# Patient Record
Sex: FEMALE | Race: WHITE | Employment: OTHER | URBAN - METROPOLITAN AREA
[De-identification: names, ages, dates, MRNs, and addresses within clinical notes are randomized per-mention and may not be internally consistent; named-entity substitution may affect disease eponyms.]

---

## 2017-07-18 ENCOUNTER — HOSPITAL ENCOUNTER (OUTPATIENT)
Dept: CT IMAGING | Age: 57
Discharge: HOME | End: 2017-07-18

## 2017-07-18 ENCOUNTER — HOSPITAL ENCOUNTER (OUTPATIENT)
Dept: OTHER | Age: 57
Discharge: HOME | End: 2017-07-18
Attending: INTERNAL MEDICINE | Admitting: INTERNAL MEDICINE

## 2017-07-18 VITALS — WEIGHT: 258 LBS | BODY MASS INDEX: 44.05 KG/M2 | HEIGHT: 64 IN

## 2017-07-18 DIAGNOSIS — C50.911: ICD-10-CM

## 2017-07-18 DIAGNOSIS — C79.51: ICD-10-CM

## 2017-07-18 RX ORDER — FLUDEOXYGLUCOSE F 18 200 MCI/ML
16.68 INJECTION, SOLUTION INTRAVENOUS
Status: COMPLETED | OUTPATIENT
Start: 2017-07-18 | End: 2017-07-18

## 2017-07-18 RX ADMIN — FLUDEOXYGLUCOSE F 18 16.68 MILLICURIE: 200 INJECTION, SOLUTION INTRAVENOUS at 08:57

## 2017-11-07 ENCOUNTER — HOSPITAL ENCOUNTER (OUTPATIENT)
Dept: OTHER | Age: 57
Discharge: HOME | End: 2017-11-07
Attending: INTERNAL MEDICINE | Admitting: INTERNAL MEDICINE

## 2017-11-07 VITALS — HEIGHT: 65 IN | BODY MASS INDEX: 43.82 KG/M2 | WEIGHT: 263 LBS

## 2017-11-07 DIAGNOSIS — C50.011 CANCER OF NIPPLE OF RIGHT BREAST (HCC): ICD-10-CM

## 2017-11-07 RX ORDER — FLUDEOXYGLUCOSE F 18 200 MCI/ML
15.47 INJECTION, SOLUTION INTRAVENOUS
Status: COMPLETED | OUTPATIENT
Start: 2017-11-07 | End: 2017-11-07

## 2017-11-07 RX ADMIN — FLUDEOXYGLUCOSE F 18 15.47 MILLICURIE: 200 INJECTION, SOLUTION INTRAVENOUS at 08:02

## 2018-03-13 ENCOUNTER — HOSPITAL ENCOUNTER (OUTPATIENT)
Dept: OTHER | Age: 58
Discharge: HOME | End: 2018-03-13
Attending: INTERNAL MEDICINE | Admitting: INTERNAL MEDICINE

## 2018-03-13 VITALS — WEIGHT: 264 LBS | HEIGHT: 64 IN | BODY MASS INDEX: 45.07 KG/M2

## 2018-03-13 DIAGNOSIS — C50.511 MALIGNANT NEOPLASM OF LOWER-OUTER QUADRANT OF RIGHT FEMALE BREAST, UNSPECIFIED ESTROGEN RECEPTOR STATUS (HCC): ICD-10-CM

## 2018-03-13 DIAGNOSIS — C50.011: ICD-10-CM

## 2018-03-13 RX ORDER — FLUDEOXYGLUCOSE F 18 200 MCI/ML
16 INJECTION, SOLUTION INTRAVENOUS
Status: COMPLETED | OUTPATIENT
Start: 2018-03-13 | End: 2018-03-13

## 2018-03-13 RX ADMIN — FLUDEOXYGLUCOSE F 18 16 MILLICURIE: 200 INJECTION, SOLUTION INTRAVENOUS at 09:59

## 2018-06-12 ENCOUNTER — HOSPITAL ENCOUNTER (OUTPATIENT)
Dept: OTHER | Age: 58
Discharge: HOME | End: 2018-06-12
Attending: INTERNAL MEDICINE | Admitting: INTERNAL MEDICINE

## 2018-06-12 DIAGNOSIS — C79.52 SECONDARY MALIGNANCY OF BONE MARROW (HCC): ICD-10-CM

## 2018-06-12 DIAGNOSIS — C79.51 BONE METASTASIS (HCC): ICD-10-CM

## 2018-06-12 RX ORDER — FLUDEOXYGLUCOSE F 18 200 MCI/ML
13.9 INJECTION, SOLUTION INTRAVENOUS
Status: COMPLETED | OUTPATIENT
Start: 2018-06-12 | End: 2018-06-12

## 2018-06-12 RX ADMIN — FLUDEOXYGLUCOSE F 18 13.9 MILLICURIE: 200 INJECTION, SOLUTION INTRAVENOUS at 09:00

## 2018-11-06 ENCOUNTER — HOSPITAL ENCOUNTER (OUTPATIENT)
Dept: PET IMAGING | Age: 58
Discharge: HOME | End: 2018-11-06
Payer: MEDICARE

## 2018-11-06 VITALS — HEIGHT: 63 IN | WEIGHT: 245 LBS | BODY MASS INDEX: 43.41 KG/M2

## 2018-11-06 DIAGNOSIS — C50.111 MALIGNANT NEOPLASM OF CENTRAL PORTION OF RIGHT FEMALE BREAST, UNSPECIFIED ESTROGEN RECEPTOR STATUS (HCC): ICD-10-CM

## 2018-11-06 PROCEDURE — 3430000000 HC RX DIAGNOSTIC RADIOPHARMACEUTICAL: Performed by: INTERNAL MEDICINE

## 2018-11-06 PROCEDURE — 78815 PET IMAGE W/CT SKULL-THIGH: CPT

## 2018-11-06 PROCEDURE — A9552 F18 FDG: HCPCS | Performed by: INTERNAL MEDICINE

## 2018-11-06 RX ORDER — FLUDEOXYGLUCOSE F 18 200 MCI/ML
13.74 INJECTION, SOLUTION INTRAVENOUS
Status: COMPLETED | OUTPATIENT
Start: 2018-11-06 | End: 2018-11-06

## 2018-11-06 RX ADMIN — FLUDEOXYGLUCOSE F 18 13.74 MILLICURIE: 200 INJECTION, SOLUTION INTRAVENOUS at 09:18

## 2019-04-09 ENCOUNTER — HOSPITAL ENCOUNTER (OUTPATIENT)
Age: 59
Discharge: HOME | End: 2019-04-09
Payer: MEDICARE

## 2019-04-09 ENCOUNTER — HOSPITAL ENCOUNTER (OUTPATIENT)
Dept: PET IMAGING | Age: 59
Discharge: HOME | End: 2019-04-09
Payer: MEDICARE

## 2019-04-09 DIAGNOSIS — C50.811 MALIGNANT NEOPLASM OF OVERLAPPING SITES OF RIGHT FEMALE BREAST, UNSPECIFIED ESTROGEN RECEPTOR STATUS (HCC): ICD-10-CM

## 2019-04-09 PROCEDURE — 78815 PET IMAGE W/CT SKULL-THIGH: CPT

## 2019-04-09 PROCEDURE — 3430000000 HC RX DIAGNOSTIC RADIOPHARMACEUTICAL: Performed by: INTERNAL MEDICINE

## 2019-04-09 PROCEDURE — A9552 F18 FDG: HCPCS | Performed by: INTERNAL MEDICINE

## 2019-04-09 RX ORDER — FLUDEOXYGLUCOSE F 18 200 MCI/ML
12.22 INJECTION, SOLUTION INTRAVENOUS
Status: COMPLETED | OUTPATIENT
Start: 2019-04-09 | End: 2019-04-09

## 2019-04-09 RX ADMIN — FLUDEOXYGLUCOSE F 18 12.22 MILLICURIE: 200 INJECTION, SOLUTION INTRAVENOUS at 08:22

## 2019-06-18 ENCOUNTER — APPOINTMENT (OUTPATIENT)
Dept: GENERAL RADIOLOGY | Age: 59
End: 2019-06-18
Payer: MEDICARE

## 2019-06-18 ENCOUNTER — HOSPITAL ENCOUNTER (EMERGENCY)
Age: 59
Discharge: HOME | End: 2019-06-19
Attending: EMERGENCY MEDICINE
Payer: MEDICARE

## 2019-06-18 ENCOUNTER — APPOINTMENT (OUTPATIENT)
Dept: CT IMAGING | Age: 59
End: 2019-06-18
Payer: MEDICARE

## 2019-06-18 VITALS
DIASTOLIC BLOOD PRESSURE: 77 MMHG | WEIGHT: 241 LBS | BODY MASS INDEX: 41.15 KG/M2 | HEIGHT: 64 IN | RESPIRATION RATE: 18 BRPM | SYSTOLIC BLOOD PRESSURE: 198 MMHG | OXYGEN SATURATION: 98 % | TEMPERATURE: 98.1 F | HEART RATE: 70 BPM

## 2019-06-18 DIAGNOSIS — R07.89 ATYPICAL CHEST PAIN: Primary | ICD-10-CM

## 2019-06-18 LAB
A/G RATIO: 1.1 (ref 1.1–2.2)
ALBUMIN SERPL-MCNC: 4.1 G/DL (ref 3.4–5)
ALP BLD-CCNC: 58 U/L (ref 40–129)
ALT SERPL-CCNC: 16 U/L (ref 10–40)
ANION GAP SERPL CALCULATED.3IONS-SCNC: 11 MMOL/L (ref 3–16)
APTT: 26.1 SEC (ref 26–36)
AST SERPL-CCNC: 21 U/L (ref 15–37)
BASOPHILS ABSOLUTE: 0 K/UL (ref 0–0.2)
BASOPHILS RELATIVE PERCENT: 2.2 %
BILIRUB SERPL-MCNC: 0.7 MG/DL (ref 0–1)
BUN BLDV-MCNC: 14 MG/DL (ref 7–20)
CALCIUM SERPL-MCNC: 9.6 MG/DL (ref 8.3–10.6)
CHLORIDE BLD-SCNC: 100 MMOL/L (ref 99–110)
CO2: 30 MMOL/L (ref 21–32)
CREAT SERPL-MCNC: 1.1 MG/DL (ref 0.6–1.2)
D DIMER: 310 NG/ML DDU (ref 0–229)
EOSINOPHILS ABSOLUTE: 0.2 K/UL (ref 0–0.6)
EOSINOPHILS RELATIVE PERCENT: 9.5 %
GFR AFRICAN AMERICAN: >60
GFR NON-AFRICAN AMERICAN: 51
GLOBULIN: 3.7 G/DL
GLUCOSE BLD-MCNC: 132 MG/DL (ref 70–99)
HCT VFR BLD CALC: 36.2 % (ref 36–48)
HEMOGLOBIN: 12.4 G/DL (ref 12–16)
INR BLD: 1.08 (ref 0.86–1.14)
LYMPHOCYTES ABSOLUTE: 0.3 K/UL (ref 1–5.1)
LYMPHOCYTES RELATIVE PERCENT: 11.9 %
MCH RBC QN AUTO: 33.8 PG (ref 26–34)
MCHC RBC AUTO-ENTMCNC: 34.3 G/DL (ref 31–36)
MCV RBC AUTO: 98.6 FL (ref 80–100)
MONOCYTES ABSOLUTE: 0.1 K/UL (ref 0–1.3)
MONOCYTES RELATIVE PERCENT: 6.3 %
NEUTROPHILS ABSOLUTE: 1.6 K/UL (ref 1.7–7.7)
NEUTROPHILS RELATIVE PERCENT: 70.1 %
PDW BLD-RTO: 15.6 % (ref 12.4–15.4)
PLATELET # BLD: 134 K/UL (ref 135–450)
PMV BLD AUTO: 8.2 FL (ref 5–10.5)
POTASSIUM SERPL-SCNC: 3.8 MMOL/L (ref 3.5–5.1)
PROTHROMBIN TIME: 12.3 SEC (ref 9.8–13)
RBC # BLD: 3.67 M/UL (ref 4–5.2)
SODIUM BLD-SCNC: 141 MMOL/L (ref 136–145)
TOTAL PROTEIN: 7.8 G/DL (ref 6.4–8.2)
TROPONIN: <0.01 NG/ML
TROPONIN: <0.01 NG/ML
WBC # BLD: 2.3 K/UL (ref 4–11)

## 2019-06-18 PROCEDURE — 99285 EMERGENCY DEPT VISIT HI MDM: CPT

## 2019-06-18 PROCEDURE — 96374 THER/PROPH/DIAG INJ IV PUSH: CPT

## 2019-06-18 PROCEDURE — 71260 CT THORAX DX C+: CPT

## 2019-06-18 PROCEDURE — 85379 FIBRIN DEGRADATION QUANT: CPT

## 2019-06-18 PROCEDURE — 80053 COMPREHEN METABOLIC PANEL: CPT

## 2019-06-18 PROCEDURE — 36415 COLL VENOUS BLD VENIPUNCTURE: CPT

## 2019-06-18 PROCEDURE — 6360000004 HC RX CONTRAST MEDICATION: Performed by: EMERGENCY MEDICINE

## 2019-06-18 PROCEDURE — 85025 COMPLETE CBC W/AUTO DIFF WBC: CPT

## 2019-06-18 PROCEDURE — 6370000000 HC RX 637 (ALT 250 FOR IP): Performed by: EMERGENCY MEDICINE

## 2019-06-18 PROCEDURE — 6360000002 HC RX W HCPCS: Performed by: EMERGENCY MEDICINE

## 2019-06-18 PROCEDURE — 85730 THROMBOPLASTIN TIME PARTIAL: CPT

## 2019-06-18 PROCEDURE — 85610 PROTHROMBIN TIME: CPT

## 2019-06-18 PROCEDURE — 93005 ELECTROCARDIOGRAM TRACING: CPT | Performed by: EMERGENCY MEDICINE

## 2019-06-18 PROCEDURE — 71045 X-RAY EXAM CHEST 1 VIEW: CPT

## 2019-06-18 PROCEDURE — 84484 ASSAY OF TROPONIN QUANT: CPT

## 2019-06-18 RX ORDER — DICYCLOMINE HYDROCHLORIDE 10 MG/1
10 CAPSULE ORAL ONCE
Status: COMPLETED | OUTPATIENT
Start: 2019-06-18 | End: 2019-06-18

## 2019-06-18 RX ORDER — MORPHINE SULFATE 4 MG/ML
4 INJECTION, SOLUTION INTRAMUSCULAR; INTRAVENOUS ONCE
Status: COMPLETED | OUTPATIENT
Start: 2019-06-18 | End: 2019-06-18

## 2019-06-18 RX ORDER — ASPIRIN 81 MG/1
81 TABLET, CHEWABLE ORAL ONCE
Status: DISCONTINUED | OUTPATIENT
Start: 2019-06-18 | End: 2019-06-19 | Stop reason: HOSPADM

## 2019-06-18 RX ORDER — PANTOPRAZOLE SODIUM 40 MG/1
40 TABLET, DELAYED RELEASE ORAL ONCE
Status: COMPLETED | OUTPATIENT
Start: 2019-06-18 | End: 2019-06-18

## 2019-06-18 RX ADMIN — DICYCLOMINE HYDROCHLORIDE 10 MG: 10 CAPSULE ORAL at 21:28

## 2019-06-18 RX ADMIN — IOPAMIDOL 75 ML: 755 INJECTION, SOLUTION INTRAVENOUS at 21:59

## 2019-06-18 RX ADMIN — LIDOCAINE HYDROCHLORIDE: 20 SOLUTION ORAL; TOPICAL at 21:30

## 2019-06-18 RX ADMIN — PANTOPRAZOLE SODIUM 40 MG: 40 TABLET, DELAYED RELEASE ORAL at 21:28

## 2019-06-18 RX ADMIN — MORPHINE SULFATE 4 MG: 4 INJECTION INTRAVENOUS at 23:34

## 2019-06-18 ASSESSMENT — PAIN DESCRIPTION - ORIENTATION: ORIENTATION: LEFT

## 2019-06-18 ASSESSMENT — ENCOUNTER SYMPTOMS
ABDOMINAL PAIN: 0
VOICE CHANGE: 0
VOMITING: 0
TROUBLE SWALLOWING: 0
SHORTNESS OF BREATH: 0
COLOR CHANGE: 0
WHEEZING: 0
FACIAL SWELLING: 0
STRIDOR: 0
NAUSEA: 0

## 2019-06-18 ASSESSMENT — PAIN SCALES - GENERAL
PAINLEVEL_OUTOF10: 5
PAINLEVEL_OUTOF10: 5
PAINLEVEL_OUTOF10: 10

## 2019-06-18 ASSESSMENT — PAIN DESCRIPTION - ONSET: ONSET: SUDDEN

## 2019-06-18 ASSESSMENT — PAIN DESCRIPTION - LOCATION: LOCATION: CHEST

## 2019-06-18 ASSESSMENT — PAIN DESCRIPTION - PROGRESSION: CLINICAL_PROGRESSION: NOT CHANGED

## 2019-06-18 ASSESSMENT — PAIN - FUNCTIONAL ASSESSMENT: PAIN_FUNCTIONAL_ASSESSMENT: PREVENTS OR INTERFERES WITH MANY ACTIVE NOT PASSIVE ACTIVITIES

## 2019-06-18 ASSESSMENT — PAIN DESCRIPTION - PAIN TYPE
TYPE: ACUTE PAIN
TYPE: ACUTE PAIN

## 2019-06-18 ASSESSMENT — PAIN DESCRIPTION - FREQUENCY: FREQUENCY: CONTINUOUS

## 2019-06-19 LAB
EKG ATRIAL RATE: 73 BPM
EKG DIAGNOSIS: NORMAL
EKG P AXIS: 72 DEGREES
EKG P-R INTERVAL: 126 MS
EKG Q-T INTERVAL: 406 MS
EKG QRS DURATION: 90 MS
EKG QTC CALCULATION (BAZETT): 447 MS
EKG R AXIS: 55 DEGREES
EKG T AXIS: 62 DEGREES
EKG VENTRICULAR RATE: 73 BPM

## 2019-06-19 PROCEDURE — 93010 ELECTROCARDIOGRAM REPORT: CPT | Performed by: INTERNAL MEDICINE

## 2019-06-19 RX ORDER — CYCLOBENZAPRINE HCL 10 MG
10 TABLET ORAL 2 TIMES DAILY PRN
Qty: 20 TABLET | Refills: 0 | Status: SHIPPED | OUTPATIENT
Start: 2019-06-19 | End: 2019-06-29

## 2019-06-19 NOTE — ED PROVIDER NOTES
[facility]  eMERGENCY dEPARTMENT eNCOUnter      Pt Name: Criselda Kiser  MRN: [id number]  [place] 1960  Date of evaluation: 6/18/2019  Provider: Gordo Mckinnon MD    CHIEF COMPLAINT       Chief Complaint   Patient presents with    Chest Pain     pt gives hx of a fib and states that has had chest since 0300 today and at 0800 pt gives chest pain a 10 and has had that type of pain all day. pt has hx breast cancer stage 4         HISTORY OF PRESENT ILLNESS   (Location/Symptom, Timing/Onset, Context/Setting, Quality, Duration, Modifying Factors, Severity)  Note limiting factors. The history is provided by the patient. Chest Pain   Pain location:  L chest  Pain quality: stabbing    Pain radiates to:  Does not radiate  Pain severity:  Severe  Onset quality:  Gradual  Duration:  18 hours  Timing:  Constant  Progression:  Worsening  Chronicity:  New  Context: at rest    Relieved by:  Nothing  Worsened by:  Deep breathing  Ineffective treatments:  None tried  Associated symptoms: palpitations    Associated symptoms: no abdominal pain, no dysphagia, no fever, no nausea, no shortness of breath, no vomiting and no weakness    Risk factors: diabetes mellitus, hypertension and obesity    Risk factors: no birth control, no coronary artery disease, no high cholesterol, no prior DVT/PE and no smoking        Nursing Notes were reviewed. REVIEW OFSYSTEMS    (2-9 systems for level 4, 10 or more for level 5)     Review of Systems   Constitutional: Negative for appetite change, fever and unexpected weight change. HENT: Negative for facial swelling, trouble swallowing and voice change. Eyes: Negative for visual disturbance. Respiratory: Negative for shortness of breath, wheezing and stridor. Cardiovascular: Positive for chest pain and palpitations. Gastrointestinal: Negative for abdominal pain, nausea and vomiting. Genitourinary: Negative for dysuria and vaginal bleeding. Musculoskeletal: Negative for neck pain and neck stiffness. Skin: Negative for color change and wound. Neurological: Negative for seizures, syncope and weakness. Psychiatric/Behavioral: Negative for self-injury and suicidal ideas. Except as noted above the remainder of the review of systems was reviewed and negative. PAST MEDICAL HISTORY     Past Medical History:   Diagnosis Date    AC (acromioclavicular) joint bone spurs     knees    Atrial fibrillation (HCC)     Cancer (HCC)     Cellulitis     Depression     anxiety    Diabetes mellitus ([facility]     Hypertension          SURGICAL HISTORY       Past Surgical History:   Procedure Laterality Date    CHOLECYSTECTOMY      HYSTERECTOMY      HYSTERECTOMY      KNEE SURGERY Right          CURRENT MEDICATIONS       Previous Medications    CALCIUM CARBONATE (OSCAL) 500 MG TABS TABLET    Take 500 mg by mouth daily    CARVEDILOL (COREG) 25 MG TABLET    Take 25 mg by mouth 2 times daily (with meals)    CITALOPRAM (CELEXA) 20 MG TABLET    Take 40 mg by mouth daily.       FERROUS SULFATE 325 (65 FE) MG EC TABLET    Take 325 mg by mouth 3 times daily (with meals)    FULVESTRANT (FASLODEX) 250 MG/5ML SOLN IM INJECTION    Inject 500 mg into the muscle once    FUROSEMIDE (LASIX) 20 MG TABLET    Take 40 mg by mouth daily     HYDRALAZINE (APRESOLINE) 10 MG TABLET    Take 25 mg by mouth 3 times daily     LANTUS SOLOSTAR 100 UNIT/ML INJECTION PEN    Inject 10 Units as directed nightly    METOCLOPRAMIDE (REGLAN) 10 MG TABLET    Take 1 tablet by mouth 3 times daily as needed (nausea and vomiting)    ONDANSETRON (ZOFRAN-ODT) 4 MG DISINTEGRATING TABLET    Take 4 mg by mouth every 8 hours as needed for Nausea or Vomiting    PALBOCICLIB (IBRANCE) 100 MG CAPSULE    Take 100 mg by mouth daily    RANITIDINE HCL (RANITIDINE 150 MAX STRENGTH PO)    Take 150 mg by mouth 2 times daily       ALLERGIES     Ampicillin and Carafate [sucralfate]    FAMILY HISTORY       Family History   Problem Relation Age of Onset    Heart Disease Maternal Grandmother     Kidney Disease Mother     Cancer Maternal Aunt           SOCIAL HISTORY       Social History     Socioeconomic History    Marital status: Single     Spouse name: None    Number of children: None    Years of education: None    Highest education level: None   Occupational History    None   Social Needs    Financial resource strain: None    Food insecurity:     Worry: None     Inability: None    Transportation needs:     Medical: None     Non-medical: None   Tobacco Use    Smoking status: Never Smoker    Smokeless tobacco: Never Used   Substance and Sexual Activity    Alcohol use: No    Drug use: No    Sexual activity: Never   Lifestyle    Physical activity:     Days per week: None     Minutes per session: None    Stress: None   Relationships    Social connections:     Talks on phone: None     Gets together: None     Attends Christianity service: None     Active member of club or organization: None     Attends meetings of clubs or organizations: None     Relationship status: None    Intimate partner violence:     Fear of current or ex partner: None     Emotionally abused: None     Physically abused: None     Forced sexual activity: None   Other Topics Concern    None   Social History Narrative    None         PHYSICAL EXAM    (up to 7 for level 4, 8 or more for level 5)     ED Triage Vitals [06/18/19 2014]   BP Temp Temp Source Pulse Resp SpO2 Height Weight   (!) 188/81 98.1 °F (36.7 °C) Oral 74 20 100 % 5' 4\" (1.626 m) 241 lb (109.3 kg)       Physical Exam   Constitutional: She is oriented to person, place, and time. She appears well-developed and well-nourished. HENT:   Head: Normocephalic and atraumatic. Right Ear: External ear normal.   Left Ear: External ear normal.   Eyes: Conjunctivae and EOM are normal.   Neck: Neck supple. No JVD present. No tracheal deviation present.    Cardiovascular: Normal rate and intact distal pulses. Pulmonary/Chest: Effort normal and breath sounds normal. No respiratory distress. She has no wheezes. She exhibits tenderness (chest pain reproducible to palpation). Abdominal: Soft. She exhibits no distension. There is no tenderness. There is no rebound and no guarding. Musculoskeletal: Normal range of motion. She exhibits no tenderness or deformity. No lower extremity swelling, tenderness, or palpable cord. Negative Ivan test bilaterally. Neurological: She is alert and oriented to person, place, and time. No cranial nerve deficit. Skin: Skin is warm and dry. She is not diaphoretic. Nursing note and vitals reviewed. DIAGNOSTIC RESULTS     EKG:All EKG's are interpreted by the Emergency Department Physician who either signs or Co-signs this chart in the absence of a cardiologist.    The Ekg interpreted by me shows  normal sinus rhythm with a rate of 73  Axis is   Normal  QTc is  447ms  Intervals and Durations are unremarkable. ST Segments: no acute change  No significant change from prior EKG dated 12/6/16      RADIOLOGY:     Interpretation per the Radiologist below, if available at the time of this note:    CT CHEST PULMONARY EMBOLISM W CONTRAST   Final Result   No evidence of pulmonary embolism or acute pulmonary abnormality. Fatty infiltration of the liver. XR CHEST PORTABLE   Final Result   Stable portable study. LABS:  Labs Reviewed   CBC WITH AUTO DIFFERENTIAL - Abnormal; Notable for the following components:       Result Value    WBC 2.3 (*)     RBC 3.67 (*)     RDW 15.6 (*)     Platelets 625 (*)     Neutrophils # 1.6 (*)     Lymphocytes # 0.3 (*)     All other components within normal limits    Narrative:     Performed at:  [address] [place] [address] Laboratory  [address] [place] [address].  [address]   Phone [phone]   COMPREHENSIVE METABOLIC PANEL - Abnormal; Notable for the following components:    Glucose 132 (*)     GFR Non- 51 (*)     All other components within normal limits    Narrative:     Performed at:  [facility]. [facility]  [address] [place] [address]. [place] [facility] [address]   Phone [phone]   D-DIMER, QUANTITATIVE - Abnormal; Notable for the following components:    D-Dimer, Quant 310 (*)     All other components within normal limits    Narrative:     Performed at:  [facility]. [facility]  [address] [place] [address]. [place]   Phone [phone]    Narrative:     Performed at:  [facility]. [facility]  [address] [place] [address]. [facility] [address] [facility]   Phone [phone]   APTT    Narrative:     Performed at:  [facility]. [facility]  [address] [place] [address]. [facility]   Phone [phone]   TROPONIN    Narrative:     Performed at:  [facility]. [facility]  [address] [place] [address]. [address]   Phone [phone]   TROPONIN    Narrative:     Performed at:  [facility]. [facility]  [address] [place] [address]. [address]   Phone [phone]       All otherlabs were within normal range or not returned as of this dictation. EMERGENCY DEPARTMENT COURSE and DIFFERENTIAL DIAGNOSIS/MDM:   Vitals:    Vitals:    06/18/19 2014 06/18/19 2327   BP: (!) 188/81 (!) 198/77   Pulse: 74 70   Resp: 20 18   Temp: 98.1 °F (36.7 °C)    TempSrc: Oral    SpO2: 100% 98%   Weight: 241 lb (109.3 kg)    Height: 5' 4\" (1.626 m)          MDM   HEART SCORE:    History: +0 for low suspicion  EKG: +0 for normal EKG   Age: +1 for age 44-72 years  Risk factors (includes HLD, HTN, DM, tobacco use, obesity, and +FHx): +2 for known CAD or 3+ risk factors  Initial troponin: +0 for negative troponin    Heart score: 3.   This falls under the following category: Score of 0-3, which indicates a very low risk for major adverse cardiac event and supports early discharge        The patient's symptoms are atypical in nature, completely reproducible to palpation, nonexertional, heart score 3, and delta troponins negative I do not suspect ACS at this time. D-dimer is elevated therefore CT chest is ordered but does not show any evidence of PE or acute emergent pathology. I talked with the patient she is comfortable being discharged home with close primary care follow-up at this time where they will discuss the possibility of stress test or further testing. In the meantime we will treat with a muscle relaxer as muscle pain is a possible cause of the patient's symptoms. Standard ER return precautions discussed in the meantime. Patient expresses understanding and agreement with this plan and is discharged home. I estimate there is low risk for pericardial tamponade, pneumothorax, pulmonary embolism, acute coronary syndrome or thoracic aortic dissection, thus I consider the discharge disposition reasonable. We have discussed the diagnosis and risks, and we agree with discharging home to follow-up with their primary doctor. We also discussed returning to the Emergency Department immediately if new or worsening symptoms occur. We have discussed the symptoms which are most concerning (e.g., bloody sputum, fever, worsening pain or shortenss of breath, vomiting) that necessitate immediate return. Procedures    FINAL IMPRESSION      1. Atypical chest pain          DISPOSITION/PLAN   DISPOSITION  Discharge      PATIENT REFERRED TO:  Riki Smallwood MD  [facility]. [address] [facility]  [phone]    In 1 week      [place].  [facility] Emergency Department  [address] [place] [address],[address]  [phone]    If symptoms worsen      DISCHARGE MEDICATIONS:  New Prescriptions    CYCLOBENZAPRINE (FLEXERIL) 10 MG TABLET    Take 1 tablet by mouth 2 times daily as needed for Muscle spasms          (Please note that portions of this note were completed with a voice recognition program.  Efforts were made to edit the dictations but occasionally words aremis-transcribed. )    Shiraz Chung MD (electronically signed)  Attending Emergency Physician           Shiraz Chung MD  06/19/19 0236

## 2019-07-05 ENCOUNTER — HOSPITAL ENCOUNTER (EMERGENCY)
Age: 59
Discharge: HOME | End: 2019-07-05
Attending: EMERGENCY MEDICINE
Payer: MEDICARE

## 2019-07-05 ENCOUNTER — APPOINTMENT (OUTPATIENT)
Dept: GENERAL RADIOLOGY | Age: 59
End: 2019-07-05
Payer: MEDICARE

## 2019-07-05 ENCOUNTER — APPOINTMENT (OUTPATIENT)
Dept: CT IMAGING | Age: 59
End: 2019-07-05
Payer: MEDICARE

## 2019-07-05 VITALS
BODY MASS INDEX: 41.15 KG/M2 | WEIGHT: 241 LBS | RESPIRATION RATE: 18 BRPM | OXYGEN SATURATION: 99 % | DIASTOLIC BLOOD PRESSURE: 81 MMHG | HEIGHT: 64 IN | TEMPERATURE: 98.4 F | SYSTOLIC BLOOD PRESSURE: 191 MMHG | HEART RATE: 88 BPM

## 2019-07-05 DIAGNOSIS — S00.81XA FACIAL ABRASION, INITIAL ENCOUNTER: ICD-10-CM

## 2019-07-05 DIAGNOSIS — S09.90XA INJURY OF HEAD, INITIAL ENCOUNTER: Primary | ICD-10-CM

## 2019-07-05 DIAGNOSIS — M25.551 ACUTE RIGHT HIP PAIN: ICD-10-CM
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
## 2019-07-05 PROCEDURE — 73501 X-RAY EXAM HIP UNI 1 VIEW: CPT

## 2019-07-05 PROCEDURE — 70450 CT HEAD/BRAIN W/O DYE: CPT

## 2019-07-05 PROCEDURE — 99284 EMERGENCY DEPT VISIT MOD MDM: CPT

## 2019-07-05 PROCEDURE — 72125 CT NECK SPINE W/O DYE: CPT

## 2019-07-05 PROCEDURE — 6370000000 HC RX 637 (ALT 250 FOR IP): Performed by: EMERGENCY MEDICINE

## 2019-07-05 RX ORDER — ACETAMINOPHEN 325 MG/1
650 TABLET ORAL ONCE
Status: COMPLETED | OUTPATIENT
Start: 2019-07-05 | End: 2019-07-05

## 2019-07-05 RX ORDER — MORPHINE SULFATE 15 MG/1
30 TABLET ORAL 2 TIMES DAILY
Status: ON HOLD | COMMUNITY
End: 2020-01-01 | Stop reason: HOSPADM

## 2019-07-05 RX ORDER — MORPHINE SULFATE 60 MG/1
60 TABLET, FILM COATED, EXTENDED RELEASE ORAL 2 TIMES DAILY
Status: ON HOLD | COMMUNITY
End: 2020-01-01 | Stop reason: HOSPADM

## 2019-07-05 RX ADMIN — ACETAMINOPHEN 650 MG: 325 TABLET ORAL at 22:09

## 2019-07-05 ASSESSMENT — PAIN SCALES - GENERAL
PAINLEVEL_OUTOF10: 8
PAINLEVEL_OUTOF10: 8

## 2019-07-05 ASSESSMENT — PAIN DESCRIPTION - ORIENTATION: ORIENTATION: RIGHT

## 2019-07-05 ASSESSMENT — PAIN DESCRIPTION - LOCATION: LOCATION: HIP;FACE

## 2019-07-05 ASSESSMENT — PAIN DESCRIPTION - PAIN TYPE: TYPE: ACUTE PAIN

## 2019-07-05 ASSESSMENT — PAIN DESCRIPTION - DESCRIPTORS: DESCRIPTORS: ACHING

## 2019-07-06 ASSESSMENT — ENCOUNTER SYMPTOMS
COUGH: 0
VOMITING: 0
NAUSEA: 0
DIARRHEA: 0
EYE DISCHARGE: 0
BACK PAIN: 0
ABDOMINAL PAIN: 0
SORE THROAT: 0

## 2019-07-06 NOTE — ED PROVIDER NOTES
bruise/bleed easily. Psychiatric/Behavioral: Negative for confusion. Positives and Pertinent negatives as per HPI. Except as noted abovein the ROS, all other systems were reviewed and negative. PAST MEDICAL HISTORY     Past Medical History:   Diagnosis Date    AC (acromioclavicular) joint bone spurs     knees    Atrial fibrillation (HCC)     Cancer ([facility] [address]     stage 4 breast    Cellulitis     Depression     anxiety    Diabetes mellitus ([facility]     Hypertension          SURGICAL HISTORY     Past Surgical History:   Procedure Laterality Date    CHOLECYSTECTOMY      HYSTERECTOMY      HYSTERECTOMY      KNEE SURGERY Right          CURRENTMEDICATIONS       Discharge Medication List as of 7/5/2019 10:37 PM      CONTINUE these medications which have NOT CHANGED    Details   morphine (MS CONTIN) 60 MG extended release tablet Take 60 mg by mouth 2 times daily. Historical Med      morphine (MSIR) 15 MG tablet Take 15 mg by mouth nightly. Historical Med      raNITIdine HCl (RANITIDINE 150 MAX STRENGTH PO) Take 150 mg by mouth 2 times dailyHistorical Med      metoclopramide (REGLAN) 10 MG tablet Take 1 tablet by mouth 3 times daily as needed (nausea and vomiting), Disp-12 tablet, R-0Print      LANTUS SOLOSTAR 100 UNIT/ML injection pen Inject 10 Units as directed nightly, R-5, TIM      hydrALAZINE (APRESOLINE) 10 MG tablet Take 25 mg by mouth 3 times daily       calcium carbonate (OSCAL) 500 MG TABS tablet Take 500 mg by mouth daily      ferrous sulfate 325 (65 FE) MG EC tablet Take 325 mg by mouth 3 times daily (with meals)      ondansetron (ZOFRAN-ODT) 4 MG disintegrating tablet Take 4 mg by mouth every 8 hours as needed for Nausea or Vomiting      palbociclib (IBRANCE) 100 MG capsule Take 100 mg by mouth daily      fulvestrant (FASLODEX) 250 MG/5ML SOLN IM injection Inject 500 mg into the muscle once      furosemide (LASIX) 20 MG tablet Take 40 mg by mouth daily       carvedilol (COREG) 25 MG tablet Take 25 fever) that necessitate immediate return. The patient understands the importance of follow up and reasons to return. FINAL IMPRESSION      1. Injury of head, initial encounter    2. Facial abrasion, initial encounter    3. Fall, initial encounter    4. Acute right hip pain          DISPOSITION/PLAN   DISPOSITION Decision To Discharge 07/05/2019 10:36:09 PM      PATIENT REFERRED TO:  [facility]) Pre-Services  [address] [place] [address].  [facility] Emergency Department  [address] [place] [address]  [phone]  Go to   If symptoms worsen      DISCHARGE MEDICATIONS:  Discharge Medication List as of 7/5/2019 10:37 PM          DISCONTINUED MEDICATIONS:  Discharge Medication List as of 7/5/2019 10:37 PM                 (Please note that portions of this note were completed with a voice recognition program.  Efforts were [facility] edit the dictations but occasionally words are mis-transcribed.)    Joshua Mckeon MD(electronically signed)      Joshua Mckeon MD  07/06/19 1756

## 2019-07-24 ENCOUNTER — HOSPITAL ENCOUNTER (EMERGENCY)
Age: 59
Discharge: HOME | DRG: 682 | End: 2019-07-24
Attending: EMERGENCY MEDICINE
Payer: MEDICARE

## 2019-07-24 ENCOUNTER — APPOINTMENT (OUTPATIENT)
Dept: CT IMAGING | Age: 59
DRG: 682 | End: 2019-07-24
Payer: MEDICARE

## 2019-07-24 VITALS
RESPIRATION RATE: 14 BRPM | BODY MASS INDEX: 40.97 KG/M2 | WEIGHT: 240 LBS | HEIGHT: 64 IN | DIASTOLIC BLOOD PRESSURE: 76 MMHG | TEMPERATURE: 98.6 F | HEART RATE: 62 BPM | OXYGEN SATURATION: 98 % | SYSTOLIC BLOOD PRESSURE: 186 MMHG

## 2019-07-24 DIAGNOSIS — R19.7 NAUSEA VOMITING AND DIARRHEA: ICD-10-CM

## 2019-07-24 DIAGNOSIS — Z17.0 MALIGNANT NEOPLASM OF BREAST IN FEMALE, ESTROGEN RECEPTOR POSITIVE, UNSPECIFIED LATERALITY, UNSPECIFIED SITE OF BREAST (HCC): ICD-10-CM

## 2019-07-24 DIAGNOSIS — C50.919 MALIGNANT NEOPLASM OF BREAST IN FEMALE, ESTROGEN RECEPTOR POSITIVE, UNSPECIFIED LATERALITY, UNSPECIFIED SITE OF BREAST (HCC): ICD-10-CM

## 2019-07-24 DIAGNOSIS — R19.7 DIARRHEA, UNSPECIFIED TYPE: Primary | ICD-10-CM

## 2019-07-24 DIAGNOSIS — R11.2 NAUSEA VOMITING AND DIARRHEA: ICD-10-CM

## 2019-07-24 LAB
A/G RATIO: 1 (ref 1.1–2.2)
ALBUMIN SERPL-MCNC: 4.4 G/DL (ref 3.4–5)
ALP BLD-CCNC: 71 U/L (ref 40–129)
ALT SERPL-CCNC: 15 U/L (ref 10–40)
ANION GAP SERPL CALCULATED.3IONS-SCNC: 12 MMOL/L (ref 3–16)
AST SERPL-CCNC: 21 U/L (ref 15–37)
BASOPHILS ABSOLUTE: 0.1 K/UL (ref 0–0.2)
BASOPHILS RELATIVE PERCENT: 1.1 %
BILIRUB SERPL-MCNC: 1 MG/DL (ref 0–1)
BUN BLDV-MCNC: 11 MG/DL (ref 7–20)
C DIFF TOXIN/ANTIGEN: NORMAL
CALCIUM SERPL-MCNC: 10.2 MG/DL (ref 8.3–10.6)
CHLORIDE BLD-SCNC: 100 MMOL/L (ref 99–110)
CO2: 30 MMOL/L (ref 21–32)
CREAT SERPL-MCNC: 1 MG/DL (ref 0.6–1.2)
EOSINOPHILS ABSOLUTE: 0.1 K/UL (ref 0–0.6)
EOSINOPHILS RELATIVE PERCENT: 1.4 %
GFR AFRICAN AMERICAN: >60
GFR NON-AFRICAN AMERICAN: 56
GLOBULIN: 4.5 G/DL
GLUCOSE BLD-MCNC: 192 MG/DL (ref 70–99)
HCT VFR BLD CALC: 38.1 % (ref 36–48)
HEMOGLOBIN: 13.3 G/DL (ref 12–16)
LIPASE: 58 U/L (ref 13–60)
LYMPHOCYTES ABSOLUTE: 0.3 K/UL (ref 1–5.1)
LYMPHOCYTES RELATIVE PERCENT: 7 %
MAGNESIUM: 1.7 MG/DL (ref 1.8–2.4)
MCH RBC QN AUTO: 35.1 PG (ref 26–34)
MCHC RBC AUTO-ENTMCNC: 35 G/DL (ref 31–36)
MCV RBC AUTO: 100.3 FL (ref 80–100)
MONOCYTES ABSOLUTE: 0.2 K/UL (ref 0–1.3)
MONOCYTES RELATIVE PERCENT: 3.6 %
NEUTROPHILS ABSOLUTE: 4.1 K/UL (ref 1.7–7.7)
NEUTROPHILS RELATIVE PERCENT: 86.9 %
PDW BLD-RTO: 17.2 % (ref 12.4–15.4)
PLATELET # BLD: 186 K/UL (ref 135–450)
PMV BLD AUTO: 8.2 FL (ref 5–10.5)
POTASSIUM SERPL-SCNC: 3 MMOL/L (ref 3.5–5.1)
RBC # BLD: 3.8 M/UL (ref 4–5.2)
SODIUM BLD-SCNC: 142 MMOL/L (ref 136–145)
TOTAL PROTEIN: 8.9 G/DL (ref 6.4–8.2)
WBC # BLD: 4.7 K/UL (ref 4–11)

## 2019-07-24 PROCEDURE — 74177 CT ABD & PELVIS W/CONTRAST: CPT

## 2019-07-24 PROCEDURE — 96361 HYDRATE IV INFUSION ADD-ON: CPT

## 2019-07-24 PROCEDURE — 85025 COMPLETE CBC W/AUTO DIFF WBC: CPT

## 2019-07-24 PROCEDURE — 2580000003 HC RX 258: Performed by: EMERGENCY MEDICINE

## 2019-07-24 PROCEDURE — 2500000003 HC RX 250 WO HCPCS: Performed by: EMERGENCY MEDICINE

## 2019-07-24 PROCEDURE — 83690 ASSAY OF LIPASE: CPT

## 2019-07-24 PROCEDURE — 6360000004 HC RX CONTRAST MEDICATION: Performed by: EMERGENCY MEDICINE

## 2019-07-24 PROCEDURE — 87449 NOS EACH ORGANISM AG IA: CPT

## 2019-07-24 PROCEDURE — 6360000002 HC RX W HCPCS: Performed by: EMERGENCY MEDICINE

## 2019-07-24 PROCEDURE — 96375 TX/PRO/DX INJ NEW DRUG ADDON: CPT

## 2019-07-24 PROCEDURE — 80053 COMPREHEN METABOLIC PANEL: CPT

## 2019-07-24 PROCEDURE — 99285 EMERGENCY DEPT VISIT HI MDM: CPT

## 2019-07-24 PROCEDURE — 96374 THER/PROPH/DIAG INJ IV PUSH: CPT

## 2019-07-24 PROCEDURE — 6370000000 HC RX 637 (ALT 250 FOR IP): Performed by: EMERGENCY MEDICINE

## 2019-07-24 PROCEDURE — 83735 ASSAY OF MAGNESIUM: CPT

## 2019-07-24 PROCEDURE — 36415 COLL VENOUS BLD VENIPUNCTURE: CPT

## 2019-07-24 PROCEDURE — 87324 CLOSTRIDIUM AG IA: CPT

## 2019-07-24 RX ORDER — DIPHENOXYLATE HYDROCHLORIDE AND ATROPINE SULFATE 2.5; .025 MG/1; MG/1
1 TABLET ORAL 4 TIMES DAILY PRN
Qty: 10 TABLET | Refills: 0 | Status: SHIPPED | OUTPATIENT
Start: 2019-07-24 | End: 2019-08-03

## 2019-07-24 RX ORDER — ONDANSETRON 2 MG/ML
4 INJECTION INTRAMUSCULAR; INTRAVENOUS ONCE
Status: COMPLETED | OUTPATIENT
Start: 2019-07-24 | End: 2019-07-24

## 2019-07-24 RX ORDER — LOPERAMIDE HYDROCHLORIDE 2 MG/1
2 CAPSULE ORAL ONCE
Status: COMPLETED | OUTPATIENT
Start: 2019-07-24 | End: 2019-07-24

## 2019-07-24 RX ORDER — DIPHENOXYLATE HYDROCHLORIDE AND ATROPINE SULFATE 2.5; .025 MG/1; MG/1
1 TABLET ORAL ONCE
Status: DISCONTINUED | OUTPATIENT
Start: 2019-07-24 | End: 2019-07-24

## 2019-07-24 RX ORDER — 0.9 % SODIUM CHLORIDE 0.9 %
1000 INTRAVENOUS SOLUTION INTRAVENOUS ONCE
Status: COMPLETED | OUTPATIENT
Start: 2019-07-24 | End: 2019-07-24

## 2019-07-24 RX ORDER — ONDANSETRON HYDROCHLORIDE 8 MG/1
8 TABLET, FILM COATED ORAL EVERY 8 HOURS PRN
Qty: 10 TABLET | Refills: 0 | Status: SHIPPED | OUTPATIENT
Start: 2019-07-24

## 2019-07-24 RX ORDER — MORPHINE SULFATE 4 MG/ML
4 INJECTION, SOLUTION INTRAMUSCULAR; INTRAVENOUS ONCE
Status: COMPLETED | OUTPATIENT
Start: 2019-07-24 | End: 2019-07-24

## 2019-07-24 RX ADMIN — ONDANSETRON 4 MG: 2 INJECTION INTRAMUSCULAR; INTRAVENOUS at 09:41

## 2019-07-24 RX ADMIN — FAMOTIDINE 20 MG: 10 INJECTION, SOLUTION INTRAVENOUS at 09:41

## 2019-07-24 RX ADMIN — MORPHINE SULFATE 4 MG: 4 INJECTION INTRAVENOUS at 09:41

## 2019-07-24 RX ADMIN — SODIUM CHLORIDE 1000 ML: 9 INJECTION, SOLUTION INTRAVENOUS at 09:41

## 2019-07-24 RX ADMIN — IOPAMIDOL 75 ML: 755 INJECTION, SOLUTION INTRAVENOUS at 10:19

## 2019-07-24 RX ADMIN — LOPERAMIDE HYDROCHLORIDE 2 MG: 2 CAPSULE ORAL at 09:41

## 2019-07-24 ASSESSMENT — ENCOUNTER SYMPTOMS
SHORTNESS OF BREATH: 0
ABDOMINAL DISTENTION: 0
BLOOD IN STOOL: 0
BACK PAIN: 0
CONSTIPATION: 0
ANAL BLEEDING: 0
NAUSEA: 1
ABDOMINAL PAIN: 0
PHOTOPHOBIA: 0
VOMITING: 1
DIARRHEA: 1
FACIAL SWELLING: 0

## 2019-07-24 ASSESSMENT — PAIN DESCRIPTION - ORIENTATION: ORIENTATION: MID

## 2019-07-24 ASSESSMENT — PAIN DESCRIPTION - LOCATION: LOCATION: ABDOMEN

## 2019-07-24 ASSESSMENT — PAIN DESCRIPTION - PAIN TYPE: TYPE: ACUTE PAIN

## 2019-07-24 ASSESSMENT — PAIN SCALES - GENERAL: PAINLEVEL_OUTOF10: 10

## 2019-07-24 ASSESSMENT — PAIN DESCRIPTION - DESCRIPTORS: DESCRIPTORS: CRAMPING

## 2019-07-24 NOTE — ED NOTES
[facility] for [facility] consulted for ENT on call, for an open nasal fracture.       Alma Mayer  07/24/19 1037

## 2019-07-24 NOTE — ED NOTES
Pt stated she needed to use the bathroom. Pt given hat for toilet and stool specimen cup and verbalized understanding. Pt ambulated to bathroom.      Dorene Veloz RN  07/24/19 0432

## 2019-07-24 NOTE — ED PROVIDER NOTES
Cancer ([facility]     stage 4 breast    Cellulitis     Depression     anxiety    Diabetes mellitus ([facility]     Hypertension        Past Surgical History:   Procedure Laterality Date    CHOLECYSTECTOMY      HYSTERECTOMY      HYSTERECTOMY      KNEE SURGERY Right        Family History   Problem Relation Age of Onset    Heart Disease Maternal Grandmother     Kidney Disease Mother     Cancer Maternal Aunt        Social History     Socioeconomic History    Marital status: Single     Spouse name: Not on file    Number of children: Not on file    Years of education: Not on file    Highest education level: Not on file   Occupational History    Not on file   Social Needs    Financial resource strain: Not on file    Food insecurity:     Worry: Not on file     Inability: Not on file    Transportation needs:     Medical: Not on file     Non-medical: Not on file   Tobacco Use    Smoking status: Never Smoker    Smokeless tobacco: Never Used   Substance and Sexual Activity    Alcohol use: No    Drug use: No    Sexual activity: Never   Lifestyle    Physical activity:     Days per week: Not on file     Minutes per session: Not on file    Stress: Not on file   Relationships    Social connections:     Talks on phone: Not on file     Gets together: Not on file     Attends Denominational service: Not on file     Active member of club or organization: Not on file     Attends meetings of clubs or organizations: Not on file     Relationship status: Not on file    Intimate partner violence:     Fear of current or ex partner: Not on file     Emotionally abused: Not on file     Physically abused: Not on file     Forced sexual activity: Not on file   Other Topics Concern    Not on file   Social History Narrative    Not on file       Patient Vitals for the past 24 hrs:   BP Temp Pulse Resp SpO2 Height Weight   07/24/19 0904 (!) 217/76 98.2 °F (36.8 °C) 66 16 98 % 5' 4\" (1.626 m) 240 lb (108.9 kg)         Medications   0.9 % None. HISTORY: ORDERING SYSTEM PROVIDED HISTORY: C-SPINE TRAUMA, NEXUS/CCR POSITIVE TECHNOLOGIST PROVIDED HISTORY: Reason for Exam: Fall (caught toe on step and lost balance and landed on face. Abrasion to nose, forehead. Pain to right hip.) Acuity: Acute Type of Exam: Initial FINDINGS: BONES/ALIGNMENT: No evidence of fracture or subluxation DEGENERATIVE CHANGES: Mild degenerative disc disease C3-C4 through C5-C6. Facet osteoarthritis C2-C3 on the left and C7-T1 bilaterally SOFT TISSUES: There is no prevertebral soft tissue swelling. No acute abnormality of the cervical spine. Ct Abdomen Pelvis W Iv Contrast Additional Contrast? Iv    Result Date: 7/24/2019  EXAMINATION: CT OF THE ABDOMEN AND PELVIS WITH CONTRAST 7/24/2019 10:10 am TECHNIQUE: CT of the abdomen and pelvis was performed with the administration of intravenous contrast. Multiplanar reformatted images are provided for review. Dose modulation, iterative reconstruction, and/or weight based adjustment of the mA/kV was utilized to reduce the radiation dose to as low as reasonably achievable. COMPARISON: CT abdomen and pelvis performed 06/04/2018. HISTORY: ORDERING SYSTEM PROVIDED HISTORY: DIARRHEA TECHNOLOGIST PROVIDED HISTORY: Patient has metastatic breast cancer and now presents with severe diarrhea and a little bit of some vomiting with diffuse abdominal discomfort Additional Contrast?->IV Reason for Exam: upper abd pain with nausea/vomiting Acuity: Acute Type of Exam: Initial Relevant Medical/Surgical History: surg: gb,hysterectomy FINDINGS: Lower Chest: The lung bases are without consolidation or effusion. The visualized cardiac structures are unremarkable. Organs: The liver is diffusely hypodense consistent with a degree of steatosis. The gallbladder has been removed. The pancreas, spleen, and adrenal glands are unremarkable. The kidneys are without obstructive uropathy. There are bilateral renal cysts. The ureters are not dilated.  The urinary bladder is unremarkable. GI/Bowel: The stomach is unremarkable. Loops of small bowel are normal in caliber without evidence for obstruction. The colon contains air and fecal residue. There are a few scattered uncomplicated diverticula. The appendix is normal.  There is no intraperitoneal free air or free fluid. Pelvis: The uterus has been removed. Phleboliths are seen in the pelvis. Peritoneum/Retroperitoneum: The psoas muscles are symmetric. The abdominal aorta is normal in caliber. The inferior vena cava is unremarkable. There is no retroperitoneal or mesenteric adenopathy. Bones/Soft Tissues: The extra-abdominal soft tissues are unremarkable. There is no acute osseous abnormality. There are 2 stable hypodense foci within the left iliac wing. There are stable primarily hypodense foci within the lumbar spine. No acute abdominal or pelvic abnormality. Diffuse hepatic steatosis. Scattered uncomplicated colonic diverticulosis. Stable hypodense foci within the lumbar spine and left iliac wing. Xr Hip 1 Vw W Pelvis Right    Result Date: 7/5/2019  EXAMINATION: ONE XRAY VIEW OF THE PELVIS AND TWO XRAY VIEWS RIGHT HIP 7/5/2019 9:42 pm COMPARISON: None. HISTORY: ORDERING SYSTEM PROVIDED HISTORY: fall onto face, now rt hip pain, known st 4 breast ca with hip met. TECHNOLOGIST PROVIDED HISTORY: Reason for exam:->fall onto face, now rt hip pain, known st 4 breast ca with hip met. Reason for Exam: Fall (caught toe on step and lost balance and landed on face. Abrasion to nose, forehead. Pain to right hip.) Acuity: Acute Type of Exam: Initial FINDINGS: No evidence of fracture or dislocation. No significant bony or joint abnormality     Negative       New Prescriptions    DIPHENOXYLATE-ATROPINE (LOMOTIL) 2.5-0.025 MG PER TABLET    Take 1 tablet by mouth 4 times daily as needed for Diarrhea for up to 10 doses.     ONDANSETRON (ZOFRAN) 8 MG TABLET    Take 1 tablet by mouth every 8 hours as needed for

## 2019-07-27 ENCOUNTER — APPOINTMENT (OUTPATIENT)
Dept: ULTRASOUND IMAGING | Age: 59
DRG: 682 | End: 2019-07-27
Payer: MEDICARE

## 2019-07-27 ENCOUNTER — HOSPITAL ENCOUNTER (INPATIENT)
Age: 59
LOS: 5 days | Discharge: HOME | DRG: 682 | End: 2019-08-01
Attending: EMERGENCY MEDICINE | Admitting: INTERNAL MEDICINE
Payer: MEDICARE

## 2019-07-27 DIAGNOSIS — A41.9 SEPTICEMIA (HCC): ICD-10-CM

## 2019-07-27 DIAGNOSIS — N17.9 ACUTE RENAL INJURY (HCC): Primary | ICD-10-CM

## 2019-07-27 LAB
A/G RATIO: 1 (ref 1.1–2.2)
ALBUMIN SERPL-MCNC: 3.4 G/DL (ref 3.4–5)
ALBUMIN SERPL-MCNC: 3.8 G/DL (ref 3.4–5)
ALBUMIN SERPL-MCNC: 3.8 G/DL (ref 3.4–5)
ALP BLD-CCNC: 63 U/L (ref 40–129)
ALT SERPL-CCNC: 20 U/L (ref 10–40)
ANION GAP SERPL CALCULATED.3IONS-SCNC: 14 MMOL/L (ref 3–16)
ANION GAP SERPL CALCULATED.3IONS-SCNC: 15 MMOL/L (ref 3–16)
ANION GAP SERPL CALCULATED.3IONS-SCNC: 16 MMOL/L (ref 3–16)
ANION GAP SERPL CALCULATED.3IONS-SCNC: 17 MMOL/L (ref 3–16)
AST SERPL-CCNC: 36 U/L (ref 15–37)
BACTERIA: ABNORMAL /HPF
BASE EXCESS VENOUS: -1.7 MMOL/L (ref -3–3)
BASOPHILS ABSOLUTE: 0.1 K/UL (ref 0–0.2)
BASOPHILS ABSOLUTE: 0.1 K/UL (ref 0–0.2)
BASOPHILS RELATIVE PERCENT: 1.2 %
BASOPHILS RELATIVE PERCENT: 1.3 %
BILIRUB SERPL-MCNC: 1 MG/DL (ref 0–1)
BILIRUBIN URINE: ABNORMAL
BLOOD, URINE: ABNORMAL
BUN BLDV-MCNC: 32 MG/DL (ref 7–20)
BUN BLDV-MCNC: 34 MG/DL (ref 7–20)
BUN BLDV-MCNC: 35 MG/DL (ref 7–20)
BUN BLDV-MCNC: 37 MG/DL (ref 7–20)
C DIFF TOXIN/ANTIGEN: NORMAL
CALCIUM SERPL-MCNC: 7.8 MG/DL (ref 8.3–10.6)
CALCIUM SERPL-MCNC: 8.4 MG/DL (ref 8.3–10.6)
CALCIUM SERPL-MCNC: 8.7 MG/DL (ref 8.3–10.6)
CALCIUM SERPL-MCNC: 8.9 MG/DL (ref 8.3–10.6)
CARBOXYHEMOGLOBIN: 3.3 % (ref 0–1.5)
CHLORIDE BLD-SCNC: 91 MMOL/L (ref 99–110)
CHLORIDE BLD-SCNC: 93 MMOL/L (ref 99–110)
CHLORIDE BLD-SCNC: 93 MMOL/L (ref 99–110)
CHLORIDE BLD-SCNC: 96 MMOL/L (ref 99–110)
CLARITY: ABNORMAL
CO2: 21 MMOL/L (ref 21–32)
CO2: 22 MMOL/L (ref 21–32)
CO2: 25 MMOL/L (ref 21–32)
CO2: 25 MMOL/L (ref 21–32)
COLOR: ABNORMAL
CREAT SERPL-MCNC: 6.6 MG/DL (ref 0.6–1.2)
CREAT SERPL-MCNC: 6.9 MG/DL (ref 0.6–1.2)
CREAT SERPL-MCNC: 7.2 MG/DL (ref 0.6–1.2)
CREAT SERPL-MCNC: 7.4 MG/DL (ref 0.6–1.2)
CREATININE URINE: 227.1 MG/DL (ref 28–259)
EOSINOPHILS ABSOLUTE: 0.3 K/UL (ref 0–0.6)
EOSINOPHILS ABSOLUTE: 0.4 K/UL (ref 0–0.6)
EOSINOPHILS RELATIVE PERCENT: 4.9 %
EOSINOPHILS RELATIVE PERCENT: 5.7 %
EPITHELIAL CELLS, UA: ABNORMAL /HPF
GFR AFRICAN AMERICAN: 7
GFR AFRICAN AMERICAN: 8
GFR NON-AFRICAN AMERICAN: 6
GLOBULIN: 3.7 G/DL
GLUCOSE BLD-MCNC: 112 MG/DL (ref 70–99)
GLUCOSE BLD-MCNC: 116 MG/DL (ref 70–99)
GLUCOSE BLD-MCNC: 116 MG/DL (ref 70–99)
GLUCOSE BLD-MCNC: 117 MG/DL (ref 70–99)
GLUCOSE BLD-MCNC: 118 MG/DL (ref 70–99)
GLUCOSE BLD-MCNC: 119 MG/DL (ref 70–99)
GLUCOSE BLD-MCNC: 127 MG/DL (ref 70–99)
GLUCOSE BLD-MCNC: 128 MG/DL (ref 70–99)
GLUCOSE URINE: 100 MG/DL
HCO3 VENOUS: 23.5 MMOL/L (ref 23–29)
HCT VFR BLD CALC: 30.3 % (ref 36–48)
HCT VFR BLD CALC: 32.6 % (ref 36–48)
HEMOGLOBIN: 10.6 G/DL (ref 12–16)
HEMOGLOBIN: 11.1 G/DL (ref 12–16)
KETONES, URINE: ABNORMAL MG/DL
LACTIC ACID: 2 MMOL/L (ref 0.4–2)
LEUKOCYTE ESTERASE, URINE: ABNORMAL
LYMPHOCYTES ABSOLUTE: 0.4 K/UL (ref 1–5.1)
LYMPHOCYTES ABSOLUTE: 0.5 K/UL (ref 1–5.1)
LYMPHOCYTES RELATIVE PERCENT: 6.6 %
LYMPHOCYTES RELATIVE PERCENT: 7.1 %
MAGNESIUM: 1.6 MG/DL (ref 1.8–2.4)
MAGNESIUM: 1.8 MG/DL (ref 1.8–2.4)
MCH RBC QN AUTO: 34.4 PG (ref 26–34)
MCH RBC QN AUTO: 35.2 PG (ref 26–34)
MCHC RBC AUTO-ENTMCNC: 34.1 G/DL (ref 31–36)
MCHC RBC AUTO-ENTMCNC: 34.9 G/DL (ref 31–36)
MCV RBC AUTO: 100.9 FL (ref 80–100)
MCV RBC AUTO: 100.9 FL (ref 80–100)
METHEMOGLOBIN VENOUS: 0.3 %
MICROSCOPIC EXAMINATION: YES
MONOCYTES ABSOLUTE: 0.3 K/UL (ref 0–1.3)
MONOCYTES ABSOLUTE: 0.4 K/UL (ref 0–1.3)
MONOCYTES RELATIVE PERCENT: 5.3 %
MONOCYTES RELATIVE PERCENT: 5.6 %
NEUTROPHILS ABSOLUTE: 4.6 K/UL (ref 1.7–7.7)
NEUTROPHILS ABSOLUTE: 6 K/UL (ref 1.7–7.7)
NEUTROPHILS RELATIVE PERCENT: 81.1 %
NEUTROPHILS RELATIVE PERCENT: 81.2 %
NITRITE, URINE: POSITIVE
O2 CONTENT, VEN: 13 VOL %
O2 SAT, VEN: 88 %
O2 THERAPY: ABNORMAL
PCO2, VEN: 41.9 MMHG (ref 40–50)
PDW BLD-RTO: 16.8 % (ref 12.4–15.4)
PDW BLD-RTO: 17.2 % (ref 12.4–15.4)
PERFORMED ON: ABNORMAL
PH UA: 5 (ref 5–8)
PH VENOUS: 7.37 (ref 7.35–7.45)
PHOSPHORUS: 3.8 MG/DL (ref 2.5–4.9)
PHOSPHORUS: 4.1 MG/DL (ref 2.5–4.9)
PHOSPHORUS: 4.4 MG/DL (ref 2.5–4.9)
PLATELET # BLD: 175 K/UL (ref 135–450)
PLATELET # BLD: 206 K/UL (ref 135–450)
PMV BLD AUTO: 8.2 FL (ref 5–10.5)
PMV BLD AUTO: 8.7 FL (ref 5–10.5)
PO2, VEN: 55.1 MMHG (ref 25–40)
POTASSIUM REFLEX MAGNESIUM: 3.1 MMOL/L (ref 3.5–5.1)
POTASSIUM REFLEX MAGNESIUM: 3.2 MMOL/L (ref 3.5–5.1)
POTASSIUM SERPL-SCNC: 3.2 MMOL/L (ref 3.5–5.1)
POTASSIUM SERPL-SCNC: 3.3 MMOL/L (ref 3.5–5.1)
POTASSIUM, UR: 49.8 MMOL/L
PROTEIN UA: 30 MG/DL
RBC # BLD: 3 M/UL (ref 4–5.2)
RBC # BLD: 3.23 M/UL (ref 4–5.2)
RBC UA: ABNORMAL /HPF (ref 0–2)
SODIUM BLD-SCNC: 130 MMOL/L (ref 136–145)
SODIUM BLD-SCNC: 131 MMOL/L (ref 136–145)
SODIUM BLD-SCNC: 133 MMOL/L (ref 136–145)
SODIUM BLD-SCNC: 134 MMOL/L (ref 136–145)
SODIUM URINE: <20 MMOL/L
SPECIFIC GRAVITY UA: >=1.03 (ref 1–1.03)
TCO2 CALC VENOUS: 25 MMOL/L
TOTAL PROTEIN: 7.5 G/DL (ref 6.4–8.2)
URINE TYPE: ABNORMAL
UROBILINOGEN, URINE: 0.2 E.U./DL
WBC # BLD: 5.7 K/UL (ref 4–11)
WBC # BLD: 7.4 K/UL (ref 4–11)
WBC UA: >100 /HPF (ref 0–5)

## 2019-07-27 PROCEDURE — 76770 US EXAM ABDO BACK WALL COMP: CPT

## 2019-07-27 PROCEDURE — 6360000002 HC RX W HCPCS: Performed by: INTERNAL MEDICINE

## 2019-07-27 PROCEDURE — 2500000003 HC RX 250 WO HCPCS: Performed by: INTERNAL MEDICINE

## 2019-07-27 PROCEDURE — 6360000002 HC RX W HCPCS: Performed by: EMERGENCY MEDICINE

## 2019-07-27 PROCEDURE — 83735 ASSAY OF MAGNESIUM: CPT

## 2019-07-27 PROCEDURE — 85025 COMPLETE CBC W/AUTO DIFF WBC: CPT

## 2019-07-27 PROCEDURE — 83036 HEMOGLOBIN GLYCOSYLATED A1C: CPT

## 2019-07-27 PROCEDURE — 82570 ASSAY OF URINE CREATININE: CPT

## 2019-07-27 PROCEDURE — 84300 ASSAY OF URINE SODIUM: CPT

## 2019-07-27 PROCEDURE — 84133 ASSAY OF URINE POTASSIUM: CPT

## 2019-07-27 PROCEDURE — 84100 ASSAY OF PHOSPHORUS: CPT

## 2019-07-27 PROCEDURE — 83605 ASSAY OF LACTIC ACID: CPT

## 2019-07-27 PROCEDURE — 1200000000 HC SEMI PRIVATE

## 2019-07-27 PROCEDURE — 87040 BLOOD CULTURE FOR BACTERIA: CPT

## 2019-07-27 PROCEDURE — 2580000003 HC RX 258: Performed by: INTERNAL MEDICINE

## 2019-07-27 PROCEDURE — 82803 BLOOD GASES ANY COMBINATION: CPT

## 2019-07-27 PROCEDURE — 96375 TX/PRO/DX INJ NEW DRUG ADDON: CPT

## 2019-07-27 PROCEDURE — 80053 COMPREHEN METABOLIC PANEL: CPT

## 2019-07-27 PROCEDURE — 2580000003 HC RX 258: Performed by: EMERGENCY MEDICINE

## 2019-07-27 PROCEDURE — 51702 INSERT TEMP BLADDER CATH: CPT

## 2019-07-27 PROCEDURE — 81001 URINALYSIS AUTO W/SCOPE: CPT

## 2019-07-27 PROCEDURE — 99285 EMERGENCY DEPT VISIT HI MDM: CPT

## 2019-07-27 PROCEDURE — 87449 NOS EACH ORGANISM AG IA: CPT

## 2019-07-27 PROCEDURE — 87205 SMEAR GRAM STAIN: CPT

## 2019-07-27 PROCEDURE — 36415 COLL VENOUS BLD VENIPUNCTURE: CPT

## 2019-07-27 PROCEDURE — 96374 THER/PROPH/DIAG INJ IV PUSH: CPT

## 2019-07-27 PROCEDURE — 87324 CLOSTRIDIUM AG IA: CPT

## 2019-07-27 PROCEDURE — 6370000000 HC RX 637 (ALT 250 FOR IP): Performed by: INTERNAL MEDICINE

## 2019-07-27 RX ORDER — POTASSIUM CHLORIDE 20 MEQ/1
20 TABLET, EXTENDED RELEASE ORAL ONCE
Status: COMPLETED | OUTPATIENT
Start: 2019-07-27 | End: 2019-07-27

## 2019-07-27 RX ORDER — NICOTINE POLACRILEX 4 MG
15 LOZENGE BUCCAL PRN
Status: DISCONTINUED | OUTPATIENT
Start: 2019-07-27 | End: 2019-08-01 | Stop reason: HOSPADM

## 2019-07-27 RX ORDER — ONDANSETRON 2 MG/ML
4 INJECTION INTRAMUSCULAR; INTRAVENOUS EVERY 6 HOURS PRN
Status: DISCONTINUED | OUTPATIENT
Start: 2019-07-27 | End: 2019-08-01 | Stop reason: HOSPADM

## 2019-07-27 RX ORDER — DEXTROSE MONOHYDRATE 25 G/50ML
12.5 INJECTION, SOLUTION INTRAVENOUS PRN
Status: DISCONTINUED | OUTPATIENT
Start: 2019-07-27 | End: 2019-08-01 | Stop reason: HOSPADM

## 2019-07-27 RX ORDER — 0.9 % SODIUM CHLORIDE 0.9 %
1500 INTRAVENOUS SOLUTION INTRAVENOUS ONCE
Status: COMPLETED | OUTPATIENT
Start: 2019-07-27 | End: 2019-07-27

## 2019-07-27 RX ORDER — HEPARIN SODIUM 5000 [USP'U]/ML
5000 INJECTION, SOLUTION INTRAVENOUS; SUBCUTANEOUS EVERY 8 HOURS SCHEDULED
Status: DISCONTINUED | OUTPATIENT
Start: 2019-07-27 | End: 2019-08-01 | Stop reason: HOSPADM

## 2019-07-27 RX ORDER — POTASSIUM CHLORIDE 7.45 MG/ML
20 INJECTION INTRAVENOUS ONCE
Status: COMPLETED | OUTPATIENT
Start: 2019-07-27 | End: 2019-07-27

## 2019-07-27 RX ORDER — SODIUM CHLORIDE 9 MG/ML
INJECTION, SOLUTION INTRAVENOUS CONTINUOUS
Status: DISCONTINUED | OUTPATIENT
Start: 2019-07-27 | End: 2019-07-27

## 2019-07-27 RX ORDER — 0.9 % SODIUM CHLORIDE 0.9 %
1000 INTRAVENOUS SOLUTION INTRAVENOUS ONCE
Status: COMPLETED | OUTPATIENT
Start: 2019-07-27 | End: 2019-07-27

## 2019-07-27 RX ORDER — SODIUM CHLORIDE 0.9 % (FLUSH) 0.9 %
10 SYRINGE (ML) INJECTION PRN
Status: DISCONTINUED | OUTPATIENT
Start: 2019-07-27 | End: 2019-08-01 | Stop reason: HOSPADM

## 2019-07-27 RX ORDER — POLYETHYLENE GLYCOL 3350 17 G/17G
17 POWDER, FOR SOLUTION ORAL DAILY PRN
Status: DISCONTINUED | OUTPATIENT
Start: 2019-07-27 | End: 2019-07-31

## 2019-07-27 RX ORDER — SODIUM CHLORIDE 9 MG/ML
INJECTION, SOLUTION INTRAVENOUS CONTINUOUS
Status: DISCONTINUED | OUTPATIENT
Start: 2019-07-27 | End: 2019-07-28

## 2019-07-27 RX ORDER — SODIUM CHLORIDE 0.9 % (FLUSH) 0.9 %
10 SYRINGE (ML) INJECTION EVERY 12 HOURS SCHEDULED
Status: DISCONTINUED | OUTPATIENT
Start: 2019-07-27 | End: 2019-08-01 | Stop reason: HOSPADM

## 2019-07-27 RX ORDER — POTASSIUM CHLORIDE 20 MEQ/1
20 TABLET, EXTENDED RELEASE ORAL 2 TIMES DAILY WITH MEALS
Status: COMPLETED | OUTPATIENT
Start: 2019-07-27 | End: 2019-07-28

## 2019-07-27 RX ORDER — DEXTROSE MONOHYDRATE 50 MG/ML
100 INJECTION, SOLUTION INTRAVENOUS PRN
Status: DISCONTINUED | OUTPATIENT
Start: 2019-07-27 | End: 2019-08-01 | Stop reason: HOSPADM

## 2019-07-27 RX ORDER — POTASSIUM CHLORIDE 20 MEQ/1
40 TABLET, EXTENDED RELEASE ORAL 2 TIMES DAILY WITH MEALS
Status: DISCONTINUED | OUTPATIENT
Start: 2019-07-27 | End: 2019-07-27

## 2019-07-27 RX ORDER — ONDANSETRON 2 MG/ML
4 INJECTION INTRAMUSCULAR; INTRAVENOUS ONCE
Status: COMPLETED | OUTPATIENT
Start: 2019-07-27 | End: 2019-07-27

## 2019-07-27 RX ADMIN — POTASSIUM CHLORIDE 20 MEQ: 20 TABLET, EXTENDED RELEASE ORAL at 11:09

## 2019-07-27 RX ADMIN — SODIUM CHLORIDE 1000 ML: 9 INJECTION, SOLUTION INTRAVENOUS at 11:10

## 2019-07-27 RX ADMIN — SODIUM CHLORIDE: 9 INJECTION, SOLUTION INTRAVENOUS at 19:38

## 2019-07-27 RX ADMIN — HEPARIN SODIUM 5000 UNITS: 5000 INJECTION INTRAVENOUS; SUBCUTANEOUS at 21:58

## 2019-07-27 RX ADMIN — POTASSIUM CHLORIDE 20 MEQ: 20 TABLET, EXTENDED RELEASE ORAL at 16:54

## 2019-07-27 RX ADMIN — Medication 10 ML: at 11:09

## 2019-07-27 RX ADMIN — ONDANSETRON 4 MG: 2 INJECTION INTRAMUSCULAR; INTRAVENOUS at 02:55

## 2019-07-27 RX ADMIN — SODIUM CHLORIDE 1500 ML: 9 INJECTION, SOLUTION INTRAVENOUS at 14:33

## 2019-07-27 RX ADMIN — HEPARIN SODIUM 5000 UNITS: 5000 INJECTION INTRAVENOUS; SUBCUTANEOUS at 13:13

## 2019-07-27 RX ADMIN — SODIUM CHLORIDE: 9 INJECTION, SOLUTION INTRAVENOUS at 14:34

## 2019-07-27 RX ADMIN — POTASSIUM CHLORIDE 10 MEQ: 10 INJECTION, SOLUTION INTRAVENOUS at 03:40

## 2019-07-27 RX ADMIN — POTASSIUM CHLORIDE 20 MEQ: 20 TABLET, EXTENDED RELEASE ORAL at 14:33

## 2019-07-27 RX ADMIN — MAGNESIUM SULFATE 2 G: 500 INJECTION, SOLUTION INTRAMUSCULAR; INTRAVENOUS at 04:28

## 2019-07-27 RX ADMIN — CEFEPIME HYDROCHLORIDE 1 G: 1 INJECTION, POWDER, FOR SOLUTION INTRAMUSCULAR; INTRAVENOUS at 03:40

## 2019-07-27 RX ADMIN — FAMOTIDINE 20 MG: 10 INJECTION, SOLUTION INTRAVENOUS at 11:08

## 2019-07-27 RX ADMIN — SODIUM CHLORIDE 1000 ML: 9 INJECTION, SOLUTION INTRAVENOUS at 02:55

## 2019-07-27 RX ADMIN — SODIUM CHLORIDE: 9 INJECTION, SOLUTION INTRAVENOUS at 11:09

## 2019-07-27 RX ADMIN — ONDANSETRON 4 MG: 2 INJECTION INTRAMUSCULAR; INTRAVENOUS at 13:13

## 2019-07-27 ASSESSMENT — PAIN SCALES - GENERAL: PAINLEVEL_OUTOF10: 7

## 2019-07-27 ASSESSMENT — PAIN DESCRIPTION - ORIENTATION: ORIENTATION: MID

## 2019-07-27 ASSESSMENT — PAIN DESCRIPTION - FREQUENCY: FREQUENCY: INTERMITTENT

## 2019-07-27 ASSESSMENT — PAIN DESCRIPTION - LOCATION: LOCATION: ABDOMEN

## 2019-07-27 ASSESSMENT — PAIN DESCRIPTION - PROGRESSION: CLINICAL_PROGRESSION: GRADUALLY WORSENING

## 2019-07-27 ASSESSMENT — PAIN DESCRIPTION - DESCRIPTORS: DESCRIPTORS: DISCOMFORT

## 2019-07-27 NOTE — CONSULTS
Emotionally abused: Not on file     Physically abused: Not on file     Forced sexual activity: Not on file   Other Topics Concern    Not on file   Social History Narrative    Not on file       Family History:   Family History   Problem Relation Age of Onset    Heart Disease Maternal Grandmother     Kidney Disease Mother     Cancer Maternal Aunt        Review of Systems:   Pertinent positives stated above in HPI. All other 10 systems were reviewed and were negative. Physical exam:   Constitutional:  VITALS:  BP (!) 99/56   Pulse 73   Temp 98 °F (36.7 °C) (Oral)   Resp 16   Ht 5' 4\" (1.626 m)   Wt 240 lb (108.9 kg)   SpO2 92%   Breastfeeding? No   BMI 41.20 kg/m²   Gen: alert, awake, nad  Skin: no rash, turgor wnl  Heent:  eomi, mmm  Neck: no bruits or jvd noted, thyroid normal  Cardiovascular:  S1, S2 without m/r/g  Respiratory: CTA B without w/r/r; respiratory effort normal  Abdomen:  +bs, soft, nt, nd, no hepatosplenomegaly  Ext: no lower extremity edema  Neuro/Psy: AAoriented times 3 ; moves all 4 ext  Musculoskeletal:  Rom, muscular strength intact; digits, nails normal    Data/  Recent Labs     07/27/19  0228 07/27/19  0641   WBC 7.4 5.7   HGB 11.1* 10.6*   HCT 32.6* 30.3*   .9* 100.9*    175     Recent Labs     07/27/19 0228 07/27/19  0641 07/27/19  1246   * 133* 130*   K 3.1* 3.2* 3.3*   CL 93* 93* 91*   CO2 25 25 22   GLUCOSE 118* 119* 116*   PHOS 4.4  --  4.1   MG 1.60* 1.80  --    BUN 32* 34* 35*   CREATININE 6.9* 7.4* 7.2*   LABGLOM 6* 6* 6*   GFRAA 7* 7* 7*     Results for Pamela Erazo (MRN [id number]) as of 7/27/2019 15:05   Ref.  Range 7/27/2019 03:00 7/27/2019 12:13   Color, UA Latest Ref Range: Straw/Yellow  DARK YELLOW (A)    Clarity, UA Latest Ref Range: Clear  CLOUDY (A)    Glucose, UA Latest Ref Range: Negative mg/dL 100 (A)    Bilirubin, Urine Latest Ref Range: Negative  SMALL (A)    Ketones, Urine Latest Ref Range: Negative mg/dL TRACE (A)    Specific [place], UA Latest Ref Range: 1.005 - 1.030  >=1.030    Blood, Urine Latest Ref Range: Negative  TRACE-INTACT (A)    pH, UA Latest Ref Range: 5.0 - 8.0  5.0    Protein, UA Latest Ref Range: Negative mg/dL 30 (A)    Urobilinogen, Urine Latest Ref Range: <2.0 E.U./dL 0.2    Nitrite, Urine Latest Ref Range: Negative  POSITIVE (A)    Leukocyte Esterase, Urine Latest Ref Range: Negative  SMALL (A)    Urine Type Unknown Not Specified    WBC, UA Latest Ref Range: 0 - 5 /HPF >100 (A)    RBC, UA Latest Ref Range: 0 - 2 /HPF 5-10 (A)    Epi Cells Latest Units: /HPF 0-2    Bacteria, UA Latest Units: /HPF 4+ (A)    Microscopic Examination Unknown YES    Creatinine, Ur Latest Ref Range: 28.0 - 259.0 mg/dL  227.1   Potassium, Ur Latest Ref Range: Not Established mmol/L  49.8   Sodium, Ur Latest Ref Range: Not Established mmol/L  <20     Ct scan a/p w iv contrast 7/24/19  Impression   No acute abdominal or pelvic abnormality.       Diffuse hepatic steatosis.       Scattered uncomplicated colonic diverticulosis.       Stable hypodense foci within the lumbar spine and left iliac wing. Assessment  -MIGUELANGEL in the setting of nausea n vomiting , UTI ,consistent with pre-renal etiology. Currently oliguric. Also received iv contrast on 7/24  -stage IV breast cancer on ibrance, was neutropenic on 7/15/19  -hypokalemia  -hyponatremia from [facility]  -s/p 2.5 l NS bolus at different times this afternoon  -cont nS 150 ml/h  -follow uc, abx per primary team  -Serial renal panel  -daily wts and strict i/o  -renal dose medications   -avoid nephrotoxins          Thank you for the consultation. Please do not hesitate to call with questions.     Madyson Ruiz  [facility]  Office: [phone]  Fax:    [phone]

## 2019-07-27 NOTE — PROGRESS NOTES
4 Eyes Skin Assessment     The patient is being assess for   Admission    I agree that 2 RN's have performed a thorough Head to Toe Skin Assessment on the patient. ALL assessment sites listed below have been assessed. Areas assessed by both nurses:   [x]   Head, Face, and Ears   [x]   Shoulders, Back, and Chest, Abdomen  [x]   Arms, Elbows, and Hands   [x]   Coccyx, Sacrum, and Ischium  [x]   Legs, Feet, and Heels        Scattered bruising and abrasions. Small healing abrasions on L knee. Blanchable redness on coccyx. Preventive mepilex applied. Two small open \"pimples\" (per patient) on abdomen. Mepilexes applied to both. **SHARE this note so that the co-signing nurse is able to place an eSignature**    Co-signer eSignature: Electronically signed by Yesica Raines RN on 7/27/19 at 12:38 PM    Does the Patient have Skin Breakdown?   Yes LDA WOUND CARE was Initiated documentation include the Christina-wound, Wound Assessment, Measurements, Dressing Treatment, Drainage, and Color\",          Rigoberto Prevention initiated:  Yes   Wound Care Orders initiated:  Yes      [id number]  nurse consulted for Pressure Injury (Stage 3,4, Unstageable, DTI, NWPT, Complex wounds)and New or Established Ostomies:  NA      Primary Nurse eSignature: Electronically signed by Hong Gomez RN on 7/27/19 at 12:37 PM

## 2019-07-27 NOTE — ED PROVIDER NOTES
58 f  N.v.d 2 days  Seen Wed for same  Chronic MSo4   3 days without  Thurs. Same s/s  appt Mon onc     Emergency Department Attending Note    Ike Campos MD    Date of ED VIsit: 7/27/2019    CHIEF COMPLAINT  Nausea (pt is a breast CA pt who has had increased loose stools and nausea since seen here on Wednesday.)      [address]lower is a 59 y.o. female  With Vital signs of /88   Pulse 77   Temp 99.2 °F (37.3 °C) (Oral)   Resp 18   Ht 5' 4\" (1.626 m)   Wt 240 lb (108.9 kg)   SpO2 96%   BMI 41.20 kg/m²  who presents to the ED with a complaint of nausea and weakness. Patient seen and evaluated in room 6. Patient is in the room with her sister and between the 2 of them unable to get a story that the patient has stage IV metastatic breast cancer and was apparently without morphine for a couple to 3 days and that promoted diarrhea which is to be expected but now recently today she comes into the emergency department because she feels weak and she is nauseous she states she has not vomited. The sister confirms that information. The weakness is prompted them to bring her around in a wheelchair versus walking. But now the patient does not seem to be complaining about the diarrhea as much as the nausea. Patient denied any chest pain or shortness of breath. No fevers or chills. She does not complain of any abdominal pain but states it rubs her stomach when she states that she feels nauseated with an upset stomach. .  No other complaints, modifying factors or associated symptoms.     Patients Past medical history reviewed and listed below  Past Medical History:   Diagnosis Date    AC (acromioclavicular) joint bone spurs     knees    Atrial fibrillation (HCC)     Cancer (HCC)     stage 4 breast    Cellulitis     Depression     anxiety    Diabetes mellitus ([facility] [address]     Hypertension      Past Surgical History:   Procedure Laterality Date    CHOLECYSTECTOMY      HYSTERECTOMY

## 2019-07-28 LAB
ANION GAP SERPL CALCULATED.3IONS-SCNC: 11 MMOL/L (ref 3–16)
BASOPHILS ABSOLUTE: 0 K/UL (ref 0–0.2)
BASOPHILS RELATIVE PERCENT: 1.1 %
BUN BLDV-MCNC: 30 MG/DL (ref 7–20)
CALCIUM SERPL-MCNC: 8 MG/DL (ref 8.3–10.6)
CHLORIDE BLD-SCNC: 103 MMOL/L (ref 99–110)
CO2: 21 MMOL/L (ref 21–32)
CREAT SERPL-MCNC: 3.9 MG/DL (ref 0.6–1.2)
EOSINOPHIL,URINE: NORMAL
EOSINOPHILS ABSOLUTE: 0.2 K/UL (ref 0–0.6)
EOSINOPHILS RELATIVE PERCENT: 4.6 %
ESTIMATED AVERAGE GLUCOSE: 116.9 MG/DL
GFR AFRICAN AMERICAN: 14
GFR NON-AFRICAN AMERICAN: 12
GLUCOSE BLD-MCNC: 117 MG/DL (ref 70–99)
GLUCOSE BLD-MCNC: 122 MG/DL (ref 70–99)
GLUCOSE BLD-MCNC: 128 MG/DL (ref 70–99)
GLUCOSE BLD-MCNC: 143 MG/DL (ref 70–99)
GLUCOSE BLD-MCNC: 165 MG/DL (ref 70–99)
HBA1C MFR BLD: 5.7 %
HCT VFR BLD CALC: 27.5 % (ref 36–48)
HEMOGLOBIN: 9.4 G/DL (ref 12–16)
LACTIC ACID: 0.6 MMOL/L (ref 0.4–2)
LYMPHOCYTES ABSOLUTE: 0.2 K/UL (ref 1–5.1)
LYMPHOCYTES RELATIVE PERCENT: 5.5 %
MCH RBC QN AUTO: 34.3 PG (ref 26–34)
MCHC RBC AUTO-ENTMCNC: 34.3 G/DL (ref 31–36)
MCV RBC AUTO: 100.1 FL (ref 80–100)
MONOCYTES ABSOLUTE: 0.2 K/UL (ref 0–1.3)
MONOCYTES RELATIVE PERCENT: 7 %
NEUTROPHILS ABSOLUTE: 2.7 K/UL (ref 1.7–7.7)
NEUTROPHILS RELATIVE PERCENT: 81.8 %
PDW BLD-RTO: 17.1 % (ref 12.4–15.4)
PERFORMED ON: ABNORMAL
PLATELET # BLD: 144 K/UL (ref 135–450)
PMV BLD AUTO: 8.5 FL (ref 5–10.5)
POTASSIUM REFLEX MAGNESIUM: 3.8 MMOL/L (ref 3.5–5.1)
RBC # BLD: 2.74 M/UL (ref 4–5.2)
SODIUM BLD-SCNC: 135 MMOL/L (ref 136–145)
WBC # BLD: 3.3 K/UL (ref 4–11)

## 2019-07-28 PROCEDURE — 97116 GAIT TRAINING THERAPY: CPT

## 2019-07-28 PROCEDURE — 2580000003 HC RX 258: Performed by: INTERNAL MEDICINE

## 2019-07-28 PROCEDURE — 6360000002 HC RX W HCPCS: Performed by: INTERNAL MEDICINE

## 2019-07-28 PROCEDURE — 6370000000 HC RX 637 (ALT 250 FOR IP): Performed by: INTERNAL MEDICINE

## 2019-07-28 PROCEDURE — 97535 SELF CARE MNGMENT TRAINING: CPT

## 2019-07-28 PROCEDURE — 85025 COMPLETE CBC W/AUTO DIFF WBC: CPT

## 2019-07-28 PROCEDURE — 97530 THERAPEUTIC ACTIVITIES: CPT

## 2019-07-28 PROCEDURE — 83605 ASSAY OF LACTIC ACID: CPT

## 2019-07-28 PROCEDURE — 2500000003 HC RX 250 WO HCPCS: Performed by: INTERNAL MEDICINE

## 2019-07-28 PROCEDURE — 97161 PT EVAL LOW COMPLEX 20 MIN: CPT

## 2019-07-28 PROCEDURE — 1200000000 HC SEMI PRIVATE

## 2019-07-28 PROCEDURE — 80048 BASIC METABOLIC PNL TOTAL CA: CPT

## 2019-07-28 PROCEDURE — 87086 URINE CULTURE/COLONY COUNT: CPT

## 2019-07-28 PROCEDURE — 97166 OT EVAL MOD COMPLEX 45 MIN: CPT

## 2019-07-28 RX ORDER — SODIUM CHLORIDE, SODIUM LACTATE, POTASSIUM CHLORIDE, CALCIUM CHLORIDE 600; 310; 30; 20 MG/100ML; MG/100ML; MG/100ML; MG/100ML
INJECTION, SOLUTION INTRAVENOUS CONTINUOUS
Status: DISCONTINUED | OUTPATIENT
Start: 2019-07-28 | End: 2019-07-29

## 2019-07-28 RX ORDER — DIMETHICONE, OXYBENZONE, AND PADIMATE O 2; 2.5; 6.6 G/100G; G/100G; G/100G
STICK TOPICAL
Status: DISPENSED
Start: 2019-07-28 | End: 2019-07-29

## 2019-07-28 RX ORDER — ACETAMINOPHEN 325 MG/1
650 TABLET ORAL EVERY 4 HOURS PRN
Status: DISCONTINUED | OUTPATIENT
Start: 2019-07-28 | End: 2019-08-01 | Stop reason: HOSPADM

## 2019-07-28 RX ADMIN — HEPARIN SODIUM 5000 UNITS: 5000 INJECTION INTRAVENOUS; SUBCUTANEOUS at 06:00

## 2019-07-28 RX ADMIN — SODIUM CHLORIDE: 9 INJECTION, SOLUTION INTRAVENOUS at 03:57

## 2019-07-28 RX ADMIN — SODIUM CHLORIDE, POTASSIUM CHLORIDE, SODIUM LACTATE AND CALCIUM CHLORIDE: 600; 310; 30; 20 INJECTION, SOLUTION INTRAVENOUS at 20:46

## 2019-07-28 RX ADMIN — POTASSIUM CHLORIDE 20 MEQ: 20 TABLET, EXTENDED RELEASE ORAL at 08:02

## 2019-07-28 RX ADMIN — HEPARIN SODIUM 5000 UNITS: 5000 INJECTION INTRAVENOUS; SUBCUTANEOUS at 20:52

## 2019-07-28 RX ADMIN — HEPARIN SODIUM 5000 UNITS: 5000 INJECTION INTRAVENOUS; SUBCUTANEOUS at 13:58

## 2019-07-28 RX ADMIN — ACETAMINOPHEN 650 MG: 325 TABLET ORAL at 17:31

## 2019-07-28 RX ADMIN — Medication 10 ML: at 08:03

## 2019-07-28 RX ADMIN — SODIUM CHLORIDE, POTASSIUM CHLORIDE, SODIUM LACTATE AND CALCIUM CHLORIDE: 600; 310; 30; 20 INJECTION, SOLUTION INTRAVENOUS at 14:37

## 2019-07-28 RX ADMIN — FAMOTIDINE 20 MG: 10 INJECTION, SOLUTION INTRAVENOUS at 08:03

## 2019-07-28 RX ADMIN — INSULIN LISPRO 1 UNITS: 100 INJECTION, SOLUTION INTRAVENOUS; SUBCUTANEOUS at 20:52

## 2019-07-28 ASSESSMENT — PAIN SCALES - GENERAL
PAINLEVEL_OUTOF10: 5
PAINLEVEL_OUTOF10: 0
PAINLEVEL_OUTOF10: 8

## 2019-07-28 ASSESSMENT — PAIN DESCRIPTION - PROGRESSION
CLINICAL_PROGRESSION: RAPIDLY WORSENING
CLINICAL_PROGRESSION: GRADUALLY IMPROVING

## 2019-07-28 ASSESSMENT — PAIN DESCRIPTION - FREQUENCY: FREQUENCY: INTERMITTENT

## 2019-07-28 ASSESSMENT — PAIN DESCRIPTION - ONSET: ONSET: GRADUAL

## 2019-07-28 ASSESSMENT — PAIN DESCRIPTION - LOCATION: LOCATION: HEAD

## 2019-07-28 ASSESSMENT — PAIN DESCRIPTION - DESCRIPTORS: DESCRIPTORS: HEADACHE

## 2019-07-28 ASSESSMENT — PAIN DESCRIPTION - PAIN TYPE: TYPE: ACUTE PAIN

## 2019-07-28 NOTE — PLAN OF CARE
Problem: Falls - Risk of:  Goal: Will remain free from falls  Description  Will remain free from falls  7/28/2019 1411 by Angle Queen RN  Outcome: Ongoing  7/28/2019 0021 by Karen Licea RN  Outcome: Ongoing  Goal: Absence of physical injury  Description  Absence of physical injury  7/28/2019 1411 by Angle Queen RN  Outcome: Ongoing  7/28/2019 0021 by Karen Licea RN  Outcome: Ongoing     Problem: Risk for Impaired Skin Integrity  Goal: Tissue integrity - skin and mucous membranes  Description  Structural intactness and normal physiological function of skin and  mucous membranes. 7/28/2019 1411 by Angle Queen RN  Outcome: Ongoing  7/28/2019 0021 by Karen Licea RN  Outcome: Ongoing     Problem: Nausea/Vomiting:  Goal: Absence of nausea/vomiting  Description  Absence of nausea/vomiting  7/28/2019 0021 by Karen Licea RN  Outcome: Ongoing  Goal: Able to drink  Description  Able to drink  7/28/2019 0021 by Karen Licea RN  Outcome: Ongoing  Goal: Able to eat  Description  Able to eat  7/28/2019 0021 by Karen Licea RN  Outcome: Ongoing  Goal: Ability to achieve adequate nutritional intake will improve  Description  Ability to achieve adequate nutritional intake will improve  7/28/2019 0021 by Karen Licea RN  Outcome: Ongoing     Problem: Activity:  Goal: Risk for activity intolerance will decrease  Description  Risk for activity intolerance will decrease  7/28/2019 0021 by Karen Licea RN  Outcome: Ongoing     Problem:  Bowel/Gastric:  Goal: Bowel function will improve  Description  Bowel function will improve  7/28/2019 0021 by Karen Licea RN  Outcome: Ongoing  Goal: Diagnostic test results will improve  Description  Diagnostic test results will improve  7/28/2019 0021 by Karen Licea RN  Outcome: Ongoing  Goal: Occurrences of nausea will decrease  Description  Occurrences of nausea will decrease  7/28/2019 0021
healthcare provider of pain before it becomes unmanageable or unbearable will improve  Outcome: Ongoing  Goal: Pain level will decrease  Description  Pain level will decrease  Outcome: Ongoing     Problem: Mobility - Impaired:  Goal: Mobility will improve  Description  Mobility will improve  7/28/2019 0021 by Karen Licea RN  Outcome: Ongoing  7/27/2019 1329 by Jason Calvert RN  Outcome: Ongoing

## 2019-07-28 NOTE — PROGRESS NOTES
Inpatient Physical Therapy Evaluation and Treatment    Unit: [address] [facility]  Date:  2019  Patient Name:    Magui Harden  Admitting diagnosis:  Acute renal injury [facility]) [N17.9]  Acute [facility] ([facility] [N17.9]  Admit Date:  2019  Precautions/Restrictions/WB Status/ Lines/ Wounds/ Oxygen: fall risk, IV, bed/chair alarm, van catheter  and telesitter    Treatment Time:  10:20-11:00  Treatment Number:  1   Timed Code Treatment Minutes: 30 minutes  Total Treatment Minutes:  40  minutes    Patient Goals for Therapy: \" to get moving \"          Discharge Recommendations: Home with  assist and home therapy  DME needs for discharge: RW       Therapy recommendation for EMS Transport: NA    Therapy recommendations for staff:   Assist of 1 with use of rolling walker (RW) and gait belt for all transfers and ambulation within halls    Home Health S4 Level Recommendation:  Level 1 Standard  AM-PAC Mobility Score    AM-PAC Inpatient Mobility Raw Score : 20     PMH:stage IV breast cancer on Ibrance, chronic pain on morphine who is admitted for [facility]  Preadmission Environment    Pt. Lives with family (father, sister and son )  Home environment:    one story home  Steps to enter first floor:   with railing  steps to enter               Steps to second floor: N/A  Bathroom:       Walk-in Shower, Shower Chair  and Raised toilet seat with grab bars  Equipment owned:      [employer], manual W/C, lift chair, hospital bed and reacher      Preadmission Status / PLOF:  History of falls             Yes  Pt. Able to drive          Yes  Pt Fully independent with ADL's         Yes  Pt. Required assistance from family for:  Cooking, Cleaning and Laundry   Pt.  Fully independent for transfers and gait and walked with: [employer] and assist from family     Pain   Yes  Location:abdomen  Ratin /10  Pain Medicine Status: No request made    Cognition    A&O Person, Place and Situation, Date stated as    Able to follow 2 step commands    Subjective  Patient lying supine in bed with no family present  Pt agreeable to this PT eval & tx. Upper Extremity ROM/Strength  Please see OT evaluation. Lower Extremity ROM / Strength    AROM WFL: Yes  ROM limitations:     Strength Assessment (measured on a 0-5 scale):  R LE   Quad   4-   Ant Tib  4   Hamstring 4-   Iliopsoas 4-  L LE  Quad   4-   Ant Tib  4   Hamstring 4-   Iliopsoas 4-    Lower Extremity Sensation    NT    Lower Extremity Proprioception:   NT    Coordination and Tone  NT    Balance  Static Sitting:  Good    Tolerance:   Dynamic Sitting:  Good -  1 lob laterally donning pants  Static Standing: Good -    Tolerance:   Dynamic Standing: Good -  with RW    Bed Mobility   Supine to Sit:   SBA  Sit to Supine:  Not Tested  Rolling:   Not Tested  Scooting at EOB: Independent  Scooting to [facility]:  Not Tested    Transfer Training     Sit to stand:   CGA  Stand to sit:   CGA VC for safety, to keep RW close  Bed to Chair:  CGA with use of rolling walker (RW) and gait belt    Gait gait completed as indicated below  Distance:  75' ft  Deviations (firm surface/linoleum): narrow PRIETO and forward flexed posture, fast bin   Assistive Device Used:  rolling walker (RW) and gait belt  Level of Assist: CGA  Comment:     Stair Training deferred, pt unsafe/not appropriate to complete stairs at this time  Activity Tolerance   Pt completed therapy session with No adverse symptoms  SpO2:   HR:  BP:     Positioning Needs   Pt sitting up in chair, call light and needs in reach and alarm set    Exercises Initiated    N/A    Other  None. Patient/Family Education   Pt educated on role of inpatient PT, POC, importance of continued activity, DC recommendations, safety awareness and calling for assist with mobility. Assessment  Pt seen for Physical Therapy evaluation in acute care setting.   Pt demonstrated decreased Activity tolerance, Balance, Safety and Strength and decreased independence with

## 2019-07-28 NOTE — PROGRESS NOTES
Pt a/o x4. Pt medicated for pain 8/10 in head as severe headache. Medicated with PRN tylenol 2 tabs PO. See MAR and pain flow sheet. Mercedes care performed. Abdominal folds cleaned with wipes and interdry applied to pink/moist folds. Call light within reach. Bed alarm is on. Tele-sitter is on. Will monitor.       07/28/19 1731   Vital Signs   Patient Currently in Pain Yes   Pain Assessment   Pain Assessment 0-10   Pain Level 8   Pain Type Acute pain   Pain Location Head   Pain Descriptors Headache   Pain Frequency Intermittent   Clinical Progression Rapidly worsening   Patient's Stated Pain Goal 3   Pain Onset Gradual

## 2019-07-28 NOTE — PROGRESS NOTES
digits, nails normal    Data/  Recent Labs     07/27/19  0228 07/27/19  0641 07/28/19  1001   WBC 7.4 5.7 3.3*   HGB 11.1* 10.6* 9.4*   HCT 32.6* 30.3* 27.5*   .9* 100.9* 100.1*    175 144     Recent Labs     07/27/19  0228 07/27/19  0641 07/27/19  1246 07/27/19  1622 07/28/19  1001   * 133* 130* 131* 135*   K 3.1* 3.2* 3.3* 3.2* 3.8   CL 93* 93* 91* 96* 103   CO2 25 25 22 21 21   GLUCOSE 118* 119* 116* 116* 143*   PHOS 4.4  --  4.1 3.8  --    MG 1.60* 1.80  --   --   --    BUN 32* 34* 35* 37* 30*   CREATININE 6.9* 7.4* 7.2* 6.6* 3.9*   LABGLOM 6* 6* 6* 6* 12*   GFRAA 7* 7* 7* 8* 14*     Results for Odella Sicard (MRN [id number]) as of 7/27/2019 15:05   Ref.  Range 7/27/2019 03:00 7/27/2019 12:13   Color, UA Latest Ref Range: Straw/Yellow  DARK YELLOW (A)    Clarity, UA Latest Ref Range: Clear  CLOUDY (A)    Glucose, UA Latest Ref Range: Negative mg/dL 100 (A)    Bilirubin, Urine Latest Ref Range: Negative  SMALL (A)    Ketones, Urine Latest Ref Range: Negative mg/dL TRACE (A)    Specific [place], UA Latest Ref Range: 1.005 - 1.030  >=1.030    Blood, Urine Latest Ref Range: Negative  TRACE-INTACT (A)    pH, UA Latest Ref Range: 5.0 - 8.0  5.0    Protein, UA Latest Ref Range: Negative mg/dL 30 (A)    Urobilinogen, Urine Latest Ref Range: <2.0 E.U./dL 0.2    Nitrite, Urine Latest Ref Range: Negative  POSITIVE (A)    Leukocyte Esterase, Urine Latest Ref Range: Negative  SMALL (A)    Urine Type Unknown Not Specified    WBC, UA Latest Ref Range: 0 - 5 /HPF >100 (A)    RBC, UA Latest Ref Range: 0 - 2 /HPF 5-10 (A)    Epi Cells Latest Units: /HPF 0-2    Bacteria, UA Latest Units: /HPF 4+ (A)    Microscopic Examination Unknown YES    Creatinine, Ur Latest Ref Range: 28.0 - 259.0 mg/dL  227.1   Potassium, Ur Latest Ref Range: Not Established mmol/L  49.8   Sodium, Ur Latest Ref Range: Not Established mmol/L  <20     Ct scan a/p w iv contrast 7/24/19  Impression   No acute abdominal or pelvic

## 2019-07-29 LAB
ANION GAP SERPL CALCULATED.3IONS-SCNC: 8 MMOL/L (ref 3–16)
BASOPHILS ABSOLUTE: 0 K/UL (ref 0–0.2)
BASOPHILS RELATIVE PERCENT: 1.7 %
BUN BLDV-MCNC: 21 MG/DL (ref 7–20)
CALCIUM SERPL-MCNC: 8.5 MG/DL (ref 8.3–10.6)
CHLORIDE BLD-SCNC: 106 MMOL/L (ref 99–110)
CO2: 26 MMOL/L (ref 21–32)
CREAT SERPL-MCNC: 1.9 MG/DL (ref 0.6–1.2)
EOSINOPHILS ABSOLUTE: 0.1 K/UL (ref 0–0.6)
EOSINOPHILS RELATIVE PERCENT: 6.1 %
GFR AFRICAN AMERICAN: 33
GFR NON-AFRICAN AMERICAN: 27
GLUCOSE BLD-MCNC: 103 MG/DL (ref 70–99)
GLUCOSE BLD-MCNC: 132 MG/DL (ref 70–99)
GLUCOSE BLD-MCNC: 132 MG/DL (ref 70–99)
GLUCOSE BLD-MCNC: 151 MG/DL (ref 70–99)
GLUCOSE BLD-MCNC: 160 MG/DL (ref 70–99)
HCT VFR BLD CALC: 26.3 % (ref 36–48)
HEMOGLOBIN: 9.3 G/DL (ref 12–16)
LYMPHOCYTES ABSOLUTE: 0.3 K/UL (ref 1–5.1)
LYMPHOCYTES RELATIVE PERCENT: 13.6 %
MAGNESIUM: 1.9 MG/DL (ref 1.8–2.4)
MCH RBC QN AUTO: 34.8 PG (ref 26–34)
MCHC RBC AUTO-ENTMCNC: 35.1 G/DL (ref 31–36)
MCV RBC AUTO: 99.2 FL (ref 80–100)
MONOCYTES ABSOLUTE: 0.3 K/UL (ref 0–1.3)
MONOCYTES RELATIVE PERCENT: 10.8 %
NEUTROPHILS ABSOLUTE: 1.6 K/UL (ref 1.7–7.7)
NEUTROPHILS RELATIVE PERCENT: 67.8 %
PDW BLD-RTO: 17.3 % (ref 12.4–15.4)
PERFORMED ON: ABNORMAL
PLATELET # BLD: 152 K/UL (ref 135–450)
PMV BLD AUTO: 8.3 FL (ref 5–10.5)
POTASSIUM REFLEX MAGNESIUM: 3.4 MMOL/L (ref 3.5–5.1)
RBC # BLD: 2.65 M/UL (ref 4–5.2)
SODIUM BLD-SCNC: 140 MMOL/L (ref 136–145)
URINE CULTURE, ROUTINE: NORMAL
WBC # BLD: 2.4 K/UL (ref 4–11)

## 2019-07-29 PROCEDURE — 99232 SBSQ HOSP IP/OBS MODERATE 35: CPT | Performed by: INTERNAL MEDICINE

## 2019-07-29 PROCEDURE — 6370000000 HC RX 637 (ALT 250 FOR IP): Performed by: INTERNAL MEDICINE

## 2019-07-29 PROCEDURE — 85025 COMPLETE CBC W/AUTO DIFF WBC: CPT

## 2019-07-29 PROCEDURE — 80048 BASIC METABOLIC PNL TOTAL CA: CPT

## 2019-07-29 PROCEDURE — 6360000002 HC RX W HCPCS: Performed by: INTERNAL MEDICINE

## 2019-07-29 PROCEDURE — 1200000000 HC SEMI PRIVATE

## 2019-07-29 PROCEDURE — 2580000003 HC RX 258: Performed by: INTERNAL MEDICINE

## 2019-07-29 PROCEDURE — 36415 COLL VENOUS BLD VENIPUNCTURE: CPT

## 2019-07-29 PROCEDURE — 97535 SELF CARE MNGMENT TRAINING: CPT

## 2019-07-29 PROCEDURE — 83735 ASSAY OF MAGNESIUM: CPT

## 2019-07-29 RX ORDER — CARVEDILOL 25 MG/1
25 TABLET ORAL 2 TIMES DAILY WITH MEALS
Status: DISCONTINUED | OUTPATIENT
Start: 2019-07-29 | End: 2019-08-01 | Stop reason: HOSPADM

## 2019-07-29 RX ORDER — HYDRALAZINE HYDROCHLORIDE 25 MG/1
25 TABLET, FILM COATED ORAL EVERY 8 HOURS SCHEDULED
Status: DISCONTINUED | OUTPATIENT
Start: 2019-07-29 | End: 2019-07-30

## 2019-07-29 RX ORDER — HYDRALAZINE HYDROCHLORIDE 20 MG/ML
10 INJECTION INTRAMUSCULAR; INTRAVENOUS EVERY 6 HOURS PRN
Status: DISCONTINUED | OUTPATIENT
Start: 2019-07-29 | End: 2019-08-01 | Stop reason: HOSPADM

## 2019-07-29 RX ADMIN — CARVEDILOL 25 MG: 25 TABLET, FILM COATED ORAL at 17:12

## 2019-07-29 RX ADMIN — HEPARIN SODIUM 5000 UNITS: 5000 INJECTION INTRAVENOUS; SUBCUTANEOUS at 15:17

## 2019-07-29 RX ADMIN — ONDANSETRON 4 MG: 2 INJECTION INTRAMUSCULAR; INTRAVENOUS at 21:07

## 2019-07-29 RX ADMIN — SODIUM CHLORIDE, POTASSIUM CHLORIDE, SODIUM LACTATE AND CALCIUM CHLORIDE: 600; 310; 30; 20 INJECTION, SOLUTION INTRAVENOUS at 10:51

## 2019-07-29 RX ADMIN — HEPARIN SODIUM 5000 UNITS: 5000 INJECTION INTRAVENOUS; SUBCUTANEOUS at 21:08

## 2019-07-29 RX ADMIN — HYDRALAZINE HYDROCHLORIDE 10 MG: 20 INJECTION INTRAMUSCULAR; INTRAVENOUS at 18:06

## 2019-07-29 RX ADMIN — NITROGLYCERIN 0.5 INCH: 20 OINTMENT TOPICAL at 18:06

## 2019-07-29 RX ADMIN — INSULIN LISPRO 1 UNITS: 100 INJECTION, SOLUTION INTRAVENOUS; SUBCUTANEOUS at 12:10

## 2019-07-29 RX ADMIN — HEPARIN SODIUM 5000 UNITS: 5000 INJECTION INTRAVENOUS; SUBCUTANEOUS at 05:17

## 2019-07-29 RX ADMIN — HYDRALAZINE HYDROCHLORIDE 25 MG: 25 TABLET, FILM COATED ORAL at 12:10

## 2019-07-29 RX ADMIN — ONDANSETRON 4 MG: 2 INJECTION INTRAMUSCULAR; INTRAVENOUS at 15:17

## 2019-07-29 NOTE — PROGRESS NOTES
[facility]       Progress Note           Subjective/   59y.o. year old female who we are seeing in consultation for MIGUELANGEL. HPI:  Renal function better with IVF's, urine output of 2.9 liters over last 24 hours. Intake improving. ROS:  No sob, abdominal pain. Objective/   GEN:  Chronically ill, BP (!) 215/86   Pulse 81   Temp 98.5 °F (36.9 °C) (Oral)   Resp 16   Ht 5' 4\" (1.626 m)   Wt 246 lb 4.8 oz (111.7 kg)   SpO2 97%   Breastfeeding? No   BMI 42.28 kg/m²   HEENT: non-icteric, no JVD  CV: S1, S2 without m/r/g; no edema  RESP: CTA B without w/r/r; breathing wnl  ABD: +bs, soft, nt, no hsm  SKIN: warm, no rashes    Data/  Recent Labs     07/27/19  0641 07/28/19  1001 07/29/19  0452   WBC 5.7 3.3* 2.4*   HGB 10.6* 9.4* 9.3*   HCT 30.3* 27.5* 26.3*   .9* 100.1* 99.2    144 152     Recent Labs     07/27/19  0228 07/27/19  0641  07/27/19  1246 07/27/19  1622 07/28/19  1001 07/29/19  0451   * 133*  --  130* 131* 135* 140   K 3.1* 3.2*   < > 3.3* 3.2* 3.8 3.4*   CL 93* 93*  --  91* 96* 103 106   CO2 25 25  --  22 21 21 26   GLUCOSE 118* 119*  --  116* 116* 143* 132*   PHOS 4.4  --   --  4.1 3.8  --   --    MG 1.60* 1.80  --   --   --   --  1.90   BUN 32* 34*  --  35* 37* 30* 21*   CREATININE 6.9* 7.4*  --  7.2* 6.6* 3.9* 1.9*   LABGLOM 6* 6*  --  6* 6* 12* 27*   GFRAA 7* 7*  --  7* 8* 14* 33*    < > = values in this interval not displayed.        Assessment/     - Acute kidney injury - Prenal, improving with IVF's  - Stage IV breast cancer on ibrance    Plan/     - Continue LR, stop when bag finished today  - Trend labs

## 2019-07-29 NOTE — PROGRESS NOTES
Patient with increased nausea and vomiting. Non-pharmalogical measures ineffective. Perfect serve to MD regarding this and pt's increased BP.

## 2019-07-30 LAB
ANION GAP SERPL CALCULATED.3IONS-SCNC: 14 MMOL/L (ref 3–16)
BUN BLDV-MCNC: 12 MG/DL (ref 7–20)
CALCIUM SERPL-MCNC: 9.2 MG/DL (ref 8.3–10.6)
CHLORIDE BLD-SCNC: 95 MMOL/L (ref 99–110)
CO2: 28 MMOL/L (ref 21–32)
CREAT SERPL-MCNC: 1.1 MG/DL (ref 0.6–1.2)
GFR AFRICAN AMERICAN: >60
GFR NON-AFRICAN AMERICAN: 51
GLUCOSE BLD-MCNC: 118 MG/DL (ref 70–99)
GLUCOSE BLD-MCNC: 135 MG/DL (ref 70–99)
GLUCOSE BLD-MCNC: 147 MG/DL (ref 70–99)
GLUCOSE BLD-MCNC: 163 MG/DL (ref 70–99)
GLUCOSE BLD-MCNC: 173 MG/DL (ref 70–99)
MAGNESIUM: 1.4 MG/DL (ref 1.8–2.4)
PERFORMED ON: ABNORMAL
POTASSIUM REFLEX MAGNESIUM: 3.1 MMOL/L (ref 3.5–5.1)
POTASSIUM SERPL-SCNC: 2.9 MMOL/L (ref 3.5–5.1)
SODIUM BLD-SCNC: 137 MMOL/L (ref 136–145)
WHITE BLOOD CELLS (WBC), STOOL: NORMAL

## 2019-07-30 PROCEDURE — 83735 ASSAY OF MAGNESIUM: CPT

## 2019-07-30 PROCEDURE — 6370000000 HC RX 637 (ALT 250 FOR IP): Performed by: INTERNAL MEDICINE

## 2019-07-30 PROCEDURE — 2580000003 HC RX 258

## 2019-07-30 PROCEDURE — 1200000000 HC SEMI PRIVATE

## 2019-07-30 PROCEDURE — 80048 BASIC METABOLIC PNL TOTAL CA: CPT

## 2019-07-30 PROCEDURE — 84132 ASSAY OF SERUM POTASSIUM: CPT

## 2019-07-30 PROCEDURE — 6360000002 HC RX W HCPCS: Performed by: INTERNAL MEDICINE

## 2019-07-30 PROCEDURE — 36415 COLL VENOUS BLD VENIPUNCTURE: CPT

## 2019-07-30 PROCEDURE — 87046 STOOL CULTR AEROBIC BACT EA: CPT

## 2019-07-30 PROCEDURE — 2580000003 HC RX 258: Performed by: INTERNAL MEDICINE

## 2019-07-30 PROCEDURE — 99232 SBSQ HOSP IP/OBS MODERATE 35: CPT | Performed by: INTERNAL MEDICINE

## 2019-07-30 PROCEDURE — 83630 LACTOFERRIN FECAL (QUAL): CPT

## 2019-07-30 PROCEDURE — 87505 NFCT AGENT DETECTION GI: CPT

## 2019-07-30 RX ORDER — LOPERAMIDE HYDROCHLORIDE 2 MG/1
2 CAPSULE ORAL 4 TIMES DAILY PRN
Status: DISCONTINUED | OUTPATIENT
Start: 2019-07-30 | End: 2019-07-30

## 2019-07-30 RX ORDER — PROCHLORPERAZINE EDISYLATE 5 MG/ML
10 INJECTION INTRAMUSCULAR; INTRAVENOUS EVERY 6 HOURS PRN
Status: DISCONTINUED | OUTPATIENT
Start: 2019-07-30 | End: 2019-08-01 | Stop reason: HOSPADM

## 2019-07-30 RX ORDER — DIPHENOXYLATE HYDROCHLORIDE AND ATROPINE SULFATE 2.5; .025 MG/1; MG/1
1 TABLET ORAL 4 TIMES DAILY PRN
Status: DISCONTINUED | OUTPATIENT
Start: 2019-07-30 | End: 2019-08-01 | Stop reason: HOSPADM

## 2019-07-30 RX ORDER — CITALOPRAM 20 MG/1
10 TABLET ORAL DAILY
Status: DISCONTINUED | OUTPATIENT
Start: 2019-07-30 | End: 2019-08-01 | Stop reason: HOSPADM

## 2019-07-30 RX ORDER — SODIUM CHLORIDE 9 MG/ML
INJECTION, SOLUTION INTRAVENOUS
Status: COMPLETED
Start: 2019-07-30 | End: 2019-07-30

## 2019-07-30 RX ORDER — HYDRALAZINE HYDROCHLORIDE 50 MG/1
50 TABLET, FILM COATED ORAL EVERY 8 HOURS SCHEDULED
Status: DISCONTINUED | OUTPATIENT
Start: 2019-07-30 | End: 2019-08-01 | Stop reason: HOSPADM

## 2019-07-30 RX ORDER — POTASSIUM CHLORIDE 7.45 MG/ML
10 INJECTION INTRAVENOUS PRN
Status: DISCONTINUED | OUTPATIENT
Start: 2019-07-30 | End: 2019-08-01 | Stop reason: HOSPADM

## 2019-07-30 RX ORDER — METOCLOPRAMIDE HYDROCHLORIDE 5 MG/ML
10 INJECTION INTRAMUSCULAR; INTRAVENOUS EVERY 6 HOURS PRN
Status: DISCONTINUED | OUTPATIENT
Start: 2019-07-30 | End: 2019-08-01 | Stop reason: HOSPADM

## 2019-07-30 RX ORDER — MAGNESIUM SULFATE IN WATER 40 MG/ML
2 INJECTION, SOLUTION INTRAVENOUS ONCE
Status: COMPLETED | OUTPATIENT
Start: 2019-07-30 | End: 2019-07-30

## 2019-07-30 RX ORDER — AMLODIPINE BESYLATE 5 MG/1
5 TABLET ORAL DAILY
Status: DISCONTINUED | OUTPATIENT
Start: 2019-07-30 | End: 2019-08-01 | Stop reason: HOSPADM

## 2019-07-30 RX ADMIN — PROCHLORPERAZINE EDISYLATE 10 MG: 5 INJECTION INTRAMUSCULAR; INTRAVENOUS at 03:01

## 2019-07-30 RX ADMIN — Medication 10 ML: at 08:32

## 2019-07-30 RX ADMIN — POTASSIUM CHLORIDE 10 MEQ: 10 INJECTION, SOLUTION INTRAVENOUS at 13:58

## 2019-07-30 RX ADMIN — MAGNESIUM SULFATE HEPTAHYDRATE 2 G: 40 INJECTION, SOLUTION INTRAVENOUS at 10:13

## 2019-07-30 RX ADMIN — HEPARIN SODIUM 5000 UNITS: 5000 INJECTION INTRAVENOUS; SUBCUTANEOUS at 14:05

## 2019-07-30 RX ADMIN — INSULIN LISPRO 1 UNITS: 100 INJECTION, SOLUTION INTRAVENOUS; SUBCUTANEOUS at 17:26

## 2019-07-30 RX ADMIN — CITALOPRAM HYDROBROMIDE 10 MG: 20 TABLET ORAL at 10:10

## 2019-07-30 RX ADMIN — METOCLOPRAMIDE 10 MG: 5 INJECTION, SOLUTION INTRAMUSCULAR; INTRAVENOUS at 22:01

## 2019-07-30 RX ADMIN — POTASSIUM CHLORIDE 10 MEQ: 10 INJECTION, SOLUTION INTRAVENOUS at 08:25

## 2019-07-30 RX ADMIN — POTASSIUM CHLORIDE 10 MEQ: 10 INJECTION, SOLUTION INTRAVENOUS at 12:16

## 2019-07-30 RX ADMIN — DIPHENOXYLATE HYDROCHLORIDE AND ATROPINE SULFATE 1 TABLET: 2.5; .025 TABLET ORAL at 22:01

## 2019-07-30 RX ADMIN — DIPHENOXYLATE HYDROCHLORIDE AND ATROPINE SULFATE 1 TABLET: 2.5; .025 TABLET ORAL at 12:21

## 2019-07-30 RX ADMIN — NITROGLYCERIN 0.5 INCH: 20 OINTMENT TOPICAL at 00:46

## 2019-07-30 RX ADMIN — HEPARIN SODIUM 5000 UNITS: 5000 INJECTION INTRAVENOUS; SUBCUTANEOUS at 22:01

## 2019-07-30 RX ADMIN — HYDRALAZINE HYDROCHLORIDE 50 MG: 50 TABLET, FILM COATED ORAL at 14:03

## 2019-07-30 RX ADMIN — SODIUM CHLORIDE 1000 ML: 9 INJECTION, SOLUTION INTRAVENOUS at 12:23

## 2019-07-30 RX ADMIN — CARVEDILOL 25 MG: 25 TABLET, FILM COATED ORAL at 17:26

## 2019-07-30 RX ADMIN — AMLODIPINE BESYLATE 5 MG: 5 TABLET ORAL at 10:10

## 2019-07-30 RX ADMIN — POTASSIUM CHLORIDE 10 MEQ: 10 INJECTION, SOLUTION INTRAVENOUS at 15:32

## 2019-07-30 RX ADMIN — ONDANSETRON 4 MG: 2 INJECTION INTRAMUSCULAR; INTRAVENOUS at 23:32

## 2019-07-30 RX ADMIN — CARVEDILOL 25 MG: 25 TABLET, FILM COATED ORAL at 10:10

## 2019-07-30 RX ADMIN — ONDANSETRON 4 MG: 2 INJECTION INTRAMUSCULAR; INTRAVENOUS at 12:21

## 2019-07-30 RX ADMIN — POTASSIUM CHLORIDE 10 MEQ: 10 INJECTION, SOLUTION INTRAVENOUS at 17:26

## 2019-07-30 RX ADMIN — METOCLOPRAMIDE 10 MG: 5 INJECTION, SOLUTION INTRAMUSCULAR; INTRAVENOUS at 08:23

## 2019-07-30 RX ADMIN — Medication 10 ML: at 00:46

## 2019-07-30 RX ADMIN — ONDANSETRON 4 MG: 2 INJECTION INTRAMUSCULAR; INTRAVENOUS at 04:35

## 2019-07-30 RX ADMIN — HYDRALAZINE HYDROCHLORIDE 10 MG: 20 INJECTION INTRAMUSCULAR; INTRAVENOUS at 00:46

## 2019-07-30 RX ADMIN — ACETAMINOPHEN 650 MG: 325 TABLET ORAL at 12:21

## 2019-07-30 RX ADMIN — HYDRALAZINE HYDROCHLORIDE 50 MG: 50 TABLET, FILM COATED ORAL at 22:01

## 2019-07-30 RX ADMIN — NITROGLYCERIN 0.5 INCH: 20 OINTMENT TOPICAL at 06:20

## 2019-07-30 RX ADMIN — SODIUM CHLORIDE 250 ML: 9 INJECTION, SOLUTION INTRAVENOUS at 08:32

## 2019-07-30 ASSESSMENT — PAIN SCALES - GENERAL: PAINLEVEL_OUTOF10: 8

## 2019-07-30 NOTE — PROGRESS NOTES
4 Eyes Skin Assessment     The patient is being assess for   a rigoberto score of 17    I agree that 2 RN's have performed a thorough Head to Toe Skin Assessment on the patient. ALL assessment sites listed below have been assessed. Areas assessed by both nurses:   [x]   Head, Face, and Ears   [x]   Shoulders, Back, and Chest, Abdomen  [x]   Arms, Elbows, and Hands   [x]   Coccyx, Sacrum, and Ischium  [x]   Legs, Feet, and Heels        Redness under abd folds and breasts, dry closed sore on abd    **SHARE this note so that the co-signing nurse is able to place an eSignature**    Co-signer eSignature: Electronically signed by Sue aJy RN on 7/30/19 at 6:30 PM    Does the Patient have Skin Breakdown?   No          Rigoberto Prevention initiated:  Yes   Wound Care Orders initiated:  No      WOC nurse consulted for Pressure Injury (Stage 3,4, Unstageable, DTI, NWPT, Complex wounds)and New or Established Ostomies:  No      Primary Nurse eSignature: Electronically signed by Ry Lira RN on 7/30/19 at 4:00 PM

## 2019-07-30 NOTE — PROGRESS NOTES
Hospitalist Progress Note      PCP: Carol Preciado MD    Date of Admission: 7/27/2019    Subjective:   High BP with nausea noted  Still with diarrhea per pt    Had good UOP last 24 hrs -2200 ml  Creatinine is down      Off chemo pills x 2 weeks    Medications:  Reviewed    Infusion Medications    dextrose       Scheduled Medications    carvedilol  25 mg Oral BID WC    hydrALAZINE  25 mg Oral 3 times per day    nitroglycerin  0.5 inch Topical 4 times per day    sodium chloride flush  10 mL Intravenous 2 times per day    heparin (porcine)  5,000 Units Subcutaneous 3 times per day    insulin lispro  0-6 Units Subcutaneous TID WC    insulin lispro  0-3 Units Subcutaneous Nightly     PRN Meds: prochlorperazine, potassium chloride, hydrALAZINE, acetaminophen, sodium chloride flush, ondansetron, polyethylene glycol, glucose, dextrose, glucagon (rDNA), dextrose      Intake/Output Summary (Last 24 hours) at 7/30/2019 0754  Last data filed at 7/30/2019 0226  Gross per 24 hour   Intake 1458 ml   Output 2200 ml   Net -742 ml       Physical Exam Performed:    BP (!) 180/97   Pulse 80   Temp 97.3 °F (36.3 °C) (Oral)   Resp 16   Ht 5' 4\" (1.626 m)   Wt 246 lb 4.8 oz (111.7 kg)   SpO2 94%   Breastfeeding? No   BMI 42.28 kg/m²         General:  Obese female, Awake, alert and oriented. Appears to be not in any distress  Mucous Membranes:  Pink , anicteric  Neck: No JVD, no carotid bruit, no thyromegaly  Chest:  Clear to auscultation bilaterally, no added sounds  Cardiovascular:  RRR S1S2 heard, no murmurs or gallops  Abdomen:  Soft, undistended, non tender, no organomegaly, BS present  Extremities: No edema or cyanosis.  Distal pulses well felt  Neurological : grossly normal        Labs:   Recent Labs     07/28/19  1001 07/29/19  0452   WBC 3.3* 2.4*   HGB 9.4* 9.3*   HCT 27.5* 26.3*    152     Recent Labs     07/27/19  1246 07/27/19  1622  07/28/19  1001 07/29/19  0451 07/30/19  0530 07/30/19  1470 * 131*  --  135* 140 137  --    K 3.3* 3.2*   < > 3.8 3.4* 3.1* 2.9*   CL 91* 96*  --  103 106 95*  --    CO2 22 21  --  21 26 28  --    BUN 35* 37*  --  30* 21* 12  --    CREATININE 7.2* 6.6*  --  3.9* 1.9* 1.1  --    CALCIUM 8.4 7.8*  --  8.0* 8.5 9.2  --    PHOS 4.1 3.8  --   --   --   --   --     < > = values in this interval not displayed. No results for input(s): AST, ALT, BILIDIR, BILITOT, ALKPHOS in the last 72 hours. No results for input(s): INR in the last 72 hours. No results for input(s): Lynnell Minna in the last 72 hours. Urinalysis:      Lab Results   Component Value Date    NITRU POSITIVE 07/27/2019    WBCUA >100 07/27/2019    BACTERIA 4+ 07/27/2019    RBCUA 5-10 07/27/2019    BLOODU TRACE-INTACT 07/27/2019    SPECGRAV >=1.030 07/27/2019    GLUCOSEU 100 07/27/2019       Radiology:  US RETROPERITONEAL COMPLETE   Final Result   Unremarkable sonographic appearance of the kidneys. Fatty infiltration of the liver. Assessment/Plan:    Active Hospital Problems    Diagnosis    Chemotherapy-induced neutropenia ([facility] [address] [D70.1, T45.1X5A]    Acute renal injury ([facility] [N17.9]    Primary cancer of right breast with metastasis to other site [facility]) [C50.911]         Acute renal failure - creat 7.4 on admit- likely with volume depletion with diarrhea  Her oral chemo pills held. started on IVF, avoided nephrotoxic meds. renal USG normal with no obstruction   Nephrology consulted, creat improved today to 1.1 with IVF alone  Can remove van     Suspected UTI - UA abnormal, received cefepime initially, but cefepime d/rupa as cultures neg      Acute metabolic encephalopathy - suspect 2/2 uremia, resolved with improved BUN- resume celexa      Hypokalemia/hypomagnesemia - replete today with IV and oral supplements    Hyponatremia - monitor on IVF NS, improved from 130-->135    Diarrhea - c diff neg, can add lomotil prn    HTN - accelerated, high BP off meds - resumed coreg.  And

## 2019-07-30 NOTE — CONSULTS
12  --    CREATININE 3.9* 1.9* 1.1  --      LIVER PROFILE: No results for input(s): AST, ALT, LIPASE, PROT, BILIDIR, BILITOT, ALKPHOS in the last 72 hours. Invalid input(s): AMYLASE,  ALB  PT/INR: No results for input(s): INR in the last 72 hours. Invalid input(s): PT    Assessment:   58 yo female with metastatic breast cancer presents with neutropenia, diarrhea, and alice    Plan:   Await stool studies. Can consider flex sigmoidoscopy with biopsies but would wait for neutropenia to improve.

## 2019-07-30 NOTE — FLOWSHEET NOTE
07/30/19 0226   Vital Signs   Temp 97.3 °F (36.3 °C)   Temp Source Oral   Pulse 80   Heart Rate Source Monitor   Resp 16   BP (!) 184/90   BP Location Left lower arm   BP Upper/Lower Lower   Patient Position Semi fowlers   Level of Consciousness 0   MEWS Score 1   Oxygen Therapy   SpO2 94 %   O2 Device None (Room air)   Pt resting in bed, no acute distress.  Brenda Perez

## 2019-07-30 NOTE — PROGRESS NOTES
Bedside report and care transferred to Jayla Beltre, [facility]. Pt denies further needs.

## 2019-07-31 LAB
ANION GAP SERPL CALCULATED.3IONS-SCNC: 10 MMOL/L (ref 3–16)
BASOPHILS ABSOLUTE: 0 K/UL (ref 0–0.2)
BASOPHILS RELATIVE PERCENT: 0.7 %
BUN BLDV-MCNC: 12 MG/DL (ref 7–20)
CALCIUM SERPL-MCNC: 9 MG/DL (ref 8.3–10.6)
CHLORIDE BLD-SCNC: 100 MMOL/L (ref 99–110)
CO2: 29 MMOL/L (ref 21–32)
CREAT SERPL-MCNC: 1 MG/DL (ref 0.6–1.2)
EOSINOPHILS ABSOLUTE: 0 K/UL (ref 0–0.6)
EOSINOPHILS RELATIVE PERCENT: 0.4 %
GFR AFRICAN AMERICAN: >60
GFR NON-AFRICAN AMERICAN: 56
GI BACTERIAL PATHOGENS BY PCR: NORMAL
GLUCOSE BLD-MCNC: 110 MG/DL (ref 70–99)
GLUCOSE BLD-MCNC: 118 MG/DL (ref 70–99)
GLUCOSE BLD-MCNC: 137 MG/DL (ref 70–99)
GLUCOSE BLD-MCNC: 139 MG/DL (ref 70–99)
GLUCOSE BLD-MCNC: 146 MG/DL (ref 70–99)
HCT VFR BLD CALC: 35.3 % (ref 36–48)
HEMOGLOBIN: 12.2 G/DL (ref 12–16)
LYMPHOCYTES ABSOLUTE: 0.4 K/UL (ref 1–5.1)
LYMPHOCYTES RELATIVE PERCENT: 8.1 %
MAGNESIUM: 1.8 MG/DL (ref 1.8–2.4)
MCH RBC QN AUTO: 34.2 PG (ref 26–34)
MCHC RBC AUTO-ENTMCNC: 34.7 G/DL (ref 31–36)
MCV RBC AUTO: 98.5 FL (ref 80–100)
MONOCYTES ABSOLUTE: 0.4 K/UL (ref 0–1.3)
MONOCYTES RELATIVE PERCENT: 8.3 %
NEUTROPHILS ABSOLUTE: 4.2 K/UL (ref 1.7–7.7)
NEUTROPHILS RELATIVE PERCENT: 82.5 %
PDW BLD-RTO: 17.2 % (ref 12.4–15.4)
PERFORMED ON: ABNORMAL
PLATELET # BLD: 264 K/UL (ref 135–450)
PMV BLD AUTO: 8.2 FL (ref 5–10.5)
POTASSIUM REFLEX MAGNESIUM: 3.1 MMOL/L (ref 3.5–5.1)
RBC # BLD: 3.58 M/UL (ref 4–5.2)
SODIUM BLD-SCNC: 139 MMOL/L (ref 136–145)
WBC # BLD: 5.1 K/UL (ref 4–11)

## 2019-07-31 PROCEDURE — 6360000002 HC RX W HCPCS: Performed by: INTERNAL MEDICINE

## 2019-07-31 PROCEDURE — 7100000011 HC PHASE II RECOVERY - ADDTL 15 MIN: Performed by: INTERNAL MEDICINE

## 2019-07-31 PROCEDURE — 6370000000 HC RX 637 (ALT 250 FOR IP): Performed by: INTERNAL MEDICINE

## 2019-07-31 PROCEDURE — 2709999900 HC NON-CHARGEABLE SUPPLY: Performed by: INTERNAL MEDICINE

## 2019-07-31 PROCEDURE — 80048 BASIC METABOLIC PNL TOTAL CA: CPT

## 2019-07-31 PROCEDURE — 85025 COMPLETE CBC W/AUTO DIFF WBC: CPT

## 2019-07-31 PROCEDURE — 99152 MOD SED SAME PHYS/QHP 5/>YRS: CPT | Performed by: INTERNAL MEDICINE

## 2019-07-31 PROCEDURE — 2580000003 HC RX 258: Performed by: INTERNAL MEDICINE

## 2019-07-31 PROCEDURE — 88305 TISSUE EXAM BY PATHOLOGIST: CPT

## 2019-07-31 PROCEDURE — 7100000010 HC PHASE II RECOVERY - FIRST 15 MIN: Performed by: INTERNAL MEDICINE

## 2019-07-31 PROCEDURE — 83735 ASSAY OF MAGNESIUM: CPT

## 2019-07-31 PROCEDURE — 99232 SBSQ HOSP IP/OBS MODERATE 35: CPT | Performed by: INTERNAL MEDICINE

## 2019-07-31 PROCEDURE — 36415 COLL VENOUS BLD VENIPUNCTURE: CPT

## 2019-07-31 PROCEDURE — 1200000000 HC SEMI PRIVATE

## 2019-07-31 PROCEDURE — 0DBN8ZX EXCISION OF SIGMOID COLON, VIA NATURAL OR ARTIFICIAL OPENING ENDOSCOPIC, DIAGNOSTIC: ICD-10-PCS | Performed by: INTERNAL MEDICINE

## 2019-07-31 PROCEDURE — 3609008300 HC SIGMOIDOSCOPY W/BIOPSY SINGLE/MULTIPLE: Performed by: INTERNAL MEDICINE

## 2019-07-31 RX ORDER — CLONIDINE HYDROCHLORIDE 0.1 MG/1
0.1 TABLET ORAL 2 TIMES DAILY
Status: DISCONTINUED | OUTPATIENT
Start: 2019-07-31 | End: 2019-08-01 | Stop reason: HOSPADM

## 2019-07-31 RX ORDER — SODIUM PHOSPHATE, DIBASIC AND SODIUM PHOSPHATE, MONOBASIC 7; 19 G/133ML; G/133ML
1 ENEMA RECTAL ONCE
Status: COMPLETED | OUTPATIENT
Start: 2019-07-31 | End: 2019-07-31

## 2019-07-31 RX ORDER — FENTANYL CITRATE 50 UG/ML
INJECTION, SOLUTION INTRAMUSCULAR; INTRAVENOUS PRN
Status: DISCONTINUED | OUTPATIENT
Start: 2019-07-31 | End: 2019-07-31 | Stop reason: ALTCHOICE

## 2019-07-31 RX ORDER — MIDAZOLAM HYDROCHLORIDE 5 MG/ML
INJECTION INTRAMUSCULAR; INTRAVENOUS PRN
Status: DISCONTINUED | OUTPATIENT
Start: 2019-07-31 | End: 2019-07-31 | Stop reason: ALTCHOICE

## 2019-07-31 RX ADMIN — POTASSIUM CHLORIDE 10 MEQ: 10 INJECTION, SOLUTION INTRAVENOUS at 13:20

## 2019-07-31 RX ADMIN — DIPHENOXYLATE HYDROCHLORIDE AND ATROPINE SULFATE 1 TABLET: 2.5; .025 TABLET ORAL at 21:05

## 2019-07-31 RX ADMIN — AMLODIPINE BESYLATE 5 MG: 5 TABLET ORAL at 08:13

## 2019-07-31 RX ADMIN — METOCLOPRAMIDE 10 MG: 5 INJECTION, SOLUTION INTRAMUSCULAR; INTRAVENOUS at 15:36

## 2019-07-31 RX ADMIN — HEPARIN SODIUM 5000 UNITS: 5000 INJECTION INTRAVENOUS; SUBCUTANEOUS at 15:15

## 2019-07-31 RX ADMIN — CLONIDINE HYDROCHLORIDE 0.1 MG: 0.1 TABLET ORAL at 12:31

## 2019-07-31 RX ADMIN — HEPARIN SODIUM 5000 UNITS: 5000 INJECTION INTRAVENOUS; SUBCUTANEOUS at 21:04

## 2019-07-31 RX ADMIN — METOCLOPRAMIDE 10 MG: 5 INJECTION, SOLUTION INTRAMUSCULAR; INTRAVENOUS at 21:05

## 2019-07-31 RX ADMIN — DIPHENOXYLATE HYDROCHLORIDE AND ATROPINE SULFATE 1 TABLET: 2.5; .025 TABLET ORAL at 04:02

## 2019-07-31 RX ADMIN — POTASSIUM CHLORIDE 10 MEQ: 10 INJECTION, SOLUTION INTRAVENOUS at 15:10

## 2019-07-31 RX ADMIN — CARVEDILOL 25 MG: 25 TABLET, FILM COATED ORAL at 08:13

## 2019-07-31 RX ADMIN — CITALOPRAM HYDROBROMIDE 10 MG: 20 TABLET ORAL at 08:13

## 2019-07-31 RX ADMIN — HYDRALAZINE HYDROCHLORIDE 50 MG: 50 TABLET, FILM COATED ORAL at 06:16

## 2019-07-31 RX ADMIN — Medication 10 ML: at 21:05

## 2019-07-31 RX ADMIN — CARVEDILOL 25 MG: 25 TABLET, FILM COATED ORAL at 17:21

## 2019-07-31 RX ADMIN — PROCHLORPERAZINE EDISYLATE 10 MG: 5 INJECTION INTRAMUSCULAR; INTRAVENOUS at 02:54

## 2019-07-31 RX ADMIN — POTASSIUM CHLORIDE 10 MEQ: 10 INJECTION, SOLUTION INTRAVENOUS at 12:12

## 2019-07-31 RX ADMIN — POTASSIUM CHLORIDE 10 MEQ: 10 INJECTION, SOLUTION INTRAVENOUS at 08:05

## 2019-07-31 RX ADMIN — CLONIDINE HYDROCHLORIDE 0.1 MG: 0.1 TABLET ORAL at 21:05

## 2019-07-31 RX ADMIN — SODIUM PHOSPHATE 1 ENEMA: 7; 19 ENEMA RECTAL at 11:44

## 2019-07-31 RX ADMIN — HYDRALAZINE HYDROCHLORIDE 50 MG: 50 TABLET, FILM COATED ORAL at 15:15

## 2019-07-31 RX ADMIN — HYDRALAZINE HYDROCHLORIDE 50 MG: 50 TABLET, FILM COATED ORAL at 21:05

## 2019-07-31 ASSESSMENT — PAIN - FUNCTIONAL ASSESSMENT: PAIN_FUNCTIONAL_ASSESSMENT: 0-10

## 2019-07-31 ASSESSMENT — PAIN DESCRIPTION - PAIN TYPE: TYPE: ACUTE PAIN

## 2019-07-31 ASSESSMENT — PAIN DESCRIPTION - LOCATION: LOCATION: ABDOMEN

## 2019-07-31 ASSESSMENT — PAIN DESCRIPTION - DESCRIPTORS
DESCRIPTORS: CRAMPING
DESCRIPTORS: CRAMPING

## 2019-07-31 ASSESSMENT — PAIN SCALES - GENERAL: PAINLEVEL_OUTOF10: 8

## 2019-07-31 NOTE — PROGRESS NOTES
[facility]       Progress Note           Subjective/   59y.o. year old female who we are seeing in consultation for MIGUELANGEL. HPI:  Renal function better with IVF's, now off. Intake better. BP trend remains high. ROS:  No sob, abdominal pain. Still having diarrhea. Objective/   GEN:  Chronically ill, BP (!) 170/83   Pulse 83   Temp 98.5 °F (36.9 °C) (Oral)   Resp 20   Ht 5' 4\" (1.626 m)   Wt 246 lb 4.8 oz (111.7 kg)   SpO2 96%   Breastfeeding?  No   BMI 42.28 kg/m²   HEENT: non-icteric, no JVD  CV: S1, S2 without m/r/g; no edema  RESP: CTA B without w/r/r; breathing wnl  ABD: +bs, soft, nt, no hsm  SKIN: warm, no rashes    Data/  Recent Labs     07/29/19  0452 07/31/19  0558   WBC 2.4* 5.1   HGB 9.3* 12.2   HCT 26.3* 35.3*   MCV 99.2 98.5    264     Recent Labs     07/29/19  0451 07/30/19  0530 07/30/19  0712 07/31/19  0558    137  --  139   K 3.4* 3.1* 2.9* 3.1*    95*  --  100   CO2 26 28  --  29   GLUCOSE 132* 163*  --  139*   MG 1.90 1.40*  --  1.80   BUN 21* 12  --  12   CREATININE 1.9* 1.1  --  1.0   LABGLOM 27* 51*  --  56*   GFRAA 33* >60  --  >60       Assessment/     - Acute kidney injury - Prenal, resolved with IVF's, non-oliguric  - Stage IV breast cancer on ibrance  - Hypertension  - Hypokalemia, hypomagnesemia    Plan/     - Monitoring off of IVF's, monitor with oral intake  - K replacement per protocol  - Prn IV hydralazine, titrated hydralazine, & added amlodipine  - Add scheduled clonidine 0.1 mg po bid  - Trend labs

## 2019-07-31 NOTE — FLOWSHEET NOTE
07/31/19 0224   Vital Signs   Temp 97.9 °F (36.6 °C)   Temp Source Oral   Pulse 83   Heart Rate Source Monitor   Resp 16   BP (!) 195/95   BP Location Left lower arm   BP Upper/Lower Lower   Patient Position Semi fowlers   Level of Consciousness 0   MEWS Score 1   Oxygen Therapy   SpO2 94 %   O2 Device None (Room air)   Pt resting in bed, no acute distress.  Mecca Kohli

## 2019-07-31 NOTE — PROGRESS NOTES
IV right FA, hurting & slight swollen, leaking at site. IV discontinued, attempted x 1 without success to restart. Clinical  notified & with try with US machine.

## 2019-07-31 NOTE — H&P
(MS CONTIN) 60 MG extended release tablet Take 60 mg by mouth 2 times daily. Yes Historical Provider, MD   morphine (MSIR) 15 MG tablet Take 15 mg by mouth nightly. Yes Historical Provider, MD   raNITIdine HCl (RANITIDINE 150 MAX STRENGTH PO) Take 150 mg by mouth 2 times daily   Yes Historical Provider, MD   metoclopramide (REGLAN) 10 MG tablet Take 1 tablet by mouth 3 times daily as needed (nausea and vomiting) 6/6/18  Yes Tang Guzman PA-C   LANTUS SOLOSTAR 100 UNIT/ML injection pen Inject 10 Units as directed nightly 9/23/16  Yes Historical Provider, MD   hydrALAZINE (APRESOLINE) 10 MG tablet Take 25 mg by mouth 3 times daily    Yes Historical Provider, MD   calcium carbonate (OSCAL) 500 MG TABS tablet Take 500 mg by mouth daily   Yes Historical Provider, MD   ferrous sulfate 325 (65 FE) MG EC tablet Take 325 mg by mouth 3 times daily (with meals)   Yes Historical Provider, MD   ondansetron (ZOFRAN-ODT) 4 MG disintegrating tablet Take 4 mg by mouth every 8 hours as needed for Nausea or Vomiting   Yes Historical Provider, MD   palbociclib (IBRANCE) 100 MG capsule Take 100 mg by mouth daily   Yes Historical Provider, MD   fulvestrant (FASLODEX) 250 MG/5ML SOLN IM injection Inject 500 mg into the muscle once   Yes Historical Provider, MD   furosemide (LASIX) 20 MG tablet Take 40 mg by mouth daily    Yes Historical Provider, MD   carvedilol (COREG) 25 MG tablet Take 25 mg by mouth 2 times daily (with meals)   Yes Historical Provider, MD   citalopram (CELEXA) 20 MG tablet Take 40 mg by mouth daily.      Yes Historical Provider, MD         Current Facility-Administered Medications:     cloNIDine (CATAPRES) tablet 0.1 mg, 0.1 mg, Oral, BID, Kam Almanza MD, 0.1 mg at 07/31/19 1231    prochlorperazine (COMPAZINE) injection 10 mg, 10 mg, Intravenous, Q6H PRN, Ethan Jordan MD, 10 mg at 07/31/19 0254    potassium chloride 10 mEq/100 mL IVPB (Peripheral Line), 10 mEq, Intravenous, PRN, Gwen Lubin MD, Last Rate: 100 mL/hr at 07/31/19 1320, 10 mEq at 07/31/19 1320    amLODIPine (NORVASC) tablet 5 mg, 5 mg, Oral, Daily, Leonel Anglin MD, 5 mg at 07/31/19 0813    citalopram (CELEXA) tablet 10 mg, 10 mg, Oral, Daily, Leonel Anglin MD, 10 mg at 07/31/19 0813    metoclopramide (REGLAN) injection 10 mg, 10 mg, Intravenous, Q6H PRN, Leonel Anglin MD, 10 mg at 07/30/19 2201    hydrALAZINE (APRESOLINE) tablet 50 mg, 50 mg, Oral, 3 times per day, Leonel Anglin MD, 50 mg at 07/31/19 0616    diphenoxylate-atropine (LOMOTIL) 2.5-0.025 MG per tablet 1 tablet, 1 tablet, Oral, 4x Daily PRN, Leonel Anglin MD, 1 tablet at 07/31/19 0402    carvedilol (COREG) tablet 25 mg, 25 mg, Oral, BID WC, Leonel Anglin MD, 25 mg at 07/31/19 0813    hydrALAZINE (APRESOLINE) injection 10 mg, 10 mg, Intravenous, Q6H PRN, Leonel Anglin MD, 10 mg at 07/30/19 0046    acetaminophen (TYLENOL) tablet 650 mg, 650 mg, Oral, Q4H PRN, Hailey Hastings MD, 650 mg at 07/30/19 1221    sodium chloride flush 0.9 % injection 10 mL, 10 mL, Intravenous, 2 times per day, Hailey Hastings MD, 10 mL at 07/30/19 0832    sodium chloride flush 0.9 % injection 10 mL, 10 mL, Intravenous, PRN, Hailey Hastings MD    ondansetron (ZOFRAN) injection 4 mg, 4 mg, Intravenous, Q6H PRN, Hailey Hastings MD, 4 mg at 07/30/19 2332    heparin (porcine) injection 5,000 Units, 5,000 Units, Subcutaneous, 3 times per day, Hailey Hastings MD, Stopped at 07/31/19 0616    insulin lispro (HUMALOG) injection vial 0-6 Units, 0-6 Units, Subcutaneous, TID WC, Hailey Hastings MD, 1 Units at 07/30/19 1726    insulin lispro (HUMALOG) injection vial 0-3 Units, 0-3 Units, Subcutaneous, Nightly, Hailey Hastings MD, 1 Units at 07/28/19 2052    glucose (GLUTOSE) 40 % oral gel 15 g, 15 g, Oral, PRN, Hailey Hastings MD    dextrose 50 % IV solution, 12.5 g, Intravenous, PRN, Hailey Hastings MD    glucagon

## 2019-08-01 VITALS
TEMPERATURE: 99.5 F | DIASTOLIC BLOOD PRESSURE: 82 MMHG | HEIGHT: 64 IN | SYSTOLIC BLOOD PRESSURE: 162 MMHG | HEART RATE: 80 BPM | BODY MASS INDEX: 42.05 KG/M2 | OXYGEN SATURATION: 97 % | WEIGHT: 246.3 LBS | RESPIRATION RATE: 16 BRPM

## 2019-08-01 LAB
ALBUMIN SERPL-MCNC: 3.4 G/DL (ref 3.4–5)
ANION GAP SERPL CALCULATED.3IONS-SCNC: 11 MMOL/L (ref 3–16)
BUN BLDV-MCNC: 14 MG/DL (ref 7–20)
CALCIUM SERPL-MCNC: 8.3 MG/DL (ref 8.3–10.6)
CHLORIDE BLD-SCNC: 98 MMOL/L (ref 99–110)
CO2: 27 MMOL/L (ref 21–32)
CREAT SERPL-MCNC: 1 MG/DL (ref 0.6–1.2)
GFR AFRICAN AMERICAN: >60
GFR NON-AFRICAN AMERICAN: 56
GLUCOSE BLD-MCNC: 128 MG/DL (ref 70–99)
GLUCOSE BLD-MCNC: 130 MG/DL (ref 70–99)
GLUCOSE BLD-MCNC: 145 MG/DL (ref 70–99)
MAGNESIUM: 1.8 MG/DL (ref 1.8–2.4)
PERFORMED ON: ABNORMAL
PERFORMED ON: ABNORMAL
PHOSPHORUS: 2.3 MG/DL (ref 2.5–4.9)
POTASSIUM REFLEX MAGNESIUM: 3.1 MMOL/L (ref 3.5–5.1)
POTASSIUM SERPL-SCNC: 3.1 MMOL/L (ref 3.5–5.1)
SODIUM BLD-SCNC: 136 MMOL/L (ref 136–145)

## 2019-08-01 PROCEDURE — 99238 HOSP IP/OBS DSCHRG MGMT 30/<: CPT | Performed by: INTERNAL MEDICINE

## 2019-08-01 PROCEDURE — 83735 ASSAY OF MAGNESIUM: CPT

## 2019-08-01 PROCEDURE — 6360000002 HC RX W HCPCS: Performed by: INTERNAL MEDICINE

## 2019-08-01 PROCEDURE — 2500000003 HC RX 250 WO HCPCS: Performed by: INTERNAL MEDICINE

## 2019-08-01 PROCEDURE — 80069 RENAL FUNCTION PANEL: CPT

## 2019-08-01 PROCEDURE — 2580000003 HC RX 258: Performed by: INTERNAL MEDICINE

## 2019-08-01 PROCEDURE — 6370000000 HC RX 637 (ALT 250 FOR IP): Performed by: INTERNAL MEDICINE

## 2019-08-01 PROCEDURE — 36415 COLL VENOUS BLD VENIPUNCTURE: CPT

## 2019-08-01 RX ORDER — POTASSIUM CHLORIDE 20 MEQ/1
40 TABLET, EXTENDED RELEASE ORAL ONCE
Status: COMPLETED | OUTPATIENT
Start: 2019-08-01 | End: 2019-08-01

## 2019-08-01 RX ORDER — AMLODIPINE BESYLATE 5 MG/1
5 TABLET ORAL DAILY
Qty: 30 TABLET | Refills: 0 | Status: ON HOLD | OUTPATIENT
Start: 2019-08-01 | End: 2020-01-01 | Stop reason: HOSPADM

## 2019-08-01 RX ORDER — HYDRALAZINE HYDROCHLORIDE 50 MG/1
50 TABLET, FILM COATED ORAL 3 TIMES DAILY
Qty: 90 TABLET | Refills: 0 | Status: SHIPPED | OUTPATIENT
Start: 2019-08-01

## 2019-08-01 RX ORDER — FUROSEMIDE 20 MG/1
20 TABLET ORAL PRN
Qty: 60 TABLET | Refills: 3
Start: 2019-08-01

## 2019-08-01 RX ADMIN — ONDANSETRON 4 MG: 2 INJECTION INTRAMUSCULAR; INTRAVENOUS at 09:31

## 2019-08-01 RX ADMIN — POTASSIUM PHOSPHATE, MONOBASIC AND POTASSIUM PHOSPHATE, DIBASIC 10 MMOL: 224; 236 INJECTION, SOLUTION INTRAVENOUS at 10:18

## 2019-08-01 RX ADMIN — CLONIDINE HYDROCHLORIDE 0.1 MG: 0.1 TABLET ORAL at 09:30

## 2019-08-01 RX ADMIN — CITALOPRAM HYDROBROMIDE 10 MG: 20 TABLET ORAL at 09:30

## 2019-08-01 RX ADMIN — CARVEDILOL 25 MG: 25 TABLET, FILM COATED ORAL at 09:30

## 2019-08-01 RX ADMIN — POTASSIUM CHLORIDE 40 MEQ: 20 TABLET, EXTENDED RELEASE ORAL at 13:27

## 2019-08-01 RX ADMIN — Medication 10 ML: at 09:31

## 2019-08-01 RX ADMIN — HEPARIN SODIUM 5000 UNITS: 5000 INJECTION INTRAVENOUS; SUBCUTANEOUS at 05:16

## 2019-08-01 RX ADMIN — HYDRALAZINE HYDROCHLORIDE 50 MG: 50 TABLET, FILM COATED ORAL at 05:15

## 2019-08-01 RX ADMIN — AMLODIPINE BESYLATE 5 MG: 5 TABLET ORAL at 09:30

## 2019-08-01 RX ADMIN — HYDRALAZINE HYDROCHLORIDE 50 MG: 50 TABLET, FILM COATED ORAL at 15:24

## 2019-08-01 NOTE — PROGRESS NOTES
27   BUN 12  --  12 14   CREATININE 1.1  --  1.0 1.0   CALCIUM 9.2  --  9.0 8.3   PHOS  --   --   --  2.3*    < > = values in this interval not displayed. No results for input(s): AST, ALT, BILIDIR, BILITOT, ALKPHOS in the last 72 hours. No results for input(s): INR in the last 72 hours. No results for input(s): Cecillia Drop in the last 72 hours. Urinalysis:      Lab Results   Component Value Date    NITRU POSITIVE 07/27/2019    WBCUA >100 07/27/2019    BACTERIA 4+ 07/27/2019    RBCUA 5-10 07/27/2019    BLOODU TRACE-INTACT 07/27/2019    SPECGRAV >=1.030 07/27/2019    GLUCOSEU 100 07/27/2019       Radiology:  US RETROPERITONEAL COMPLETE   Final Result   Unremarkable sonographic appearance of the kidneys. Fatty infiltration of the liver. Assessment/Plan:    Active Hospital Problems    Diagnosis    Chemotherapy-induced neutropenia ([facility] [address] [D70.1, T45.1X5A]    Acute renal injury ([facility] [address] [N17.9]    Primary cancer of right breast with metastasis to other site [facility]) [C50.911]         Acute renal failure - creat 7.4 on admit- likely with volume depletion with diarrhea  Her oral chemo pills held. started on IVF, avoided nephrotoxic meds. renal USG normal with no obstruction   Nephrology consulted, creat improved today to 1.1 with IVF alone         Acute metabolic encephalopathy - suspect 2/2 uremia, resolved with improved BUN- resumed celexa      Hypokalemia/hypomagnesemia - repleted  with IV and oral supplements    Hyponatremia - monitor on IVF NS, improved from 130-->135    Diarrhea - c diff neg, can add lomotil prn- due to persistent nausea and diarrhea, chemo related adverse effects considered- GI consulted and had neg flex sigmoidoscopy - bx done  to rule out microscopic colitis pending  Diarrhea slowly improved with lomotil. Electrolytes stable  Dc home . HTN - accelerated, high BP off meds - resumed coreg. And  hydralazine as at home.  Added norvasc and improved      Dm II -

## 2019-08-01 NOTE — DISCHARGE SUMMARY
Biopsies obtained  Recommendations:  1. Continue antidiarrheal agents  2. Await pathology results    Consults    nephro  GI     Physical Exam at Discharge:    BP (!) 149/83   Pulse 80   Temp 99.5 °F (37.5 °C) (Oral)   Resp 16   Ht 5' 4\" (1.626 m)   Wt 246 lb 4.8 oz (111.7 kg)   SpO2 97%   Breastfeeding? No   BMI 42.28 kg/m²     General:  Obese female, Awake, alert and oriented. Appears to be not in any distress  Mucous Membranes:  Pink , anicteric  Neck: No JVD, no carotid bruit, no thyromegaly  Chest:  Clear to auscultation bilaterally, no added sounds  Cardiovascular:  RRR S1S2 heard, no murmurs or gallops  Abdomen:  Soft, undistended, non tender, no organomegaly, BS present  Extremities: No edema or cyanosis. Distal pulses well felt  Neurological : grossly normal    CBC:   Recent Labs     07/31/19  0558   WBC 5.1   HGB 12.2   HCT 35.3*   MCV 98.5        BMP:   Recent Labs     07/30/19  0530  07/31/19  0558 08/01/19  0530     --  139 136   K 3.1*   < > 3.1* 3.1*  3.1*   CL 95*  --  100 98*   CO2 28  --  29 27   PHOS  --   --   --  2.3*   BUN 12  --  12 14   CREATININE 1.1  --  1.0 1.0    < > = values in this interval not displayed. CULTURES  Blood: NGTD  Gi bacterial pathogens by PCR: neg  C diff ag: neg  Urine: NG    RADIOLOGY  US RETROPERITONEAL COMPLETE   Final Result   Unremarkable sonographic appearance of the kidneys. Fatty infiltration of the liver. Discharge Medications     Medication List      START taking these medications    amLODIPine 5 MG tablet  Commonly known as:  NORVASC  Take 1 tablet by mouth daily  Notes to patient:  Amlodipine (Norvasc ®)  Use: lower blood pressure. Side effects: dizziness, headache, and    constipation.         CHANGE how you take these medications    furosemide 20 MG tablet  Commonly known as:  LASIX  Take 1 tablet by mouth as needed (edema)  What changed:    · how much to take  · when to take this  · reasons to take this

## 2019-08-02 LAB
BLOOD CULTURE, ROUTINE: NORMAL
CULTURE, BLOOD 2: NORMAL

## 2019-08-05 ENCOUNTER — HOSPITAL ENCOUNTER (OUTPATIENT)
Age: 59
Discharge: HOME | End: 2019-08-05
Payer: MEDICARE

## 2019-08-05 DIAGNOSIS — N17.9 ACUTE RENAL INJURY (HCC): ICD-10-CM

## 2019-08-05 LAB
ANION GAP SERPL CALCULATED.3IONS-SCNC: 14 MMOL/L (ref 3–16)
BUN BLDV-MCNC: 16 MG/DL (ref 7–20)
CALCIUM SERPL-MCNC: 9.1 MG/DL (ref 8.3–10.6)
CHLORIDE BLD-SCNC: 100 MMOL/L (ref 99–110)
CO2: 24 MMOL/L (ref 21–32)
CREAT SERPL-MCNC: 1 MG/DL (ref 0.6–1.2)
GFR AFRICAN AMERICAN: >60
GFR NON-AFRICAN AMERICAN: 56
GLUCOSE BLD-MCNC: 223 MG/DL (ref 70–99)
POTASSIUM SERPL-SCNC: 3.2 MMOL/L (ref 3.5–5.1)
SODIUM BLD-SCNC: 138 MMOL/L (ref 136–145)

## 2019-08-05 PROCEDURE — 80048 BASIC METABOLIC PNL TOTAL CA: CPT

## 2019-08-05 PROCEDURE — 36415 COLL VENOUS BLD VENIPUNCTURE: CPT

## 2019-11-01 ENCOUNTER — HOSPITAL ENCOUNTER (OUTPATIENT)
Dept: PET IMAGING | Age: 59
Discharge: HOME | End: 2019-11-01
Payer: MEDICARE

## 2019-11-01 VITALS — BODY MASS INDEX: 40.29 KG/M2 | HEIGHT: 64 IN | WEIGHT: 236 LBS

## 2019-11-01 DIAGNOSIS — C50.811 MALIGNANT NEOPLASM OF OVERLAPPING SITES OF RIGHT FEMALE BREAST, UNSPECIFIED ESTROGEN RECEPTOR STATUS (HCC): ICD-10-CM

## 2019-11-01 PROCEDURE — A9552 F18 FDG: HCPCS | Performed by: INTERNAL MEDICINE

## 2019-11-01 PROCEDURE — 3430000000 HC RX DIAGNOSTIC RADIOPHARMACEUTICAL: Performed by: INTERNAL MEDICINE

## 2019-11-01 PROCEDURE — 78815 PET IMAGE W/CT SKULL-THIGH: CPT

## 2019-11-01 RX ORDER — FLUDEOXYGLUCOSE F 18 200 MCI/ML
14.36 INJECTION, SOLUTION INTRAVENOUS
Status: COMPLETED | OUTPATIENT
Start: 2019-11-01 | End: 2019-11-01

## 2019-11-01 RX ADMIN — FLUDEOXYGLUCOSE F 18 14.36 MILLICURIE: 200 INJECTION, SOLUTION INTRAVENOUS at 09:57

## 2020-01-01 ENCOUNTER — APPOINTMENT (OUTPATIENT)
Dept: GENERAL RADIOLOGY | Age: 60
DRG: 208 | End: 2020-01-01
Payer: MEDICARE

## 2020-01-01 ENCOUNTER — APPOINTMENT (OUTPATIENT)
Dept: CT IMAGING | Age: 60
DRG: 853 | End: 2020-01-01
Payer: MEDICARE

## 2020-01-01 ENCOUNTER — APPOINTMENT (OUTPATIENT)
Dept: GENERAL RADIOLOGY | Age: 60
DRG: 853 | End: 2020-01-01
Payer: MEDICARE

## 2020-01-01 ENCOUNTER — HOSPITAL ENCOUNTER (INPATIENT)
Age: 60
LOS: 11 days | Discharge: NURSING, REHAB OR LONG-TERM CARE | DRG: 208 | End: 2020-11-18
Attending: EMERGENCY MEDICINE | Admitting: INTERNAL MEDICINE
Payer: MEDICARE

## 2020-01-01 ENCOUNTER — APPOINTMENT (OUTPATIENT)
Dept: CT IMAGING | Age: 60
DRG: 208 | End: 2020-01-01
Payer: MEDICARE

## 2020-01-01 ENCOUNTER — HOSPITAL ENCOUNTER (INPATIENT)
Age: 60
LOS: 13 days | DRG: 853 | End: 2020-12-23
Attending: EMERGENCY MEDICINE | Admitting: INTERNAL MEDICINE
Payer: MEDICARE

## 2020-01-01 ENCOUNTER — HOSPITAL ENCOUNTER (OUTPATIENT)
Dept: PET IMAGING | Age: 60
Discharge: HOME | End: 2020-11-06
Payer: MEDICARE

## 2020-01-01 ENCOUNTER — APPOINTMENT (OUTPATIENT)
Dept: ULTRASOUND IMAGING | Age: 60
DRG: 208 | End: 2020-01-01
Payer: MEDICARE

## 2020-01-01 ENCOUNTER — TELEPHONE (OUTPATIENT)
Dept: PULMONOLOGY | Age: 60
End: 2020-01-01

## 2020-01-01 ENCOUNTER — HOSPITAL ENCOUNTER (OUTPATIENT)
Dept: PET IMAGING | Age: 60
Discharge: HOME | End: 2020-07-10
Payer: MEDICARE

## 2020-01-01 VITALS — BODY MASS INDEX: 40.12 KG/M2 | WEIGHT: 235 LBS | HEIGHT: 64 IN

## 2020-01-01 VITALS
BODY MASS INDEX: 37.11 KG/M2 | SYSTOLIC BLOOD PRESSURE: 166 MMHG | RESPIRATION RATE: 16 BRPM | HEART RATE: 98 BPM | OXYGEN SATURATION: 95 % | TEMPERATURE: 97.9 F | WEIGHT: 217.4 LBS | HEIGHT: 64 IN | DIASTOLIC BLOOD PRESSURE: 65 MMHG

## 2020-01-01 VITALS — BODY MASS INDEX: 40.12 KG/M2 | HEIGHT: 64 IN | WEIGHT: 235 LBS

## 2020-01-01 VITALS
WEIGHT: 244 LBS | SYSTOLIC BLOOD PRESSURE: 120 MMHG | RESPIRATION RATE: 38 BRPM | DIASTOLIC BLOOD PRESSURE: 70 MMHG | TEMPERATURE: 102.7 F | OXYGEN SATURATION: 91 % | BODY MASS INDEX: 41.66 KG/M2 | HEIGHT: 64 IN | HEART RATE: 121 BPM

## 2020-01-01 DIAGNOSIS — J12.82 PNEUMONIA DUE TO COVID-19 VIRUS: ICD-10-CM

## 2020-01-01 DIAGNOSIS — J96.01 ACUTE RESPIRATORY FAILURE WITH HYPOXIA (HCC): ICD-10-CM

## 2020-01-01 DIAGNOSIS — U07.1 PNEUMONIA DUE TO COVID-19 VIRUS: ICD-10-CM

## 2020-01-01 DIAGNOSIS — D64.9 ANEMIA REQUIRING TRANSFUSIONS: ICD-10-CM

## 2020-01-01 DIAGNOSIS — I46.9 CARDIAC ARREST (HCC): Primary | ICD-10-CM

## 2020-01-01 LAB
A/G RATIO: 0.6 (ref 1.1–2.2)
A/G RATIO: 0.9 (ref 1.1–2.2)
A/G RATIO: 1 (ref 1.1–2.2)
ABO/RH: NORMAL
ALBUMIN SERPL-MCNC: 1.7 G/DL (ref 3.4–5)
ALBUMIN SERPL-MCNC: 1.8 G/DL (ref 3.4–5)
ALBUMIN SERPL-MCNC: 1.8 G/DL (ref 3.4–5)
ALBUMIN SERPL-MCNC: 1.9 G/DL (ref 3.4–5)
ALBUMIN SERPL-MCNC: 2.1 G/DL (ref 3.4–5)
ALBUMIN SERPL-MCNC: 2.2 G/DL (ref 3.4–5)
ALBUMIN SERPL-MCNC: 2.2 G/DL (ref 3.4–5)
ALBUMIN SERPL-MCNC: 2.3 G/DL (ref 3.4–5)
ALBUMIN SERPL-MCNC: 2.4 G/DL (ref 3.4–5)
ALBUMIN SERPL-MCNC: 2.4 G/DL (ref 3.4–5)
ALBUMIN SERPL-MCNC: 2.5 G/DL (ref 3.4–5)
ALBUMIN SERPL-MCNC: 2.9 G/DL (ref 3.4–5)
ALBUMIN SERPL-MCNC: 3.1 G/DL (ref 3.4–5)
ALBUMIN SERPL-MCNC: 3.1 G/DL (ref 3.4–5)
ALBUMIN SERPL-MCNC: 3.5 G/DL (ref 3.4–5)
ALBUMIN SERPL-MCNC: 3.7 G/DL (ref 3.4–5)
ALBUMIN SERPL-MCNC: 3.7 G/DL (ref 3.4–5)
ALP BLD-CCNC: 105 U/L (ref 40–129)
ALP BLD-CCNC: 110 U/L (ref 40–129)
ALP BLD-CCNC: 115 U/L (ref 40–129)
ALP BLD-CCNC: 133 U/L (ref 40–129)
ALP BLD-CCNC: 55 U/L (ref 40–129)
ALP BLD-CCNC: 62 U/L (ref 40–129)
ALP BLD-CCNC: 97 U/L (ref 40–129)
ALP BLD-CCNC: 99 U/L (ref 40–129)
ALT SERPL-CCNC: 106 U/L (ref 10–40)
ALT SERPL-CCNC: 112 U/L (ref 10–40)
ALT SERPL-CCNC: 19 U/L (ref 10–40)
ALT SERPL-CCNC: 6 U/L (ref 10–40)
ALT SERPL-CCNC: 7 U/L (ref 10–40)
ALT SERPL-CCNC: 91 U/L (ref 10–40)
ALT SERPL-CCNC: 93 U/L (ref 10–40)
ALT SERPL-CCNC: 96 U/L (ref 10–40)
AMORPHOUS: ABNORMAL /HPF
AMYLASE: 50 U/L (ref 25–115)
ANION GAP SERPL CALCULATED.3IONS-SCNC: 10 MMOL/L (ref 3–16)
ANION GAP SERPL CALCULATED.3IONS-SCNC: 11 MMOL/L (ref 3–16)
ANION GAP SERPL CALCULATED.3IONS-SCNC: 12 MMOL/L (ref 3–16)
ANION GAP SERPL CALCULATED.3IONS-SCNC: 13 MMOL/L (ref 3–16)
ANION GAP SERPL CALCULATED.3IONS-SCNC: 14 MMOL/L (ref 3–16)
ANION GAP SERPL CALCULATED.3IONS-SCNC: 15 MMOL/L (ref 3–16)
ANION GAP SERPL CALCULATED.3IONS-SCNC: 15 MMOL/L (ref 3–16)
ANION GAP SERPL CALCULATED.3IONS-SCNC: 17 MMOL/L (ref 3–16)
ANION GAP SERPL CALCULATED.3IONS-SCNC: 8 MMOL/L (ref 3–16)
ANION GAP SERPL CALCULATED.3IONS-SCNC: 9 MMOL/L (ref 3–16)
ANION GAP SERPL CALCULATED.3IONS-SCNC: 9 MMOL/L (ref 3–16)
ANISOCYTOSIS: ABNORMAL
ANISOCYTOSIS: ABNORMAL
ANTIBODY SCREEN: NORMAL
APPEARANCE FLUID: NORMAL
APTT: 29.1 SEC (ref 24.2–36.2)
APTT: 32.8 SEC (ref 24.2–36.2)
AST SERPL-CCNC: 11 U/L (ref 15–37)
AST SERPL-CCNC: 12 U/L (ref 15–37)
AST SERPL-CCNC: 123 U/L (ref 15–37)
AST SERPL-CCNC: 125 U/L (ref 15–37)
AST SERPL-CCNC: 133 U/L (ref 15–37)
AST SERPL-CCNC: 133 U/L (ref 15–37)
AST SERPL-CCNC: 165 U/L (ref 15–37)
AST SERPL-CCNC: 22 U/L (ref 15–37)
BACTERIA: ABNORMAL /HPF
BACTERIA: ABNORMAL /HPF
BANDED NEUTROPHILS RELATIVE PERCENT: 16 % (ref 0–7)
BANDED NEUTROPHILS RELATIVE PERCENT: 8 % (ref 0–7)
BASE EXCESS ARTERIAL: -1.9 MMOL/L (ref -3–3)
BASE EXCESS ARTERIAL: -2.2 MMOL/L (ref -3–3)
BASE EXCESS ARTERIAL: -2.2 MMOL/L (ref -3–3)
BASE EXCESS ARTERIAL: -2.7 MMOL/L (ref -3–3)
BASE EXCESS ARTERIAL: -2.8 MMOL/L (ref -3–3)
BASE EXCESS ARTERIAL: -2.9 MMOL/L (ref -3–3)
BASE EXCESS ARTERIAL: -3 (ref -3–3)
BASE EXCESS ARTERIAL: -3 MMOL/L (ref -3–3)
BASE EXCESS ARTERIAL: -3.1 MMOL/L (ref -3–3)
BASE EXCESS ARTERIAL: -3.2 MMOL/L (ref -3–3)
BASE EXCESS ARTERIAL: -3.3 MMOL/L (ref -3–3)
BASE EXCESS ARTERIAL: -4.1 MMOL/L (ref -3–3)
BASE EXCESS ARTERIAL: -4.2 MMOL/L (ref -3–3)
BASE EXCESS ARTERIAL: -4.6 MMOL/L (ref -3–3)
BASE EXCESS ARTERIAL: -5 MMOL/L (ref -3–3)
BASE EXCESS ARTERIAL: -5 MMOL/L (ref -3–3)
BASE EXCESS ARTERIAL: -5.1 MMOL/L (ref -3–3)
BASE EXCESS ARTERIAL: -5.2 MMOL/L (ref -3–3)
BASE EXCESS ARTERIAL: -5.3 MMOL/L (ref -3–3)
BASE EXCESS ARTERIAL: -5.7 MMOL/L (ref -3–3)
BASE EXCESS ARTERIAL: -5.8 MMOL/L (ref -3–3)
BASE EXCESS ARTERIAL: -6.5 MMOL/L (ref -3–3)
BASE EXCESS ARTERIAL: -7.3 MMOL/L (ref -3–3)
BASE EXCESS ARTERIAL: -7.3 MMOL/L (ref -3–3)
BASE EXCESS ARTERIAL: -7.4 MMOL/L (ref -3–3)
BASE EXCESS ARTERIAL: -8.2 MMOL/L (ref -3–3)
BASE EXCESS ARTERIAL: 10.2 MMOL/L (ref -3–3)
BASE EXCESS ARTERIAL: 16.2 MMOL/L (ref -3–3)
BASE EXCESS ARTERIAL: 6.1 MMOL/L (ref -3–3)
BASE EXCESS ARTERIAL: 7.1 MMOL/L (ref -3–3)
BASE EXCESS ARTERIAL: 8.4 MMOL/L (ref -3–3)
BASE EXCESS VENOUS: -6 MMOL/L (ref -3–3)
BASE EXCESS VENOUS: 2.7 MMOL/L (ref -3–3)
BASE EXCESS VENOUS: 6 (ref -3–3)
BASOPHILS ABSOLUTE: 0 K/UL (ref 0–0.2)
BASOPHILS ABSOLUTE: 0.1 K/UL (ref 0–0.2)
BASOPHILS ABSOLUTE: 0.2 K/UL (ref 0–0.2)
BASOPHILS RELATIVE PERCENT: 0 %
BASOPHILS RELATIVE PERCENT: 0 %
BASOPHILS RELATIVE PERCENT: 0.1 %
BASOPHILS RELATIVE PERCENT: 0.2 %
BASOPHILS RELATIVE PERCENT: 0.3 %
BASOPHILS RELATIVE PERCENT: 0.4 %
BASOPHILS RELATIVE PERCENT: 0.5 %
BASOPHILS RELATIVE PERCENT: 0.6 %
BASOPHILS RELATIVE PERCENT: 0.6 %
BASOPHILS RELATIVE PERCENT: 0.7 %
BASOPHILS RELATIVE PERCENT: 0.7 %
BASOPHILS RELATIVE PERCENT: 0.8 %
BASOPHILS RELATIVE PERCENT: 0.9 %
BASOPHILS RELATIVE PERCENT: 1 %
BASOPHILS RELATIVE PERCENT: 1.1 %
BASOPHILS RELATIVE PERCENT: 1.1 %
BILIRUB SERPL-MCNC: 0.8 MG/DL (ref 0–1)
BILIRUB SERPL-MCNC: 0.9 MG/DL (ref 0–1)
BILIRUB SERPL-MCNC: 1.1 MG/DL (ref 0–1)
BILIRUB SERPL-MCNC: 1.2 MG/DL (ref 0–1)
BILIRUB SERPL-MCNC: 1.2 MG/DL (ref 0–1)
BILIRUB SERPL-MCNC: 1.3 MG/DL (ref 0–1)
BILIRUB SERPL-MCNC: 2 MG/DL (ref 0–1)
BILIRUB SERPL-MCNC: 3.5 MG/DL (ref 0–1)
BILIRUBIN DIRECT: 0.6 MG/DL (ref 0–0.3)
BILIRUBIN DIRECT: 0.7 MG/DL (ref 0–0.3)
BILIRUBIN DIRECT: 1 MG/DL (ref 0–0.3)
BILIRUBIN DIRECT: 1.7 MG/DL (ref 0–0.3)
BILIRUBIN DIRECT: 3.2 MG/DL (ref 0–0.3)
BILIRUBIN URINE: ABNORMAL
BILIRUBIN URINE: NEGATIVE
BILIRUBIN, INDIRECT: 0.1 MG/DL (ref 0–1)
BILIRUBIN, INDIRECT: 0.2 MG/DL (ref 0–1)
BILIRUBIN, INDIRECT: 0.2 MG/DL (ref 0–1)
BILIRUBIN, INDIRECT: 0.3 MG/DL (ref 0–1)
BILIRUBIN, INDIRECT: 0.3 MG/DL (ref 0–1)
BLOOD BANK DISPENSE STATUS: NORMAL
BLOOD BANK DISPENSE STATUS: NORMAL
BLOOD BANK PRODUCT CODE: NORMAL
BLOOD BANK PRODUCT CODE: NORMAL
BLOOD CULTURE, ROUTINE: ABNORMAL
BLOOD CULTURE, ROUTINE: NORMAL
BLOOD CULTURE, ROUTINE: NORMAL
BLOOD, URINE: ABNORMAL
BLOOD, URINE: NEGATIVE
BODY FLUID CULTURE, STERILE: NORMAL
BPU ID: NORMAL
BPU ID: NORMAL
BUN BLDV-MCNC: 14 MG/DL (ref 7–20)
BUN BLDV-MCNC: 16 MG/DL (ref 7–20)
BUN BLDV-MCNC: 18 MG/DL (ref 7–20)
BUN BLDV-MCNC: 21 MG/DL (ref 7–20)
BUN BLDV-MCNC: 21 MG/DL (ref 7–20)
BUN BLDV-MCNC: 28 MG/DL (ref 7–20)
BUN BLDV-MCNC: 29 MG/DL (ref 7–20)
BUN BLDV-MCNC: 29 MG/DL (ref 7–20)
BUN BLDV-MCNC: 32 MG/DL (ref 7–20)
BUN BLDV-MCNC: 32 MG/DL (ref 7–20)
BUN BLDV-MCNC: 36 MG/DL (ref 7–20)
BUN BLDV-MCNC: 38 MG/DL (ref 7–20)
BUN BLDV-MCNC: 42 MG/DL (ref 7–20)
BUN BLDV-MCNC: 44 MG/DL (ref 7–20)
BUN BLDV-MCNC: 45 MG/DL (ref 7–20)
BUN BLDV-MCNC: 46 MG/DL (ref 7–20)
BUN BLDV-MCNC: 47 MG/DL (ref 7–20)
BUN BLDV-MCNC: 47 MG/DL (ref 7–20)
BUN BLDV-MCNC: 48 MG/DL (ref 7–20)
BUN BLDV-MCNC: 48 MG/DL (ref 7–20)
BUN BLDV-MCNC: 49 MG/DL (ref 7–20)
BUN BLDV-MCNC: 51 MG/DL (ref 7–20)
BUN BLDV-MCNC: 52 MG/DL (ref 7–20)
BUN BLDV-MCNC: 52 MG/DL (ref 7–20)
BUN BLDV-MCNC: 53 MG/DL (ref 7–20)
BUN BLDV-MCNC: 54 MG/DL (ref 7–20)
BUN BLDV-MCNC: 54 MG/DL (ref 7–20)
BUN BLDV-MCNC: 55 MG/DL (ref 7–20)
BUN BLDV-MCNC: 55 MG/DL (ref 7–20)
BUN BLDV-MCNC: 57 MG/DL (ref 7–20)
BUN BLDV-MCNC: 60 MG/DL (ref 7–20)
BUN BLDV-MCNC: 60 MG/DL (ref 7–20)
BUN BLDV-MCNC: 67 MG/DL (ref 7–20)
BUN BLDV-MCNC: 81 MG/DL (ref 7–20)
BUN BLDV-MCNC: 88 MG/DL (ref 7–20)
CALCIUM IONIZED: 1.06 MMOL/L (ref 1.12–1.32)
CALCIUM IONIZED: 1.07 MMOL/L (ref 1.12–1.32)
CALCIUM IONIZED: 1.11 MMOL/L (ref 1.12–1.32)
CALCIUM IONIZED: 1.13 MMOL/L (ref 1.12–1.32)
CALCIUM IONIZED: 1.16 MMOL/L (ref 1.12–1.32)
CALCIUM SERPL-MCNC: 7.9 MG/DL (ref 8.3–10.6)
CALCIUM SERPL-MCNC: 7.9 MG/DL (ref 8.3–10.6)
CALCIUM SERPL-MCNC: 8 MG/DL (ref 8.3–10.6)
CALCIUM SERPL-MCNC: 8.1 MG/DL (ref 8.3–10.6)
CALCIUM SERPL-MCNC: 8.2 MG/DL (ref 8.3–10.6)
CALCIUM SERPL-MCNC: 8.3 MG/DL (ref 8.3–10.6)
CALCIUM SERPL-MCNC: 8.4 MG/DL (ref 8.3–10.6)
CALCIUM SERPL-MCNC: 8.5 MG/DL (ref 8.3–10.6)
CALCIUM SERPL-MCNC: 8.5 MG/DL (ref 8.3–10.6)
CALCIUM SERPL-MCNC: 8.6 MG/DL (ref 8.3–10.6)
CALCIUM SERPL-MCNC: 8.7 MG/DL (ref 8.3–10.6)
CALCIUM SERPL-MCNC: 8.7 MG/DL (ref 8.3–10.6)
CALCIUM SERPL-MCNC: 8.8 MG/DL (ref 8.3–10.6)
CALCIUM SERPL-MCNC: 8.8 MG/DL (ref 8.3–10.6)
CALCIUM SERPL-MCNC: 8.9 MG/DL (ref 8.3–10.6)
CALCIUM SERPL-MCNC: 8.9 MG/DL (ref 8.3–10.6)
CALCIUM SERPL-MCNC: 9 MG/DL (ref 8.3–10.6)
CALCIUM SERPL-MCNC: 9.1 MG/DL (ref 8.3–10.6)
CALCIUM SERPL-MCNC: 9.3 MG/DL (ref 8.3–10.6)
CALCIUM SERPL-MCNC: 9.3 MG/DL (ref 8.3–10.6)
CARBOXYHEMOGLOBIN ARTERIAL: 0 % (ref 0–1.5)
CARBOXYHEMOGLOBIN ARTERIAL: 0 % (ref 0–1.5)
CARBOXYHEMOGLOBIN ARTERIAL: 0.1 % (ref 0–1.5)
CARBOXYHEMOGLOBIN ARTERIAL: 0.2 % (ref 0–1.5)
CARBOXYHEMOGLOBIN ARTERIAL: 0.3 % (ref 0–1.5)
CARBOXYHEMOGLOBIN ARTERIAL: 0.4 % (ref 0–1.5)
CARBOXYHEMOGLOBIN ARTERIAL: 0.5 % (ref 0–1.5)
CARBOXYHEMOGLOBIN ARTERIAL: 0.7 % (ref 0–1.5)
CARBOXYHEMOGLOBIN ARTERIAL: 1.1 % (ref 0–1.5)
CARBOXYHEMOGLOBIN: 2.9 % (ref 0–1.5)
CARBOXYHEMOGLOBIN: 3.8 % (ref 0–1.5)
CELL COUNT FLUID TYPE: NORMAL
CHLORIDE BLD-SCNC: 100 MMOL/L (ref 99–110)
CHLORIDE BLD-SCNC: 101 MMOL/L (ref 99–110)
CHLORIDE BLD-SCNC: 103 MMOL/L (ref 99–110)
CHLORIDE BLD-SCNC: 104 MMOL/L (ref 99–110)
CHLORIDE BLD-SCNC: 105 MMOL/L (ref 99–110)
CHLORIDE BLD-SCNC: 106 MMOL/L (ref 99–110)
CHLORIDE BLD-SCNC: 106 MMOL/L (ref 99–110)
CHLORIDE BLD-SCNC: 108 MMOL/L (ref 99–110)
CHLORIDE BLD-SCNC: 109 MMOL/L (ref 99–110)
CHLORIDE BLD-SCNC: 86 MMOL/L (ref 99–110)
CHLORIDE BLD-SCNC: 86 MMOL/L (ref 99–110)
CHLORIDE BLD-SCNC: 88 MMOL/L (ref 99–110)
CHLORIDE BLD-SCNC: 89 MMOL/L (ref 99–110)
CHLORIDE BLD-SCNC: 90 MMOL/L (ref 99–110)
CHLORIDE BLD-SCNC: 91 MMOL/L (ref 99–110)
CHLORIDE BLD-SCNC: 92 MMOL/L (ref 99–110)
CHLORIDE BLD-SCNC: 92 MMOL/L (ref 99–110)
CHLORIDE BLD-SCNC: 93 MMOL/L (ref 99–110)
CHLORIDE BLD-SCNC: 93 MMOL/L (ref 99–110)
CHLORIDE BLD-SCNC: 95 MMOL/L (ref 99–110)
CHLORIDE BLD-SCNC: 97 MMOL/L (ref 99–110)
CHLORIDE BLD-SCNC: 98 MMOL/L (ref 99–110)
CHLORIDE BLD-SCNC: 99 MMOL/L (ref 99–110)
CHLORIDE URINE RANDOM: 87 MMOL/L
CLARITY: ABNORMAL
CLARITY: CLEAR
CLOT EVALUATION: NORMAL
CO2: 20 MMOL/L (ref 21–32)
CO2: 21 MMOL/L (ref 21–32)
CO2: 22 MMOL/L (ref 21–32)
CO2: 23 MMOL/L (ref 21–32)
CO2: 24 MMOL/L (ref 21–32)
CO2: 25 MMOL/L (ref 21–32)
CO2: 27 MMOL/L (ref 21–32)
CO2: 27 MMOL/L (ref 21–32)
CO2: 28 MMOL/L (ref 21–32)
CO2: 29 MMOL/L (ref 21–32)
CO2: 30 MMOL/L (ref 21–32)
CO2: 31 MMOL/L (ref 21–32)
CO2: 34 MMOL/L (ref 21–32)
CO2: 34 MMOL/L (ref 21–32)
CO2: 35 MMOL/L (ref 21–32)
CO2: 38 MMOL/L (ref 21–32)
CO2: 38 MMOL/L (ref 21–32)
COARSE CASTS, UA: ABNORMAL /LPF (ref 0–2)
COLOR FLUID: YELLOW
COLOR: ABNORMAL
COLOR: YELLOW
CREAT SERPL-MCNC: 0.9 MG/DL (ref 0.6–1.2)
CREAT SERPL-MCNC: 1 MG/DL (ref 0.6–1.2)
CREAT SERPL-MCNC: 1.1 MG/DL (ref 0.6–1.2)
CREAT SERPL-MCNC: 1.2 MG/DL (ref 0.6–1.2)
CREAT SERPL-MCNC: 1.3 MG/DL (ref 0.6–1.2)
CREAT SERPL-MCNC: 1.4 MG/DL (ref 0.6–1.2)
CREAT SERPL-MCNC: 1.5 MG/DL (ref 0.6–1.2)
CREAT SERPL-MCNC: 1.5 MG/DL (ref 0.6–1.2)
CREAT SERPL-MCNC: 1.6 MG/DL (ref 0.6–1.2)
CREAT SERPL-MCNC: 1.7 MG/DL (ref 0.6–1.2)
CREAT SERPL-MCNC: 1.7 MG/DL (ref 0.6–1.2)
CREAT SERPL-MCNC: 1.8 MG/DL (ref 0.6–1.2)
CREAT SERPL-MCNC: 2 MG/DL (ref 0.6–1.2)
CREAT SERPL-MCNC: 2.2 MG/DL (ref 0.6–1.2)
CREAT SERPL-MCNC: 2.3 MG/DL (ref 0.6–1.2)
CREAT SERPL-MCNC: 2.3 MG/DL (ref 0.6–1.2)
CREAT SERPL-MCNC: 2.4 MG/DL (ref 0.6–1.2)
CREAT SERPL-MCNC: 2.5 MG/DL (ref 0.6–1.2)
CREAT SERPL-MCNC: 2.6 MG/DL (ref 0.6–1.2)
CREAT SERPL-MCNC: 2.6 MG/DL (ref 0.6–1.2)
CREAT SERPL-MCNC: 2.7 MG/DL (ref 0.6–1.2)
CULTURE, BLOOD 2: ABNORMAL
CULTURE, BLOOD 2: ABNORMAL
CULTURE, BLOOD 2: NORMAL
CULTURE, BLOOD 2: NORMAL
CULTURE, RESPIRATORY: ABNORMAL
CULTURE, RESPIRATORY: NORMAL
DESCRIPTION BLOOD BANK: NORMAL
DESCRIPTION BLOOD BANK: NORMAL
EKG ATRIAL RATE: 107 BPM
EKG ATRIAL RATE: 129 BPM
EKG ATRIAL RATE: 129 BPM
EKG ATRIAL RATE: 133 BPM
EKG ATRIAL RATE: 142 BPM
EKG ATRIAL RATE: 197 BPM
EKG ATRIAL RATE: 258 BPM
EKG ATRIAL RATE: 64 BPM
EKG ATRIAL RATE: 65 BPM
EKG ATRIAL RATE: 83 BPM
EKG ATRIAL RATE: 84 BPM
EKG ATRIAL RATE: 85 BPM
EKG ATRIAL RATE: 88 BPM
EKG DIAGNOSIS: NORMAL
EKG P AXIS: 47 DEGREES
EKG P AXIS: 48 DEGREES
EKG P AXIS: 50 DEGREES
EKG P AXIS: 55 DEGREES
EKG P AXIS: 71 DEGREES
EKG P-R INTERVAL: 138 MS
EKG P-R INTERVAL: 142 MS
EKG P-R INTERVAL: 154 MS
EKG P-R INTERVAL: 158 MS
EKG P-R INTERVAL: 180 MS
EKG P-R INTERVAL: 198 MS
EKG Q-T INTERVAL: 316 MS
EKG Q-T INTERVAL: 318 MS
EKG Q-T INTERVAL: 328 MS
EKG Q-T INTERVAL: 338 MS
EKG Q-T INTERVAL: 342 MS
EKG Q-T INTERVAL: 358 MS
EKG Q-T INTERVAL: 364 MS
EKG Q-T INTERVAL: 364 MS
EKG Q-T INTERVAL: 368 MS
EKG Q-T INTERVAL: 370 MS
EKG Q-T INTERVAL: 372 MS
EKG Q-T INTERVAL: 482 MS
EKG Q-T INTERVAL: 504 MS
EKG QRS DURATION: 82 MS
EKG QRS DURATION: 86 MS
EKG QRS DURATION: 92 MS
EKG QRS DURATION: 92 MS
EKG QRS DURATION: 94 MS
EKG QRS DURATION: 96 MS
EKG QRS DURATION: 98 MS
EKG QRS DURATION: 98 MS
EKG QTC CALCULATION (BAZETT): 390 MS
EKG QTC CALCULATION (BAZETT): 427 MS
EKG QTC CALCULATION (BAZETT): 433 MS
EKG QTC CALCULATION (BAZETT): 442 MS
EKG QTC CALCULATION (BAZETT): 461 MS
EKG QTC CALCULATION (BAZETT): 467 MS
EKG QTC CALCULATION (BAZETT): 489 MS
EKG QTC CALCULATION (BAZETT): 501 MS
EKG QTC CALCULATION (BAZETT): 519 MS
EKG QTC CALCULATION (BAZETT): 520 MS
EKG QTC CALCULATION (BAZETT): 534 MS
EKG QTC CALCULATION (BAZETT): 555 MS
EKG QTC CALCULATION (BAZETT): 556 MS
EKG R AXIS: 17 DEGREES
EKG R AXIS: 30 DEGREES
EKG R AXIS: 36 DEGREES
EKG R AXIS: 38 DEGREES
EKG R AXIS: 39 DEGREES
EKG R AXIS: 40 DEGREES
EKG R AXIS: 41 DEGREES
EKG R AXIS: 42 DEGREES
EKG R AXIS: 43 DEGREES
EKG R AXIS: 43 DEGREES
EKG R AXIS: 53 DEGREES
EKG R AXIS: 61 DEGREES
EKG R AXIS: 65 DEGREES
EKG T AXIS: 114 DEGREES
EKG T AXIS: 155 DEGREES
EKG T AXIS: 171 DEGREES
EKG T AXIS: 178 DEGREES
EKG T AXIS: 179 DEGREES
EKG T AXIS: 181 DEGREES
EKG T AXIS: 194 DEGREES
EKG T AXIS: 213 DEGREES
EKG T AXIS: 215 DEGREES
EKG T AXIS: 220 DEGREES
EKG T AXIS: 224 DEGREES
EKG T AXIS: 70 DEGREES
EKG T AXIS: 83 DEGREES
EKG VENTRICULAR RATE: 115 BPM
EKG VENTRICULAR RATE: 127 BPM
EKG VENTRICULAR RATE: 128 BPM
EKG VENTRICULAR RATE: 136 BPM
EKG VENTRICULAR RATE: 139 BPM
EKG VENTRICULAR RATE: 140 BPM
EKG VENTRICULAR RATE: 142 BPM
EKG VENTRICULAR RATE: 64 BPM
EKG VENTRICULAR RATE: 65 BPM
EKG VENTRICULAR RATE: 83 BPM
EKG VENTRICULAR RATE: 85 BPM
EKG VENTRICULAR RATE: 85 BPM
EKG VENTRICULAR RATE: 88 BPM
EOSINOPHIL FLUID: 2 %
EOSINOPHILS ABSOLUTE: 0 K/UL (ref 0–0.6)
EOSINOPHILS ABSOLUTE: 0.1 K/UL (ref 0–0.6)
EOSINOPHILS ABSOLUTE: 0.2 K/UL (ref 0–0.6)
EOSINOPHILS RELATIVE PERCENT: 0 %
EOSINOPHILS RELATIVE PERCENT: 0.1 %
EOSINOPHILS RELATIVE PERCENT: 0.2 %
EOSINOPHILS RELATIVE PERCENT: 0.2 %
EOSINOPHILS RELATIVE PERCENT: 0.3 %
EOSINOPHILS RELATIVE PERCENT: 0.7 %
EOSINOPHILS RELATIVE PERCENT: 0.8 %
EOSINOPHILS RELATIVE PERCENT: 0.9 %
EOSINOPHILS RELATIVE PERCENT: 1 %
EOSINOPHILS RELATIVE PERCENT: 1.7 %
EOSINOPHILS RELATIVE PERCENT: 2.3 %
EOSINOPHILS RELATIVE PERCENT: 2.3 %
EOSINOPHILS RELATIVE PERCENT: 4.3 %
EPITHELIAL CELLS, UA: ABNORMAL /HPF (ref 0–5)
EPITHELIAL CELLS, UA: ABNORMAL /HPF (ref 0–5)
ESTIMATED AVERAGE GLUCOSE: 88.2 MG/DL
FIBRINOGEN: 470 MG/DL (ref 200–397)
FLUID PATH CONSULT: YES
FLUID TYPE: NORMAL
GFR AFRICAN AMERICAN: 22
GFR AFRICAN AMERICAN: 23
GFR AFRICAN AMERICAN: 23
GFR AFRICAN AMERICAN: 24
GFR AFRICAN AMERICAN: 25
GFR AFRICAN AMERICAN: 26
GFR AFRICAN AMERICAN: 26
GFR AFRICAN AMERICAN: 27
GFR AFRICAN AMERICAN: 31
GFR AFRICAN AMERICAN: 34
GFR AFRICAN AMERICAN: 37
GFR AFRICAN AMERICAN: 37
GFR AFRICAN AMERICAN: 40
GFR AFRICAN AMERICAN: 43
GFR AFRICAN AMERICAN: 43
GFR AFRICAN AMERICAN: 46
GFR AFRICAN AMERICAN: 50
GFR AFRICAN AMERICAN: 55
GFR AFRICAN AMERICAN: >60
GFR NON-AFRICAN AMERICAN: 18
GFR NON-AFRICAN AMERICAN: 19
GFR NON-AFRICAN AMERICAN: 19
GFR NON-AFRICAN AMERICAN: 20
GFR NON-AFRICAN AMERICAN: 21
GFR NON-AFRICAN AMERICAN: 21
GFR NON-AFRICAN AMERICAN: 23
GFR NON-AFRICAN AMERICAN: 25
GFR NON-AFRICAN AMERICAN: 28
GFR NON-AFRICAN AMERICAN: 30
GFR NON-AFRICAN AMERICAN: 30
GFR NON-AFRICAN AMERICAN: 33
GFR NON-AFRICAN AMERICAN: 35
GFR NON-AFRICAN AMERICAN: 35
GFR NON-AFRICAN AMERICAN: 38
GFR NON-AFRICAN AMERICAN: 41
GFR NON-AFRICAN AMERICAN: 46
GFR NON-AFRICAN AMERICAN: 50
GFR NON-AFRICAN AMERICAN: 56
GFR NON-AFRICAN AMERICAN: >60
GLOBULIN: 3.8 G/DL
GLOBULIN: 4 G/DL
GLOBULIN: 4.1 G/DL
GLUCOSE BLD-MCNC: 100 MG/DL (ref 70–99)
GLUCOSE BLD-MCNC: 102 MG/DL (ref 70–99)
GLUCOSE BLD-MCNC: 102 MG/DL (ref 70–99)
GLUCOSE BLD-MCNC: 103 MG/DL (ref 70–99)
GLUCOSE BLD-MCNC: 105 MG/DL (ref 70–99)
GLUCOSE BLD-MCNC: 106 MG/DL (ref 70–99)
GLUCOSE BLD-MCNC: 106 MG/DL (ref 70–99)
GLUCOSE BLD-MCNC: 107 MG/DL (ref 70–99)
GLUCOSE BLD-MCNC: 108 MG/DL (ref 70–99)
GLUCOSE BLD-MCNC: 110 MG/DL (ref 70–99)
GLUCOSE BLD-MCNC: 111 MG/DL (ref 70–99)
GLUCOSE BLD-MCNC: 112 MG/DL (ref 70–99)
GLUCOSE BLD-MCNC: 113 MG/DL (ref 70–99)
GLUCOSE BLD-MCNC: 114 MG/DL (ref 70–99)
GLUCOSE BLD-MCNC: 115 MG/DL (ref 70–99)
GLUCOSE BLD-MCNC: 116 MG/DL (ref 70–99)
GLUCOSE BLD-MCNC: 117 MG/DL (ref 70–99)
GLUCOSE BLD-MCNC: 118 MG/DL (ref 70–99)
GLUCOSE BLD-MCNC: 118 MG/DL (ref 70–99)
GLUCOSE BLD-MCNC: 119 MG/DL (ref 70–99)
GLUCOSE BLD-MCNC: 120 MG/DL (ref 70–99)
GLUCOSE BLD-MCNC: 120 MG/DL (ref 70–99)
GLUCOSE BLD-MCNC: 121 MG/DL (ref 70–99)
GLUCOSE BLD-MCNC: 122 MG/DL (ref 70–99)
GLUCOSE BLD-MCNC: 123 MG/DL (ref 70–99)
GLUCOSE BLD-MCNC: 124 MG/DL (ref 70–99)
GLUCOSE BLD-MCNC: 125 MG/DL (ref 70–99)
GLUCOSE BLD-MCNC: 125 MG/DL (ref 70–99)
GLUCOSE BLD-MCNC: 126 MG/DL (ref 70–99)
GLUCOSE BLD-MCNC: 126 MG/DL (ref 70–99)
GLUCOSE BLD-MCNC: 127 MG/DL (ref 70–99)
GLUCOSE BLD-MCNC: 128 MG/DL (ref 70–99)
GLUCOSE BLD-MCNC: 128 MG/DL (ref 70–99)
GLUCOSE BLD-MCNC: 129 MG/DL (ref 70–99)
GLUCOSE BLD-MCNC: 129 MG/DL (ref 70–99)
GLUCOSE BLD-MCNC: 130 MG/DL (ref 70–99)
GLUCOSE BLD-MCNC: 131 MG/DL (ref 70–99)
GLUCOSE BLD-MCNC: 132 MG/DL (ref 70–99)
GLUCOSE BLD-MCNC: 133 MG/DL (ref 70–99)
GLUCOSE BLD-MCNC: 133 MG/DL (ref 70–99)
GLUCOSE BLD-MCNC: 134 MG/DL (ref 70–99)
GLUCOSE BLD-MCNC: 135 MG/DL (ref 70–99)
GLUCOSE BLD-MCNC: 137 MG/DL (ref 70–99)
GLUCOSE BLD-MCNC: 138 MG/DL (ref 70–99)
GLUCOSE BLD-MCNC: 139 MG/DL (ref 70–99)
GLUCOSE BLD-MCNC: 139 MG/DL (ref 70–99)
GLUCOSE BLD-MCNC: 140 MG/DL (ref 70–99)
GLUCOSE BLD-MCNC: 142 MG/DL (ref 70–99)
GLUCOSE BLD-MCNC: 143 MG/DL (ref 70–99)
GLUCOSE BLD-MCNC: 144 MG/DL (ref 70–99)
GLUCOSE BLD-MCNC: 145 MG/DL (ref 70–99)
GLUCOSE BLD-MCNC: 146 MG/DL (ref 70–99)
GLUCOSE BLD-MCNC: 146 MG/DL (ref 70–99)
GLUCOSE BLD-MCNC: 147 MG/DL (ref 70–99)
GLUCOSE BLD-MCNC: 148 MG/DL (ref 70–99)
GLUCOSE BLD-MCNC: 149 MG/DL (ref 70–99)
GLUCOSE BLD-MCNC: 150 MG/DL (ref 70–99)
GLUCOSE BLD-MCNC: 151 MG/DL (ref 70–99)
GLUCOSE BLD-MCNC: 151 MG/DL (ref 70–99)
GLUCOSE BLD-MCNC: 153 MG/DL (ref 70–99)
GLUCOSE BLD-MCNC: 153 MG/DL (ref 70–99)
GLUCOSE BLD-MCNC: 154 MG/DL (ref 70–99)
GLUCOSE BLD-MCNC: 154 MG/DL (ref 70–99)
GLUCOSE BLD-MCNC: 155 MG/DL (ref 70–99)
GLUCOSE BLD-MCNC: 156 MG/DL (ref 70–99)
GLUCOSE BLD-MCNC: 157 MG/DL (ref 70–99)
GLUCOSE BLD-MCNC: 157 MG/DL (ref 70–99)
GLUCOSE BLD-MCNC: 158 MG/DL (ref 70–99)
GLUCOSE BLD-MCNC: 159 MG/DL (ref 70–99)
GLUCOSE BLD-MCNC: 159 MG/DL (ref 70–99)
GLUCOSE BLD-MCNC: 160 MG/DL (ref 70–99)
GLUCOSE BLD-MCNC: 163 MG/DL (ref 70–99)
GLUCOSE BLD-MCNC: 163 MG/DL (ref 70–99)
GLUCOSE BLD-MCNC: 164 MG/DL (ref 70–99)
GLUCOSE BLD-MCNC: 165 MG/DL (ref 70–99)
GLUCOSE BLD-MCNC: 167 MG/DL (ref 70–99)
GLUCOSE BLD-MCNC: 168 MG/DL (ref 70–99)
GLUCOSE BLD-MCNC: 170 MG/DL (ref 70–99)
GLUCOSE BLD-MCNC: 172 MG/DL (ref 70–99)
GLUCOSE BLD-MCNC: 173 MG/DL (ref 70–99)
GLUCOSE BLD-MCNC: 179 MG/DL (ref 70–99)
GLUCOSE BLD-MCNC: 183 MG/DL (ref 70–99)
GLUCOSE BLD-MCNC: 185 MG/DL (ref 70–99)
GLUCOSE BLD-MCNC: 186 MG/DL (ref 70–99)
GLUCOSE BLD-MCNC: 187 MG/DL (ref 70–99)
GLUCOSE BLD-MCNC: 187 MG/DL (ref 70–99)
GLUCOSE BLD-MCNC: 189 MG/DL (ref 70–99)
GLUCOSE BLD-MCNC: 193 MG/DL (ref 70–99)
GLUCOSE BLD-MCNC: 198 MG/DL (ref 70–99)
GLUCOSE BLD-MCNC: 210 MG/DL (ref 70–99)
GLUCOSE BLD-MCNC: 217 MG/DL (ref 70–99)
GLUCOSE BLD-MCNC: 219 MG/DL (ref 70–99)
GLUCOSE BLD-MCNC: 225 MG/DL (ref 70–99)
GLUCOSE BLD-MCNC: 230 MG/DL (ref 70–99)
GLUCOSE BLD-MCNC: 236 MG/DL (ref 70–99)
GLUCOSE BLD-MCNC: 237 MG/DL (ref 70–99)
GLUCOSE BLD-MCNC: 242 MG/DL (ref 70–99)
GLUCOSE BLD-MCNC: 76 MG/DL (ref 70–99)
GLUCOSE BLD-MCNC: 84 MG/DL (ref 70–99)
GLUCOSE BLD-MCNC: 90 MG/DL (ref 70–99)
GLUCOSE BLD-MCNC: 90 MG/DL (ref 70–99)
GLUCOSE BLD-MCNC: 91 MG/DL (ref 70–99)
GLUCOSE BLD-MCNC: 91 MG/DL (ref 70–99)
GLUCOSE BLD-MCNC: 92 MG/DL (ref 70–99)
GLUCOSE BLD-MCNC: 93 MG/DL (ref 70–99)
GLUCOSE BLD-MCNC: 94 MG/DL (ref 70–99)
GLUCOSE BLD-MCNC: 95 MG/DL (ref 70–99)
GLUCOSE BLD-MCNC: 96 MG/DL (ref 70–99)
GLUCOSE BLD-MCNC: 98 MG/DL (ref 70–99)
GLUCOSE BLD-MCNC: 99 MG/DL (ref 70–99)
GLUCOSE URINE: NEGATIVE MG/DL
GLUCOSE URINE: NEGATIVE MG/DL
GRAM STAIN RESULT: ABNORMAL
GRAM STAIN RESULT: ABNORMAL
GRAM STAIN RESULT: NORMAL
GRAM STAIN RESULT: NORMAL
HBA1C MFR BLD: 4.7 %
HCO3 ARTERIAL: 18 MMOL/L (ref 21–29)
HCO3 ARTERIAL: 19.2 MMOL/L (ref 21–29)
HCO3 ARTERIAL: 19.2 MMOL/L (ref 21–29)
HCO3 ARTERIAL: 19.8 MMOL/L (ref 21–29)
HCO3 ARTERIAL: 19.9 MMOL/L (ref 21–29)
HCO3 ARTERIAL: 20.2 MMOL/L (ref 21–29)
HCO3 ARTERIAL: 20.2 MMOL/L (ref 21–29)
HCO3 ARTERIAL: 20.5 MMOL/L (ref 21–29)
HCO3 ARTERIAL: 20.8 MMOL/L (ref 21–29)
HCO3 ARTERIAL: 20.9 MMOL/L (ref 21–29)
HCO3 ARTERIAL: 21.1 MMOL/L (ref 21–29)
HCO3 ARTERIAL: 21.1 MMOL/L (ref 21–29)
HCO3 ARTERIAL: 21.3 MMOL/L (ref 21–29)
HCO3 ARTERIAL: 21.7 MMOL/L (ref 21–29)
HCO3 ARTERIAL: 21.9 MMOL/L (ref 21–29)
HCO3 ARTERIAL: 22 MMOL/L (ref 21–29)
HCO3 ARTERIAL: 22.2 MMOL/L (ref 21–29)
HCO3 ARTERIAL: 22.3 MMOL/L (ref 21–29)
HCO3 ARTERIAL: 22.4 MMOL/L (ref 21–29)
HCO3 ARTERIAL: 22.9 MMOL/L (ref 21–29)
HCO3 ARTERIAL: 23 MMOL/L (ref 21–29)
HCO3 ARTERIAL: 23.3 MMOL/L (ref 21–29)
HCO3 ARTERIAL: 23.4 MMOL/L (ref 21–29)
HCO3 ARTERIAL: 23.4 MMOL/L (ref 21–29)
HCO3 ARTERIAL: 23.8 MMOL/L (ref 21–29)
HCO3 ARTERIAL: 24.1 MMOL/L (ref 21–29)
HCO3 ARTERIAL: 31.1 MMOL/L (ref 21–29)
HCO3 ARTERIAL: 32 MMOL/L (ref 21–29)
HCO3 ARTERIAL: 32.7 MMOL/L (ref 21–29)
HCO3 ARTERIAL: 34.1 MMOL/L (ref 21–29)
HCO3 ARTERIAL: 40.2 MMOL/L (ref 21–29)
HCO3 VENOUS: 20.2 MMOL/L (ref 23–29)
HCO3 VENOUS: 28.4 MMOL/L (ref 23–29)
HCO3 VENOUS: 31.1 MMOL/L (ref 23–29)
HCT VFR BLD CALC: 19.8 % (ref 36–48)
HCT VFR BLD CALC: 22.9 % (ref 36–48)
HCT VFR BLD CALC: 23.6 % (ref 36–48)
HCT VFR BLD CALC: 23.8 % (ref 36–48)
HCT VFR BLD CALC: 24.5 % (ref 36–48)
HCT VFR BLD CALC: 24.7 % (ref 36–48)
HCT VFR BLD CALC: 24.7 % (ref 36–48)
HCT VFR BLD CALC: 24.8 % (ref 36–48)
HCT VFR BLD CALC: 25 % (ref 36–48)
HCT VFR BLD CALC: 25.3 % (ref 36–48)
HCT VFR BLD CALC: 25.4 % (ref 36–48)
HCT VFR BLD CALC: 25.6 % (ref 36–48)
HCT VFR BLD CALC: 25.7 % (ref 36–48)
HCT VFR BLD CALC: 25.9 % (ref 36–48)
HCT VFR BLD CALC: 25.9 % (ref 36–48)
HCT VFR BLD CALC: 26.2 % (ref 36–48)
HCT VFR BLD CALC: 26.3 % (ref 36–48)
HCT VFR BLD CALC: 27.5 % (ref 36–48)
HCT VFR BLD CALC: 27.6 % (ref 36–48)
HCT VFR BLD CALC: 28.1 % (ref 36–48)
HCT VFR BLD CALC: 28.8 % (ref 36–48)
HCT VFR BLD CALC: 29.4 % (ref 36–48)
HCT VFR BLD CALC: 29.9 % (ref 36–48)
HCT VFR BLD CALC: 30.1 % (ref 36–48)
HCT VFR BLD CALC: 30.1 % (ref 36–48)
HCT VFR BLD CALC: 31.2 % (ref 36–48)
HEMATOLOGY PATH CONSULT: NORMAL
HEMATOLOGY PATH CONSULT: YES
HEMOGLOBIN, ART, EXTENDED: 10 G/DL (ref 12–16)
HEMOGLOBIN, ART, EXTENDED: 10.2 G/DL (ref 12–16)
HEMOGLOBIN, ART, EXTENDED: 10.3 G/DL (ref 12–16)
HEMOGLOBIN, ART, EXTENDED: 10.3 G/DL (ref 12–16)
HEMOGLOBIN, ART, EXTENDED: 10.5 G/DL (ref 12–16)
HEMOGLOBIN, ART, EXTENDED: 10.5 G/DL (ref 12–16)
HEMOGLOBIN, ART, EXTENDED: 10.6 G/DL (ref 12–16)
HEMOGLOBIN, ART, EXTENDED: 10.8 G/DL (ref 12–16)
HEMOGLOBIN, ART, EXTENDED: 11.8 G/DL (ref 12–16)
HEMOGLOBIN, ART, EXTENDED: 11.8 G/DL (ref 12–16)
HEMOGLOBIN, ART, EXTENDED: 12.9 G/DL (ref 12–16)
HEMOGLOBIN, ART, EXTENDED: 7.4 G/DL (ref 12–16)
HEMOGLOBIN, ART, EXTENDED: 8.1 G/DL (ref 12–16)
HEMOGLOBIN, ART, EXTENDED: 8.2 G/DL (ref 12–16)
HEMOGLOBIN, ART, EXTENDED: 8.3 G/DL (ref 12–16)
HEMOGLOBIN, ART, EXTENDED: 8.4 G/DL (ref 12–16)
HEMOGLOBIN, ART, EXTENDED: 8.8 G/DL (ref 12–16)
HEMOGLOBIN, ART, EXTENDED: 8.9 G/DL (ref 12–16)
HEMOGLOBIN, ART, EXTENDED: 9 G/DL (ref 12–16)
HEMOGLOBIN, ART, EXTENDED: 9 G/DL (ref 12–16)
HEMOGLOBIN, ART, EXTENDED: 9.1 G/DL (ref 12–16)
HEMOGLOBIN, ART, EXTENDED: 9.2 G/DL (ref 12–16)
HEMOGLOBIN, ART, EXTENDED: 9.3 G/DL (ref 12–16)
HEMOGLOBIN, ART, EXTENDED: 9.3 G/DL (ref 12–16)
HEMOGLOBIN, ART, EXTENDED: 9.5 G/DL (ref 12–16)
HEMOGLOBIN, ART, EXTENDED: 9.8 G/DL (ref 12–16)
HEMOGLOBIN: 10 G/DL (ref 12–16)
HEMOGLOBIN: 10.1 G/DL (ref 12–16)
HEMOGLOBIN: 6.2 G/DL (ref 12–16)
HEMOGLOBIN: 7.4 G/DL (ref 12–16)
HEMOGLOBIN: 7.4 G/DL (ref 12–16)
HEMOGLOBIN: 7.5 G/DL (ref 12–16)
HEMOGLOBIN: 7.8 G/DL (ref 12–16)
HEMOGLOBIN: 7.9 G/DL (ref 12–16)
HEMOGLOBIN: 8 G/DL (ref 12–16)
HEMOGLOBIN: 8 G/DL (ref 12–16)
HEMOGLOBIN: 8.1 G/DL (ref 12–16)
HEMOGLOBIN: 8.2 G/DL (ref 12–16)
HEMOGLOBIN: 8.3 G/DL (ref 12–16)
HEMOGLOBIN: 8.3 G/DL (ref 12–16)
HEMOGLOBIN: 8.4 G/DL (ref 12–16)
HEMOGLOBIN: 8.7 G/DL (ref 12–16)
HEMOGLOBIN: 8.9 G/DL (ref 12–16)
HEMOGLOBIN: 9 G/DL (ref 12–16)
HEMOGLOBIN: 9 G/DL (ref 12–16)
HEMOGLOBIN: 9.1 G/DL (ref 12–16)
HEMOGLOBIN: 9.5 G/DL (ref 12–16)
HEMOGLOBIN: 9.6 G/DL (ref 12–16)
HEMOGLOBIN: 9.8 G/DL (ref 12–16)
HYALINE CASTS: ABNORMAL /LPF (ref 0–2)
HYPERSEGMENTED NEUTROPHILS: PRESENT
INR BLD: 1.28 (ref 0.86–1.14)
INR BLD: 1.31 (ref 0.86–1.14)
INR BLD: 1.32 (ref 0.86–1.14)
INR BLD: 1.46 (ref 0.86–1.14)
INR BLD: 1.66 (ref 0.86–1.14)
INR BLD: 2.1 (ref 0.86–1.14)
INR BLD: 2.38 (ref 0.86–1.14)
INR BLD: 3.68 (ref 0.86–1.14)
KETONES, URINE: ABNORMAL MG/DL
KETONES, URINE: NEGATIVE MG/DL
LACTIC ACID, SEPSIS: 1.1 MMOL/L (ref 0.4–1.9)
LACTIC ACID, SEPSIS: 1.5 MMOL/L (ref 0.4–1.9)
LACTIC ACID: 1.5 MMOL/L (ref 0.4–2)
LACTIC ACID: 1.7 MMOL/L (ref 0.4–2)
LACTIC ACID: 1.9 MMOL/L (ref 0.4–2)
LACTIC ACID: 2.3 MMOL/L (ref 0.4–2)
LACTIC ACID: 2.6 MMOL/L (ref 0.4–2)
LD, FLUID: 630 U/L
LEUKOCYTE ESTERASE, URINE: ABNORMAL
LEUKOCYTE ESTERASE, URINE: NEGATIVE
LIPASE: 9 U/L (ref 13–60)
LYMPHOCYTES ABSOLUTE: 0 K/UL (ref 1–5.1)
LYMPHOCYTES ABSOLUTE: 0.1 K/UL (ref 1–5.1)
LYMPHOCYTES ABSOLUTE: 0.2 K/UL (ref 1–5.1)
LYMPHOCYTES ABSOLUTE: 0.3 K/UL (ref 1–5.1)
LYMPHOCYTES RELATIVE PERCENT: 0 %
LYMPHOCYTES RELATIVE PERCENT: 0.4 %
LYMPHOCYTES RELATIVE PERCENT: 0.6 %
LYMPHOCYTES RELATIVE PERCENT: 0.6 %
LYMPHOCYTES RELATIVE PERCENT: 0.7 %
LYMPHOCYTES RELATIVE PERCENT: 0.8 %
LYMPHOCYTES RELATIVE PERCENT: 0.8 %
LYMPHOCYTES RELATIVE PERCENT: 1 %
LYMPHOCYTES RELATIVE PERCENT: 1 %
LYMPHOCYTES RELATIVE PERCENT: 1.1 %
LYMPHOCYTES RELATIVE PERCENT: 1.4 %
LYMPHOCYTES RELATIVE PERCENT: 1.4 %
LYMPHOCYTES RELATIVE PERCENT: 1.5 %
LYMPHOCYTES RELATIVE PERCENT: 1.6 %
LYMPHOCYTES RELATIVE PERCENT: 1.8 %
LYMPHOCYTES RELATIVE PERCENT: 1.9 %
LYMPHOCYTES RELATIVE PERCENT: 1.9 %
LYMPHOCYTES RELATIVE PERCENT: 2.4 %
LYMPHOCYTES RELATIVE PERCENT: 2.6 %
LYMPHOCYTES RELATIVE PERCENT: 2.9 %
LYMPHOCYTES RELATIVE PERCENT: 3.2 %
LYMPHOCYTES RELATIVE PERCENT: 4.2 %
LYMPHOCYTES, BODY FLUID: 29 %
MACROPHAGE FLUID: 21 %
MAGNESIUM: 1.3 MG/DL (ref 1.8–2.4)
MAGNESIUM: 1.6 MG/DL (ref 1.8–2.4)
MAGNESIUM: 1.8 MG/DL (ref 1.8–2.4)
MAGNESIUM: 1.9 MG/DL (ref 1.8–2.4)
MAGNESIUM: 2 MG/DL (ref 1.8–2.4)
MAGNESIUM: 2.1 MG/DL (ref 1.8–2.4)
MAGNESIUM: 2.1 MG/DL (ref 1.8–2.4)
MAGNESIUM: 2.2 MG/DL (ref 1.8–2.4)
MAGNESIUM: 2.2 MG/DL (ref 1.8–2.4)
MAGNESIUM: 2.3 MG/DL (ref 1.8–2.4)
MAGNESIUM: 2.3 MG/DL (ref 1.8–2.4)
MAGNESIUM: 2.4 MG/DL (ref 1.8–2.4)
MAGNESIUM: 2.5 MG/DL (ref 1.8–2.4)
MAGNESIUM: 2.5 MG/DL (ref 1.8–2.4)
MCH RBC QN AUTO: 27.7 PG (ref 26–34)
MCH RBC QN AUTO: 27.8 PG (ref 26–34)
MCH RBC QN AUTO: 27.9 PG (ref 26–34)
MCH RBC QN AUTO: 28 PG (ref 26–34)
MCH RBC QN AUTO: 28 PG (ref 26–34)
MCH RBC QN AUTO: 28.2 PG (ref 26–34)
MCH RBC QN AUTO: 28.3 PG (ref 26–34)
MCH RBC QN AUTO: 28.4 PG (ref 26–34)
MCH RBC QN AUTO: 28.5 PG (ref 26–34)
MCH RBC QN AUTO: 28.5 PG (ref 26–34)
MCH RBC QN AUTO: 28.7 PG (ref 26–34)
MCH RBC QN AUTO: 28.9 PG (ref 26–34)
MCH RBC QN AUTO: 29.3 PG (ref 26–34)
MCH RBC QN AUTO: 29.3 PG (ref 26–34)
MCH RBC QN AUTO: 29.5 PG (ref 26–34)
MCH RBC QN AUTO: 29.6 PG (ref 26–34)
MCH RBC QN AUTO: 29.9 PG (ref 26–34)
MCH RBC QN AUTO: 30 PG (ref 26–34)
MCH RBC QN AUTO: 30.1 PG (ref 26–34)
MCH RBC QN AUTO: 30.3 PG (ref 26–34)
MCH RBC QN AUTO: 30.4 PG (ref 26–34)
MCH RBC QN AUTO: 30.4 PG (ref 26–34)
MCH RBC QN AUTO: 30.6 PG (ref 26–34)
MCHC RBC AUTO-ENTMCNC: 30.3 G/DL (ref 31–36)
MCHC RBC AUTO-ENTMCNC: 30.4 G/DL (ref 31–36)
MCHC RBC AUTO-ENTMCNC: 30.5 G/DL (ref 31–36)
MCHC RBC AUTO-ENTMCNC: 30.8 G/DL (ref 31–36)
MCHC RBC AUTO-ENTMCNC: 31.1 G/DL (ref 31–36)
MCHC RBC AUTO-ENTMCNC: 31.3 G/DL (ref 31–36)
MCHC RBC AUTO-ENTMCNC: 31.5 G/DL (ref 31–36)
MCHC RBC AUTO-ENTMCNC: 31.5 G/DL (ref 31–36)
MCHC RBC AUTO-ENTMCNC: 31.7 G/DL (ref 31–36)
MCHC RBC AUTO-ENTMCNC: 31.8 G/DL (ref 31–36)
MCHC RBC AUTO-ENTMCNC: 31.9 G/DL (ref 31–36)
MCHC RBC AUTO-ENTMCNC: 31.9 G/DL (ref 31–36)
MCHC RBC AUTO-ENTMCNC: 32 G/DL (ref 31–36)
MCHC RBC AUTO-ENTMCNC: 32.1 G/DL (ref 31–36)
MCHC RBC AUTO-ENTMCNC: 32.2 G/DL (ref 31–36)
MCHC RBC AUTO-ENTMCNC: 32.2 G/DL (ref 31–36)
MCHC RBC AUTO-ENTMCNC: 32.4 G/DL (ref 31–36)
MCHC RBC AUTO-ENTMCNC: 32.5 G/DL (ref 31–36)
MCHC RBC AUTO-ENTMCNC: 32.5 G/DL (ref 31–36)
MCHC RBC AUTO-ENTMCNC: 32.6 G/DL (ref 31–36)
MCHC RBC AUTO-ENTMCNC: 32.6 G/DL (ref 31–36)
MCHC RBC AUTO-ENTMCNC: 32.7 G/DL (ref 31–36)
MCHC RBC AUTO-ENTMCNC: 33 G/DL (ref 31–36)
MCHC RBC AUTO-ENTMCNC: 33 G/DL (ref 31–36)
MCHC RBC AUTO-ENTMCNC: 33.1 G/DL (ref 31–36)
MCHC RBC AUTO-ENTMCNC: 33.1 G/DL (ref 31–36)
MCHC RBC AUTO-ENTMCNC: 33.2 G/DL (ref 31–36)
MCHC RBC AUTO-ENTMCNC: 33.3 G/DL (ref 31–36)
MCV RBC AUTO: 85.2 FL (ref 80–100)
MCV RBC AUTO: 85.7 FL (ref 80–100)
MCV RBC AUTO: 86.4 FL (ref 80–100)
MCV RBC AUTO: 86.8 FL (ref 80–100)
MCV RBC AUTO: 86.9 FL (ref 80–100)
MCV RBC AUTO: 87.2 FL (ref 80–100)
MCV RBC AUTO: 87.6 FL (ref 80–100)
MCV RBC AUTO: 88.4 FL (ref 80–100)
MCV RBC AUTO: 88.4 FL (ref 80–100)
MCV RBC AUTO: 88.5 FL (ref 80–100)
MCV RBC AUTO: 88.9 FL (ref 80–100)
MCV RBC AUTO: 89.5 FL (ref 80–100)
MCV RBC AUTO: 89.7 FL (ref 80–100)
MCV RBC AUTO: 89.9 FL (ref 80–100)
MCV RBC AUTO: 90.1 FL (ref 80–100)
MCV RBC AUTO: 90.9 FL (ref 80–100)
MCV RBC AUTO: 90.9 FL (ref 80–100)
MCV RBC AUTO: 91.2 FL (ref 80–100)
MCV RBC AUTO: 91.3 FL (ref 80–100)
MCV RBC AUTO: 91.7 FL (ref 80–100)
MCV RBC AUTO: 91.9 FL (ref 80–100)
MCV RBC AUTO: 91.9 FL (ref 80–100)
MCV RBC AUTO: 92.1 FL (ref 80–100)
MCV RBC AUTO: 92.2 FL (ref 80–100)
MCV RBC AUTO: 92.4 FL (ref 80–100)
MCV RBC AUTO: 93.2 FL (ref 80–100)
MCV RBC AUTO: 93.8 FL (ref 80–100)
MCV RBC AUTO: 94.2 FL (ref 80–100)
MESOTHELIAL FLUID: 1 %
METHEMOGLOBIN ARTERIAL: 0 %
METHEMOGLOBIN ARTERIAL: 0.1 %
METHEMOGLOBIN ARTERIAL: 0.2 %
METHEMOGLOBIN ARTERIAL: 0.3 %
METHEMOGLOBIN VENOUS: 0.3 %
METHEMOGLOBIN VENOUS: 0.3 %
MICROSCOPIC EXAMINATION: YES
MICROSCOPIC EXAMINATION: YES
MONOCYTES ABSOLUTE: 0 K/UL (ref 0–1.3)
MONOCYTES ABSOLUTE: 0 K/UL (ref 0–1.3)
MONOCYTES ABSOLUTE: 0.1 K/UL (ref 0–1.3)
MONOCYTES ABSOLUTE: 0.2 K/UL (ref 0–1.3)
MONOCYTES ABSOLUTE: 0.3 K/UL (ref 0–1.3)
MONOCYTES ABSOLUTE: 0.4 K/UL (ref 0–1.3)
MONOCYTES ABSOLUTE: 0.5 K/UL (ref 0–1.3)
MONOCYTES ABSOLUTE: 0.5 K/UL (ref 0–1.3)
MONOCYTES ABSOLUTE: 0.6 K/UL (ref 0–1.3)
MONOCYTES RELATIVE PERCENT: 0.2 %
MONOCYTES RELATIVE PERCENT: 0.2 %
MONOCYTES RELATIVE PERCENT: 1 %
MONOCYTES RELATIVE PERCENT: 1.2 %
MONOCYTES RELATIVE PERCENT: 1.3 %
MONOCYTES RELATIVE PERCENT: 1.6 %
MONOCYTES RELATIVE PERCENT: 1.8 %
MONOCYTES RELATIVE PERCENT: 1.8 %
MONOCYTES RELATIVE PERCENT: 1.9 %
MONOCYTES RELATIVE PERCENT: 2 %
MONOCYTES RELATIVE PERCENT: 2.2 %
MONOCYTES RELATIVE PERCENT: 2.7 %
MONOCYTES RELATIVE PERCENT: 2.7 %
MONOCYTES RELATIVE PERCENT: 3.6 %
MONOCYTES RELATIVE PERCENT: 5 %
MONOCYTES RELATIVE PERCENT: 5.1 %
MONOCYTES RELATIVE PERCENT: 5.3 %
MONOCYTES RELATIVE PERCENT: 6.4 %
MONOCYTES RELATIVE PERCENT: 6.5 %
MONOCYTES RELATIVE PERCENT: 6.9 %
MONOCYTES RELATIVE PERCENT: 7.7 %
MONOCYTES RELATIVE PERCENT: 7.8 %
MONOCYTES RELATIVE PERCENT: 8.6 %
MONONUCLEAR UNIDENTIFIED CELLS FLUID: 23 %
MUCUS: ABNORMAL /LPF
NEUTROPHIL, FLUID: 24 %
NEUTROPHILS ABSOLUTE: 10.6 K/UL (ref 1.7–7.7)
NEUTROPHILS ABSOLUTE: 10.9 K/UL (ref 1.7–7.7)
NEUTROPHILS ABSOLUTE: 12.6 K/UL (ref 1.7–7.7)
NEUTROPHILS ABSOLUTE: 12.7 K/UL (ref 1.7–7.7)
NEUTROPHILS ABSOLUTE: 13.5 K/UL (ref 1.7–7.7)
NEUTROPHILS ABSOLUTE: 13.9 K/UL (ref 1.7–7.7)
NEUTROPHILS ABSOLUTE: 14.9 K/UL (ref 1.7–7.7)
NEUTROPHILS ABSOLUTE: 16.3 K/UL (ref 1.7–7.7)
NEUTROPHILS ABSOLUTE: 17.7 K/UL (ref 1.7–7.7)
NEUTROPHILS ABSOLUTE: 3.5 K/UL (ref 1.7–7.7)
NEUTROPHILS ABSOLUTE: 3.9 K/UL (ref 1.7–7.7)
NEUTROPHILS ABSOLUTE: 31 K/UL (ref 1.7–7.7)
NEUTROPHILS ABSOLUTE: 4.3 K/UL (ref 1.7–7.7)
NEUTROPHILS ABSOLUTE: 4.3 K/UL (ref 1.7–7.7)
NEUTROPHILS ABSOLUTE: 47.4 K/UL (ref 1.7–7.7)
NEUTROPHILS ABSOLUTE: 5 K/UL (ref 1.7–7.7)
NEUTROPHILS ABSOLUTE: 5.2 K/UL (ref 1.7–7.7)
NEUTROPHILS ABSOLUTE: 6 K/UL (ref 1.7–7.7)
NEUTROPHILS ABSOLUTE: 6.5 K/UL (ref 1.7–7.7)
NEUTROPHILS ABSOLUTE: 6.5 K/UL (ref 1.7–7.7)
NEUTROPHILS ABSOLUTE: 6.6 K/UL (ref 1.7–7.7)
NEUTROPHILS ABSOLUTE: 6.8 K/UL (ref 1.7–7.7)
NEUTROPHILS ABSOLUTE: 7.2 K/UL (ref 1.7–7.7)
NEUTROPHILS ABSOLUTE: 7.6 K/UL (ref 1.7–7.7)
NEUTROPHILS ABSOLUTE: 7.8 K/UL (ref 1.7–7.7)
NEUTROPHILS ABSOLUTE: 7.9 K/UL (ref 1.7–7.7)
NEUTROPHILS ABSOLUTE: 8.4 K/UL (ref 1.7–7.7)
NEUTROPHILS RELATIVE PERCENT: 82.7 %
NEUTROPHILS RELATIVE PERCENT: 83 %
NEUTROPHILS RELATIVE PERCENT: 87.3 %
NEUTROPHILS RELATIVE PERCENT: 87.8 %
NEUTROPHILS RELATIVE PERCENT: 88.1 %
NEUTROPHILS RELATIVE PERCENT: 89.3 %
NEUTROPHILS RELATIVE PERCENT: 89.7 %
NEUTROPHILS RELATIVE PERCENT: 90 %
NEUTROPHILS RELATIVE PERCENT: 90.6 %
NEUTROPHILS RELATIVE PERCENT: 91.9 %
NEUTROPHILS RELATIVE PERCENT: 92.5 %
NEUTROPHILS RELATIVE PERCENT: 93.7 %
NEUTROPHILS RELATIVE PERCENT: 94.6 %
NEUTROPHILS RELATIVE PERCENT: 94.8 %
NEUTROPHILS RELATIVE PERCENT: 96.2 %
NEUTROPHILS RELATIVE PERCENT: 96.3 %
NEUTROPHILS RELATIVE PERCENT: 96.3 %
NEUTROPHILS RELATIVE PERCENT: 96.6 %
NEUTROPHILS RELATIVE PERCENT: 97 %
NEUTROPHILS RELATIVE PERCENT: 97 %
NEUTROPHILS RELATIVE PERCENT: 97.3 %
NEUTROPHILS RELATIVE PERCENT: 97.4 %
NEUTROPHILS RELATIVE PERCENT: 97.7 %
NEUTROPHILS RELATIVE PERCENT: 98 %
NEUTROPHILS RELATIVE PERCENT: 98.1 %
NEUTROPHILS RELATIVE PERCENT: 98.4 %
NEUTROPHILS RELATIVE PERCENT: 99.3 %
NITRITE, URINE: NEGATIVE
NITRITE, URINE: NEGATIVE
NUCLEATED CELLS FLUID: 437 /CUMM
NUMBER OF CELLS COUNTED FLUID: 100
O2 CONTENT ARTERIAL: 11 ML/DL
O2 CONTENT ARTERIAL: 12 ML/DL
O2 CONTENT ARTERIAL: 13 ML/DL
O2 CONTENT ARTERIAL: 14 ML/DL
O2 CONTENT ARTERIAL: 15 ML/DL
O2 CONTENT ARTERIAL: 17 ML/DL
O2 CONTENT ARTERIAL: 9 ML/DL
O2 CONTENT, VEN: 12 VOL %
O2 CONTENT, VEN: 12 VOL %
O2 SAT, ARTERIAL: 53 % (ref 93–100)
O2 SAT, ARTERIAL: 76.1 %
O2 SAT, ARTERIAL: 82 %
O2 SAT, ARTERIAL: 86.8 %
O2 SAT, ARTERIAL: 87.1 %
O2 SAT, ARTERIAL: 87.1 %
O2 SAT, ARTERIAL: 87.8 %
O2 SAT, ARTERIAL: 88.5 %
O2 SAT, ARTERIAL: 89.8 %
O2 SAT, ARTERIAL: 90.4 %
O2 SAT, ARTERIAL: 90.6 %
O2 SAT, ARTERIAL: 92 %
O2 SAT, ARTERIAL: 92.4 %
O2 SAT, ARTERIAL: 94.2 %
O2 SAT, ARTERIAL: 94.2 %
O2 SAT, ARTERIAL: 95 %
O2 SAT, ARTERIAL: 95.8 %
O2 SAT, ARTERIAL: 96 %
O2 SAT, ARTERIAL: 96.1 %
O2 SAT, ARTERIAL: 96.3 %
O2 SAT, ARTERIAL: 96.3 %
O2 SAT, ARTERIAL: 96.5 %
O2 SAT, ARTERIAL: 96.6 %
O2 SAT, ARTERIAL: 96.7 %
O2 SAT, ARTERIAL: 96.9 %
O2 SAT, ARTERIAL: 97.1 %
O2 SAT, ARTERIAL: 97.3 %
O2 SAT, ARTERIAL: 97.7 %
O2 SAT, ARTERIAL: 98 %
O2 SAT, ARTERIAL: 98.4 %
O2 SAT, ARTERIAL: 98.9 %
O2 SAT, VEN: 71 %
O2 SAT, VEN: 85 %
O2 SAT, VEN: 98 %
O2 THERAPY: ABNORMAL
ORGANISM: ABNORMAL
OSMOLALITY URINE: 304 MOSM/KG (ref 390–1070)
PATH CONSULT FLUID: NORMAL
PCO2 ARTERIAL: 27.9 MMHG (ref 35–45)
PCO2 ARTERIAL: 29.7 MMHG (ref 35–45)
PCO2 ARTERIAL: 32.3 MMHG (ref 35–45)
PCO2 ARTERIAL: 33.4 MMHG (ref 35–45)
PCO2 ARTERIAL: 33.7 MMHG (ref 35–45)
PCO2 ARTERIAL: 34.3 MMHG (ref 35–45)
PCO2 ARTERIAL: 34.5 MMHG (ref 35–45)
PCO2 ARTERIAL: 35.3 MMHG (ref 35–45)
PCO2 ARTERIAL: 35.6 MMHG (ref 35–45)
PCO2 ARTERIAL: 35.7 MMHG (ref 35–45)
PCO2 ARTERIAL: 36.1 MMHG (ref 35–45)
PCO2 ARTERIAL: 36.6 MMHG (ref 35–45)
PCO2 ARTERIAL: 37.6 MMHG (ref 35–45)
PCO2 ARTERIAL: 39 MMHG (ref 35–45)
PCO2 ARTERIAL: 40.5 MMHG (ref 35–45)
PCO2 ARTERIAL: 40.7 MMHG (ref 35–45)
PCO2 ARTERIAL: 43.3 MMHG (ref 35–45)
PCO2 ARTERIAL: 43.5 MMHG (ref 35–45)
PCO2 ARTERIAL: 45.6 MMHG (ref 35–45)
PCO2 ARTERIAL: 48 MMHG (ref 35–45)
PCO2 ARTERIAL: 51.8 MMHG (ref 35–45)
PCO2 ARTERIAL: 52.7 MMHG (ref 35–45)
PCO2 ARTERIAL: 53.4 MMHG (ref 35–45)
PCO2 ARTERIAL: 54.8 MMHG (ref 35–45)
PCO2 ARTERIAL: 56.2 MM HG (ref 35–45)
PCO2 ARTERIAL: 56.4 MMHG (ref 35–45)
PCO2 ARTERIAL: 57.5 MMHG (ref 35–45)
PCO2 ARTERIAL: 58.8 MMHG (ref 35–45)
PCO2 ARTERIAL: 64.1 MMHG (ref 35–45)
PCO2 ARTERIAL: 73.6 MMHG (ref 35–45)
PCO2 ARTERIAL: 81.9 MMHG (ref 35–45)
PCO2, VEN: 43.3 MMHG (ref 40–50)
PCO2, VEN: 49.1 MMHG (ref 40–50)
PCO2, VEN: 49.8 MM HG (ref 40–50)
PDW BLD-RTO: 18.9 % (ref 12.4–15.4)
PDW BLD-RTO: 19 % (ref 12.4–15.4)
PDW BLD-RTO: 19 % (ref 12.4–15.4)
PDW BLD-RTO: 19.2 % (ref 12.4–15.4)
PDW BLD-RTO: 19.2 % (ref 12.4–15.4)
PDW BLD-RTO: 19.3 % (ref 12.4–15.4)
PDW BLD-RTO: 19.5 % (ref 12.4–15.4)
PDW BLD-RTO: 19.6 % (ref 12.4–15.4)
PDW BLD-RTO: 19.6 % (ref 12.4–15.4)
PDW BLD-RTO: 19.7 % (ref 12.4–15.4)
PDW BLD-RTO: 19.7 % (ref 12.4–15.4)
PDW BLD-RTO: 19.9 % (ref 12.4–15.4)
PDW BLD-RTO: 20 % (ref 12.4–15.4)
PDW BLD-RTO: 20.1 % (ref 12.4–15.4)
PDW BLD-RTO: 20.3 % (ref 12.4–15.4)
PDW BLD-RTO: 20.5 % (ref 12.4–15.4)
PDW BLD-RTO: 20.6 % (ref 12.4–15.4)
PDW BLD-RTO: 20.7 % (ref 12.4–15.4)
PDW BLD-RTO: 20.9 % (ref 12.4–15.4)
PDW BLD-RTO: 20.9 % (ref 12.4–15.4)
PDW BLD-RTO: 21 % (ref 12.4–15.4)
PDW BLD-RTO: 21.3 % (ref 12.4–15.4)
PDW BLD-RTO: 21.4 % (ref 12.4–15.4)
PDW BLD-RTO: 21.7 % (ref 12.4–15.4)
PDW BLD-RTO: 21.9 % (ref 12.4–15.4)
PDW BLD-RTO: 21.9 % (ref 12.4–15.4)
PDW BLD-RTO: 22 % (ref 12.4–15.4)
PDW BLD-RTO: 22.2 % (ref 12.4–15.4)
PERFORMED ON: ABNORMAL
PERFORMED ON: NORMAL
PH ARTERIAL: 7.08 (ref 7.35–7.45)
PH ARTERIAL: 7.12 (ref 7.35–7.45)
PH ARTERIAL: 7.15 (ref 7.35–7.45)
PH ARTERIAL: 7.19 (ref 7.35–7.45)
PH ARTERIAL: 7.21 (ref 7.35–7.45)
PH ARTERIAL: 7.24 (ref 7.35–7.45)
PH ARTERIAL: 7.33 (ref 7.35–7.45)
PH ARTERIAL: 7.35 (ref 7.35–7.45)
PH ARTERIAL: 7.36 (ref 7.35–7.45)
PH ARTERIAL: 7.36 (ref 7.35–7.45)
PH ARTERIAL: 7.39 (ref 7.35–7.45)
PH ARTERIAL: 7.4 (ref 7.35–7.45)
PH ARTERIAL: 7.41 (ref 7.35–7.45)
PH ARTERIAL: 7.42 (ref 7.35–7.45)
PH ARTERIAL: 7.42 (ref 7.35–7.45)
PH ARTERIAL: 7.43 (ref 7.35–7.45)
PH ARTERIAL: 7.43 (ref 7.35–7.45)
PH ARTERIAL: 7.45 (ref 7.35–7.45)
PH ARTERIAL: 7.51 (ref 7.35–7.45)
PH ARTERIAL: 7.51 (ref 7.35–7.45)
PH ARTERIAL: 7.56 (ref 7.35–7.45)
PH UA: 5 (ref 5–8)
PH UA: 5.5 (ref 5–8)
PH VENOUS: 7.19 (ref 7.35–7.45)
PH VENOUS: 7.27 (ref 7.35–7.45)
PH VENOUS: 7.29 (ref 7.35–7.45)
PH VENOUS: 7.31 (ref 7.35–7.45)
PH VENOUS: 7.33 (ref 7.35–7.45)
PH VENOUS: 7.36 (ref 7.35–7.45)
PH VENOUS: 7.38 (ref 7.35–7.45)
PH VENOUS: 7.4 (ref 7.35–7.45)
PH, BODY FLUID: 7.5
PHOSPHORUS: 3 MG/DL (ref 2.5–4.9)
PHOSPHORUS: 3.2 MG/DL (ref 2.5–4.9)
PHOSPHORUS: 3.5 MG/DL (ref 2.5–4.9)
PHOSPHORUS: 3.7 MG/DL (ref 2.5–4.9)
PHOSPHORUS: 3.9 MG/DL (ref 2.5–4.9)
PHOSPHORUS: 4.2 MG/DL (ref 2.5–4.9)
PHOSPHORUS: 4.3 MG/DL (ref 2.5–4.9)
PHOSPHORUS: 4.5 MG/DL (ref 2.5–4.9)
PHOSPHORUS: 4.7 MG/DL (ref 2.5–4.9)
PHOSPHORUS: 4.8 MG/DL (ref 2.5–4.9)
PHOSPHORUS: 4.8 MG/DL (ref 2.5–4.9)
PHOSPHORUS: 5 MG/DL (ref 2.5–4.9)
PHOSPHORUS: 5.2 MG/DL (ref 2.5–4.9)
PHOSPHORUS: 5.4 MG/DL (ref 2.5–4.9)
PHOSPHORUS: 5.7 MG/DL (ref 2.5–4.9)
PHOSPHORUS: 5.7 MG/DL (ref 2.5–4.9)
PHOSPHORUS: 6.1 MG/DL (ref 2.5–4.9)
PHOSPHORUS: 6.3 MG/DL (ref 2.5–4.9)
PHOSPHORUS: 6.7 MG/DL (ref 2.5–4.9)
PHOSPHORUS: 6.8 MG/DL (ref 2.5–4.9)
PHOSPHORUS: 6.9 MG/DL (ref 2.5–4.9)
PHOSPHORUS: 7 MG/DL (ref 2.5–4.9)
PHOSPHORUS: 7 MG/DL (ref 2.5–4.9)
PHOSPHORUS: 7.2 MG/DL (ref 2.5–4.9)
PHOSPHORUS: 7.2 MG/DL (ref 2.5–4.9)
PHOSPHORUS: 7.5 MG/DL (ref 2.5–4.9)
PHOSPHORUS: 7.6 MG/DL (ref 2.5–4.9)
PHOSPHORUS: 7.6 MG/DL (ref 2.5–4.9)
PHOSPHORUS: 7.7 MG/DL (ref 2.5–4.9)
PHOSPHORUS: 7.9 MG/DL (ref 2.5–4.9)
PLATELET # BLD: 105 K/UL (ref 135–450)
PLATELET # BLD: 106 K/UL (ref 135–450)
PLATELET # BLD: 107 K/UL (ref 135–450)
PLATELET # BLD: 111 K/UL (ref 135–450)
PLATELET # BLD: 112 K/UL (ref 135–450)
PLATELET # BLD: 113 K/UL (ref 135–450)
PLATELET # BLD: 114 K/UL (ref 135–450)
PLATELET # BLD: 116 K/UL (ref 135–450)
PLATELET # BLD: 118 K/UL (ref 135–450)
PLATELET # BLD: 126 K/UL (ref 135–450)
PLATELET # BLD: 129 K/UL (ref 135–450)
PLATELET # BLD: 133 K/UL (ref 135–450)
PLATELET # BLD: 144 K/UL (ref 135–450)
PLATELET # BLD: 147 K/UL (ref 135–450)
PLATELET # BLD: 148 K/UL (ref 135–450)
PLATELET # BLD: 159 K/UL (ref 135–450)
PLATELET # BLD: 162 K/UL (ref 135–450)
PLATELET # BLD: 184 K/UL (ref 135–450)
PLATELET # BLD: 190 K/UL (ref 135–450)
PLATELET # BLD: 193 K/UL (ref 135–450)
PLATELET # BLD: 200 K/UL (ref 135–450)
PLATELET # BLD: 234 K/UL (ref 135–450)
PLATELET # BLD: 235 K/UL (ref 135–450)
PLATELET # BLD: 237 K/UL (ref 135–450)
PLATELET # BLD: 253 K/UL (ref 135–450)
PLATELET # BLD: 81 K/UL (ref 135–450)
PLATELET # BLD: 85 K/UL (ref 135–450)
PLATELET # BLD: 87 K/UL (ref 135–450)
PLATELET # BLD: 87 K/UL (ref 135–450)
PLATELET # BLD: 92 K/UL (ref 135–450)
PLATELET # BLD: 94 K/UL (ref 135–450)
PLATELET # BLD: 99 K/UL (ref 135–450)
PLATELET SLIDE REVIEW: ADEQUATE
PMV BLD AUTO: 10 FL (ref 5–10.5)
PMV BLD AUTO: 10.2 FL (ref 5–10.5)
PMV BLD AUTO: 10.4 FL (ref 5–10.5)
PMV BLD AUTO: 7.5 FL (ref 5–10.5)
PMV BLD AUTO: 7.7 FL (ref 5–10.5)
PMV BLD AUTO: 7.8 FL (ref 5–10.5)
PMV BLD AUTO: 7.9 FL (ref 5–10.5)
PMV BLD AUTO: 7.9 FL (ref 5–10.5)
PMV BLD AUTO: 8 FL (ref 5–10.5)
PMV BLD AUTO: 8.1 FL (ref 5–10.5)
PMV BLD AUTO: 8.2 FL (ref 5–10.5)
PMV BLD AUTO: 8.2 FL (ref 5–10.5)
PMV BLD AUTO: 8.3 FL (ref 5–10.5)
PMV BLD AUTO: 8.5 FL (ref 5–10.5)
PMV BLD AUTO: 8.6 FL (ref 5–10.5)
PMV BLD AUTO: 8.6 FL (ref 5–10.5)
PMV BLD AUTO: 8.7 FL (ref 5–10.5)
PMV BLD AUTO: 8.9 FL (ref 5–10.5)
PMV BLD AUTO: 8.9 FL (ref 5–10.5)
PMV BLD AUTO: 9.1 FL (ref 5–10.5)
PMV BLD AUTO: 9.3 FL (ref 5–10.5)
PMV BLD AUTO: 9.5 FL (ref 5–10.5)
PMV BLD AUTO: 9.5 FL (ref 5–10.5)
PMV BLD AUTO: 9.7 FL (ref 5–10.5)
PMV BLD AUTO: 9.7 FL (ref 5–10.5)
PO2 ARTERIAL: 107.1 MMHG (ref 75–108)
PO2 ARTERIAL: 121 MMHG (ref 75–108)
PO2 ARTERIAL: 142 MMHG (ref 75–108)
PO2 ARTERIAL: 33.2 MM HG (ref 75–108)
PO2 ARTERIAL: 47.4 MMHG (ref 75–108)
PO2 ARTERIAL: 52.5 MMHG (ref 75–108)
PO2 ARTERIAL: 55.1 MMHG (ref 75–108)
PO2 ARTERIAL: 56.2 MMHG (ref 75–108)
PO2 ARTERIAL: 60.8 MMHG (ref 75–108)
PO2 ARTERIAL: 64.1 MMHG (ref 75–108)
PO2 ARTERIAL: 64.8 MMHG (ref 75–108)
PO2 ARTERIAL: 67.3 MMHG (ref 75–108)
PO2 ARTERIAL: 69.1 MMHG (ref 75–108)
PO2 ARTERIAL: 71.6 MMHG (ref 75–108)
PO2 ARTERIAL: 72.8 MMHG (ref 75–108)
PO2 ARTERIAL: 73.2 MMHG (ref 75–108)
PO2 ARTERIAL: 74.9 MMHG (ref 75–108)
PO2 ARTERIAL: 76.1 MMHG (ref 75–108)
PO2 ARTERIAL: 77.1 MMHG (ref 75–108)
PO2 ARTERIAL: 78.3 MMHG (ref 75–108)
PO2 ARTERIAL: 79.8 MMHG (ref 75–108)
PO2 ARTERIAL: 81.4 MMHG (ref 75–108)
PO2 ARTERIAL: 82.2 MMHG (ref 75–108)
PO2 ARTERIAL: 82.5 MMHG (ref 75–108)
PO2 ARTERIAL: 84.8 MMHG (ref 75–108)
PO2 ARTERIAL: 85.4 MMHG (ref 75–108)
PO2 ARTERIAL: 86.4 MMHG (ref 75–108)
PO2 ARTERIAL: 88.4 MMHG (ref 75–108)
PO2 ARTERIAL: 95.1 MMHG (ref 75–108)
PO2 ARTERIAL: 96.3 MMHG (ref 75–108)
PO2 ARTERIAL: 98.9 MMHG (ref 75–108)
PO2, VEN: 115.7 MMHG (ref 25–40)
PO2, VEN: 38 MM HG
PO2, VEN: 50.9 MMHG (ref 25–40)
POC FIO2: 90
POC SAMPLE TYPE: ABNORMAL
POC SAMPLE TYPE: ABNORMAL
POTASSIUM REFLEX MAGNESIUM: 2.9 MMOL/L (ref 3.5–5.1)
POTASSIUM REFLEX MAGNESIUM: 3 MMOL/L (ref 3.5–5.1)
POTASSIUM REFLEX MAGNESIUM: 3.1 MMOL/L (ref 3.5–5.1)
POTASSIUM REFLEX MAGNESIUM: 3.1 MMOL/L (ref 3.5–5.1)
POTASSIUM REFLEX MAGNESIUM: 3.2 MMOL/L (ref 3.5–5.1)
POTASSIUM REFLEX MAGNESIUM: 3.3 MMOL/L (ref 3.5–5.1)
POTASSIUM REFLEX MAGNESIUM: 3.3 MMOL/L (ref 3.5–5.1)
POTASSIUM REFLEX MAGNESIUM: 3.7 MMOL/L (ref 3.5–5.1)
POTASSIUM REFLEX MAGNESIUM: 4 MMOL/L (ref 3.5–5.1)
POTASSIUM REFLEX MAGNESIUM: 4.1 MMOL/L (ref 3.5–5.1)
POTASSIUM REFLEX MAGNESIUM: 4.7 MMOL/L (ref 3.5–5.1)
POTASSIUM SERPL-SCNC: 2.7 MMOL/L (ref 3.5–5.1)
POTASSIUM SERPL-SCNC: 2.9 MMOL/L (ref 3.5–5.1)
POTASSIUM SERPL-SCNC: 3.6 MMOL/L (ref 3.5–5.1)
POTASSIUM SERPL-SCNC: 3.7 MMOL/L (ref 3.5–5.1)
POTASSIUM SERPL-SCNC: 3.8 MMOL/L (ref 3.5–5.1)
POTASSIUM SERPL-SCNC: 3.9 MMOL/L (ref 3.5–5.1)
POTASSIUM SERPL-SCNC: 3.9 MMOL/L (ref 3.5–5.1)
POTASSIUM SERPL-SCNC: 4 MMOL/L (ref 3.5–5.1)
POTASSIUM SERPL-SCNC: 4.1 MMOL/L (ref 3.5–5.1)
POTASSIUM SERPL-SCNC: 4.1 MMOL/L (ref 3.5–5.1)
POTASSIUM SERPL-SCNC: 4.2 MMOL/L (ref 3.5–5.1)
POTASSIUM SERPL-SCNC: 4.5 MMOL/L (ref 3.5–5.1)
POTASSIUM SERPL-SCNC: 4.6 MMOL/L (ref 3.5–5.1)
POTASSIUM SERPL-SCNC: 4.7 MMOL/L (ref 3.5–5.1)
POTASSIUM SERPL-SCNC: 4.8 MMOL/L (ref 3.5–5.1)
POTASSIUM, UR: 22.9 MMOL/L
PRO-BNP: ABNORMAL PG/ML (ref 0–124)
PROCALCITONIN: 99.54 NG/ML (ref 0–0.15)
PROTEIN FLUID: 4.3 G/DL
PROTEIN UA: ABNORMAL MG/DL
PROTEIN UA: NEGATIVE MG/DL
PROTHROMBIN TIME: 14.9 SEC (ref 10–13.2)
PROTHROMBIN TIME: 15.2 SEC (ref 10–13.2)
PROTHROMBIN TIME: 15.3 SEC (ref 10–13.2)
PROTHROMBIN TIME: 17 SEC (ref 10–13.2)
PROTHROMBIN TIME: 19.3 SEC (ref 10–13.2)
PROTHROMBIN TIME: 24.6 SEC (ref 10–13.2)
PROTHROMBIN TIME: 27.8 SEC (ref 10–13.2)
PROTHROMBIN TIME: 43.3 SEC (ref 10–13.2)
RBC # BLD: 2.24 M/UL (ref 4–5.2)
RBC # BLD: 2.59 M/UL (ref 4–5.2)
RBC # BLD: 2.59 M/UL (ref 4–5.2)
RBC # BLD: 2.62 M/UL (ref 4–5.2)
RBC # BLD: 2.63 M/UL (ref 4–5.2)
RBC # BLD: 2.75 M/UL (ref 4–5.2)
RBC # BLD: 2.76 M/UL (ref 4–5.2)
RBC # BLD: 2.76 M/UL (ref 4–5.2)
RBC # BLD: 2.79 M/UL (ref 4–5.2)
RBC # BLD: 2.81 M/UL (ref 4–5.2)
RBC # BLD: 2.81 M/UL (ref 4–5.2)
RBC # BLD: 2.85 M/UL (ref 4–5.2)
RBC # BLD: 2.86 M/UL (ref 4–5.2)
RBC # BLD: 2.86 M/UL (ref 4–5.2)
RBC # BLD: 2.88 M/UL (ref 4–5.2)
RBC # BLD: 2.88 M/UL (ref 4–5.2)
RBC # BLD: 2.89 M/UL (ref 4–5.2)
RBC # BLD: 2.9 M/UL (ref 4–5.2)
RBC # BLD: 2.93 M/UL (ref 4–5.2)
RBC # BLD: 2.93 M/UL (ref 4–5.2)
RBC # BLD: 2.98 M/UL (ref 4–5.2)
RBC # BLD: 3 M/UL (ref 4–5.2)
RBC # BLD: 3.02 M/UL (ref 4–5.2)
RBC # BLD: 3.06 M/UL (ref 4–5.2)
RBC # BLD: 3.12 M/UL (ref 4–5.2)
RBC # BLD: 3.19 M/UL (ref 4–5.2)
RBC # BLD: 3.24 M/UL (ref 4–5.2)
RBC # BLD: 3.27 M/UL (ref 4–5.2)
RBC # BLD: 3.28 M/UL (ref 4–5.2)
RBC # BLD: 3.31 M/UL (ref 4–5.2)
RBC # BLD: 3.45 M/UL (ref 4–5.2)
RBC # BLD: 3.46 M/UL (ref 4–5.2)
RBC FLUID: 2400 /CUMM
RBC UA: ABNORMAL /HPF (ref 0–4)
RBC UA: ABNORMAL /HPF (ref 0–4)
REPORT: NORMAL
SARS-COV-2, NAAT: DETECTED
SARS-COV-2, NAAT: NOT DETECTED
SARS-COV-2, NAAT: NOT DETECTED
SARS-COV-2, PCR: NOT DETECTED
SLIDE REVIEW: ABNORMAL
SODIUM BLD-SCNC: 120 MMOL/L (ref 136–145)
SODIUM BLD-SCNC: 120 MMOL/L (ref 136–145)
SODIUM BLD-SCNC: 121 MMOL/L (ref 136–145)
SODIUM BLD-SCNC: 121 MMOL/L (ref 136–145)
SODIUM BLD-SCNC: 123 MMOL/L (ref 136–145)
SODIUM BLD-SCNC: 123 MMOL/L (ref 136–145)
SODIUM BLD-SCNC: 124 MMOL/L (ref 136–145)
SODIUM BLD-SCNC: 125 MMOL/L (ref 136–145)
SODIUM BLD-SCNC: 126 MMOL/L (ref 136–145)
SODIUM BLD-SCNC: 130 MMOL/L (ref 136–145)
SODIUM BLD-SCNC: 131 MMOL/L (ref 136–145)
SODIUM BLD-SCNC: 132 MMOL/L (ref 136–145)
SODIUM BLD-SCNC: 133 MMOL/L (ref 136–145)
SODIUM BLD-SCNC: 134 MMOL/L (ref 136–145)
SODIUM BLD-SCNC: 135 MMOL/L (ref 136–145)
SODIUM BLD-SCNC: 136 MMOL/L (ref 136–145)
SODIUM BLD-SCNC: 136 MMOL/L (ref 136–145)
SODIUM BLD-SCNC: 137 MMOL/L (ref 136–145)
SODIUM BLD-SCNC: 138 MMOL/L (ref 136–145)
SODIUM BLD-SCNC: 138 MMOL/L (ref 136–145)
SODIUM BLD-SCNC: 139 MMOL/L (ref 136–145)
SODIUM BLD-SCNC: 140 MMOL/L (ref 136–145)
SODIUM BLD-SCNC: 141 MMOL/L (ref 136–145)
SODIUM BLD-SCNC: 142 MMOL/L (ref 136–145)
SODIUM URINE: 85 MMOL/L
SPECIFIC GRAVITY UA: 1.02 (ref 1–1.03)
SPECIFIC GRAVITY UA: >=1.03 (ref 1–1.03)
TCO2 ARTERIAL: 18.8 MMOL/L
TCO2 ARTERIAL: 20.2 MMOL/L
TCO2 ARTERIAL: 20.3 MMOL/L
TCO2 ARTERIAL: 21 MMOL/L
TCO2 ARTERIAL: 21.1 MMOL/L
TCO2 ARTERIAL: 21.4 MMOL/L
TCO2 ARTERIAL: 21.5 MMOL/L
TCO2 ARTERIAL: 21.9 MMOL/L
TCO2 ARTERIAL: 22 MMOL/L
TCO2 ARTERIAL: 22.1 MMOL/L
TCO2 ARTERIAL: 22.2 MMOL/L
TCO2 ARTERIAL: 22.2 MMOL/L
TCO2 ARTERIAL: 22.4 MMOL/L
TCO2 ARTERIAL: 22.7 MMOL/L
TCO2 ARTERIAL: 23.1 MMOL/L
TCO2 ARTERIAL: 23.5 MMOL/L
TCO2 ARTERIAL: 23.5 MMOL/L
TCO2 ARTERIAL: 23.8 MMOL/L
TCO2 ARTERIAL: 24 MMOL/L
TCO2 ARTERIAL: 24.7 MMOL/L
TCO2 ARTERIAL: 25.1 MMOL/L
TCO2 ARTERIAL: 25.6 MMOL/L
TCO2 ARTERIAL: 26.3 MMOL/L
TCO2 ARTERIAL: 32.6 MMOL/L
TCO2 ARTERIAL: 33.3 MMOL/L
TCO2 ARTERIAL: 34.3 MMOL/L
TCO2 ARTERIAL: 35.4 MMOL/L
TCO2 ARTERIAL: 41.6 MMOL/L
TCO2 CALC VENOUS: 22 MMOL/L
TCO2 CALC VENOUS: 30 MMOL/L
TOTAL PROTEIN: 4.8 G/DL (ref 6.4–8.2)
TOTAL PROTEIN: 5.2 G/DL (ref 6.4–8.2)
TOTAL PROTEIN: 5.6 G/DL (ref 6.4–8.2)
TOTAL PROTEIN: 5.9 G/DL (ref 6.4–8.2)
TOTAL PROTEIN: 5.9 G/DL (ref 6.4–8.2)
TOTAL PROTEIN: 6.5 G/DL (ref 6.4–8.2)
TOTAL PROTEIN: 7.5 G/DL (ref 6.4–8.2)
TOTAL PROTEIN: 7.5 G/DL (ref 6.4–8.2)
TOXIC GRANULATION: PRESENT
TRIGL SERPL-MCNC: 117 MG/DL (ref 0–150)
TRIGL SERPL-MCNC: 190 MG/DL (ref 0–150)
TRIGL SERPL-MCNC: 207 MG/DL (ref 0–150)
TRIGL SERPL-MCNC: 218 MG/DL (ref 0–150)
TRIGL SERPL-MCNC: 97 MG/DL (ref 0–150)
TROPONIN: 0.01 NG/ML
TROPONIN: <0.01 NG/ML
URINE CULTURE, ROUTINE: ABNORMAL
URINE REFLEX TO CULTURE: ABNORMAL
URINE TYPE: ABNORMAL
URINE TYPE: ABNORMAL
UROBILINOGEN, URINE: 0.2 E.U./DL
UROBILINOGEN, URINE: 0.2 E.U./DL
VANCOMYCIN RANDOM: 13.1 UG/ML
VANCOMYCIN RANDOM: 13.9 UG/ML
VANCOMYCIN RANDOM: 14.4 UG/ML
VANCOMYCIN RANDOM: 15.2 UG/ML
VANCOMYCIN RANDOM: 17 UG/ML
VANCOMYCIN RANDOM: 18.2 UG/ML
VANCOMYCIN RANDOM: 19.4 UG/ML
VANCOMYCIN RANDOM: 19.7 UG/ML
VANCOMYCIN TROUGH: 19.8 UG/ML (ref 10–20)
VANCOMYCIN TROUGH: 9.8 UG/ML (ref 10–20)
WBC # BLD: 10.8 K/UL (ref 4–11)
WBC # BLD: 11.1 K/UL (ref 4–11)
WBC # BLD: 12.8 K/UL (ref 4–11)
WBC # BLD: 12.8 K/UL (ref 4–11)
WBC # BLD: 12.9 K/UL (ref 4–11)
WBC # BLD: 13.3 K/UL (ref 4–11)
WBC # BLD: 13.9 K/UL (ref 4–11)
WBC # BLD: 14.4 K/UL (ref 4–11)
WBC # BLD: 15.2 K/UL (ref 4–11)
WBC # BLD: 16.6 K/UL (ref 4–11)
WBC # BLD: 18.2 K/UL (ref 4–11)
WBC # BLD: 18.4 K/UL (ref 4–11)
WBC # BLD: 3.6 K/UL (ref 4–11)
WBC # BLD: 31.6 K/UL (ref 4–11)
WBC # BLD: 4.1 K/UL (ref 4–11)
WBC # BLD: 4.8 K/UL (ref 4–11)
WBC # BLD: 47.9 K/UL (ref 4–11)
WBC # BLD: 5.2 K/UL (ref 4–11)
WBC # BLD: 5.5 K/UL (ref 4–11)
WBC # BLD: 5.6 K/UL (ref 4–11)
WBC # BLD: 6 K/UL (ref 4–11)
WBC # BLD: 6.8 K/UL (ref 4–11)
WBC # BLD: 6.8 K/UL (ref 4–11)
WBC # BLD: 7.1 K/UL (ref 4–11)
WBC # BLD: 7.3 K/UL (ref 4–11)
WBC # BLD: 7.3 K/UL (ref 4–11)
WBC # BLD: 7.4 K/UL (ref 4–11)
WBC # BLD: 8 K/UL (ref 4–11)
WBC # BLD: 8 K/UL (ref 4–11)
WBC # BLD: 8.2 K/UL (ref 4–11)
WBC # BLD: 8.6 K/UL (ref 4–11)
WBC # BLD: 8.7 K/UL (ref 4–11)
WBC UA: ABNORMAL /HPF (ref 0–5)
WBC UA: ABNORMAL /HPF (ref 0–5)

## 2020-01-01 PROCEDURE — 84100 ASSAY OF PHOSPHORUS: CPT

## 2020-01-01 PROCEDURE — 99232 SBSQ HOSP IP/OBS MODERATE 35: CPT | Performed by: INTERNAL MEDICINE

## 2020-01-01 PROCEDURE — 2700000000 HC OXYGEN THERAPY PER DAY

## 2020-01-01 PROCEDURE — 99291 CRITICAL CARE FIRST HOUR: CPT | Performed by: INTERNAL MEDICINE

## 2020-01-01 PROCEDURE — 80053 COMPREHEN METABOLIC PANEL: CPT

## 2020-01-01 PROCEDURE — U0002 COVID-19 LAB TEST NON-CDC: HCPCS

## 2020-01-01 PROCEDURE — 84132 ASSAY OF SERUM POTASSIUM: CPT

## 2020-01-01 PROCEDURE — 6370000000 HC RX 637 (ALT 250 FOR IP): Performed by: INTERNAL MEDICINE

## 2020-01-01 PROCEDURE — 93005 ELECTROCARDIOGRAM TRACING: CPT | Performed by: EMERGENCY MEDICINE

## 2020-01-01 PROCEDURE — 93005 ELECTROCARDIOGRAM TRACING: CPT | Performed by: HOSPITALIST

## 2020-01-01 PROCEDURE — 2500000003 HC RX 250 WO HCPCS: Performed by: HOSPITALIST

## 2020-01-01 PROCEDURE — 6360000002 HC RX W HCPCS: Performed by: INTERNAL MEDICINE

## 2020-01-01 PROCEDURE — 6360000002 HC RX W HCPCS

## 2020-01-01 PROCEDURE — 0W9B3ZZ DRAINAGE OF LEFT PLEURAL CAVITY, PERCUTANEOUS APPROACH: ICD-10-PCS | Performed by: RADIOLOGY

## 2020-01-01 PROCEDURE — 82803 BLOOD GASES ANY COMBINATION: CPT

## 2020-01-01 PROCEDURE — 80048 BASIC METABOLIC PNL TOTAL CA: CPT

## 2020-01-01 PROCEDURE — 36600 WITHDRAWAL OF ARTERIAL BLOOD: CPT

## 2020-01-01 PROCEDURE — 94761 N-INVAS EAR/PLS OXIMETRY MLT: CPT

## 2020-01-01 PROCEDURE — 2580000003 HC RX 258: Performed by: INTERNAL MEDICINE

## 2020-01-01 PROCEDURE — 71045 X-RAY EXAM CHEST 1 VIEW: CPT

## 2020-01-01 PROCEDURE — 2580000003 HC RX 258: Performed by: EMERGENCY MEDICINE

## 2020-01-01 PROCEDURE — 82150 ASSAY OF AMYLASE: CPT

## 2020-01-01 PROCEDURE — 2500000003 HC RX 250 WO HCPCS: Performed by: INTERNAL MEDICINE

## 2020-01-01 PROCEDURE — 92526 ORAL FUNCTION THERAPY: CPT

## 2020-01-01 PROCEDURE — 2580000003 HC RX 258: Performed by: PHYSICIAN ASSISTANT

## 2020-01-01 PROCEDURE — C9113 INJ PANTOPRAZOLE SODIUM, VIA: HCPCS | Performed by: INTERNAL MEDICINE

## 2020-01-01 PROCEDURE — 83880 ASSAY OF NATRIURETIC PEPTIDE: CPT

## 2020-01-01 PROCEDURE — 84478 ASSAY OF TRIGLYCERIDES: CPT

## 2020-01-01 PROCEDURE — 99222 1ST HOSP IP/OBS MODERATE 55: CPT | Performed by: INTERNAL MEDICINE

## 2020-01-01 PROCEDURE — 93010 ELECTROCARDIOGRAM REPORT: CPT | Performed by: INTERNAL MEDICINE

## 2020-01-01 PROCEDURE — 94640 AIRWAY INHALATION TREATMENT: CPT

## 2020-01-01 PROCEDURE — 87205 SMEAR GRAM STAIN: CPT

## 2020-01-01 PROCEDURE — 85025 COMPLETE CBC W/AUTO DIFF WBC: CPT

## 2020-01-01 PROCEDURE — 81001 URINALYSIS AUTO W/SCOPE: CPT

## 2020-01-01 PROCEDURE — 94003 VENT MGMT INPAT SUBQ DAY: CPT

## 2020-01-01 PROCEDURE — 80069 RENAL FUNCTION PANEL: CPT

## 2020-01-01 PROCEDURE — 2580000003 HC RX 258: Performed by: HOSPITALIST

## 2020-01-01 PROCEDURE — 94750 HC PULMONARY COMPLIANCE STUDY: CPT

## 2020-01-01 PROCEDURE — 97535 SELF CARE MNGMENT TRAINING: CPT

## 2020-01-01 PROCEDURE — 84484 ASSAY OF TROPONIN QUANT: CPT

## 2020-01-01 PROCEDURE — 6360000004 HC RX CONTRAST MEDICATION: Performed by: EMERGENCY MEDICINE

## 2020-01-01 PROCEDURE — 92611 MOTION FLUOROSCOPY/SWALLOW: CPT

## 2020-01-01 PROCEDURE — 5A1945Z RESPIRATORY VENTILATION, 24-96 CONSECUTIVE HOURS: ICD-10-PCS | Performed by: INTERNAL MEDICINE

## 2020-01-01 PROCEDURE — 2000000000 HC ICU R&B

## 2020-01-01 PROCEDURE — 3430000000 HC RX DIAGNOSTIC RADIOPHARMACEUTICAL: Performed by: INTERNAL MEDICINE

## 2020-01-01 PROCEDURE — 5A1955Z RESPIRATORY VENTILATION, GREATER THAN 96 CONSECUTIVE HOURS: ICD-10-PCS | Performed by: EMERGENCY MEDICINE

## 2020-01-01 PROCEDURE — 6360000002 HC RX W HCPCS: Performed by: PHYSICIAN ASSISTANT

## 2020-01-01 PROCEDURE — 36415 COLL VENOUS BLD VENIPUNCTURE: CPT

## 2020-01-01 PROCEDURE — 99233 SBSQ HOSP IP/OBS HIGH 50: CPT | Performed by: INTERNAL MEDICINE

## 2020-01-01 PROCEDURE — 89220 SPUTUM SPECIMEN COLLECTION: CPT

## 2020-01-01 PROCEDURE — 87077 CULTURE AEROBIC IDENTIFY: CPT

## 2020-01-01 PROCEDURE — 83615 LACTATE (LD) (LDH) ENZYME: CPT

## 2020-01-01 PROCEDURE — 88305 TISSUE EXAM BY PATHOLOGIST: CPT

## 2020-01-01 PROCEDURE — 6360000002 HC RX W HCPCS: Performed by: EMERGENCY MEDICINE

## 2020-01-01 PROCEDURE — 6370000000 HC RX 637 (ALT 250 FOR IP): Performed by: PHYSICIAN ASSISTANT

## 2020-01-01 PROCEDURE — 84133 ASSAY OF URINE POTASSIUM: CPT

## 2020-01-01 PROCEDURE — 86900 BLOOD TYPING SEROLOGIC ABO: CPT

## 2020-01-01 PROCEDURE — 93005 ELECTROCARDIOGRAM TRACING: CPT | Performed by: INTERNAL MEDICINE

## 2020-01-01 PROCEDURE — 83735 ASSAY OF MAGNESIUM: CPT

## 2020-01-01 PROCEDURE — 80202 ASSAY OF VANCOMYCIN: CPT

## 2020-01-01 PROCEDURE — 87150 DNA/RNA AMPLIFIED PROBE: CPT

## 2020-01-01 PROCEDURE — 37799 UNLISTED PX VASCULAR SURGERY: CPT

## 2020-01-01 PROCEDURE — 83605 ASSAY OF LACTIC ACID: CPT

## 2020-01-01 PROCEDURE — 92950 HEART/LUNG RESUSCITATION CPR: CPT

## 2020-01-01 PROCEDURE — P9016 RBC LEUKOCYTES REDUCED: HCPCS

## 2020-01-01 PROCEDURE — 87015 SPECIMEN INFECT AGNT CONCNTJ: CPT

## 2020-01-01 PROCEDURE — 2060000000 HC ICU INTERMEDIATE R&B

## 2020-01-01 PROCEDURE — 92610 EVALUATE SWALLOWING FUNCTION: CPT

## 2020-01-01 PROCEDURE — 85027 COMPLETE CBC AUTOMATED: CPT

## 2020-01-01 PROCEDURE — 85610 PROTHROMBIN TIME: CPT

## 2020-01-01 PROCEDURE — U0003 INFECTIOUS AGENT DETECTION BY NUCLEIC ACID (DNA OR RNA); SEVERE ACUTE RESPIRATORY SYNDROME CORONAVIRUS 2 (SARS-COV-2) (CORONAVIRUS DISEASE [COVID-19]), AMPLIFIED PROBE TECHNIQUE, MAKING USE OF HIGH THROUGHPUT TECHNOLOGIES AS DESCRIBED BY CMS-2020-01-R: HCPCS

## 2020-01-01 PROCEDURE — 11042 DBRDMT SUBQ TIS 1ST 20SQCM/<: CPT | Performed by: SURGERY

## 2020-01-01 PROCEDURE — 85384 FIBRINOGEN ACTIVITY: CPT

## 2020-01-01 PROCEDURE — 99221 1ST HOSP IP/OBS SF/LOW 40: CPT | Performed by: SURGERY

## 2020-01-01 PROCEDURE — 02HV33Z INSERTION OF INFUSION DEVICE INTO SUPERIOR VENA CAVA, PERCUTANEOUS APPROACH: ICD-10-PCS | Performed by: EMERGENCY MEDICINE

## 2020-01-01 PROCEDURE — 6370000000 HC RX 637 (ALT 250 FOR IP): Performed by: SURGERY

## 2020-01-01 PROCEDURE — 82330 ASSAY OF CALCIUM: CPT

## 2020-01-01 PROCEDURE — 36592 COLLECT BLOOD FROM PICC: CPT

## 2020-01-01 PROCEDURE — 87040 BLOOD CULTURE FOR BACTERIA: CPT

## 2020-01-01 PROCEDURE — 88342 IMHCHEM/IMCYTCHM 1ST ANTB: CPT

## 2020-01-01 PROCEDURE — 87086 URINE CULTURE/COLONY COUNT: CPT

## 2020-01-01 PROCEDURE — A9552 F18 FDG: HCPCS | Performed by: INTERNAL MEDICINE

## 2020-01-01 PROCEDURE — 6370000000 HC RX 637 (ALT 250 FOR IP): Performed by: HOSPITALIST

## 2020-01-01 PROCEDURE — 0BH17EZ INSERTION OF ENDOTRACHEAL AIRWAY INTO TRACHEA, VIA NATURAL OR ARTIFICIAL OPENING: ICD-10-PCS | Performed by: INTERNAL MEDICINE

## 2020-01-01 PROCEDURE — 85730 THROMBOPLASTIN TIME PARTIAL: CPT

## 2020-01-01 PROCEDURE — 2500000003 HC RX 250 WO HCPCS: Performed by: PHYSICIAN ASSISTANT

## 2020-01-01 PROCEDURE — 86850 RBC ANTIBODY SCREEN: CPT

## 2020-01-01 PROCEDURE — P9047 ALBUMIN (HUMAN), 25%, 50ML: HCPCS | Performed by: INTERNAL MEDICINE

## 2020-01-01 PROCEDURE — 87186 SC STD MICRODIL/AGAR DIL: CPT

## 2020-01-01 PROCEDURE — G0008 ADMIN INFLUENZA VIRUS VAC: HCPCS | Performed by: INTERNAL MEDICINE

## 2020-01-01 PROCEDURE — 80076 HEPATIC FUNCTION PANEL: CPT

## 2020-01-01 PROCEDURE — 2580000003 HC RX 258

## 2020-01-01 PROCEDURE — 97116 GAIT TRAINING THERAPY: CPT

## 2020-01-01 PROCEDURE — 36556 INSERT NON-TUNNEL CV CATH: CPT

## 2020-01-01 PROCEDURE — 32555 ASPIRATE PLEURA W/ IMAGING: CPT

## 2020-01-01 PROCEDURE — 51702 INSERT TEMP BLADDER CATH: CPT

## 2020-01-01 PROCEDURE — 99238 HOSP IP/OBS DSCHRG MGMT 30/<: CPT | Performed by: INTERNAL MEDICINE

## 2020-01-01 PROCEDURE — 83935 ASSAY OF URINE OSMOLALITY: CPT

## 2020-01-01 PROCEDURE — 97163 PT EVAL HIGH COMPLEX 45 MIN: CPT

## 2020-01-01 PROCEDURE — 90686 IIV4 VACC NO PRSV 0.5 ML IM: CPT | Performed by: INTERNAL MEDICINE

## 2020-01-01 PROCEDURE — 78815 PET IMAGE W/CT SKULL-THIGH: CPT

## 2020-01-01 PROCEDURE — 87206 SMEAR FLUORESCENT/ACID STAI: CPT

## 2020-01-01 PROCEDURE — 70450 CT HEAD/BRAIN W/O DYE: CPT

## 2020-01-01 PROCEDURE — 87070 CULTURE OTHR SPECIMN AEROBIC: CPT

## 2020-01-01 PROCEDURE — 89051 BODY FLUID CELL COUNT: CPT

## 2020-01-01 PROCEDURE — 74230 X-RAY XM SWLNG FUNCJ C+: CPT

## 2020-01-01 PROCEDURE — 99223 1ST HOSP IP/OBS HIGH 75: CPT | Performed by: INTERNAL MEDICINE

## 2020-01-01 PROCEDURE — 83690 ASSAY OF LIPASE: CPT

## 2020-01-01 PROCEDURE — 2500000003 HC RX 250 WO HCPCS: Performed by: EMERGENCY MEDICINE

## 2020-01-01 PROCEDURE — 99231 SBSQ HOSP IP/OBS SF/LOW 25: CPT | Performed by: SURGERY

## 2020-01-01 PROCEDURE — 2500000003 HC RX 250 WO HCPCS

## 2020-01-01 PROCEDURE — P9040 RBC LEUKOREDUCED IRRADIATED: HCPCS

## 2020-01-01 PROCEDURE — 99285 EMERGENCY DEPT VISIT HI MDM: CPT

## 2020-01-01 PROCEDURE — 6370000000 HC RX 637 (ALT 250 FOR IP)

## 2020-01-01 PROCEDURE — 88112 CYTOPATH CELL ENHANCE TECH: CPT

## 2020-01-01 PROCEDURE — 96365 THER/PROPH/DIAG IV INF INIT: CPT

## 2020-01-01 PROCEDURE — 1200000000 HC SEMI PRIVATE

## 2020-01-01 PROCEDURE — 84145 PROCALCITONIN (PCT): CPT

## 2020-01-01 PROCEDURE — 96374 THER/PROPH/DIAG INJ IV PUSH: CPT

## 2020-01-01 PROCEDURE — 6370000000 HC RX 637 (ALT 250 FOR IP): Performed by: RADIOLOGY

## 2020-01-01 PROCEDURE — 31500 INSERT EMERGENCY AIRWAY: CPT

## 2020-01-01 PROCEDURE — 86403 PARTICLE AGGLUT ANTBDY SCRN: CPT

## 2020-01-01 PROCEDURE — 97112 NEUROMUSCULAR REEDUCATION: CPT

## 2020-01-01 PROCEDURE — 0JB70ZZ EXCISION OF BACK SUBCUTANEOUS TISSUE AND FASCIA, OPEN APPROACH: ICD-10-PCS | Performed by: SURGERY

## 2020-01-01 PROCEDURE — 83986 ASSAY PH BODY FLUID NOS: CPT

## 2020-01-01 PROCEDURE — 71260 CT THORAX DX C+: CPT

## 2020-01-01 PROCEDURE — 94002 VENT MGMT INPAT INIT DAY: CPT

## 2020-01-01 PROCEDURE — 97606 NEG PRS WND THER DME>50 SQCM: CPT

## 2020-01-01 PROCEDURE — 97530 THERAPEUTIC ACTIVITIES: CPT

## 2020-01-01 PROCEDURE — 36430 TRANSFUSION BLD/BLD COMPNT: CPT

## 2020-01-01 PROCEDURE — 6360000002 HC RX W HCPCS: Performed by: HOSPITALIST

## 2020-01-01 PROCEDURE — 96376 TX/PRO/DX INJ SAME DRUG ADON: CPT

## 2020-01-01 PROCEDURE — 31500 INSERT EMERGENCY AIRWAY: CPT | Performed by: INTERNAL MEDICINE

## 2020-01-01 PROCEDURE — 86920 COMPATIBILITY TEST SPIN: CPT

## 2020-01-01 PROCEDURE — 83036 HEMOGLOBIN GLYCOSYLATED A1C: CPT

## 2020-01-01 PROCEDURE — 84157 ASSAY OF PROTEIN OTHER: CPT

## 2020-01-01 PROCEDURE — 93005 ELECTROCARDIOGRAM TRACING: CPT

## 2020-01-01 PROCEDURE — 99239 HOSP IP/OBS DSCHRG MGMT >30: CPT | Performed by: INTERNAL MEDICINE

## 2020-01-01 PROCEDURE — 88341 IMHCHEM/IMCYTCHM EA ADD ANTB: CPT

## 2020-01-01 PROCEDURE — 86901 BLOOD TYPING SEROLOGIC RH(D): CPT

## 2020-01-01 PROCEDURE — 82436 ASSAY OF URINE CHLORIDE: CPT

## 2020-01-01 PROCEDURE — 97167 OT EVAL HIGH COMPLEX 60 MIN: CPT

## 2020-01-01 PROCEDURE — 87116 MYCOBACTERIA CULTURE: CPT

## 2020-01-01 PROCEDURE — 84300 ASSAY OF URINE SODIUM: CPT

## 2020-01-01 RX ORDER — FENTANYL CITRATE 50 UG/ML
100 INJECTION, SOLUTION INTRAMUSCULAR; INTRAVENOUS ONCE
Status: COMPLETED | OUTPATIENT
Start: 2020-01-01 | End: 2020-01-01

## 2020-01-01 RX ORDER — MIDAZOLAM HYDROCHLORIDE 1 MG/ML
2 INJECTION INTRAMUSCULAR; INTRAVENOUS ONCE
Status: DISCONTINUED | OUTPATIENT
Start: 2020-01-01 | End: 2020-01-01

## 2020-01-01 RX ORDER — SODIUM CHLORIDE 9 MG/ML
INJECTION, SOLUTION INTRAVENOUS
Status: DISCONTINUED
Start: 2020-01-01 | End: 2020-01-01

## 2020-01-01 RX ORDER — CHLORHEXIDINE GLUCONATE 0.12 MG/ML
15 RINSE ORAL 2 TIMES DAILY
Status: DISCONTINUED | OUTPATIENT
Start: 2020-01-01 | End: 2020-01-01

## 2020-01-01 RX ORDER — ALBUTEROL SULFATE 90 UG/1
2 AEROSOL, METERED RESPIRATORY (INHALATION) 4 TIMES DAILY
Status: DISCONTINUED | OUTPATIENT
Start: 2020-01-01 | End: 2020-01-01

## 2020-01-01 RX ORDER — DILTIAZEM HYDROCHLORIDE 180 MG/1
180 CAPSULE, COATED, EXTENDED RELEASE ORAL 2 TIMES DAILY
Qty: 30 CAPSULE | Refills: 3
Start: 2020-01-01

## 2020-01-01 RX ORDER — DEXAMETHASONE SODIUM PHOSPHATE 4 MG/ML
4 INJECTION, SOLUTION INTRA-ARTICULAR; INTRALESIONAL; INTRAMUSCULAR; INTRAVENOUS; SOFT TISSUE DAILY
Status: DISCONTINUED | OUTPATIENT
Start: 2020-01-01 | End: 2020-01-01 | Stop reason: HOSPADM

## 2020-01-01 RX ORDER — FUROSEMIDE 10 MG/ML
40 INJECTION INTRAMUSCULAR; INTRAVENOUS 2 TIMES DAILY
Status: DISCONTINUED | OUTPATIENT
Start: 2020-01-01 | End: 2020-01-01

## 2020-01-01 RX ORDER — POTASSIUM CHLORIDE 7.45 MG/ML
10 INJECTION INTRAVENOUS
Status: COMPLETED | OUTPATIENT
Start: 2020-01-01 | End: 2020-01-01

## 2020-01-01 RX ORDER — PANTOPRAZOLE SODIUM 40 MG/10ML
40 INJECTION, POWDER, LYOPHILIZED, FOR SOLUTION INTRAVENOUS DAILY
Status: DISCONTINUED | OUTPATIENT
Start: 2020-01-01 | End: 2020-01-01 | Stop reason: HOSPADM

## 2020-01-01 RX ORDER — SODIUM CHLORIDE 0.9 % (FLUSH) 0.9 %
10 SYRINGE (ML) INJECTION PRN
Status: DISCONTINUED | OUTPATIENT
Start: 2020-01-01 | End: 2020-01-01 | Stop reason: HOSPADM

## 2020-01-01 RX ORDER — POTASSIUM CHLORIDE 29.8 MG/ML
20 INJECTION INTRAVENOUS PRN
Status: DISCONTINUED | OUTPATIENT
Start: 2020-01-01 | End: 2020-01-01

## 2020-01-01 RX ORDER — POTASSIUM CHLORIDE 750 MG/1
20 TABLET, EXTENDED RELEASE ORAL ONCE
Status: COMPLETED | OUTPATIENT
Start: 2020-01-01 | End: 2020-01-01

## 2020-01-01 RX ORDER — PROPOFOL 10 MG/ML
10 INJECTION, EMULSION INTRAVENOUS
Status: DISCONTINUED | OUTPATIENT
Start: 2020-01-01 | End: 2020-01-01

## 2020-01-01 RX ORDER — DILTIAZEM HYDROCHLORIDE 120 MG/1
120 CAPSULE, COATED, EXTENDED RELEASE ORAL ONCE
Status: COMPLETED | OUTPATIENT
Start: 2020-01-01 | End: 2020-01-01

## 2020-01-01 RX ORDER — ONDANSETRON 2 MG/ML
4 INJECTION INTRAMUSCULAR; INTRAVENOUS EVERY 6 HOURS PRN
Status: DISCONTINUED | OUTPATIENT
Start: 2020-01-01 | End: 2020-01-01 | Stop reason: HOSPADM

## 2020-01-01 RX ORDER — METOLAZONE 2.5 MG/1
5 TABLET ORAL DAILY
Status: DISCONTINUED | OUTPATIENT
Start: 2020-01-01 | End: 2020-01-01

## 2020-01-01 RX ORDER — CARVEDILOL 25 MG/1
25 TABLET ORAL 2 TIMES DAILY WITH MEALS
Status: DISCONTINUED | OUTPATIENT
Start: 2020-01-01 | End: 2020-01-01 | Stop reason: HOSPADM

## 2020-01-01 RX ORDER — SODIUM CHLORIDE 9 MG/ML
INJECTION, SOLUTION INTRAVENOUS CONTINUOUS
Status: DISCONTINUED | OUTPATIENT
Start: 2020-01-01 | End: 2020-01-01

## 2020-01-01 RX ORDER — MINERAL OIL AND WHITE PETROLATUM 150; 830 MG/G; MG/G
OINTMENT OPHTHALMIC EVERY 4 HOURS
Status: DISCONTINUED | OUTPATIENT
Start: 2020-01-01 | End: 2020-01-01 | Stop reason: HOSPADM

## 2020-01-01 RX ORDER — MAGNESIUM SULFATE IN WATER 40 MG/ML
2 INJECTION, SOLUTION INTRAVENOUS ONCE
Status: COMPLETED | OUTPATIENT
Start: 2020-01-01 | End: 2020-01-01

## 2020-01-01 RX ORDER — PANTOPRAZOLE SODIUM 40 MG/1
40 TABLET, DELAYED RELEASE ORAL
Status: DISCONTINUED | OUTPATIENT
Start: 2020-01-01 | End: 2020-01-01 | Stop reason: SDUPTHER

## 2020-01-01 RX ORDER — MIDAZOLAM HYDROCHLORIDE 1 MG/ML
1 INJECTION INTRAMUSCULAR; INTRAVENOUS ONCE
Status: DISCONTINUED | OUTPATIENT
Start: 2020-01-01 | End: 2020-01-01

## 2020-01-01 RX ORDER — DEXTROSE MONOHYDRATE 25 G/50ML
12.5 INJECTION, SOLUTION INTRAVENOUS PRN
Status: DISCONTINUED | OUTPATIENT
Start: 2020-01-01 | End: 2020-01-01 | Stop reason: HOSPADM

## 2020-01-01 RX ORDER — POTASSIUM CHLORIDE 750 MG/1
40 TABLET, EXTENDED RELEASE ORAL ONCE
Status: COMPLETED | OUTPATIENT
Start: 2020-01-01 | End: 2020-01-01

## 2020-01-01 RX ORDER — IPRATROPIUM BROMIDE AND ALBUTEROL SULFATE 2.5; .5 MG/3ML; MG/3ML
1 SOLUTION RESPIRATORY (INHALATION) 3 TIMES DAILY
Status: DISCONTINUED | OUTPATIENT
Start: 2020-01-01 | End: 2020-01-01

## 2020-01-01 RX ORDER — DOPAMINE HYDROCHLORIDE 160 MG/100ML
INJECTION, SOLUTION INTRAVENOUS
Status: COMPLETED
Start: 2020-01-01 | End: 2020-01-01

## 2020-01-01 RX ORDER — METOPROLOL TARTRATE 5 MG/5ML
2.5 INJECTION INTRAVENOUS ONCE
Status: COMPLETED | OUTPATIENT
Start: 2020-01-01 | End: 2020-01-01

## 2020-01-01 RX ORDER — MAGNESIUM SULFATE IN WATER 40 MG/ML
4 INJECTION, SOLUTION INTRAVENOUS ONCE
Status: COMPLETED | OUTPATIENT
Start: 2020-01-01 | End: 2020-01-01

## 2020-01-01 RX ORDER — SODIUM CHLORIDE 9 MG/ML
INJECTION, SOLUTION INTRAVENOUS
Status: DISPENSED
Start: 2020-01-01 | End: 2020-01-01

## 2020-01-01 RX ORDER — PROMETHAZINE HYDROCHLORIDE 25 MG/1
12.5 TABLET ORAL EVERY 6 HOURS PRN
Status: DISCONTINUED | OUTPATIENT
Start: 2020-01-01 | End: 2020-01-01 | Stop reason: HOSPADM

## 2020-01-01 RX ORDER — POTASSIUM CHLORIDE 7.45 MG/ML
10 INJECTION INTRAVENOUS PRN
Status: DISCONTINUED | OUTPATIENT
Start: 2020-01-01 | End: 2020-01-01

## 2020-01-01 RX ORDER — RANITIDINE 150 MG/1
150 TABLET ORAL 2 TIMES DAILY
Status: DISCONTINUED | OUTPATIENT
Start: 2020-01-01 | End: 2020-01-01

## 2020-01-01 RX ORDER — CHLORHEXIDINE GLUCONATE 0.12 MG/ML
15 RINSE ORAL 2 TIMES DAILY
Status: DISCONTINUED | OUTPATIENT
Start: 2020-01-01 | End: 2020-01-01 | Stop reason: HOSPADM

## 2020-01-01 RX ORDER — SODIUM CHLORIDE FOR INHALATION 3 %
VIAL, NEBULIZER (ML) INHALATION
Status: DISPENSED
Start: 2020-01-01 | End: 2020-01-01

## 2020-01-01 RX ORDER — PROPOFOL 10 MG/ML
10 INJECTION, EMULSION INTRAVENOUS
Status: DISCONTINUED | OUTPATIENT
Start: 2020-01-01 | End: 2020-01-01 | Stop reason: HOSPADM

## 2020-01-01 RX ORDER — POTASSIUM CHLORIDE 20 MEQ/1
40 TABLET, EXTENDED RELEASE ORAL
Status: DISCONTINUED | OUTPATIENT
Start: 2020-01-01 | End: 2020-01-01 | Stop reason: SDUPTHER

## 2020-01-01 RX ORDER — DIGOXIN 0.25 MG/ML
250 INJECTION INTRAMUSCULAR; INTRAVENOUS EVERY 4 HOURS
Status: DISCONTINUED | OUTPATIENT
Start: 2020-01-01 | End: 2020-01-01

## 2020-01-01 RX ORDER — ALBUMIN (HUMAN) 12.5 G/50ML
25 SOLUTION INTRAVENOUS EVERY 8 HOURS
Status: COMPLETED | OUTPATIENT
Start: 2020-01-01 | End: 2020-01-01

## 2020-01-01 RX ORDER — CASTOR OIL AND BALSAM, PERU 788; 87 MG/G; MG/G
OINTMENT TOPICAL 2 TIMES DAILY
Status: DISCONTINUED | OUTPATIENT
Start: 2020-01-01 | End: 2020-01-01 | Stop reason: HOSPADM

## 2020-01-01 RX ORDER — SODIUM CHLORIDE 0.9 % (FLUSH) 0.9 %
SYRINGE (ML) INJECTION
Status: COMPLETED
Start: 2020-01-01 | End: 2020-01-01

## 2020-01-01 RX ORDER — MAGNESIUM SULFATE 1 G/100ML
1 INJECTION INTRAVENOUS ONCE
Status: COMPLETED | OUTPATIENT
Start: 2020-01-01 | End: 2020-01-01

## 2020-01-01 RX ORDER — ALBUTEROL SULFATE 90 UG/1
4 AEROSOL, METERED RESPIRATORY (INHALATION) EVERY 4 HOURS
Status: DISCONTINUED | OUTPATIENT
Start: 2020-01-01 | End: 2020-01-01

## 2020-01-01 RX ORDER — ALBUTEROL SULFATE 90 UG/1
2 AEROSOL, METERED RESPIRATORY (INHALATION) EVERY 4 HOURS PRN
Status: DISCONTINUED | OUTPATIENT
Start: 2020-01-01 | End: 2020-01-01

## 2020-01-01 RX ORDER — FUROSEMIDE 10 MG/ML
INJECTION INTRAMUSCULAR; INTRAVENOUS
Status: DISPENSED
Start: 2020-01-01 | End: 2020-01-01

## 2020-01-01 RX ORDER — SODIUM CHLORIDE, SODIUM LACTATE, POTASSIUM CHLORIDE, CALCIUM CHLORIDE 600; 310; 30; 20 MG/100ML; MG/100ML; MG/100ML; MG/100ML
INJECTION, SOLUTION INTRAVENOUS
Status: DISCONTINUED
Start: 2020-01-01 | End: 2020-01-01 | Stop reason: WASHOUT

## 2020-01-01 RX ORDER — FENTANYL CITRATE 50 UG/ML
50 INJECTION, SOLUTION INTRAMUSCULAR; INTRAVENOUS
Status: DISCONTINUED | OUTPATIENT
Start: 2020-01-01 | End: 2020-01-01 | Stop reason: HOSPADM

## 2020-01-01 RX ORDER — CITALOPRAM 20 MG/1
40 TABLET ORAL DAILY
Status: DISCONTINUED | OUTPATIENT
Start: 2020-01-01 | End: 2020-01-01 | Stop reason: HOSPADM

## 2020-01-01 RX ORDER — MORPHINE SULFATE 30 MG/1
60 TABLET, FILM COATED, EXTENDED RELEASE ORAL 2 TIMES DAILY
Status: DISCONTINUED | OUTPATIENT
Start: 2020-01-01 | End: 2020-01-01

## 2020-01-01 RX ORDER — ACETAMINOPHEN 650 MG/1
650 SUPPOSITORY RECTAL EVERY 6 HOURS PRN
Status: DISCONTINUED | OUTPATIENT
Start: 2020-01-01 | End: 2020-01-01 | Stop reason: HOSPADM

## 2020-01-01 RX ORDER — DILTIAZEM HYDROCHLORIDE 60 MG/1
60 TABLET, FILM COATED ORAL EVERY 6 HOURS SCHEDULED
Status: DISCONTINUED | OUTPATIENT
Start: 2020-01-01 | End: 2020-01-01 | Stop reason: HOSPADM

## 2020-01-01 RX ORDER — EPINEPHRINE 0.1 MG/ML
SYRINGE (ML) INJECTION DAILY PRN
Status: COMPLETED | OUTPATIENT
Start: 2020-01-01 | End: 2020-01-01

## 2020-01-01 RX ORDER — DILTIAZEM HYDROCHLORIDE 60 MG/1
30 TABLET, FILM COATED ORAL EVERY 6 HOURS SCHEDULED
Status: DISCONTINUED | OUTPATIENT
Start: 2020-01-01 | End: 2020-01-01

## 2020-01-01 RX ORDER — LEVALBUTEROL 1.25 MG/.5ML
1.26 SOLUTION, CONCENTRATE RESPIRATORY (INHALATION) ONCE
Status: COMPLETED | OUTPATIENT
Start: 2020-01-01 | End: 2020-01-01

## 2020-01-01 RX ORDER — DILTIAZEM HYDROCHLORIDE 5 MG/ML
10 INJECTION INTRAVENOUS ONCE
Status: COMPLETED | OUTPATIENT
Start: 2020-01-01 | End: 2020-01-01

## 2020-01-01 RX ORDER — ALBUTEROL SULFATE 90 UG/1
4 AEROSOL, METERED RESPIRATORY (INHALATION) EVERY 4 HOURS PRN
Status: DISCONTINUED | OUTPATIENT
Start: 2020-01-01 | End: 2020-01-01

## 2020-01-01 RX ORDER — SODIUM CHLORIDE 9 MG/ML
INJECTION, SOLUTION INTRAVENOUS
Status: COMPLETED
Start: 2020-01-01 | End: 2020-01-01

## 2020-01-01 RX ORDER — ACETAMINOPHEN 325 MG/1
650 TABLET ORAL EVERY 4 HOURS PRN
Status: DISCONTINUED | OUTPATIENT
Start: 2020-01-01 | End: 2020-01-01 | Stop reason: HOSPADM

## 2020-01-01 RX ORDER — MORPHINE SULFATE 2 MG/ML
2 INJECTION, SOLUTION INTRAMUSCULAR; INTRAVENOUS EVERY 4 HOURS PRN
Status: DISCONTINUED | OUTPATIENT
Start: 2020-01-01 | End: 2020-01-01

## 2020-01-01 RX ORDER — SODIUM CHLORIDE, SODIUM LACTATE, POTASSIUM CHLORIDE, CALCIUM CHLORIDE 600; 310; 30; 20 MG/100ML; MG/100ML; MG/100ML; MG/100ML
INJECTION, SOLUTION INTRAVENOUS CONTINUOUS
Status: DISPENSED | OUTPATIENT
Start: 2020-01-01 | End: 2020-01-01

## 2020-01-01 RX ORDER — DEXTROSE MONOHYDRATE 50 MG/ML
100 INJECTION, SOLUTION INTRAVENOUS PRN
Status: DISCONTINUED | OUTPATIENT
Start: 2020-01-01 | End: 2020-01-01 | Stop reason: HOSPADM

## 2020-01-01 RX ORDER — DILTIAZEM HYDROCHLORIDE 180 MG/1
180 CAPSULE, COATED, EXTENDED RELEASE ORAL DAILY
Status: DISCONTINUED | OUTPATIENT
Start: 2020-01-01 | End: 2020-01-01

## 2020-01-01 RX ORDER — 0.9 % SODIUM CHLORIDE 0.9 %
500 INTRAVENOUS SOLUTION INTRAVENOUS ONCE
Status: COMPLETED | OUTPATIENT
Start: 2020-01-01 | End: 2020-01-01

## 2020-01-01 RX ORDER — CASTOR OIL AND BALSAM, PERU 788; 87 MG/G; MG/G
OINTMENT TOPICAL 2 TIMES DAILY
Qty: 30 G | Refills: 0
Start: 2020-01-01

## 2020-01-01 RX ORDER — DILTIAZEM HYDROCHLORIDE 180 MG/1
180 CAPSULE, COATED, EXTENDED RELEASE ORAL 2 TIMES DAILY
Status: DISCONTINUED | OUTPATIENT
Start: 2020-01-01 | End: 2020-01-01 | Stop reason: HOSPADM

## 2020-01-01 RX ORDER — NICOTINE POLACRILEX 4 MG
15 LOZENGE BUCCAL PRN
Status: DISCONTINUED | OUTPATIENT
Start: 2020-01-01 | End: 2020-01-01 | Stop reason: HOSPADM

## 2020-01-01 RX ORDER — FLUDEOXYGLUCOSE F 18 200 MCI/ML
13.11 INJECTION, SOLUTION INTRAVENOUS
Status: COMPLETED | OUTPATIENT
Start: 2020-01-01 | End: 2020-01-01

## 2020-01-01 RX ORDER — FUROSEMIDE 10 MG/ML
20 INJECTION INTRAMUSCULAR; INTRAVENOUS ONCE
Status: DISCONTINUED | OUTPATIENT
Start: 2020-01-01 | End: 2020-01-01

## 2020-01-01 RX ORDER — FUROSEMIDE 10 MG/ML
100 INJECTION INTRAMUSCULAR; INTRAVENOUS ONCE
Status: DISCONTINUED | OUTPATIENT
Start: 2020-01-01 | End: 2020-01-01 | Stop reason: CLARIF

## 2020-01-01 RX ORDER — SODIUM CHLORIDE 0.9 % (FLUSH) 0.9 %
10 SYRINGE (ML) INJECTION EVERY 12 HOURS SCHEDULED
Status: DISCONTINUED | OUTPATIENT
Start: 2020-01-01 | End: 2020-01-01 | Stop reason: HOSPADM

## 2020-01-01 RX ORDER — ACETAMINOPHEN 325 MG/1
650 TABLET ORAL EVERY 6 HOURS PRN
Status: DISCONTINUED | OUTPATIENT
Start: 2020-01-01 | End: 2020-01-01 | Stop reason: HOSPADM

## 2020-01-01 RX ORDER — CALCIUM CHLORIDE 100 MG/ML
INJECTION INTRAVENOUS; INTRAVENTRICULAR DAILY PRN
Status: COMPLETED | OUTPATIENT
Start: 2020-01-01 | End: 2020-01-01

## 2020-01-01 RX ORDER — LORAZEPAM 2 MG/ML
INJECTION INTRAMUSCULAR
Status: DISPENSED
Start: 2020-01-01 | End: 2020-01-01

## 2020-01-01 RX ORDER — 0.9 % SODIUM CHLORIDE 0.9 %
30 INTRAVENOUS SOLUTION INTRAVENOUS ONCE
Status: COMPLETED | OUTPATIENT
Start: 2020-01-01 | End: 2020-01-01

## 2020-01-01 RX ORDER — CHLOROTHIAZIDE SODIUM 500 MG/1
500 INJECTION INTRAVENOUS 2 TIMES DAILY
Status: DISCONTINUED | OUTPATIENT
Start: 2020-01-01 | End: 2020-01-01 | Stop reason: SDUPTHER

## 2020-01-01 RX ORDER — MINERAL OIL AND WHITE PETROLATUM 150; 830 MG/G; MG/G
OINTMENT OPHTHALMIC EVERY 4 HOURS
Status: DISCONTINUED | OUTPATIENT
Start: 2020-01-01 | End: 2020-01-01

## 2020-01-01 RX ORDER — SODIUM HYPOCHLORITE 1.25 MG/ML
480 SOLUTION TOPICAL CONTINUOUS PRN
Status: DISCONTINUED | OUTPATIENT
Start: 2020-01-01 | End: 2020-01-01 | Stop reason: SDUPTHER

## 2020-01-01 RX ORDER — PROPOFOL 10 MG/ML
10 INJECTION, EMULSION INTRAVENOUS CONTINUOUS
Status: DISCONTINUED | OUTPATIENT
Start: 2020-01-01 | End: 2020-01-01

## 2020-01-01 RX ORDER — ALBUTEROL SULFATE 90 UG/1
2 AEROSOL, METERED RESPIRATORY (INHALATION) EVERY 6 HOURS PRN
Status: DISCONTINUED | OUTPATIENT
Start: 2020-01-01 | End: 2020-01-01

## 2020-01-01 RX ORDER — 0.9 % SODIUM CHLORIDE 0.9 %
20 INTRAVENOUS SOLUTION INTRAVENOUS ONCE
Status: DISCONTINUED | OUTPATIENT
Start: 2020-01-01 | End: 2020-01-01 | Stop reason: ALTCHOICE

## 2020-01-01 RX ORDER — SODIUM CHLORIDE 9 MG/ML
INJECTION, SOLUTION INTRAVENOUS CONTINUOUS
Status: DISCONTINUED | OUTPATIENT
Start: 2020-01-01 | End: 2020-01-01 | Stop reason: HOSPADM

## 2020-01-01 RX ORDER — 0.9 % SODIUM CHLORIDE 0.9 %
1000 INTRAVENOUS SOLUTION INTRAVENOUS ONCE
Status: COMPLETED | OUTPATIENT
Start: 2020-01-01 | End: 2020-01-01

## 2020-01-01 RX ORDER — MIDAZOLAM HYDROCHLORIDE 1 MG/ML
2 INJECTION INTRAMUSCULAR; INTRAVENOUS
Status: DISCONTINUED | OUTPATIENT
Start: 2020-01-01 | End: 2020-01-01 | Stop reason: HOSPADM

## 2020-01-01 RX ORDER — POTASSIUM CHLORIDE 20 MEQ/1
40 TABLET, EXTENDED RELEASE ORAL ONCE
Status: COMPLETED | OUTPATIENT
Start: 2020-01-01 | End: 2020-01-01

## 2020-01-01 RX ORDER — LEVALBUTEROL 1.25 MG/.5ML
SOLUTION, CONCENTRATE RESPIRATORY (INHALATION)
Status: COMPLETED
Start: 2020-01-01 | End: 2020-01-01

## 2020-01-01 RX ORDER — METHYLPREDNISOLONE SODIUM SUCCINATE 40 MG/ML
40 INJECTION, POWDER, LYOPHILIZED, FOR SOLUTION INTRAMUSCULAR; INTRAVENOUS ONCE
Status: COMPLETED | OUTPATIENT
Start: 2020-01-01 | End: 2020-01-01

## 2020-01-01 RX ORDER — POTASSIUM CHLORIDE 7.45 MG/ML
20 INJECTION INTRAVENOUS ONCE
Status: DISCONTINUED | OUTPATIENT
Start: 2020-01-01 | End: 2020-01-01

## 2020-01-01 RX ORDER — IPRATROPIUM BROMIDE AND ALBUTEROL SULFATE 2.5; .5 MG/3ML; MG/3ML
1 SOLUTION RESPIRATORY (INHALATION) EVERY 4 HOURS PRN
Status: DISCONTINUED | OUTPATIENT
Start: 2020-01-01 | End: 2020-01-01 | Stop reason: HOSPADM

## 2020-01-01 RX ORDER — IPRATROPIUM BROMIDE AND ALBUTEROL SULFATE 2.5; .5 MG/3ML; MG/3ML
1 SOLUTION RESPIRATORY (INHALATION) EVERY 4 HOURS
Status: DISCONTINUED | OUTPATIENT
Start: 2020-01-01 | End: 2020-01-01

## 2020-01-01 RX ORDER — PANTOPRAZOLE SODIUM 40 MG/10ML
40 INJECTION, POWDER, LYOPHILIZED, FOR SOLUTION INTRAVENOUS 2 TIMES DAILY
Status: DISCONTINUED | OUTPATIENT
Start: 2020-01-01 | End: 2020-01-01

## 2020-01-01 RX ORDER — LORAZEPAM 2 MG/ML
1 INJECTION INTRAMUSCULAR ONCE
Status: COMPLETED | OUTPATIENT
Start: 2020-01-01 | End: 2020-01-01

## 2020-01-01 RX ORDER — ACETAZOLAMIDE 250 MG/1
250 TABLET ORAL EVERY 6 HOURS
Status: COMPLETED | OUTPATIENT
Start: 2020-01-01 | End: 2020-01-01

## 2020-01-01 RX ORDER — FUROSEMIDE 10 MG/ML
40 INJECTION INTRAMUSCULAR; INTRAVENOUS ONCE
Status: COMPLETED | OUTPATIENT
Start: 2020-01-01 | End: 2020-01-01

## 2020-01-01 RX ORDER — CARBOXYMETHYLCELLULOSE SODIUM 10 MG/ML
1 GEL OPHTHALMIC EVERY 4 HOURS
Status: DISCONTINUED | OUTPATIENT
Start: 2020-01-01 | End: 2020-01-01

## 2020-01-01 RX ORDER — OMEPRAZOLE 40 MG/1
40 CAPSULE, DELAYED RELEASE ORAL DAILY
COMMUNITY

## 2020-01-01 RX ORDER — PROPOFOL 10 MG/ML
INJECTION, EMULSION INTRAVENOUS
Status: COMPLETED
Start: 2020-01-01 | End: 2020-01-01

## 2020-01-01 RX ORDER — FUROSEMIDE 20 MG/1
20 TABLET ORAL PRN
Status: DISCONTINUED | OUTPATIENT
Start: 2020-01-01 | End: 2020-01-01

## 2020-01-01 RX ORDER — CARBOXYMETHYLCELLULOSE SODIUM 10 MG/ML
1 GEL OPHTHALMIC EVERY 4 HOURS
Status: DISCONTINUED | OUTPATIENT
Start: 2020-01-01 | End: 2020-01-01 | Stop reason: HOSPADM

## 2020-01-01 RX ORDER — FENTANYL CITRATE 50 UG/ML
50 INJECTION, SOLUTION INTRAMUSCULAR; INTRAVENOUS
Status: DISCONTINUED | OUTPATIENT
Start: 2020-01-01 | End: 2020-01-01

## 2020-01-01 RX ORDER — FLUDEOXYGLUCOSE F 18 200 MCI/ML
15.69 INJECTION, SOLUTION INTRAVENOUS
Status: COMPLETED | OUTPATIENT
Start: 2020-01-01 | End: 2020-01-01

## 2020-01-01 RX ORDER — DOPAMINE HYDROCHLORIDE 160 MG/100ML
5 INJECTION, SOLUTION INTRAVENOUS CONTINUOUS
Status: DISCONTINUED | OUTPATIENT
Start: 2020-01-01 | End: 2020-01-01

## 2020-01-01 RX ORDER — DIGOXIN 0.25 MG/ML
250 INJECTION INTRAMUSCULAR; INTRAVENOUS EVERY 6 HOURS
Status: COMPLETED | OUTPATIENT
Start: 2020-01-01 | End: 2020-01-01

## 2020-01-01 RX ORDER — DEXAMETHASONE SODIUM PHOSPHATE 10 MG/ML
10 INJECTION, SOLUTION INTRAMUSCULAR; INTRAVENOUS ONCE
Status: COMPLETED | OUTPATIENT
Start: 2020-01-01 | End: 2020-01-01

## 2020-01-01 RX ORDER — LEVOFLOXACIN 5 MG/ML
750 INJECTION, SOLUTION INTRAVENOUS
Status: COMPLETED | OUTPATIENT
Start: 2020-01-01 | End: 2020-01-01

## 2020-01-01 RX ORDER — POTASSIUM CHLORIDE 29.8 MG/ML
20 INJECTION INTRAVENOUS ONCE
Status: DISCONTINUED | OUTPATIENT
Start: 2020-01-01 | End: 2020-01-01

## 2020-01-01 RX ORDER — DIMETHICONE, OXYBENZONE, AND PADIMATE O 2; 2.5; 6.6 G/100G; G/100G; G/100G
STICK TOPICAL
Status: COMPLETED
Start: 2020-01-01 | End: 2020-01-01

## 2020-01-01 RX ORDER — POLYETHYLENE GLYCOL 3350 17 G/17G
17 POWDER, FOR SOLUTION ORAL DAILY PRN
Status: DISCONTINUED | OUTPATIENT
Start: 2020-01-01 | End: 2020-01-01 | Stop reason: HOSPADM

## 2020-01-01 RX ORDER — PANTOPRAZOLE SODIUM 40 MG/1
40 TABLET, DELAYED RELEASE ORAL
Status: DISCONTINUED | OUTPATIENT
Start: 2020-01-01 | End: 2020-01-01

## 2020-01-01 RX ORDER — CHLOROTHIAZIDE SODIUM 500 MG/1
500 INJECTION INTRAVENOUS 2 TIMES DAILY
Status: COMPLETED | OUTPATIENT
Start: 2020-01-01 | End: 2020-01-01

## 2020-01-01 RX ORDER — NOREPINEPHRINE BIT/0.9 % NACL 16MG/250ML
2 INFUSION BOTTLE (ML) INTRAVENOUS CONTINUOUS
Status: DISCONTINUED | OUTPATIENT
Start: 2020-01-01 | End: 2020-01-01

## 2020-01-01 RX ORDER — ALBUTEROL SULFATE 2.5 MG/3ML
2.5 SOLUTION RESPIRATORY (INHALATION) EVERY 4 HOURS
Status: DISCONTINUED | OUTPATIENT
Start: 2020-01-01 | End: 2020-01-01

## 2020-01-01 RX ORDER — DIGOXIN 0.25 MG/ML
250 INJECTION INTRAMUSCULAR; INTRAVENOUS EVERY 6 HOURS
Status: DISCONTINUED | OUTPATIENT
Start: 2020-01-01 | End: 2020-01-01 | Stop reason: HOSPADM

## 2020-01-01 RX ORDER — FUROSEMIDE 10 MG/ML
80 INJECTION INTRAMUSCULAR; INTRAVENOUS 2 TIMES DAILY
Status: DISCONTINUED | OUTPATIENT
Start: 2020-01-01 | End: 2020-01-01

## 2020-01-01 RX ORDER — MIDAZOLAM HYDROCHLORIDE 1 MG/ML
2 INJECTION INTRAMUSCULAR; INTRAVENOUS
Status: DISCONTINUED | OUTPATIENT
Start: 2020-01-01 | End: 2020-01-01

## 2020-01-01 RX ORDER — MAGNESIUM SULFATE 1 G/100ML
1 INJECTION INTRAVENOUS PRN
Status: DISCONTINUED | OUTPATIENT
Start: 2020-01-01 | End: 2020-01-01

## 2020-01-01 RX ORDER — POTASSIUM CHLORIDE 20 MEQ/1
40 TABLET, EXTENDED RELEASE ORAL PRN
Status: DISCONTINUED | OUTPATIENT
Start: 2020-01-01 | End: 2020-01-01

## 2020-01-01 RX ORDER — DEXAMETHASONE SODIUM PHOSPHATE 10 MG/ML
6 INJECTION, SOLUTION INTRAMUSCULAR; INTRAVENOUS DAILY
Status: DISCONTINUED | OUTPATIENT
Start: 2020-01-01 | End: 2020-01-01

## 2020-01-01 RX ORDER — SODIUM HYPOCHLORITE 1.25 MG/ML
473 SOLUTION TOPICAL CONTINUOUS PRN
Status: DISCONTINUED | OUTPATIENT
Start: 2020-01-01 | End: 2020-01-01 | Stop reason: HOSPADM

## 2020-01-01 RX ADMIN — PANTOPRAZOLE SODIUM 40 MG: 40 INJECTION, POWDER, FOR SOLUTION INTRAVENOUS at 21:18

## 2020-01-01 RX ADMIN — CARBOXYMETHYLCELLULOSE SODIUM 1 DROP: 10 GEL OPHTHALMIC at 02:16

## 2020-01-01 RX ADMIN — IPRATROPIUM BROMIDE AND ALBUTEROL SULFATE 1 AMPULE: .5; 3 SOLUTION RESPIRATORY (INHALATION) at 10:45

## 2020-01-01 RX ADMIN — CHLORHEXIDINE GLUCONATE 0.12% ORAL RINSE 15 ML: 1.2 LIQUID ORAL at 08:49

## 2020-01-01 RX ADMIN — POTASSIUM CHLORIDE 40 MEQ: 750 TABLET, EXTENDED RELEASE ORAL at 07:55

## 2020-01-01 RX ADMIN — PROPOFOL 40 MCG/KG/MIN: 10 INJECTION, EMULSION INTRAVENOUS at 20:49

## 2020-01-01 RX ADMIN — IPRATROPIUM BROMIDE AND ALBUTEROL SULFATE 1 AMPULE: .5; 3 SOLUTION RESPIRATORY (INHALATION) at 11:02

## 2020-01-01 RX ADMIN — Medication 10 ML: at 20:52

## 2020-01-01 RX ADMIN — INSULIN LISPRO 1 UNITS: 100 INJECTION, SOLUTION INTRAVENOUS; SUBCUTANEOUS at 23:59

## 2020-01-01 RX ADMIN — AMIODARONE HYDROCHLORIDE 1 MG/MIN: 50 INJECTION, SOLUTION INTRAVENOUS at 15:15

## 2020-01-01 RX ADMIN — DEXAMETHASONE SODIUM PHOSPHATE 6 MG: 10 INJECTION, SOLUTION INTRAMUSCULAR; INTRAVENOUS at 08:22

## 2020-01-01 RX ADMIN — POTASSIUM BICARBONATE 40 MEQ: 782 TABLET, EFFERVESCENT ORAL at 09:48

## 2020-01-01 RX ADMIN — DEXMEDETOMIDINE HYDROCHLORIDE 1.2 MCG/KG/HR: 100 INJECTION, SOLUTION INTRAVENOUS at 06:11

## 2020-01-01 RX ADMIN — PROPOFOL 50 MCG/KG/MIN: 10 INJECTION, EMULSION INTRAVENOUS at 15:15

## 2020-01-01 RX ADMIN — PROPOFOL 25 MCG/KG/MIN: 10 INJECTION, EMULSION INTRAVENOUS at 20:36

## 2020-01-01 RX ADMIN — CARBOXYMETHYLCELLULOSE SODIUM 1 DROP: 10 GEL OPHTHALMIC at 00:33

## 2020-01-01 RX ADMIN — PANTOPRAZOLE SODIUM 40 MG: 40 INJECTION, POWDER, FOR SOLUTION INTRAVENOUS at 20:09

## 2020-01-01 RX ADMIN — NOREPINEPHRINE BITARTRATE 4 MCG/MIN: 1 INJECTION INTRAVENOUS at 09:04

## 2020-01-01 RX ADMIN — DILTIAZEM HYDROCHLORIDE 60 MG: 60 TABLET, FILM COATED ORAL at 23:56

## 2020-01-01 RX ADMIN — WHITE PETROLATUM 57.7 %-MINERAL OIL 31.9 % EYE OINTMENT: at 07:26

## 2020-01-01 RX ADMIN — IPRATROPIUM BROMIDE AND ALBUTEROL SULFATE 1 AMPULE: .5; 3 SOLUTION RESPIRATORY (INHALATION) at 23:03

## 2020-01-01 RX ADMIN — DILTIAZEM HYDROCHLORIDE 180 MG: 180 CAPSULE, COATED, EXTENDED RELEASE ORAL at 20:03

## 2020-01-01 RX ADMIN — WHITE PETROLATUM 57.7 %-MINERAL OIL 31.9 % EYE OINTMENT: at 00:01

## 2020-01-01 RX ADMIN — INSULIN LISPRO 1 UNITS: 100 INJECTION, SOLUTION INTRAVENOUS; SUBCUTANEOUS at 23:47

## 2020-01-01 RX ADMIN — CARBOXYMETHYLCELLULOSE SODIUM 1 DROP: 10 GEL OPHTHALMIC at 10:58

## 2020-01-01 RX ADMIN — Medication 15 ML: at 08:21

## 2020-01-01 RX ADMIN — ALBUMIN (HUMAN) 25 G: 0.25 INJECTION, SOLUTION INTRAVENOUS at 20:47

## 2020-01-01 RX ADMIN — CARBOXYMETHYLCELLULOSE SODIUM 1 DROP: 10 GEL OPHTHALMIC at 09:07

## 2020-01-01 RX ADMIN — IPRATROPIUM BROMIDE AND ALBUTEROL SULFATE 1 AMPULE: .5; 3 SOLUTION RESPIRATORY (INHALATION) at 10:42

## 2020-01-01 RX ADMIN — Medication 10 ML: at 08:22

## 2020-01-01 RX ADMIN — FENTANYL CITRATE 50 MCG: 50 INJECTION, SOLUTION INTRAMUSCULAR; INTRAVENOUS at 06:46

## 2020-01-01 RX ADMIN — MIDAZOLAM HYDROCHLORIDE 2 MG: 1 INJECTION, SOLUTION INTRAMUSCULAR; INTRAVENOUS at 03:03

## 2020-01-01 RX ADMIN — Medication 10 ML: at 20:00

## 2020-01-01 RX ADMIN — Medication 10 ML: at 07:58

## 2020-01-01 RX ADMIN — CITALOPRAM HYDROBROMIDE 40 MG: 20 TABLET ORAL at 08:46

## 2020-01-01 RX ADMIN — CARBOXYMETHYLCELLULOSE SODIUM 1 DROP: 10 GEL OPHTHALMIC at 15:15

## 2020-01-01 RX ADMIN — ENOXAPARIN SODIUM 40 MG: 40 INJECTION SUBCUTANEOUS at 09:04

## 2020-01-01 RX ADMIN — DEXMEDETOMIDINE HYDROCHLORIDE 1.2 MCG/KG/HR: 100 INJECTION, SOLUTION INTRAVENOUS at 13:19

## 2020-01-01 RX ADMIN — PROPOFOL 50 MCG/KG/MIN: 10 INJECTION, EMULSION INTRAVENOUS at 08:48

## 2020-01-01 RX ADMIN — MAGNESIUM SULFATE HEPTAHYDRATE 1 G: 1 INJECTION, SOLUTION INTRAVENOUS at 09:04

## 2020-01-01 RX ADMIN — FENTANYL CITRATE 50 MCG: 50 INJECTION, SOLUTION INTRAMUSCULAR; INTRAVENOUS at 08:15

## 2020-01-01 RX ADMIN — DEXMEDETOMIDINE HYDROCHLORIDE 0.5 MCG/KG/HR: 100 INJECTION, SOLUTION INTRAVENOUS at 06:58

## 2020-01-01 RX ADMIN — MICONAZOLE NITRATE: 20 POWDER TOPICAL at 08:02

## 2020-01-01 RX ADMIN — CHLOROTHIAZIDE SODIUM 500 MG: 500 INJECTION, POWDER, LYOPHILIZED, FOR SOLUTION INTRAVENOUS at 09:10

## 2020-01-01 RX ADMIN — MICONAZOLE NITRATE: 20 POWDER TOPICAL at 08:22

## 2020-01-01 RX ADMIN — WHITE PETROLATUM 57.7 %-MINERAL OIL 31.9 % EYE OINTMENT: at 17:16

## 2020-01-01 RX ADMIN — VASOPRESSIN: 20 INJECTION INTRAVENOUS at 09:03

## 2020-01-01 RX ADMIN — CITALOPRAM HYDROBROMIDE 40 MG: 20 TABLET ORAL at 09:26

## 2020-01-01 RX ADMIN — WHITE PETROLATUM 57.7 %-MINERAL OIL 31.9 % EYE OINTMENT: at 19:44

## 2020-01-01 RX ADMIN — Medication 10 ML: at 20:20

## 2020-01-01 RX ADMIN — MIDAZOLAM HYDROCHLORIDE 2 MG: 1 INJECTION, SOLUTION INTRAMUSCULAR; INTRAVENOUS at 12:16

## 2020-01-01 RX ADMIN — MUPIROCIN: 20 OINTMENT TOPICAL at 08:59

## 2020-01-01 RX ADMIN — FUROSEMIDE 40 MG: 10 INJECTION, SOLUTION INTRAMUSCULAR; INTRAVENOUS at 16:36

## 2020-01-01 RX ADMIN — CARBOXYMETHYLCELLULOSE SODIUM 1 DROP: 10 GEL OPHTHALMIC at 21:28

## 2020-01-01 RX ADMIN — DILTIAZEM HYDROCHLORIDE 60 MG: 60 TABLET, FILM COATED ORAL at 06:29

## 2020-01-01 RX ADMIN — IPRATROPIUM BROMIDE AND ALBUTEROL SULFATE 1 AMPULE: .5; 3 SOLUTION RESPIRATORY (INHALATION) at 19:08

## 2020-01-01 RX ADMIN — WHITE PETROLATUM 57.7 %-MINERAL OIL 31.9 % EYE OINTMENT: at 20:09

## 2020-01-01 RX ADMIN — CEFTRIAXONE 2 G: 2 INJECTION, POWDER, FOR SOLUTION INTRAMUSCULAR; INTRAVENOUS at 11:33

## 2020-01-01 RX ADMIN — MIDAZOLAM HYDROCHLORIDE 2 MG: 1 INJECTION, SOLUTION INTRAMUSCULAR; INTRAVENOUS at 12:53

## 2020-01-01 RX ADMIN — CEFTRIAXONE 2 G: 2 INJECTION, POWDER, FOR SOLUTION INTRAMUSCULAR; INTRAVENOUS at 13:09

## 2020-01-01 RX ADMIN — MORPHINE SULFATE 60 MG: 30 TABLET, FILM COATED, EXTENDED RELEASE ORAL at 21:36

## 2020-01-01 RX ADMIN — MIDAZOLAM HYDROCHLORIDE 2 MG: 1 INJECTION, SOLUTION INTRAMUSCULAR; INTRAVENOUS at 23:37

## 2020-01-01 RX ADMIN — FENTANYL CITRATE 50 MCG: 50 INJECTION, SOLUTION INTRAMUSCULAR; INTRAVENOUS at 10:21

## 2020-01-01 RX ADMIN — PROPOFOL 25 MCG/KG/MIN: 10 INJECTION, EMULSION INTRAVENOUS at 19:43

## 2020-01-01 RX ADMIN — CASTOR OIL AND BALSAM, PERU: 788; 87 OINTMENT TOPICAL at 08:56

## 2020-01-01 RX ADMIN — Medication 15 ML: at 08:02

## 2020-01-01 RX ADMIN — ENOXAPARIN SODIUM 40 MG: 40 INJECTION SUBCUTANEOUS at 09:30

## 2020-01-01 RX ADMIN — FENTANYL CITRATE 175 MCG/HR: 50 INJECTION, SOLUTION INTRAMUSCULAR; INTRAVENOUS at 11:21

## 2020-01-01 RX ADMIN — IPRATROPIUM BROMIDE AND ALBUTEROL SULFATE 1 AMPULE: .5; 3 SOLUTION RESPIRATORY (INHALATION) at 07:39

## 2020-01-01 RX ADMIN — POTASSIUM BICARBONATE 60 MEQ: 782 TABLET, EFFERVESCENT ORAL at 08:19

## 2020-01-01 RX ADMIN — MICONAZOLE NITRATE: 20 POWDER TOPICAL at 08:24

## 2020-01-01 RX ADMIN — METOPROLOL TARTRATE 2.5 MG: 5 INJECTION INTRAVENOUS at 05:16

## 2020-01-01 RX ADMIN — CASTOR OIL AND BALSAM, PERU: 788; 87 OINTMENT TOPICAL at 22:18

## 2020-01-01 RX ADMIN — MICONAZOLE NITRATE: 20 POWDER TOPICAL at 22:12

## 2020-01-01 RX ADMIN — DEXMEDETOMIDINE HYDROCHLORIDE 1.5 MCG/KG/HR: 100 INJECTION, SOLUTION INTRAVENOUS at 09:18

## 2020-01-01 RX ADMIN — MUPIROCIN: 20 OINTMENT TOPICAL at 08:43

## 2020-01-01 RX ADMIN — MIDAZOLAM HYDROCHLORIDE 2 MG: 1 INJECTION, SOLUTION INTRAMUSCULAR; INTRAVENOUS at 13:30

## 2020-01-01 RX ADMIN — WHITE PETROLATUM 57.7 %-MINERAL OIL 31.9 % EYE OINTMENT: at 04:06

## 2020-01-01 RX ADMIN — PROPOFOL 50 MCG/KG/MIN: 10 INJECTION, EMULSION INTRAVENOUS at 11:48

## 2020-01-01 RX ADMIN — PANTOPRAZOLE SODIUM 40 MG: 40 INJECTION, POWDER, FOR SOLUTION INTRAVENOUS at 19:54

## 2020-01-01 RX ADMIN — PANTOPRAZOLE SODIUM 40 MG: 40 INJECTION, POWDER, FOR SOLUTION INTRAVENOUS at 08:41

## 2020-01-01 RX ADMIN — MIDAZOLAM HYDROCHLORIDE 2 MG: 1 INJECTION, SOLUTION INTRAMUSCULAR; INTRAVENOUS at 20:08

## 2020-01-01 RX ADMIN — IPRATROPIUM BROMIDE AND ALBUTEROL SULFATE 1 AMPULE: .5; 3 SOLUTION RESPIRATORY (INHALATION) at 20:04

## 2020-01-01 RX ADMIN — MIDAZOLAM HYDROCHLORIDE 5 MG/HR: 5 INJECTION, SOLUTION INTRAMUSCULAR; INTRAVENOUS at 12:00

## 2020-01-01 RX ADMIN — IPRATROPIUM BROMIDE AND ALBUTEROL SULFATE 1 AMPULE: .5; 3 SOLUTION RESPIRATORY (INHALATION) at 07:58

## 2020-01-01 RX ADMIN — INSULIN LISPRO 1 UNITS: 100 INJECTION, SOLUTION INTRAVENOUS; SUBCUTANEOUS at 00:18

## 2020-01-01 RX ADMIN — IPRATROPIUM BROMIDE AND ALBUTEROL SULFATE 1 AMPULE: .5; 3 SOLUTION RESPIRATORY (INHALATION) at 07:22

## 2020-01-01 RX ADMIN — CARBOXYMETHYLCELLULOSE SODIUM 1 DROP: 10 GEL OPHTHALMIC at 23:02

## 2020-01-01 RX ADMIN — INSULIN LISPRO 1 UNITS: 100 INJECTION, SOLUTION INTRAVENOUS; SUBCUTANEOUS at 19:43

## 2020-01-01 RX ADMIN — IPRATROPIUM BROMIDE AND ALBUTEROL SULFATE 1 AMPULE: .5; 3 SOLUTION RESPIRATORY (INHALATION) at 07:56

## 2020-01-01 RX ADMIN — MIDAZOLAM HYDROCHLORIDE 2 MG: 1 INJECTION, SOLUTION INTRAMUSCULAR; INTRAVENOUS at 02:28

## 2020-01-01 RX ADMIN — METRONIDAZOLE 500 MG: 500 INJECTION, SOLUTION INTRAVENOUS at 09:31

## 2020-01-01 RX ADMIN — PROPOFOL 40 MCG/KG/MIN: 10 INJECTION, EMULSION INTRAVENOUS at 18:40

## 2020-01-01 RX ADMIN — WHITE PETROLATUM 57.7 %-MINERAL OIL 31.9 % EYE OINTMENT: at 11:44

## 2020-01-01 RX ADMIN — CEFTRIAXONE 2 G: 2 INJECTION, POWDER, FOR SOLUTION INTRAMUSCULAR; INTRAVENOUS at 11:25

## 2020-01-01 RX ADMIN — MORPHINE SULFATE 2 MG: 2 INJECTION, SOLUTION INTRAMUSCULAR; INTRAVENOUS at 17:48

## 2020-01-01 RX ADMIN — CARBOXYMETHYLCELLULOSE SODIUM 1 DROP: 10 GEL OPHTHALMIC at 05:42

## 2020-01-01 RX ADMIN — FENTANYL CITRATE 50 MCG: 50 INJECTION, SOLUTION INTRAMUSCULAR; INTRAVENOUS at 08:03

## 2020-01-01 RX ADMIN — FENTANYL CITRATE 50 MCG: 50 INJECTION, SOLUTION INTRAMUSCULAR; INTRAVENOUS at 12:12

## 2020-01-01 RX ADMIN — VANCOMYCIN HYDROCHLORIDE 1 G: 1 INJECTION, POWDER, LYOPHILIZED, FOR SOLUTION INTRAVENOUS at 22:21

## 2020-01-01 RX ADMIN — WHITE PETROLATUM 57.7 %-MINERAL OIL 31.9 % EYE OINTMENT: at 20:31

## 2020-01-01 RX ADMIN — CARBOXYMETHYLCELLULOSE SODIUM 1 DROP: 10 GEL OPHTHALMIC at 17:31

## 2020-01-01 RX ADMIN — Medication 2 PUFF: at 11:38

## 2020-01-01 RX ADMIN — FENTANYL CITRATE 50 MCG: 50 INJECTION, SOLUTION INTRAMUSCULAR; INTRAVENOUS at 23:54

## 2020-01-01 RX ADMIN — IPRATROPIUM BROMIDE AND ALBUTEROL SULFATE 1 AMPULE: .5; 3 SOLUTION RESPIRATORY (INHALATION) at 12:32

## 2020-01-01 RX ADMIN — MIDAZOLAM HYDROCHLORIDE 2 MG: 1 INJECTION, SOLUTION INTRAMUSCULAR; INTRAVENOUS at 04:37

## 2020-01-01 RX ADMIN — MORPHINE SULFATE 60 MG: 30 TABLET, FILM COATED, EXTENDED RELEASE ORAL at 09:02

## 2020-01-01 RX ADMIN — Medication 10 ML: at 07:25

## 2020-01-01 RX ADMIN — DEXAMETHASONE SODIUM PHOSPHATE 6 MG: 10 INJECTION, SOLUTION INTRAMUSCULAR; INTRAVENOUS at 08:25

## 2020-01-01 RX ADMIN — IPRATROPIUM BROMIDE AND ALBUTEROL SULFATE 1 AMPULE: .5; 3 SOLUTION RESPIRATORY (INHALATION) at 03:55

## 2020-01-01 RX ADMIN — DEXAMETHASONE SODIUM PHOSPHATE 6 MG: 10 INJECTION, SOLUTION INTRAMUSCULAR; INTRAVENOUS at 08:43

## 2020-01-01 RX ADMIN — CARBOXYMETHYLCELLULOSE SODIUM 1 DROP: 10 GEL OPHTHALMIC at 06:10

## 2020-01-01 RX ADMIN — CARVEDILOL 25 MG: 25 TABLET, FILM COATED ORAL at 17:27

## 2020-01-01 RX ADMIN — POTASSIUM BICARBONATE 20 MEQ: 782 TABLET, EFFERVESCENT ORAL at 16:32

## 2020-01-01 RX ADMIN — CARBOXYMETHYLCELLULOSE SODIUM 1 DROP: 10 GEL OPHTHALMIC at 21:43

## 2020-01-01 RX ADMIN — POTASSIUM CHLORIDE 20 MEQ: 750 TABLET, EXTENDED RELEASE ORAL at 11:41

## 2020-01-01 RX ADMIN — CARVEDILOL 25 MG: 25 TABLET, FILM COATED ORAL at 07:55

## 2020-01-01 RX ADMIN — POTASSIUM BICARBONATE 20 MEQ: 782 TABLET, EFFERVESCENT ORAL at 13:18

## 2020-01-01 RX ADMIN — LORAZEPAM 1 MG: 2 INJECTION INTRAMUSCULAR; INTRAVENOUS at 18:05

## 2020-01-01 RX ADMIN — WHITE PETROLATUM 57.7 %-MINERAL OIL 31.9 % EYE OINTMENT: at 08:41

## 2020-01-01 RX ADMIN — MIDAZOLAM HYDROCHLORIDE 2 MG: 1 INJECTION, SOLUTION INTRAMUSCULAR; INTRAVENOUS at 16:41

## 2020-01-01 RX ADMIN — SODIUM CHLORIDE 3 MCG/KG/MIN: 9 INJECTION, SOLUTION INTRAVENOUS at 12:52

## 2020-01-01 RX ADMIN — FUROSEMIDE 40 MG: 10 INJECTION, SOLUTION INTRAMUSCULAR; INTRAVENOUS at 02:16

## 2020-01-01 RX ADMIN — FENTANYL CITRATE 150 MCG/HR: 50 INJECTION, SOLUTION INTRAMUSCULAR; INTRAVENOUS at 13:05

## 2020-01-01 RX ADMIN — PROPOFOL 50 MCG/KG/MIN: 10 INJECTION, EMULSION INTRAVENOUS at 01:30

## 2020-01-01 RX ADMIN — CARBOXYMETHYLCELLULOSE SODIUM 1 DROP: 10 GEL OPHTHALMIC at 23:26

## 2020-01-01 RX ADMIN — PROPOFOL 25 MCG/KG/MIN: 10 INJECTION, EMULSION INTRAVENOUS at 07:16

## 2020-01-01 RX ADMIN — SODIUM CHLORIDE: 9 INJECTION, SOLUTION INTRAVENOUS at 21:24

## 2020-01-01 RX ADMIN — MICONAZOLE NITRATE: 20 POWDER TOPICAL at 09:17

## 2020-01-01 RX ADMIN — IPRATROPIUM BROMIDE AND ALBUTEROL SULFATE 1 AMPULE: .5; 3 SOLUTION RESPIRATORY (INHALATION) at 07:18

## 2020-01-01 RX ADMIN — FENTANYL CITRATE 50 MCG: 50 INJECTION, SOLUTION INTRAMUSCULAR; INTRAVENOUS at 13:10

## 2020-01-01 RX ADMIN — POTASSIUM CHLORIDE 10 MEQ: 7.46 INJECTION, SOLUTION INTRAVENOUS at 11:07

## 2020-01-01 RX ADMIN — WHITE PETROLATUM 57.7 %-MINERAL OIL 31.9 % EYE OINTMENT: at 02:06

## 2020-01-01 RX ADMIN — ENOXAPARIN SODIUM 40 MG: 40 INJECTION SUBCUTANEOUS at 08:58

## 2020-01-01 RX ADMIN — CARVEDILOL 25 MG: 25 TABLET, FILM COATED ORAL at 18:28

## 2020-01-01 RX ADMIN — MIDAZOLAM HYDROCHLORIDE 2 MG: 1 INJECTION, SOLUTION INTRAMUSCULAR; INTRAVENOUS at 04:30

## 2020-01-01 RX ADMIN — CARVEDILOL 25 MG: 25 TABLET, FILM COATED ORAL at 16:32

## 2020-01-01 RX ADMIN — MICONAZOLE NITRATE: 20 POWDER TOPICAL at 20:48

## 2020-01-01 RX ADMIN — WHITE PETROLATUM 57.7 %-MINERAL OIL 31.9 % EYE OINTMENT: at 20:22

## 2020-01-01 RX ADMIN — DAKIN'S SOLUTION 0.125% (QUARTER STRENGTH): 0.12 SOLUTION at 20:53

## 2020-01-01 RX ADMIN — INSULIN LISPRO 2 UNITS: 100 INJECTION, SOLUTION INTRAVENOUS; SUBCUTANEOUS at 09:27

## 2020-01-01 RX ADMIN — FUROSEMIDE 40 MG: 10 INJECTION, SOLUTION INTRAMUSCULAR; INTRAVENOUS at 08:27

## 2020-01-01 RX ADMIN — DILTIAZEM HYDROCHLORIDE 30 MG: 60 TABLET, FILM COATED ORAL at 11:22

## 2020-01-01 RX ADMIN — WHITE PETROLATUM 57.7 %-MINERAL OIL 31.9 % EYE OINTMENT: at 00:03

## 2020-01-01 RX ADMIN — IPRATROPIUM BROMIDE AND ALBUTEROL SULFATE 1 AMPULE: .5; 3 SOLUTION RESPIRATORY (INHALATION) at 15:33

## 2020-01-01 RX ADMIN — IOPAMIDOL 75 ML: 755 INJECTION, SOLUTION INTRAVENOUS at 17:41

## 2020-01-01 RX ADMIN — MICONAZOLE NITRATE: 20 POWDER TOPICAL at 07:45

## 2020-01-01 RX ADMIN — FENTANYL CITRATE 50 MCG: 50 INJECTION, SOLUTION INTRAMUSCULAR; INTRAVENOUS at 11:46

## 2020-01-01 RX ADMIN — EPINEPHRINE 1 MG: 0.1 INJECTION, SOLUTION ENDOTRACHEAL; INTRACARDIAC; INTRAVENOUS at 11:02

## 2020-01-01 RX ADMIN — INSULIN LISPRO 1 UNITS: 100 INJECTION, SOLUTION INTRAVENOUS; SUBCUTANEOUS at 16:48

## 2020-01-01 RX ADMIN — SODIUM BICARBONATE: 84 INJECTION, SOLUTION INTRAVENOUS at 15:17

## 2020-01-01 RX ADMIN — PANTOPRAZOLE SODIUM 40 MG: 40 INJECTION, POWDER, FOR SOLUTION INTRAVENOUS at 08:03

## 2020-01-01 RX ADMIN — DIGOXIN 250 MCG: 0.25 INJECTION INTRAMUSCULAR; INTRAVENOUS at 09:31

## 2020-01-01 RX ADMIN — IPRATROPIUM BROMIDE AND ALBUTEROL SULFATE 1 AMPULE: .5; 3 SOLUTION RESPIRATORY (INHALATION) at 20:05

## 2020-01-01 RX ADMIN — DILTIAZEM HYDROCHLORIDE 30 MG: 60 TABLET, FILM COATED ORAL at 17:27

## 2020-01-01 RX ADMIN — CARBOXYMETHYLCELLULOSE SODIUM 1 DROP: 10 GEL OPHTHALMIC at 16:56

## 2020-01-01 RX ADMIN — CITALOPRAM HYDROBROMIDE 40 MG: 20 TABLET ORAL at 08:20

## 2020-01-01 RX ADMIN — DILTIAZEM HYDROCHLORIDE 60 MG: 60 TABLET, FILM COATED ORAL at 11:46

## 2020-01-01 RX ADMIN — MIDAZOLAM HYDROCHLORIDE 2 MG: 1 INJECTION, SOLUTION INTRAMUSCULAR; INTRAVENOUS at 23:09

## 2020-01-01 RX ADMIN — WHITE PETROLATUM 57.7 %-MINERAL OIL 31.9 % EYE OINTMENT: at 12:20

## 2020-01-01 RX ADMIN — WHITE PETROLATUM 57.7 %-MINERAL OIL 31.9 % EYE OINTMENT: at 09:16

## 2020-01-01 RX ADMIN — CARVEDILOL 25 MG: 25 TABLET, FILM COATED ORAL at 17:30

## 2020-01-01 RX ADMIN — INSULIN LISPRO 1 UNITS: 100 INJECTION, SOLUTION INTRAVENOUS; SUBCUTANEOUS at 16:36

## 2020-01-01 RX ADMIN — CARBOXYMETHYLCELLULOSE SODIUM 1 DROP: 10 GEL OPHTHALMIC at 11:30

## 2020-01-01 RX ADMIN — WHITE PETROLATUM 57.7 %-MINERAL OIL 31.9 % EYE OINTMENT: at 17:30

## 2020-01-01 RX ADMIN — FENTANYL CITRATE 50 MCG: 50 INJECTION, SOLUTION INTRAMUSCULAR; INTRAVENOUS at 21:44

## 2020-01-01 RX ADMIN — FENTANYL CITRATE 50 MCG: 50 INJECTION, SOLUTION INTRAMUSCULAR; INTRAVENOUS at 15:15

## 2020-01-01 RX ADMIN — PROPOFOL 40 MCG/KG/MIN: 10 INJECTION, EMULSION INTRAVENOUS at 13:08

## 2020-01-01 RX ADMIN — CEFEPIME 2 G: 2 INJECTION, POWDER, FOR SOLUTION INTRAVENOUS at 03:30

## 2020-01-01 RX ADMIN — DEXAMETHASONE SODIUM PHOSPHATE 4 MG: 4 INJECTION, SOLUTION INTRAMUSCULAR; INTRAVENOUS at 08:05

## 2020-01-01 RX ADMIN — SODIUM CHLORIDE 3.5 MCG/KG/MIN: 9 INJECTION, SOLUTION INTRAVENOUS at 19:05

## 2020-01-01 RX ADMIN — Medication 10 ML: at 09:09

## 2020-01-01 RX ADMIN — PANTOPRAZOLE SODIUM 40 MG: 40 INJECTION, POWDER, FOR SOLUTION INTRAVENOUS at 08:45

## 2020-01-01 RX ADMIN — CARVEDILOL 25 MG: 25 TABLET, FILM COATED ORAL at 09:00

## 2020-01-01 RX ADMIN — FENTANYL CITRATE 50 MCG: 50 INJECTION, SOLUTION INTRAMUSCULAR; INTRAVENOUS at 06:22

## 2020-01-01 RX ADMIN — PANTOPRAZOLE SODIUM 40 MG: 40 INJECTION, POWDER, FOR SOLUTION INTRAVENOUS at 07:55

## 2020-01-01 RX ADMIN — DIGOXIN 250 MCG: 0.25 INJECTION INTRAMUSCULAR; INTRAVENOUS at 11:09

## 2020-01-01 RX ADMIN — FENTANYL CITRATE 125 MCG/HR: 50 INJECTION, SOLUTION INTRAMUSCULAR; INTRAVENOUS at 20:05

## 2020-01-01 RX ADMIN — DEXMEDETOMIDINE HYDROCHLORIDE 1.2 MCG/KG/HR: 100 INJECTION, SOLUTION INTRAVENOUS at 03:09

## 2020-01-01 RX ADMIN — PROPOFOL 20 MCG/KG/MIN: 10 INJECTION, EMULSION INTRAVENOUS at 12:13

## 2020-01-01 RX ADMIN — METRONIDAZOLE 500 MG: 500 INJECTION, SOLUTION INTRAVENOUS at 03:46

## 2020-01-01 RX ADMIN — MORPHINE SULFATE 60 MG: 30 TABLET, FILM COATED, EXTENDED RELEASE ORAL at 09:26

## 2020-01-01 RX ADMIN — LEVOFLOXACIN 750 MG: 5 INJECTION, SOLUTION INTRAVENOUS at 00:26

## 2020-01-01 RX ADMIN — CEFTRIAXONE 2 G: 2 INJECTION, POWDER, FOR SOLUTION INTRAMUSCULAR; INTRAVENOUS at 11:22

## 2020-01-01 RX ADMIN — MICONAZOLE NITRATE: 20 POWDER TOPICAL at 08:25

## 2020-01-01 RX ADMIN — IPRATROPIUM BROMIDE AND ALBUTEROL SULFATE 1 AMPULE: .5; 3 SOLUTION RESPIRATORY (INHALATION) at 19:55

## 2020-01-01 RX ADMIN — FENTANYL CITRATE 50 MCG: 50 INJECTION, SOLUTION INTRAMUSCULAR; INTRAVENOUS at 16:55

## 2020-01-01 RX ADMIN — CARVEDILOL 25 MG: 25 TABLET, FILM COATED ORAL at 17:17

## 2020-01-01 RX ADMIN — MIDAZOLAM HYDROCHLORIDE 3 MG/HR: 5 INJECTION, SOLUTION INTRAMUSCULAR; INTRAVENOUS at 06:15

## 2020-01-01 RX ADMIN — WHITE PETROLATUM 57.7 %-MINERAL OIL 31.9 % EYE OINTMENT: at 08:25

## 2020-01-01 RX ADMIN — VASOPRESSIN 0.04 UNITS/MIN: 20 INJECTION INTRAVENOUS at 10:56

## 2020-01-01 RX ADMIN — INSULIN LISPRO 1 UNITS: 100 INJECTION, SOLUTION INTRAVENOUS; SUBCUTANEOUS at 12:21

## 2020-01-01 RX ADMIN — CARBOXYMETHYLCELLULOSE SODIUM 1 DROP: 10 GEL OPHTHALMIC at 18:35

## 2020-01-01 RX ADMIN — MIDAZOLAM HYDROCHLORIDE 2 MG: 1 INJECTION, SOLUTION INTRAMUSCULAR; INTRAVENOUS at 16:47

## 2020-01-01 RX ADMIN — CARBOXYMETHYLCELLULOSE SODIUM 1 DROP: 10 GEL OPHTHALMIC at 11:45

## 2020-01-01 RX ADMIN — FUROSEMIDE 40 MG: 10 INJECTION, SOLUTION INTRAMUSCULAR; INTRAVENOUS at 00:04

## 2020-01-01 RX ADMIN — CARVEDILOL 25 MG: 25 TABLET, FILM COATED ORAL at 08:49

## 2020-01-01 RX ADMIN — CEFTRIAXONE 2 G: 2 INJECTION, POWDER, FOR SOLUTION INTRAMUSCULAR; INTRAVENOUS at 12:23

## 2020-01-01 RX ADMIN — FENTANYL CITRATE 50 MCG: 50 INJECTION, SOLUTION INTRAMUSCULAR; INTRAVENOUS at 14:52

## 2020-01-01 RX ADMIN — CHLORHEXIDINE GLUCONATE 0.12% ORAL RINSE 15 ML: 1.2 LIQUID ORAL at 08:58

## 2020-01-01 RX ADMIN — MICONAZOLE NITRATE: 20 POWDER TOPICAL at 08:56

## 2020-01-01 RX ADMIN — CALCIUM GLUCONATE 1 G: 98 INJECTION, SOLUTION INTRAVENOUS at 11:29

## 2020-01-01 RX ADMIN — PROPOFOL 15 MCG/KG/MIN: 10 INJECTION, EMULSION INTRAVENOUS at 07:57

## 2020-01-01 RX ADMIN — CARBOXYMETHYLCELLULOSE SODIUM 1 DROP: 10 GEL OPHTHALMIC at 07:12

## 2020-01-01 RX ADMIN — CARBOXYMETHYLCELLULOSE SODIUM 1 DROP: 10 GEL OPHTHALMIC at 17:51

## 2020-01-01 RX ADMIN — CARBOXYMETHYLCELLULOSE SODIUM 1 DROP: 10 GEL OPHTHALMIC at 08:33

## 2020-01-01 RX ADMIN — FENTANYL CITRATE 50 MCG: 50 INJECTION, SOLUTION INTRAMUSCULAR; INTRAVENOUS at 16:47

## 2020-01-01 RX ADMIN — CEFTRIAXONE 2 G: 2 INJECTION, POWDER, FOR SOLUTION INTRAMUSCULAR; INTRAVENOUS at 10:58

## 2020-01-01 RX ADMIN — DILTIAZEM HYDROCHLORIDE 60 MG: 60 TABLET, FILM COATED ORAL at 12:21

## 2020-01-01 RX ADMIN — CARVEDILOL 25 MG: 25 TABLET, FILM COATED ORAL at 09:26

## 2020-01-01 RX ADMIN — FUROSEMIDE 40 MG: 10 INJECTION, SOLUTION INTRAMUSCULAR; INTRAVENOUS at 11:47

## 2020-01-01 RX ADMIN — WHITE PETROLATUM 57.7 %-MINERAL OIL 31.9 % EYE OINTMENT: at 17:49

## 2020-01-01 RX ADMIN — INSULIN LISPRO 1 UNITS: 100 INJECTION, SOLUTION INTRAVENOUS; SUBCUTANEOUS at 04:02

## 2020-01-01 RX ADMIN — IPRATROPIUM BROMIDE AND ALBUTEROL SULFATE 1 AMPULE: .5; 3 SOLUTION RESPIRATORY (INHALATION) at 22:43

## 2020-01-01 RX ADMIN — DOPAMINE HYDROCHLORIDE 5 MCG/KG/MIN: 160 INJECTION, SOLUTION INTRAVENOUS at 13:15

## 2020-01-01 RX ADMIN — MORPHINE SULFATE 60 MG: 30 TABLET, FILM COATED, EXTENDED RELEASE ORAL at 00:48

## 2020-01-01 RX ADMIN — CITALOPRAM HYDROBROMIDE 40 MG: 20 TABLET ORAL at 07:55

## 2020-01-01 RX ADMIN — PROPOFOL 40 MCG/KG/MIN: 10 INJECTION, EMULSION INTRAVENOUS at 19:14

## 2020-01-01 RX ADMIN — Medication 10 ML: at 08:48

## 2020-01-01 RX ADMIN — WHITE PETROLATUM 57.7 %-MINERAL OIL 31.9 % EYE OINTMENT: at 09:07

## 2020-01-01 RX ADMIN — METHYLPREDNISOLONE SODIUM SUCCINATE 40 MG: 40 INJECTION, POWDER, FOR SOLUTION INTRAMUSCULAR; INTRAVENOUS at 02:16

## 2020-01-01 RX ADMIN — PANTOPRAZOLE SODIUM 40 MG: 40 INJECTION, POWDER, FOR SOLUTION INTRAVENOUS at 09:04

## 2020-01-01 RX ADMIN — CARBOXYMETHYLCELLULOSE SODIUM 1 DROP: 10 GEL OPHTHALMIC at 11:32

## 2020-01-01 RX ADMIN — DEXMEDETOMIDINE HYDROCHLORIDE 1.2 MCG/KG/HR: 100 INJECTION, SOLUTION INTRAVENOUS at 23:51

## 2020-01-01 RX ADMIN — INSULIN LISPRO 1 UNITS: 100 INJECTION, SOLUTION INTRAVENOUS; SUBCUTANEOUS at 21:39

## 2020-01-01 RX ADMIN — CARBOXYMETHYLCELLULOSE SODIUM 1 DROP: 10 GEL OPHTHALMIC at 05:16

## 2020-01-01 RX ADMIN — MICONAZOLE NITRATE: 20 POWDER TOPICAL at 20:13

## 2020-01-01 RX ADMIN — POTASSIUM CHLORIDE 10 MEQ: 7.46 INJECTION, SOLUTION INTRAVENOUS at 10:42

## 2020-01-01 RX ADMIN — INSULIN LISPRO 1 UNITS: 100 INJECTION, SOLUTION INTRAVENOUS; SUBCUTANEOUS at 21:03

## 2020-01-01 RX ADMIN — METRONIDAZOLE 500 MG: 500 INJECTION, SOLUTION INTRAVENOUS at 02:16

## 2020-01-01 RX ADMIN — DILTIAZEM HYDROCHLORIDE 5 MG/HR: 5 INJECTION INTRAVENOUS at 04:13

## 2020-01-01 RX ADMIN — SODIUM BICARBONATE 50 MEQ: 84 INJECTION, SOLUTION INTRAVENOUS at 11:29

## 2020-01-01 RX ADMIN — WHITE PETROLATUM 57.7 %-MINERAL OIL 31.9 % EYE OINTMENT: at 07:45

## 2020-01-01 RX ADMIN — PANTOPRAZOLE SODIUM 40 MG: 40 INJECTION, POWDER, FOR SOLUTION INTRAVENOUS at 09:02

## 2020-01-01 RX ADMIN — MUPIROCIN: 20 OINTMENT TOPICAL at 20:27

## 2020-01-01 RX ADMIN — IPRATROPIUM BROMIDE AND ALBUTEROL SULFATE 1 AMPULE: .5; 3 SOLUTION RESPIRATORY (INHALATION) at 02:45

## 2020-01-01 RX ADMIN — FENTANYL CITRATE 50 MCG: 50 INJECTION, SOLUTION INTRAMUSCULAR; INTRAVENOUS at 19:43

## 2020-01-01 RX ADMIN — PROPOFOL 5 MCG/KG/MIN: 10 INJECTION, EMULSION INTRAVENOUS at 02:47

## 2020-01-01 RX ADMIN — CARBOXYMETHYLCELLULOSE SODIUM 1 DROP: 10 GEL OPHTHALMIC at 06:22

## 2020-01-01 RX ADMIN — INSULIN LISPRO 1 UNITS: 100 INJECTION, SOLUTION INTRAVENOUS; SUBCUTANEOUS at 05:34

## 2020-01-01 RX ADMIN — WHITE PETROLATUM 57.7 %-MINERAL OIL 31.9 % EYE OINTMENT: at 16:06

## 2020-01-01 RX ADMIN — DILTIAZEM HYDROCHLORIDE 30 MG: 60 TABLET, FILM COATED ORAL at 05:18

## 2020-01-01 RX ADMIN — ALBUTEROL SULFATE 2.5 MG: 2.5 SOLUTION RESPIRATORY (INHALATION) at 22:58

## 2020-01-01 RX ADMIN — IPRATROPIUM BROMIDE AND ALBUTEROL SULFATE 1 AMPULE: .5; 3 SOLUTION RESPIRATORY (INHALATION) at 14:58

## 2020-01-01 RX ADMIN — WHITE PETROLATUM 57.7 %-MINERAL OIL 31.9 % EYE OINTMENT: at 09:00

## 2020-01-01 RX ADMIN — WHITE PETROLATUM 57.7 %-MINERAL OIL 31.9 % EYE OINTMENT: at 08:03

## 2020-01-01 RX ADMIN — VANCOMYCIN HYDROCHLORIDE 1000 MG: 1 INJECTION, POWDER, LYOPHILIZED, FOR SOLUTION INTRAVENOUS at 12:14

## 2020-01-01 RX ADMIN — MIDAZOLAM HYDROCHLORIDE 2 MG: 1 INJECTION, SOLUTION INTRAMUSCULAR; INTRAVENOUS at 12:21

## 2020-01-01 RX ADMIN — PANTOPRAZOLE SODIUM 40 MG: 40 INJECTION, POWDER, FOR SOLUTION INTRAVENOUS at 08:47

## 2020-01-01 RX ADMIN — SODIUM CHLORIDE 2913 ML: 9 INJECTION, SOLUTION INTRAVENOUS at 12:12

## 2020-01-01 RX ADMIN — NOREPINEPHRINE BITARTRATE 5 MCG/MIN: 1 INJECTION INTRAVENOUS at 09:11

## 2020-01-01 RX ADMIN — CITALOPRAM HYDROBROMIDE 40 MG: 20 TABLET ORAL at 11:03

## 2020-01-01 RX ADMIN — Medication 15 ML: at 07:26

## 2020-01-01 RX ADMIN — INSULIN LISPRO 1 UNITS: 100 INJECTION, SOLUTION INTRAVENOUS; SUBCUTANEOUS at 17:30

## 2020-01-01 RX ADMIN — CARBOXYMETHYLCELLULOSE SODIUM 1 DROP: 10 GEL OPHTHALMIC at 02:04

## 2020-01-01 RX ADMIN — DILTIAZEM HYDROCHLORIDE 60 MG: 60 TABLET, FILM COATED ORAL at 16:48

## 2020-01-01 RX ADMIN — MIDAZOLAM HYDROCHLORIDE 2 MG: 1 INJECTION, SOLUTION INTRAMUSCULAR; INTRAVENOUS at 22:08

## 2020-01-01 RX ADMIN — ACETAMINOPHEN 650 MG: 325 TABLET ORAL at 04:18

## 2020-01-01 RX ADMIN — WHITE PETROLATUM 57.7 %-MINERAL OIL 31.9 % EYE OINTMENT: at 11:34

## 2020-01-01 RX ADMIN — APIXABAN 5 MG: 5 TABLET, FILM COATED ORAL at 12:10

## 2020-01-01 RX ADMIN — METRONIDAZOLE 500 MG: 500 INJECTION, SOLUTION INTRAVENOUS at 18:58

## 2020-01-01 RX ADMIN — IPRATROPIUM BROMIDE AND ALBUTEROL SULFATE 1 AMPULE: .5; 3 SOLUTION RESPIRATORY (INHALATION) at 20:24

## 2020-01-01 RX ADMIN — PROPOFOL 50 MCG/KG/MIN: 10 INJECTION, EMULSION INTRAVENOUS at 06:09

## 2020-01-01 RX ADMIN — FENTANYL CITRATE 50 MCG: 50 INJECTION, SOLUTION INTRAMUSCULAR; INTRAVENOUS at 02:01

## 2020-01-01 RX ADMIN — MIDAZOLAM HYDROCHLORIDE 2 MG: 1 INJECTION, SOLUTION INTRAMUSCULAR; INTRAVENOUS at 13:09

## 2020-01-01 RX ADMIN — POTASSIUM BICARBONATE 20 MEQ: 391 TABLET, EFFERVESCENT ORAL at 11:02

## 2020-01-01 RX ADMIN — DILTIAZEM HYDROCHLORIDE 60 MG: 60 TABLET, FILM COATED ORAL at 06:10

## 2020-01-01 RX ADMIN — VANCOMYCIN HYDROCHLORIDE 1.5 G: 10 INJECTION, POWDER, LYOPHILIZED, FOR SOLUTION INTRAVENOUS at 13:25

## 2020-01-01 RX ADMIN — WHITE PETROLATUM 57.7 %-MINERAL OIL 31.9 % EYE OINTMENT: at 19:54

## 2020-01-01 RX ADMIN — DILTIAZEM HYDROCHLORIDE 5 MG/HR: 5 INJECTION INTRAVENOUS at 00:42

## 2020-01-01 RX ADMIN — WHITE PETROLATUM 57.7 %-MINERAL OIL 31.9 % EYE OINTMENT: at 13:44

## 2020-01-01 RX ADMIN — DEXAMETHASONE SODIUM PHOSPHATE 6 MG: 10 INJECTION, SOLUTION INTRAMUSCULAR; INTRAVENOUS at 08:03

## 2020-01-01 RX ADMIN — MIDAZOLAM HYDROCHLORIDE 2 MG: 1 INJECTION, SOLUTION INTRAMUSCULAR; INTRAVENOUS at 08:39

## 2020-01-01 RX ADMIN — CHLORHEXIDINE GLUCONATE 0.12% ORAL RINSE 15 ML: 1.2 LIQUID ORAL at 20:30

## 2020-01-01 RX ADMIN — CARBOXYMETHYLCELLULOSE SODIUM 1 DROP: 10 GEL OPHTHALMIC at 22:23

## 2020-01-01 RX ADMIN — NOREPINEPHRINE BITARTRATE 2 MCG/MIN: 1 INJECTION INTRAVENOUS at 14:16

## 2020-01-01 RX ADMIN — PROPOFOL 40 MCG/KG/MIN: 10 INJECTION, EMULSION INTRAVENOUS at 00:07

## 2020-01-01 RX ADMIN — DILTIAZEM HYDROCHLORIDE 60 MG: 60 TABLET, FILM COATED ORAL at 00:03

## 2020-01-01 RX ADMIN — INSULIN LISPRO 1 UNITS: 100 INJECTION, SOLUTION INTRAVENOUS; SUBCUTANEOUS at 21:20

## 2020-01-01 RX ADMIN — DILTIAZEM HYDROCHLORIDE 60 MG: 60 TABLET, FILM COATED ORAL at 16:37

## 2020-01-01 RX ADMIN — CHLOROTHIAZIDE SODIUM 500 MG: 500 INJECTION, POWDER, LYOPHILIZED, FOR SOLUTION INTRAVENOUS at 21:20

## 2020-01-01 RX ADMIN — DEXAMETHASONE SODIUM PHOSPHATE 6 MG: 10 INJECTION, SOLUTION INTRAMUSCULAR; INTRAVENOUS at 09:21

## 2020-01-01 RX ADMIN — PANTOPRAZOLE SODIUM 40 MG: 40 INJECTION, POWDER, FOR SOLUTION INTRAVENOUS at 22:16

## 2020-01-01 RX ADMIN — SODIUM CHLORIDE, POTASSIUM CHLORIDE, SODIUM LACTATE AND CALCIUM CHLORIDE: 600; 310; 30; 20 INJECTION, SOLUTION INTRAVENOUS at 23:50

## 2020-01-01 RX ADMIN — FENTANYL CITRATE 25 MCG/HR: 50 INJECTION, SOLUTION INTRAMUSCULAR; INTRAVENOUS at 11:32

## 2020-01-01 RX ADMIN — FENTANYL CITRATE 50 MCG: 50 INJECTION, SOLUTION INTRAMUSCULAR; INTRAVENOUS at 16:37

## 2020-01-01 RX ADMIN — WHITE PETROLATUM 57.7 %-MINERAL OIL 31.9 % EYE OINTMENT: at 00:33

## 2020-01-01 RX ADMIN — INSULIN LISPRO 1 UNITS: 100 INJECTION, SOLUTION INTRAVENOUS; SUBCUTANEOUS at 12:18

## 2020-01-01 RX ADMIN — WHITE PETROLATUM 57.7 %-MINERAL OIL 31.9 % EYE OINTMENT: at 20:53

## 2020-01-01 RX ADMIN — CARBOXYMETHYLCELLULOSE SODIUM 1 DROP: 10 GEL OPHTHALMIC at 16:55

## 2020-01-01 RX ADMIN — PROPOFOL 40 MCG/KG/MIN: 10 INJECTION, EMULSION INTRAVENOUS at 11:02

## 2020-01-01 RX ADMIN — DILTIAZEM HYDROCHLORIDE 30 MG: 60 TABLET, FILM COATED ORAL at 23:43

## 2020-01-01 RX ADMIN — Medication 10 ML: at 20:05

## 2020-01-01 RX ADMIN — WHITE PETROLATUM 57.7 %-MINERAL OIL 31.9 % EYE OINTMENT: at 08:44

## 2020-01-01 RX ADMIN — IPRATROPIUM BROMIDE AND ALBUTEROL SULFATE 1 AMPULE: .5; 3 SOLUTION RESPIRATORY (INHALATION) at 11:33

## 2020-01-01 RX ADMIN — Medication 10 ML: at 20:49

## 2020-01-01 RX ADMIN — MUPIROCIN: 20 OINTMENT TOPICAL at 07:58

## 2020-01-01 RX ADMIN — PANTOPRAZOLE SODIUM 40 MG: 40 INJECTION, POWDER, FOR SOLUTION INTRAVENOUS at 20:13

## 2020-01-01 RX ADMIN — PROPOFOL 15 MCG/KG/MIN: 10 INJECTION, EMULSION INTRAVENOUS at 11:18

## 2020-01-01 RX ADMIN — CARBOXYMETHYLCELLULOSE SODIUM 1 DROP: 10 GEL OPHTHALMIC at 18:05

## 2020-01-01 RX ADMIN — FENTANYL CITRATE 50 MCG: 50 INJECTION, SOLUTION INTRAMUSCULAR; INTRAVENOUS at 02:44

## 2020-01-01 RX ADMIN — WHITE PETROLATUM 57.7 %-MINERAL OIL 31.9 % EYE OINTMENT: at 23:47

## 2020-01-01 RX ADMIN — PROPOFOL 50 MCG/KG/MIN: 10 INJECTION, EMULSION INTRAVENOUS at 10:57

## 2020-01-01 RX ADMIN — PANTOPRAZOLE SODIUM 40 MG: 40 INJECTION, POWDER, FOR SOLUTION INTRAVENOUS at 11:02

## 2020-01-01 RX ADMIN — DILTIAZEM HYDROCHLORIDE 60 MG: 60 TABLET, FILM COATED ORAL at 00:02

## 2020-01-01 RX ADMIN — IPRATROPIUM BROMIDE AND ALBUTEROL SULFATE 1 AMPULE: .5; 3 SOLUTION RESPIRATORY (INHALATION) at 13:00

## 2020-01-01 RX ADMIN — IPRATROPIUM BROMIDE AND ALBUTEROL SULFATE 1 AMPULE: .5; 3 SOLUTION RESPIRATORY (INHALATION) at 19:14

## 2020-01-01 RX ADMIN — FENTANYL CITRATE 150 MCG/HR: 50 INJECTION, SOLUTION INTRAMUSCULAR; INTRAVENOUS at 21:03

## 2020-01-01 RX ADMIN — PROPOFOL 35 MCG/KG/MIN: 10 INJECTION, EMULSION INTRAVENOUS at 23:25

## 2020-01-01 RX ADMIN — WHITE PETROLATUM 57.7 %-MINERAL OIL 31.9 % EYE OINTMENT: at 11:24

## 2020-01-01 RX ADMIN — FUROSEMIDE 40 MG: 10 INJECTION, SOLUTION INTRAMUSCULAR; INTRAVENOUS at 08:22

## 2020-01-01 RX ADMIN — DIGOXIN 250 MCG: 0.25 INJECTION INTRAMUSCULAR; INTRAVENOUS at 17:27

## 2020-01-01 RX ADMIN — CASTOR OIL AND BALSAM, PERU: 788; 87 OINTMENT TOPICAL at 08:41

## 2020-01-01 RX ADMIN — FENTANYL CITRATE 50 MCG: 50 INJECTION, SOLUTION INTRAMUSCULAR; INTRAVENOUS at 13:33

## 2020-01-01 RX ADMIN — IPRATROPIUM BROMIDE AND ALBUTEROL SULFATE 1 AMPULE: .5; 3 SOLUTION RESPIRATORY (INHALATION) at 15:29

## 2020-01-01 RX ADMIN — WHITE PETROLATUM 57.7 %-MINERAL OIL 31.9 % EYE OINTMENT: at 16:08

## 2020-01-01 RX ADMIN — Medication 10 ML: at 22:23

## 2020-01-01 RX ADMIN — WHITE PETROLATUM 57.7 %-MINERAL OIL 31.9 % EYE OINTMENT: at 23:56

## 2020-01-01 RX ADMIN — PANTOPRAZOLE SODIUM 40 MG: 40 INJECTION, POWDER, FOR SOLUTION INTRAVENOUS at 08:26

## 2020-01-01 RX ADMIN — CARBOXYMETHYLCELLULOSE SODIUM 1 DROP: 10 GEL OPHTHALMIC at 16:06

## 2020-01-01 RX ADMIN — PROPOFOL 10 MCG/KG/MIN: 10 INJECTION, EMULSION INTRAVENOUS at 12:26

## 2020-01-01 RX ADMIN — MICONAZOLE NITRATE: 20 POWDER TOPICAL at 19:54

## 2020-01-01 RX ADMIN — Medication 10 ML: at 08:58

## 2020-01-01 RX ADMIN — LEVOFLOXACIN 750 MG: 5 INJECTION, SOLUTION INTRAVENOUS at 00:23

## 2020-01-01 RX ADMIN — DILTIAZEM HYDROCHLORIDE 60 MG: 60 TABLET, FILM COATED ORAL at 06:09

## 2020-01-01 RX ADMIN — IPRATROPIUM BROMIDE AND ALBUTEROL SULFATE 1 AMPULE: .5; 3 SOLUTION RESPIRATORY (INHALATION) at 14:59

## 2020-01-01 RX ADMIN — IPRATROPIUM BROMIDE AND ALBUTEROL SULFATE 1 AMPULE: .5; 3 SOLUTION RESPIRATORY (INHALATION) at 21:00

## 2020-01-01 RX ADMIN — LEVALBUTEROL 1.26 MG: 1.25 SOLUTION, CONCENTRATE RESPIRATORY (INHALATION) at 21:43

## 2020-01-01 RX ADMIN — VANCOMYCIN HYDROCHLORIDE 500 MG: 500 INJECTION, POWDER, LYOPHILIZED, FOR SOLUTION INTRAVENOUS at 08:19

## 2020-01-01 RX ADMIN — IPRATROPIUM BROMIDE AND ALBUTEROL SULFATE 1 AMPULE: .5; 3 SOLUTION RESPIRATORY (INHALATION) at 15:56

## 2020-01-01 RX ADMIN — Medication 15 ML: at 20:48

## 2020-01-01 RX ADMIN — WHITE PETROLATUM 57.7 %-MINERAL OIL 31.9 % EYE OINTMENT: at 20:28

## 2020-01-01 RX ADMIN — PROPOFOL 20 MCG/KG/MIN: 10 INJECTION, EMULSION INTRAVENOUS at 10:20

## 2020-01-01 RX ADMIN — Medication 10 ML: at 08:34

## 2020-01-01 RX ADMIN — PROPOFOL 50 MCG/KG/MIN: 10 INJECTION, EMULSION INTRAVENOUS at 22:50

## 2020-01-01 RX ADMIN — METRONIDAZOLE 500 MG: 500 INJECTION, SOLUTION INTRAVENOUS at 13:17

## 2020-01-01 RX ADMIN — IPRATROPIUM BROMIDE AND ALBUTEROL SULFATE 1 AMPULE: .5; 3 SOLUTION RESPIRATORY (INHALATION) at 23:38

## 2020-01-01 RX ADMIN — WHITE PETROLATUM 57.7 %-MINERAL OIL 31.9 % EYE OINTMENT: at 16:47

## 2020-01-01 RX ADMIN — FUROSEMIDE 40 MG: 10 INJECTION, SOLUTION INTRAMUSCULAR; INTRAVENOUS at 22:38

## 2020-01-01 RX ADMIN — WHITE PETROLATUM 57.7 %-MINERAL OIL 31.9 % EYE OINTMENT: at 20:14

## 2020-01-01 RX ADMIN — MIDAZOLAM HYDROCHLORIDE 2 MG: 1 INJECTION, SOLUTION INTRAMUSCULAR; INTRAVENOUS at 08:23

## 2020-01-01 RX ADMIN — ENOXAPARIN SODIUM 40 MG: 40 INJECTION SUBCUTANEOUS at 07:26

## 2020-01-01 RX ADMIN — DEXAMETHASONE SODIUM PHOSPHATE 6 MG: 10 INJECTION, SOLUTION INTRAMUSCULAR; INTRAVENOUS at 08:45

## 2020-01-01 RX ADMIN — DAKIN'S SOLUTION 0.125% (QUARTER STRENGTH) 473 ML: 0.12 SOLUTION at 18:04

## 2020-01-01 RX ADMIN — Medication 2 PUFF: at 10:40

## 2020-01-01 RX ADMIN — IPRATROPIUM BROMIDE AND ALBUTEROL SULFATE 1 AMPULE: .5; 3 SOLUTION RESPIRATORY (INHALATION) at 23:00

## 2020-01-01 RX ADMIN — IPRATROPIUM BROMIDE AND ALBUTEROL SULFATE 1 AMPULE: .5; 3 SOLUTION RESPIRATORY (INHALATION) at 03:04

## 2020-01-01 RX ADMIN — Medication 15 ML: at 20:26

## 2020-01-01 RX ADMIN — CARBOXYMETHYLCELLULOSE SODIUM 1 DROP: 10 GEL OPHTHALMIC at 01:34

## 2020-01-01 RX ADMIN — MUPIROCIN: 20 OINTMENT TOPICAL at 08:50

## 2020-01-01 RX ADMIN — MUPIROCIN: 20 OINTMENT TOPICAL at 20:13

## 2020-01-01 RX ADMIN — CARBOXYMETHYLCELLULOSE SODIUM 1 DROP: 10 GEL OPHTHALMIC at 23:42

## 2020-01-01 RX ADMIN — VASOPRESSIN 0.04 UNITS/MIN: 20 INJECTION INTRAVENOUS at 14:33

## 2020-01-01 RX ADMIN — INFLUENZA A VIRUS A/VICTORIA/2454/2019 IVR-207 (H1N1) ANTIGEN (PROPIOLACTONE INACTIVATED), INFLUENZA A VIRUS A/HONG KONG/2671/2019 IVR-208 (H3N2) ANTIGEN (PROPIOLACTONE INACTIVATED), INFLUENZA B VIRUS B/VICTORIA/705/2018 BVR-11 ANTIGEN (PROPIOLACTONE INACTIVATED), INFLUENZA B VIRUS B/PHUKET/3073/2013 BVR-1B ANTIGEN (PROPIOLACTONE INACTIVATED) 0.5 ML: 15; 15; 15; 15 INJECTION, SUSPENSION INTRAMUSCULAR at 09:02

## 2020-01-01 RX ADMIN — IPRATROPIUM BROMIDE AND ALBUTEROL SULFATE 1 AMPULE: .5; 3 SOLUTION RESPIRATORY (INHALATION) at 06:58

## 2020-01-01 RX ADMIN — MICONAZOLE NITRATE: 20 POWDER TOPICAL at 09:00

## 2020-01-01 RX ADMIN — POTASSIUM CHLORIDE 40 MEQ: 750 TABLET, EXTENDED RELEASE ORAL at 15:18

## 2020-01-01 RX ADMIN — IPRATROPIUM BROMIDE AND ALBUTEROL SULFATE 1 AMPULE: .5; 3 SOLUTION RESPIRATORY (INHALATION) at 03:27

## 2020-01-01 RX ADMIN — MIDAZOLAM HYDROCHLORIDE 2 MG: 1 INJECTION, SOLUTION INTRAMUSCULAR; INTRAVENOUS at 12:12

## 2020-01-01 RX ADMIN — WHITE PETROLATUM 57.7 %-MINERAL OIL 31.9 % EYE OINTMENT: at 10:20

## 2020-01-01 RX ADMIN — CARBOXYMETHYLCELLULOSE SODIUM 1 DROP: 10 GEL OPHTHALMIC at 06:00

## 2020-01-01 RX ADMIN — PANTOPRAZOLE SODIUM 40 MG: 40 INJECTION, POWDER, FOR SOLUTION INTRAVENOUS at 08:43

## 2020-01-01 RX ADMIN — IPRATROPIUM BROMIDE AND ALBUTEROL SULFATE 1 AMPULE: .5; 3 SOLUTION RESPIRATORY (INHALATION) at 11:08

## 2020-01-01 RX ADMIN — DEXAMETHASONE SODIUM PHOSPHATE 6 MG: 10 INJECTION, SOLUTION INTRAMUSCULAR; INTRAVENOUS at 08:48

## 2020-01-01 RX ADMIN — WHITE PETROLATUM 57.7 %-MINERAL OIL 31.9 % EYE OINTMENT: at 03:52

## 2020-01-01 RX ADMIN — Medication 2 PUFF: at 21:15

## 2020-01-01 RX ADMIN — CEFEPIME 2 G: 2 INJECTION, POWDER, FOR SOLUTION INTRAVENOUS at 17:57

## 2020-01-01 RX ADMIN — CARBOXYMETHYLCELLULOSE SODIUM 1 DROP: 10 GEL OPHTHALMIC at 13:05

## 2020-01-01 RX ADMIN — METRONIDAZOLE 500 MG: 500 INJECTION, SOLUTION INTRAVENOUS at 17:52

## 2020-01-01 RX ADMIN — FENTANYL CITRATE 100 MCG: 50 INJECTION, SOLUTION INTRAMUSCULAR; INTRAVENOUS at 09:44

## 2020-01-01 RX ADMIN — IPRATROPIUM BROMIDE AND ALBUTEROL SULFATE 1 AMPULE: .5; 3 SOLUTION RESPIRATORY (INHALATION) at 15:30

## 2020-01-01 RX ADMIN — IPRATROPIUM BROMIDE AND ALBUTEROL SULFATE 1 AMPULE: .5; 3 SOLUTION RESPIRATORY (INHALATION) at 08:10

## 2020-01-01 RX ADMIN — ACETAMINOPHEN 650 MG: 325 TABLET ORAL at 15:42

## 2020-01-01 RX ADMIN — DEXAMETHASONE SODIUM PHOSPHATE 10 MG: 10 INJECTION, SOLUTION INTRAMUSCULAR; INTRAVENOUS at 12:20

## 2020-01-01 RX ADMIN — METRONIDAZOLE 500 MG: 500 INJECTION, SOLUTION INTRAVENOUS at 10:27

## 2020-01-01 RX ADMIN — CARBOXYMETHYLCELLULOSE SODIUM 1 DROP: 10 GEL OPHTHALMIC at 10:30

## 2020-01-01 RX ADMIN — DILTIAZEM HYDROCHLORIDE 10 MG: 5 INJECTION INTRAVENOUS at 18:26

## 2020-01-01 RX ADMIN — Medication 15 ML: at 20:09

## 2020-01-01 RX ADMIN — Medication 10 ML: at 20:36

## 2020-01-01 RX ADMIN — ENOXAPARIN SODIUM 40 MG: 40 INJECTION SUBCUTANEOUS at 08:22

## 2020-01-01 RX ADMIN — MICONAZOLE NITRATE: 20 POWDER TOPICAL at 10:20

## 2020-01-01 RX ADMIN — METRONIDAZOLE 500 MG: 500 INJECTION, SOLUTION INTRAVENOUS at 02:01

## 2020-01-01 RX ADMIN — DILTIAZEM HYDROCHLORIDE 60 MG: 60 TABLET, FILM COATED ORAL at 12:18

## 2020-01-01 RX ADMIN — WHITE PETROLATUM 57.7 %-MINERAL OIL 31.9 % EYE OINTMENT: at 08:43

## 2020-01-01 RX ADMIN — VANCOMYCIN HYDROCHLORIDE 1 G: 1 INJECTION, POWDER, LYOPHILIZED, FOR SOLUTION INTRAVENOUS at 20:29

## 2020-01-01 RX ADMIN — MUPIROCIN: 20 OINTMENT TOPICAL at 19:54

## 2020-01-01 RX ADMIN — MIDAZOLAM HYDROCHLORIDE 2 MG: 1 INJECTION, SOLUTION INTRAMUSCULAR; INTRAVENOUS at 04:05

## 2020-01-01 RX ADMIN — IPRATROPIUM BROMIDE AND ALBUTEROL SULFATE 1 AMPULE: .5; 3 SOLUTION RESPIRATORY (INHALATION) at 19:09

## 2020-01-01 RX ADMIN — MIDAZOLAM HYDROCHLORIDE 2 MG: 1 INJECTION, SOLUTION INTRAMUSCULAR; INTRAVENOUS at 18:24

## 2020-01-01 RX ADMIN — CARBOXYMETHYLCELLULOSE SODIUM 1 DROP: 10 GEL OPHTHALMIC at 22:12

## 2020-01-01 RX ADMIN — Medication 10 ML: at 20:47

## 2020-01-01 RX ADMIN — VASOPRESSIN 0.04 UNITS/MIN: 20 INJECTION INTRAVENOUS at 22:00

## 2020-01-01 RX ADMIN — FENTANYL CITRATE 50 MCG: 50 INJECTION, SOLUTION INTRAMUSCULAR; INTRAVENOUS at 12:21

## 2020-01-01 RX ADMIN — FENTANYL CITRATE 50 MCG: 50 INJECTION, SOLUTION INTRAMUSCULAR; INTRAVENOUS at 08:39

## 2020-01-01 RX ADMIN — PANTOPRAZOLE SODIUM 40 MG: 40 INJECTION, POWDER, FOR SOLUTION INTRAVENOUS at 07:27

## 2020-01-01 RX ADMIN — WHITE PETROLATUM 57.7 %-MINERAL OIL 31.9 % EYE OINTMENT: at 16:37

## 2020-01-01 RX ADMIN — MIDAZOLAM HYDROCHLORIDE 2 MG/HR: 5 INJECTION, SOLUTION INTRAMUSCULAR; INTRAVENOUS at 17:30

## 2020-01-01 RX ADMIN — CARBOXYMETHYLCELLULOSE SODIUM 1 DROP: 10 GEL OPHTHALMIC at 22:00

## 2020-01-01 RX ADMIN — POTASSIUM CHLORIDE 40 MEQ: 1500 TABLET, EXTENDED RELEASE ORAL at 14:42

## 2020-01-01 RX ADMIN — WHITE PETROLATUM 57.7 %-MINERAL OIL 31.9 % EYE OINTMENT: at 03:30

## 2020-01-01 RX ADMIN — SODIUM CHLORIDE 2 MCG/KG/MIN: 9 INJECTION, SOLUTION INTRAVENOUS at 12:01

## 2020-01-01 RX ADMIN — IPRATROPIUM BROMIDE AND ALBUTEROL SULFATE 1 AMPULE: .5; 3 SOLUTION RESPIRATORY (INHALATION) at 15:18

## 2020-01-01 RX ADMIN — MUPIROCIN: 20 OINTMENT TOPICAL at 09:07

## 2020-01-01 RX ADMIN — WHITE PETROLATUM 57.7 %-MINERAL OIL 31.9 % EYE OINTMENT: at 08:22

## 2020-01-01 RX ADMIN — PANTOPRAZOLE SODIUM 40 MG: 40 INJECTION, POWDER, FOR SOLUTION INTRAVENOUS at 08:23

## 2020-01-01 RX ADMIN — PROPOFOL 60 MCG/KG/MIN: 10 INJECTION, EMULSION INTRAVENOUS at 18:04

## 2020-01-01 RX ADMIN — CEFEPIME 2 G: 2 INJECTION, POWDER, FOR SOLUTION INTRAVENOUS at 14:30

## 2020-01-01 RX ADMIN — VANCOMYCIN HYDROCHLORIDE 500 MG: 500 INJECTION, POWDER, LYOPHILIZED, FOR SOLUTION INTRAVENOUS at 08:47

## 2020-01-01 RX ADMIN — NOREPINEPHRINE BITARTRATE 30 MCG/MIN: 1 INJECTION INTRAVENOUS at 20:30

## 2020-01-01 RX ADMIN — POTASSIUM BICARBONATE 20 MEQ: 782 TABLET, EFFERVESCENT ORAL at 16:44

## 2020-01-01 RX ADMIN — CITALOPRAM HYDROBROMIDE 40 MG: 20 TABLET ORAL at 08:42

## 2020-01-01 RX ADMIN — MIDAZOLAM HYDROCHLORIDE 2 MG: 1 INJECTION, SOLUTION INTRAMUSCULAR; INTRAVENOUS at 13:33

## 2020-01-01 RX ADMIN — AMIODARONE HYDROCHLORIDE 150 MG: 50 INJECTION, SOLUTION INTRAVENOUS at 10:57

## 2020-01-01 RX ADMIN — METRONIDAZOLE 500 MG: 500 INJECTION, SOLUTION INTRAVENOUS at 12:19

## 2020-01-01 RX ADMIN — PROPOFOL 30 MCG/KG/MIN: 10 INJECTION, EMULSION INTRAVENOUS at 05:17

## 2020-01-01 RX ADMIN — PANTOPRAZOLE SODIUM 40 MG: 40 INJECTION, POWDER, FOR SOLUTION INTRAVENOUS at 08:04

## 2020-01-01 RX ADMIN — METRONIDAZOLE 500 MG: 500 INJECTION, SOLUTION INTRAVENOUS at 03:00

## 2020-01-01 RX ADMIN — PROPOFOL 40 MCG/KG/MIN: 10 INJECTION, EMULSION INTRAVENOUS at 15:03

## 2020-01-01 RX ADMIN — PROPOFOL 25 MCG/KG/MIN: 10 INJECTION, EMULSION INTRAVENOUS at 02:00

## 2020-01-01 RX ADMIN — Medication 15 ML: at 08:25

## 2020-01-01 RX ADMIN — MICONAZOLE NITRATE: 20 POWDER TOPICAL at 20:01

## 2020-01-01 RX ADMIN — Medication 10 ML: at 08:02

## 2020-01-01 RX ADMIN — DEXMEDETOMIDINE HYDROCHLORIDE 0.2 MCG/KG/HR: 100 INJECTION, SOLUTION INTRAVENOUS at 04:59

## 2020-01-01 RX ADMIN — WHITE PETROLATUM 57.7 %-MINERAL OIL 31.9 % EYE OINTMENT: at 00:13

## 2020-01-01 RX ADMIN — PROPOFOL 40 MCG/KG/MIN: 10 INJECTION, EMULSION INTRAVENOUS at 08:20

## 2020-01-01 RX ADMIN — CASTOR OIL AND BALSAM, PERU: 788; 87 OINTMENT TOPICAL at 22:39

## 2020-01-01 RX ADMIN — WHITE PETROLATUM 57.7 %-MINERAL OIL 31.9 % EYE OINTMENT: at 04:14

## 2020-01-01 RX ADMIN — FENTANYL CITRATE 50 MCG: 50 INJECTION, SOLUTION INTRAMUSCULAR; INTRAVENOUS at 20:11

## 2020-01-01 RX ADMIN — ACETAMINOPHEN 650 MG: 325 TABLET ORAL at 08:23

## 2020-01-01 RX ADMIN — DEXMEDETOMIDINE HYDROCHLORIDE 0.9 MCG/KG/HR: 100 INJECTION, SOLUTION INTRAVENOUS at 12:15

## 2020-01-01 RX ADMIN — FENTANYL CITRATE 100 MCG/HR: 50 INJECTION, SOLUTION INTRAMUSCULAR; INTRAVENOUS at 07:24

## 2020-01-01 RX ADMIN — METRONIDAZOLE 500 MG: 500 INJECTION, SOLUTION INTRAVENOUS at 02:30

## 2020-01-01 RX ADMIN — CEFTRIAXONE 2 G: 2 INJECTION, POWDER, FOR SOLUTION INTRAMUSCULAR; INTRAVENOUS at 12:14

## 2020-01-01 RX ADMIN — DILTIAZEM HYDROCHLORIDE 60 MG: 60 TABLET, FILM COATED ORAL at 11:57

## 2020-01-01 RX ADMIN — IPRATROPIUM BROMIDE AND ALBUTEROL SULFATE 1 AMPULE: .5; 3 SOLUTION RESPIRATORY (INHALATION) at 03:07

## 2020-01-01 RX ADMIN — FENTANYL CITRATE 50 MCG: 50 INJECTION, SOLUTION INTRAMUSCULAR; INTRAVENOUS at 20:34

## 2020-01-01 RX ADMIN — ENOXAPARIN SODIUM 40 MG: 40 INJECTION SUBCUTANEOUS at 08:47

## 2020-01-01 RX ADMIN — MIDAZOLAM HYDROCHLORIDE 2 MG: 1 INJECTION, SOLUTION INTRAMUSCULAR; INTRAVENOUS at 05:51

## 2020-01-01 RX ADMIN — PROPOFOL 15 MCG/KG/MIN: 10 INJECTION, EMULSION INTRAVENOUS at 03:00

## 2020-01-01 RX ADMIN — IPRATROPIUM BROMIDE AND ALBUTEROL SULFATE 1 AMPULE: .5; 3 SOLUTION RESPIRATORY (INHALATION) at 03:13

## 2020-01-01 RX ADMIN — Medication 10 ML: at 19:36

## 2020-01-01 RX ADMIN — MICONAZOLE NITRATE: 20 POWDER TOPICAL at 20:24

## 2020-01-01 RX ADMIN — PROPOFOL 40 MCG/KG/MIN: 10 INJECTION, EMULSION INTRAVENOUS at 15:57

## 2020-01-01 RX ADMIN — CARBOXYMETHYLCELLULOSE SODIUM 1 DROP: 10 GEL OPHTHALMIC at 08:41

## 2020-01-01 RX ADMIN — FENTANYL CITRATE 50 MCG: 50 INJECTION, SOLUTION INTRAMUSCULAR; INTRAVENOUS at 20:26

## 2020-01-01 RX ADMIN — DEXAMETHASONE SODIUM PHOSPHATE 6 MG: 10 INJECTION, SOLUTION INTRAMUSCULAR; INTRAVENOUS at 09:02

## 2020-01-01 RX ADMIN — FENTANYL CITRATE 200 MCG/HR: 50 INJECTION, SOLUTION INTRAMUSCULAR; INTRAVENOUS at 21:56

## 2020-01-01 RX ADMIN — IPRATROPIUM BROMIDE AND ALBUTEROL SULFATE 1 AMPULE: .5; 3 SOLUTION RESPIRATORY (INHALATION) at 02:49

## 2020-01-01 RX ADMIN — PROPOFOL 50 MCG/KG/MIN: 10 INJECTION, EMULSION INTRAVENOUS at 02:21

## 2020-01-01 RX ADMIN — PANTOPRAZOLE SODIUM 40 MG: 40 INJECTION, POWDER, FOR SOLUTION INTRAVENOUS at 08:22

## 2020-01-01 RX ADMIN — MIDAZOLAM HYDROCHLORIDE 2 MG: 1 INJECTION, SOLUTION INTRAMUSCULAR; INTRAVENOUS at 05:53

## 2020-01-01 RX ADMIN — CARBOXYMETHYLCELLULOSE SODIUM 1 DROP: 10 GEL OPHTHALMIC at 06:21

## 2020-01-01 RX ADMIN — DAKIN'S SOLUTION 0.125% (QUARTER STRENGTH) 473 ML: 0.12 SOLUTION at 06:32

## 2020-01-01 RX ADMIN — ACETAZOLAMIDE 250 MG: 250 TABLET ORAL at 11:47

## 2020-01-01 RX ADMIN — MICONAZOLE NITRATE: 20 POWDER TOPICAL at 19:36

## 2020-01-01 RX ADMIN — SODIUM CHLORIDE: 9 INJECTION, SOLUTION INTRAVENOUS at 07:02

## 2020-01-01 RX ADMIN — FENTANYL CITRATE 50 MCG: 50 INJECTION, SOLUTION INTRAMUSCULAR; INTRAVENOUS at 21:01

## 2020-01-01 RX ADMIN — CHLORHEXIDINE GLUCONATE 0.12% ORAL RINSE 15 ML: 1.2 LIQUID ORAL at 20:28

## 2020-01-01 RX ADMIN — DEXMEDETOMIDINE HYDROCHLORIDE 1.2 MCG/KG/HR: 100 INJECTION, SOLUTION INTRAVENOUS at 20:29

## 2020-01-01 RX ADMIN — ENOXAPARIN SODIUM 40 MG: 40 INJECTION SUBCUTANEOUS at 11:57

## 2020-01-01 RX ADMIN — VASOPRESSIN: 20 INJECTION INTRAVENOUS at 02:00

## 2020-01-01 RX ADMIN — Medication 15 ML: at 07:58

## 2020-01-01 RX ADMIN — EPINEPHRINE 5 MCG/MIN: 1 INJECTION INTRAMUSCULAR; INTRAVENOUS; SUBCUTANEOUS at 12:06

## 2020-01-01 RX ADMIN — IPRATROPIUM BROMIDE AND ALBUTEROL SULFATE 1 AMPULE: .5; 3 SOLUTION RESPIRATORY (INHALATION) at 23:04

## 2020-01-01 RX ADMIN — MUPIROCIN: 20 OINTMENT TOPICAL at 19:47

## 2020-01-01 RX ADMIN — MICONAZOLE NITRATE: 20 POWDER TOPICAL at 20:30

## 2020-01-01 RX ADMIN — MIDAZOLAM HYDROCHLORIDE 2 MG: 1 INJECTION, SOLUTION INTRAMUSCULAR; INTRAVENOUS at 19:36

## 2020-01-01 RX ADMIN — METRONIDAZOLE 500 MG: 500 INJECTION, SOLUTION INTRAVENOUS at 03:44

## 2020-01-01 RX ADMIN — PROPOFOL 50 MCG/KG/MIN: 10 INJECTION, EMULSION INTRAVENOUS at 07:28

## 2020-01-01 RX ADMIN — POTASSIUM CHLORIDE 20 MEQ: 750 TABLET, EXTENDED RELEASE ORAL at 15:18

## 2020-01-01 RX ADMIN — FUROSEMIDE 40 MG: 10 INJECTION, SOLUTION INTRAMUSCULAR; INTRAVENOUS at 11:33

## 2020-01-01 RX ADMIN — MIDAZOLAM HYDROCHLORIDE 2 MG: 1 INJECTION, SOLUTION INTRAMUSCULAR; INTRAVENOUS at 08:05

## 2020-01-01 RX ADMIN — DEXAMETHASONE SODIUM PHOSPHATE 4 MG: 4 INJECTION, SOLUTION INTRAMUSCULAR; INTRAVENOUS at 07:26

## 2020-01-01 RX ADMIN — MORPHINE SULFATE 2 MG: 2 INJECTION, SOLUTION INTRAMUSCULAR; INTRAVENOUS at 17:59

## 2020-01-01 RX ADMIN — PROPOFOL 5 MCG/KG/MIN: 10 INJECTION, EMULSION INTRAVENOUS at 21:23

## 2020-01-01 RX ADMIN — DILTIAZEM HYDROCHLORIDE 5 MG/HR: 5 INJECTION, SOLUTION INTRAVENOUS at 18:51

## 2020-01-01 RX ADMIN — WHITE PETROLATUM 57.7 %-MINERAL OIL 31.9 % EYE OINTMENT: at 23:48

## 2020-01-01 RX ADMIN — Medication 15 ML: at 09:02

## 2020-01-01 RX ADMIN — INSULIN LISPRO 2 UNITS: 100 INJECTION, SOLUTION INTRAVENOUS; SUBCUTANEOUS at 16:56

## 2020-01-01 RX ADMIN — MIDAZOLAM HYDROCHLORIDE 2 MG: 1 INJECTION, SOLUTION INTRAMUSCULAR; INTRAVENOUS at 21:44

## 2020-01-01 RX ADMIN — INSULIN LISPRO 1 UNITS: 100 INJECTION, SOLUTION INTRAVENOUS; SUBCUTANEOUS at 16:37

## 2020-01-01 RX ADMIN — SODIUM CHLORIDE 2.5 MCG/KG/MIN: 9 INJECTION, SOLUTION INTRAVENOUS at 02:19

## 2020-01-01 RX ADMIN — ALBUTEROL SULFATE 2.5 MG: 2.5 SOLUTION RESPIRATORY (INHALATION) at 18:56

## 2020-01-01 RX ADMIN — MIDAZOLAM HYDROCHLORIDE 2 MG: 1 INJECTION, SOLUTION INTRAMUSCULAR; INTRAVENOUS at 03:30

## 2020-01-01 RX ADMIN — PANTOPRAZOLE SODIUM 40 MG: 40 INJECTION, POWDER, FOR SOLUTION INTRAVENOUS at 08:48

## 2020-01-01 RX ADMIN — FENTANYL CITRATE 25 MCG/HR: 50 INJECTION, SOLUTION INTRAMUSCULAR; INTRAVENOUS at 19:57

## 2020-01-01 RX ADMIN — FENTANYL CITRATE 175 MCG/HR: 50 INJECTION, SOLUTION INTRAMUSCULAR; INTRAVENOUS at 10:57

## 2020-01-01 RX ADMIN — MICONAZOLE NITRATE: 20 POWDER TOPICAL at 20:54

## 2020-01-01 RX ADMIN — CEFTRIAXONE 2 G: 2 INJECTION, POWDER, FOR SOLUTION INTRAMUSCULAR; INTRAVENOUS at 10:27

## 2020-01-01 RX ADMIN — FENTANYL CITRATE 50 MCG: 50 INJECTION, SOLUTION INTRAMUSCULAR; INTRAVENOUS at 07:26

## 2020-01-01 RX ADMIN — FENTANYL CITRATE 175 MCG/HR: 50 INJECTION, SOLUTION INTRAMUSCULAR; INTRAVENOUS at 02:52

## 2020-01-01 RX ADMIN — SODIUM CHLORIDE 4.71 UNITS/HR: 9 INJECTION, SOLUTION INTRAVENOUS at 19:39

## 2020-01-01 RX ADMIN — Medication 10 ML: at 19:56

## 2020-01-01 RX ADMIN — WHITE PETROLATUM 57.7 %-MINERAL OIL 31.9 % EYE OINTMENT: at 04:30

## 2020-01-01 RX ADMIN — FUROSEMIDE 40 MG: 10 INJECTION, SOLUTION INTRAMUSCULAR; INTRAVENOUS at 22:22

## 2020-01-01 RX ADMIN — ENOXAPARIN SODIUM 40 MG: 40 INJECTION SUBCUTANEOUS at 08:26

## 2020-01-01 RX ADMIN — WHITE PETROLATUM 57.7 %-MINERAL OIL 31.9 % EYE OINTMENT: at 23:58

## 2020-01-01 RX ADMIN — POTASSIUM BICARBONATE 40 MEQ: 391 TABLET, EFFERVESCENT ORAL at 17:34

## 2020-01-01 RX ADMIN — PROPOFOL 30 MCG/KG/MIN: 10 INJECTION, EMULSION INTRAVENOUS at 13:23

## 2020-01-01 RX ADMIN — SODIUM CHLORIDE 4 MCG/KG/MIN: 9 INJECTION, SOLUTION INTRAVENOUS at 11:30

## 2020-01-01 RX ADMIN — CARBOXYMETHYLCELLULOSE SODIUM 1 DROP: 10 GEL OPHTHALMIC at 16:37

## 2020-01-01 RX ADMIN — CHLORHEXIDINE GLUCONATE 0.12% ORAL RINSE 15 ML: 1.2 LIQUID ORAL at 08:26

## 2020-01-01 RX ADMIN — PROPOFOL 20 MCG/KG/MIN: 10 INJECTION, EMULSION INTRAVENOUS at 17:45

## 2020-01-01 RX ADMIN — WHITE PETROLATUM 57.7 %-MINERAL OIL 31.9 % EYE OINTMENT: at 04:59

## 2020-01-01 RX ADMIN — CARBOXYMETHYLCELLULOSE SODIUM 1 DROP: 10 GEL OPHTHALMIC at 22:41

## 2020-01-01 RX ADMIN — IPRATROPIUM BROMIDE AND ALBUTEROL SULFATE 1 AMPULE: .5; 3 SOLUTION RESPIRATORY (INHALATION) at 19:03

## 2020-01-01 RX ADMIN — PROPOFOL 50 MCG/KG/MIN: 10 INJECTION, EMULSION INTRAVENOUS at 19:29

## 2020-01-01 RX ADMIN — MUPIROCIN: 20 OINTMENT TOPICAL at 20:01

## 2020-01-01 RX ADMIN — MICONAZOLE NITRATE: 20 POWDER TOPICAL at 09:24

## 2020-01-01 RX ADMIN — WHITE PETROLATUM 57.7 %-MINERAL OIL 31.9 % EYE OINTMENT: at 04:01

## 2020-01-01 RX ADMIN — CASTOR OIL AND BALSAM, PERU: 788; 87 OINTMENT TOPICAL at 19:27

## 2020-01-01 RX ADMIN — DILTIAZEM HYDROCHLORIDE 60 MG: 60 TABLET, FILM COATED ORAL at 23:47

## 2020-01-01 RX ADMIN — DILTIAZEM HYDROCHLORIDE 60 MG: 60 TABLET, FILM COATED ORAL at 23:58

## 2020-01-01 RX ADMIN — NOREPINEPHRINE BITARTRATE 5 MCG/MIN: 1 INJECTION INTRAVENOUS at 11:27

## 2020-01-01 RX ADMIN — PROPOFOL 20 MCG/KG/MIN: 10 INJECTION, EMULSION INTRAVENOUS at 08:41

## 2020-01-01 RX ADMIN — WHITE PETROLATUM 57.7 %-MINERAL OIL 31.9 % EYE OINTMENT: at 22:23

## 2020-01-01 RX ADMIN — WHITE PETROLATUM 57.7 %-MINERAL OIL 31.9 % EYE OINTMENT: at 03:51

## 2020-01-01 RX ADMIN — CITALOPRAM HYDROBROMIDE 40 MG: 20 TABLET ORAL at 08:34

## 2020-01-01 RX ADMIN — Medication 10 ML: at 20:37

## 2020-01-01 RX ADMIN — FUROSEMIDE 40 MG: 10 INJECTION, SOLUTION INTRAMUSCULAR; INTRAVENOUS at 13:18

## 2020-01-01 RX ADMIN — IPRATROPIUM BROMIDE AND ALBUTEROL SULFATE 1 AMPULE: .5; 3 SOLUTION RESPIRATORY (INHALATION) at 18:36

## 2020-01-01 RX ADMIN — EPINEPHRINE 1 MG: 0.1 INJECTION, SOLUTION ENDOTRACHEAL; INTRACARDIAC; INTRAVENOUS at 11:34

## 2020-01-01 RX ADMIN — CHLORHEXIDINE GLUCONATE 0.12% ORAL RINSE 15 ML: 1.2 LIQUID ORAL at 08:41

## 2020-01-01 RX ADMIN — IPRATROPIUM BROMIDE AND ALBUTEROL SULFATE 1 AMPULE: .5; 3 SOLUTION RESPIRATORY (INHALATION) at 12:22

## 2020-01-01 RX ADMIN — IPRATROPIUM BROMIDE AND ALBUTEROL SULFATE 1 AMPULE: .5; 3 SOLUTION RESPIRATORY (INHALATION) at 00:03

## 2020-01-01 RX ADMIN — POTASSIUM BICARBONATE 20 MEQ: 782 TABLET, EFFERVESCENT ORAL at 11:32

## 2020-01-01 RX ADMIN — WHITE PETROLATUM 57.7 %-MINERAL OIL 31.9 % EYE OINTMENT: at 05:16

## 2020-01-01 RX ADMIN — CARBOXYMETHYLCELLULOSE SODIUM 1 DROP: 10 GEL OPHTHALMIC at 02:35

## 2020-01-01 RX ADMIN — CARBOXYMETHYLCELLULOSE SODIUM 1 DROP: 10 GEL OPHTHALMIC at 03:26

## 2020-01-01 RX ADMIN — POTASSIUM CHLORIDE 10 MEQ: 7.46 INJECTION, SOLUTION INTRAVENOUS at 08:27

## 2020-01-01 RX ADMIN — Medication 10 ML: at 20:10

## 2020-01-01 RX ADMIN — IPRATROPIUM BROMIDE AND ALBUTEROL SULFATE 1 AMPULE: .5; 3 SOLUTION RESPIRATORY (INHALATION) at 11:29

## 2020-01-01 RX ADMIN — WHITE PETROLATUM 57.7 %-MINERAL OIL 31.9 % EYE OINTMENT: at 20:30

## 2020-01-01 RX ADMIN — MUPIROCIN: 20 OINTMENT TOPICAL at 22:24

## 2020-01-01 RX ADMIN — Medication 15 ML: at 09:10

## 2020-01-01 RX ADMIN — CARBOXYMETHYLCELLULOSE SODIUM 1 DROP: 10 GEL OPHTHALMIC at 02:30

## 2020-01-01 RX ADMIN — Medication 10 ML: at 20:29

## 2020-01-01 RX ADMIN — CARBOXYMETHYLCELLULOSE SODIUM 1 DROP: 10 GEL OPHTHALMIC at 23:00

## 2020-01-01 RX ADMIN — IPRATROPIUM BROMIDE AND ALBUTEROL SULFATE 1 AMPULE: .5; 3 SOLUTION RESPIRATORY (INHALATION) at 03:20

## 2020-01-01 RX ADMIN — IPRATROPIUM BROMIDE AND ALBUTEROL SULFATE 1 AMPULE: .5; 3 SOLUTION RESPIRATORY (INHALATION) at 06:47

## 2020-01-01 RX ADMIN — METRONIDAZOLE 500 MG: 500 INJECTION, SOLUTION INTRAVENOUS at 12:15

## 2020-01-01 RX ADMIN — DEXAMETHASONE SODIUM PHOSPHATE 6 MG: 10 INJECTION, SOLUTION INTRAMUSCULAR; INTRAVENOUS at 09:08

## 2020-01-01 RX ADMIN — WHITE PETROLATUM 57.7 %-MINERAL OIL 31.9 % EYE OINTMENT: at 16:00

## 2020-01-01 RX ADMIN — PROPOFOL 40 MCG/KG/MIN: 10 INJECTION, EMULSION INTRAVENOUS at 03:55

## 2020-01-01 RX ADMIN — CASTOR OIL AND BALSAM, PERU: 788; 87 OINTMENT TOPICAL at 11:02

## 2020-01-01 RX ADMIN — IPRATROPIUM BROMIDE AND ALBUTEROL SULFATE 1 AMPULE: .5; 3 SOLUTION RESPIRATORY (INHALATION) at 07:43

## 2020-01-01 RX ADMIN — Medication 10 ML: at 08:43

## 2020-01-01 RX ADMIN — WHITE PETROLATUM 57.7 %-MINERAL OIL 31.9 % EYE OINTMENT: at 17:17

## 2020-01-01 RX ADMIN — CARBOXYMETHYLCELLULOSE SODIUM 1 DROP: 10 GEL OPHTHALMIC at 02:00

## 2020-01-01 RX ADMIN — Medication 15 ML: at 21:01

## 2020-01-01 RX ADMIN — Medication 10 ML: at 08:49

## 2020-01-01 RX ADMIN — CASTOR OIL AND BALSAM, PERU: 788; 87 OINTMENT TOPICAL at 19:50

## 2020-01-01 RX ADMIN — CARBOXYMETHYLCELLULOSE SODIUM 1 DROP: 10 GEL OPHTHALMIC at 15:19

## 2020-01-01 RX ADMIN — FENTANYL CITRATE 175 MCG/HR: 50 INJECTION, SOLUTION INTRAMUSCULAR; INTRAVENOUS at 07:09

## 2020-01-01 RX ADMIN — DAKIN'S SOLUTION 0.125% (QUARTER STRENGTH) 473 ML: 0.12 SOLUTION at 04:30

## 2020-01-01 RX ADMIN — CARVEDILOL 25 MG: 25 TABLET, FILM COATED ORAL at 09:05

## 2020-01-01 RX ADMIN — CARVEDILOL 25 MG: 25 TABLET, FILM COATED ORAL at 17:16

## 2020-01-01 RX ADMIN — CARBOXYMETHYLCELLULOSE SODIUM 1 DROP: 10 GEL OPHTHALMIC at 20:30

## 2020-01-01 RX ADMIN — FUROSEMIDE 40 MG: 10 INJECTION, SOLUTION INTRAMUSCULAR; INTRAVENOUS at 08:23

## 2020-01-01 RX ADMIN — MIDAZOLAM HYDROCHLORIDE 2 MG: 1 INJECTION, SOLUTION INTRAMUSCULAR; INTRAVENOUS at 21:51

## 2020-01-01 RX ADMIN — WHITE PETROLATUM 57.7 %-MINERAL OIL 31.9 % EYE OINTMENT: at 20:08

## 2020-01-01 RX ADMIN — FENTANYL CITRATE 50 MCG: 50 INJECTION, SOLUTION INTRAMUSCULAR; INTRAVENOUS at 05:51

## 2020-01-01 RX ADMIN — CITALOPRAM HYDROBROMIDE 40 MG: 20 TABLET ORAL at 08:50

## 2020-01-01 RX ADMIN — FENTANYL CITRATE 50 MCG: 50 INJECTION, SOLUTION INTRAMUSCULAR; INTRAVENOUS at 12:40

## 2020-01-01 RX ADMIN — Medication 10 ML: at 09:04

## 2020-01-01 RX ADMIN — DAKIN'S SOLUTION 0.125% (QUARTER STRENGTH): 0.12 SOLUTION at 12:14

## 2020-01-01 RX ADMIN — WHITE PETROLATUM 57.7 %-MINERAL OIL 31.9 % EYE OINTMENT: at 19:37

## 2020-01-01 RX ADMIN — CARBOXYMETHYLCELLULOSE SODIUM 1 DROP: 10 GEL OPHTHALMIC at 06:16

## 2020-01-01 RX ADMIN — IPRATROPIUM BROMIDE AND ALBUTEROL SULFATE 1 AMPULE: .5; 3 SOLUTION RESPIRATORY (INHALATION) at 22:36

## 2020-01-01 RX ADMIN — IPRATROPIUM BROMIDE AND ALBUTEROL SULFATE 1 AMPULE: .5; 3 SOLUTION RESPIRATORY (INHALATION) at 11:39

## 2020-01-01 RX ADMIN — VANCOMYCIN HYDROCHLORIDE 1250 MG: 10 INJECTION, POWDER, LYOPHILIZED, FOR SOLUTION INTRAVENOUS at 20:27

## 2020-01-01 RX ADMIN — PROPOFOL 10 MCG/KG/MIN: 10 INJECTION, EMULSION INTRAVENOUS at 23:10

## 2020-01-01 RX ADMIN — FENTANYL CITRATE 50 MCG: 50 INJECTION, SOLUTION INTRAMUSCULAR; INTRAVENOUS at 09:30

## 2020-01-01 RX ADMIN — FENTANYL CITRATE 150 MCG/HR: 50 INJECTION, SOLUTION INTRAMUSCULAR; INTRAVENOUS at 16:35

## 2020-01-01 RX ADMIN — MUPIROCIN: 20 OINTMENT TOPICAL at 20:53

## 2020-01-01 RX ADMIN — PANTOPRAZOLE SODIUM 40 MG: 40 TABLET, DELAYED RELEASE ORAL at 05:19

## 2020-01-01 RX ADMIN — POTASSIUM CHLORIDE 10 MEQ: 7.46 INJECTION, SOLUTION INTRAVENOUS at 08:21

## 2020-01-01 RX ADMIN — CARBOXYMETHYLCELLULOSE SODIUM 1 DROP: 10 GEL OPHTHALMIC at 04:48

## 2020-01-01 RX ADMIN — DILTIAZEM HYDROCHLORIDE 5 MG/HR: 5 INJECTION INTRAVENOUS at 04:58

## 2020-01-01 RX ADMIN — WHITE PETROLATUM 57.7 %-MINERAL OIL 31.9 % EYE OINTMENT: at 04:12

## 2020-01-01 RX ADMIN — PROPOFOL 25 MCG/KG/MIN: 10 INJECTION, EMULSION INTRAVENOUS at 05:10

## 2020-01-01 RX ADMIN — FUROSEMIDE 40 MG: 10 INJECTION, SOLUTION INTRAMUSCULAR; INTRAVENOUS at 23:26

## 2020-01-01 RX ADMIN — ENOXAPARIN SODIUM 40 MG: 40 INJECTION SUBCUTANEOUS at 09:26

## 2020-01-01 RX ADMIN — CITALOPRAM HYDROBROMIDE 40 MG: 20 TABLET ORAL at 08:30

## 2020-01-01 RX ADMIN — WHITE PETROLATUM 57.7 %-MINERAL OIL 31.9 % EYE OINTMENT: at 15:09

## 2020-01-01 RX ADMIN — DILTIAZEM HYDROCHLORIDE 5 MG/HR: 5 INJECTION INTRAVENOUS at 06:23

## 2020-01-01 RX ADMIN — FENTANYL CITRATE 175 MCG/HR: 50 INJECTION, SOLUTION INTRAMUSCULAR; INTRAVENOUS at 04:52

## 2020-01-01 RX ADMIN — CALCIUM CHLORIDE 1 G: 100 INJECTION, SOLUTION INTRAVENOUS; INTRAVENTRICULAR at 11:30

## 2020-01-01 RX ADMIN — CEFEPIME 2 G: 2 INJECTION, POWDER, FOR SOLUTION INTRAVENOUS at 02:45

## 2020-01-01 RX ADMIN — ENOXAPARIN SODIUM 40 MG: 40 INJECTION SUBCUTANEOUS at 08:34

## 2020-01-01 RX ADMIN — INSULIN LISPRO 1 UNITS: 100 INJECTION, SOLUTION INTRAVENOUS; SUBCUTANEOUS at 20:38

## 2020-01-01 RX ADMIN — POTASSIUM CHLORIDE 10 MEQ: 7.46 INJECTION, SOLUTION INTRAVENOUS at 06:23

## 2020-01-01 RX ADMIN — ALBUMIN (HUMAN) 25 G: 0.25 INJECTION, SOLUTION INTRAVENOUS at 12:17

## 2020-01-01 RX ADMIN — FENTANYL CITRATE 50 MCG: 50 INJECTION, SOLUTION INTRAMUSCULAR; INTRAVENOUS at 17:51

## 2020-01-01 RX ADMIN — LEVOFLOXACIN 750 MG: 5 INJECTION, SOLUTION INTRAVENOUS at 01:28

## 2020-01-01 RX ADMIN — IPRATROPIUM BROMIDE AND ALBUTEROL SULFATE 1 AMPULE: .5; 3 SOLUTION RESPIRATORY (INHALATION) at 23:45

## 2020-01-01 RX ADMIN — CARBOXYMETHYLCELLULOSE SODIUM 1 DROP: 10 GEL OPHTHALMIC at 02:01

## 2020-01-01 RX ADMIN — PROPOFOL 50 MCG/KG/MIN: 10 INJECTION, EMULSION INTRAVENOUS at 17:39

## 2020-01-01 RX ADMIN — FENTANYL CITRATE 50 MCG: 50 INJECTION, SOLUTION INTRAMUSCULAR; INTRAVENOUS at 23:10

## 2020-01-01 RX ADMIN — MICONAZOLE NITRATE: 20 POWDER TOPICAL at 09:05

## 2020-01-01 RX ADMIN — CARBOXYMETHYLCELLULOSE SODIUM 1 DROP: 10 GEL OPHTHALMIC at 20:29

## 2020-01-01 RX ADMIN — PROPOFOL 20 MCG/KG/MIN: 10 INJECTION, EMULSION INTRAVENOUS at 09:00

## 2020-01-01 RX ADMIN — WHITE PETROLATUM 57.7 %-MINERAL OIL 31.9 % EYE OINTMENT: at 08:49

## 2020-01-01 RX ADMIN — Medication 15 ML: at 08:56

## 2020-01-01 RX ADMIN — DILTIAZEM HYDROCHLORIDE 60 MG: 60 TABLET, FILM COATED ORAL at 16:44

## 2020-01-01 RX ADMIN — PROPOFOL 15 MCG/KG/MIN: 10 INJECTION, EMULSION INTRAVENOUS at 05:27

## 2020-01-01 RX ADMIN — Medication 2 PUFF: at 06:46

## 2020-01-01 RX ADMIN — FENTANYL CITRATE 50 MCG: 50 INJECTION, SOLUTION INTRAMUSCULAR; INTRAVENOUS at 12:18

## 2020-01-01 RX ADMIN — SODIUM CHLORIDE 500 ML: 9 INJECTION, SOLUTION INTRAVENOUS at 14:24

## 2020-01-01 RX ADMIN — PROPOFOL 50 MCG/KG/MIN: 10 INJECTION, EMULSION INTRAVENOUS at 05:00

## 2020-01-01 RX ADMIN — PANTOPRAZOLE SODIUM 40 MG: 40 TABLET, DELAYED RELEASE ORAL at 07:19

## 2020-01-01 RX ADMIN — PROPOFOL 55 MCG/KG/MIN: 10 INJECTION, EMULSION INTRAVENOUS at 15:58

## 2020-01-01 RX ADMIN — MIDAZOLAM HYDROCHLORIDE 2 MG: 1 INJECTION, SOLUTION INTRAMUSCULAR; INTRAVENOUS at 06:00

## 2020-01-01 RX ADMIN — SODIUM CHLORIDE, POTASSIUM CHLORIDE, SODIUM LACTATE AND CALCIUM CHLORIDE: 600; 310; 30; 20 INJECTION, SOLUTION INTRAVENOUS at 10:56

## 2020-01-01 RX ADMIN — Medication 15 ML: at 21:00

## 2020-01-01 RX ADMIN — Medication 15 ML: at 09:00

## 2020-01-01 RX ADMIN — FENTANYL CITRATE 50 MCG: 50 INJECTION, SOLUTION INTRAMUSCULAR; INTRAVENOUS at 08:23

## 2020-01-01 RX ADMIN — CARBOXYMETHYLCELLULOSE SODIUM 1 DROP: 10 GEL OPHTHALMIC at 13:09

## 2020-01-01 RX ADMIN — FENTANYL CITRATE 50 MCG: 50 INJECTION, SOLUTION INTRAMUSCULAR; INTRAVENOUS at 09:02

## 2020-01-01 RX ADMIN — DILTIAZEM HYDROCHLORIDE 120 MG: 120 CAPSULE, COATED, EXTENDED RELEASE ORAL at 00:48

## 2020-01-01 RX ADMIN — FENTANYL CITRATE 50 MCG: 50 INJECTION, SOLUTION INTRAMUSCULAR; INTRAVENOUS at 12:10

## 2020-01-01 RX ADMIN — IPRATROPIUM BROMIDE AND ALBUTEROL SULFATE 1 AMPULE: .5; 3 SOLUTION RESPIRATORY (INHALATION) at 06:55

## 2020-01-01 RX ADMIN — FENTANYL CITRATE 50 MCG: 50 INJECTION, SOLUTION INTRAMUSCULAR; INTRAVENOUS at 04:51

## 2020-01-01 RX ADMIN — MIDAZOLAM HYDROCHLORIDE 2 MG: 1 INJECTION, SOLUTION INTRAMUSCULAR; INTRAVENOUS at 15:58

## 2020-01-01 RX ADMIN — WHITE PETROLATUM 57.7 %-MINERAL OIL 31.9 % EYE OINTMENT: at 00:23

## 2020-01-01 RX ADMIN — ALBUMIN (HUMAN) 25 G: 0.25 INJECTION, SOLUTION INTRAVENOUS at 03:46

## 2020-01-01 RX ADMIN — FENTANYL CITRATE 50 MCG: 50 INJECTION, SOLUTION INTRAMUSCULAR; INTRAVENOUS at 10:46

## 2020-01-01 RX ADMIN — PROPOFOL 20 MCG/KG/MIN: 10 INJECTION, EMULSION INTRAVENOUS at 02:47

## 2020-01-01 RX ADMIN — IPRATROPIUM BROMIDE AND ALBUTEROL SULFATE 1 AMPULE: .5; 3 SOLUTION RESPIRATORY (INHALATION) at 03:17

## 2020-01-01 RX ADMIN — MIDAZOLAM HYDROCHLORIDE 5 MG/HR: 5 INJECTION, SOLUTION INTRAMUSCULAR; INTRAVENOUS at 09:22

## 2020-01-01 RX ADMIN — VANCOMYCIN HYDROCHLORIDE: 1 INJECTION, POWDER, LYOPHILIZED, FOR SOLUTION INTRAVENOUS at 01:00

## 2020-01-01 RX ADMIN — CARVEDILOL 25 MG: 25 TABLET, FILM COATED ORAL at 16:08

## 2020-01-01 RX ADMIN — MUPIROCIN: 20 OINTMENT TOPICAL at 20:31

## 2020-01-01 RX ADMIN — MIDAZOLAM HYDROCHLORIDE 2 MG: 1 INJECTION, SOLUTION INTRAMUSCULAR; INTRAVENOUS at 06:46

## 2020-01-01 RX ADMIN — METRONIDAZOLE 500 MG: 500 INJECTION, SOLUTION INTRAVENOUS at 18:35

## 2020-01-01 RX ADMIN — ENOXAPARIN SODIUM 40 MG: 40 INJECTION SUBCUTANEOUS at 08:01

## 2020-01-01 RX ADMIN — SODIUM CHLORIDE 2 MCG/KG/MIN: 9 INJECTION, SOLUTION INTRAVENOUS at 03:35

## 2020-01-01 RX ADMIN — VASOPRESSIN: 20 INJECTION INTRAVENOUS at 17:49

## 2020-01-01 RX ADMIN — PANTOPRAZOLE SODIUM 40 MG: 40 INJECTION, POWDER, FOR SOLUTION INTRAVENOUS at 08:21

## 2020-01-01 RX ADMIN — FENTANYL CITRATE 50 MCG: 50 INJECTION, SOLUTION INTRAMUSCULAR; INTRAVENOUS at 04:06

## 2020-01-01 RX ADMIN — WHITE PETROLATUM 57.7 %-MINERAL OIL 31.9 % EYE OINTMENT: at 23:38

## 2020-01-01 RX ADMIN — FENTANYL CITRATE 50 MCG: 50 INJECTION, SOLUTION INTRAMUSCULAR; INTRAVENOUS at 18:41

## 2020-01-01 RX ADMIN — FLUDEOXYGLUCOSE F 18 13.11 MILLICURIE: 200 INJECTION, SOLUTION INTRAVENOUS at 09:18

## 2020-01-01 RX ADMIN — IPRATROPIUM BROMIDE AND ALBUTEROL SULFATE 1 AMPULE: .5; 3 SOLUTION RESPIRATORY (INHALATION) at 07:38

## 2020-01-01 RX ADMIN — IPRATROPIUM BROMIDE AND ALBUTEROL SULFATE 1 AMPULE: .5; 3 SOLUTION RESPIRATORY (INHALATION) at 18:45

## 2020-01-01 RX ADMIN — EPINEPHRINE 1 MG: 0.1 INJECTION, SOLUTION ENDOTRACHEAL; INTRACARDIAC; INTRAVENOUS at 11:13

## 2020-01-01 RX ADMIN — CARBOXYMETHYLCELLULOSE SODIUM 1 DROP: 10 GEL OPHTHALMIC at 02:45

## 2020-01-01 RX ADMIN — VANCOMYCIN HYDROCHLORIDE 1 G: 1 INJECTION, POWDER, LYOPHILIZED, FOR SOLUTION INTRAVENOUS at 21:36

## 2020-01-01 RX ADMIN — METRONIDAZOLE 500 MG: 500 INJECTION, SOLUTION INTRAVENOUS at 21:18

## 2020-01-01 RX ADMIN — METOPROLOL TARTRATE 2.5 MG: 5 INJECTION INTRAVENOUS at 06:32

## 2020-01-01 RX ADMIN — WHITE PETROLATUM 57.7 %-MINERAL OIL 31.9 % EYE OINTMENT: at 12:22

## 2020-01-01 RX ADMIN — INSULIN LISPRO 1 UNITS: 100 INJECTION, SOLUTION INTRAVENOUS; SUBCUTANEOUS at 09:11

## 2020-01-01 RX ADMIN — FUROSEMIDE 10 MG/HR: 10 INJECTION, SOLUTION INTRAVENOUS at 17:00

## 2020-01-01 RX ADMIN — FENTANYL CITRATE 125 MCG/HR: 50 INJECTION, SOLUTION INTRAMUSCULAR; INTRAVENOUS at 04:06

## 2020-01-01 RX ADMIN — PROPOFOL 35 MCG/KG/MIN: 10 INJECTION, EMULSION INTRAVENOUS at 02:36

## 2020-01-01 RX ADMIN — FENTANYL CITRATE 50 MCG: 50 INJECTION, SOLUTION INTRAMUSCULAR; INTRAVENOUS at 16:42

## 2020-01-01 RX ADMIN — CARBOXYMETHYLCELLULOSE SODIUM 1 DROP: 10 GEL OPHTHALMIC at 20:50

## 2020-01-01 RX ADMIN — INSULIN LISPRO 1 UNITS: 100 INJECTION, SOLUTION INTRAVENOUS; SUBCUTANEOUS at 20:30

## 2020-01-01 RX ADMIN — DAKIN'S SOLUTION 0.125% (QUARTER STRENGTH): 0.12 SOLUTION at 21:17

## 2020-01-01 RX ADMIN — INSULIN LISPRO 1 UNITS: 100 INJECTION, SOLUTION INTRAVENOUS; SUBCUTANEOUS at 04:30

## 2020-01-01 RX ADMIN — ALBUMIN (HUMAN) 25 G: 0.25 INJECTION, SOLUTION INTRAVENOUS at 03:30

## 2020-01-01 RX ADMIN — CHLORHEXIDINE GLUCONATE 0.12% ORAL RINSE 15 ML: 1.2 LIQUID ORAL at 22:21

## 2020-01-01 RX ADMIN — Medication 15 ML: at 19:54

## 2020-01-01 RX ADMIN — CITALOPRAM HYDROBROMIDE 40 MG: 20 TABLET ORAL at 09:00

## 2020-01-01 RX ADMIN — PANTOPRAZOLE SODIUM 40 MG: 40 INJECTION, POWDER, FOR SOLUTION INTRAVENOUS at 09:20

## 2020-01-01 RX ADMIN — WHITE PETROLATUM 57.7 %-MINERAL OIL 31.9 % EYE OINTMENT: at 01:00

## 2020-01-01 RX ADMIN — DILTIAZEM HYDROCHLORIDE 5 MG/HR: 5 INJECTION INTRAVENOUS at 18:28

## 2020-01-01 RX ADMIN — PROPOFOL 40 MCG/KG/MIN: 10 INJECTION, EMULSION INTRAVENOUS at 11:32

## 2020-01-01 RX ADMIN — FLUDEOXYGLUCOSE F 18 15.69 MILLICURIE: 200 INJECTION, SOLUTION INTRAVENOUS at 08:46

## 2020-01-01 RX ADMIN — IPRATROPIUM BROMIDE AND ALBUTEROL SULFATE 1 AMPULE: .5; 3 SOLUTION RESPIRATORY (INHALATION) at 02:55

## 2020-01-01 RX ADMIN — VANCOMYCIN HYDROCHLORIDE 1000 MG: 1 INJECTION, POWDER, LYOPHILIZED, FOR SOLUTION INTRAVENOUS at 16:07

## 2020-01-01 RX ADMIN — VANCOMYCIN HYDROCHLORIDE 1000 MG: 1 INJECTION, POWDER, LYOPHILIZED, FOR SOLUTION INTRAVENOUS at 20:34

## 2020-01-01 RX ADMIN — INSULIN LISPRO 1 UNITS: 100 INJECTION, SOLUTION INTRAVENOUS; SUBCUTANEOUS at 20:02

## 2020-01-01 RX ADMIN — PANTOPRAZOLE SODIUM 40 MG: 40 INJECTION, POWDER, FOR SOLUTION INTRAVENOUS at 09:10

## 2020-01-01 RX ADMIN — CHLOROTHIAZIDE SODIUM 500 MG: 500 INJECTION, POWDER, LYOPHILIZED, FOR SOLUTION INTRAVENOUS at 20:25

## 2020-01-01 RX ADMIN — FENTANYL CITRATE 50 MCG: 50 INJECTION, SOLUTION INTRAMUSCULAR; INTRAVENOUS at 18:04

## 2020-01-01 RX ADMIN — IPRATROPIUM BROMIDE AND ALBUTEROL SULFATE 1 AMPULE: .5; 3 SOLUTION RESPIRATORY (INHALATION) at 07:28

## 2020-01-01 RX ADMIN — AMIODARONE HYDROCHLORIDE 0.5 MG/MIN: 50 INJECTION, SOLUTION INTRAVENOUS at 23:40

## 2020-01-01 RX ADMIN — PANTOPRAZOLE SODIUM 40 MG: 40 INJECTION, POWDER, FOR SOLUTION INTRAVENOUS at 20:52

## 2020-01-01 RX ADMIN — IPRATROPIUM BROMIDE AND ALBUTEROL SULFATE 1 AMPULE: .5; 3 SOLUTION RESPIRATORY (INHALATION) at 11:26

## 2020-01-01 RX ADMIN — FENTANYL CITRATE 125 MCG/HR: 50 INJECTION, SOLUTION INTRAMUSCULAR; INTRAVENOUS at 12:12

## 2020-01-01 RX ADMIN — MIDAZOLAM HYDROCHLORIDE 2 MG: 1 INJECTION, SOLUTION INTRAMUSCULAR; INTRAVENOUS at 06:52

## 2020-01-01 RX ADMIN — PROPOFOL 20 MCG/KG/MIN: 10 INJECTION, EMULSION INTRAVENOUS at 15:12

## 2020-01-01 RX ADMIN — INSULIN LISPRO 1 UNITS: 100 INJECTION, SOLUTION INTRAVENOUS; SUBCUTANEOUS at 00:32

## 2020-01-01 RX ADMIN — FUROSEMIDE 10 MG/HR: 10 INJECTION, SOLUTION INTRAVENOUS at 20:58

## 2020-01-01 RX ADMIN — CALCIUM GLUCONATE 1 G: 98 INJECTION, SOLUTION INTRAVENOUS at 18:16

## 2020-01-01 RX ADMIN — PROPOFOL 40 MCG/KG/MIN: 10 INJECTION, EMULSION INTRAVENOUS at 00:33

## 2020-01-01 RX ADMIN — WHITE PETROLATUM 57.7 %-MINERAL OIL 31.9 % EYE OINTMENT: at 20:00

## 2020-01-01 RX ADMIN — Medication 10 ML: at 09:17

## 2020-01-01 RX ADMIN — CASTOR OIL AND BALSAM, PERU: 788; 87 OINTMENT TOPICAL at 08:38

## 2020-01-01 RX ADMIN — EPINEPHRINE 1 MG: 0.1 INJECTION, SOLUTION ENDOTRACHEAL; INTRACARDIAC; INTRAVENOUS at 11:21

## 2020-01-01 RX ADMIN — MIDAZOLAM HYDROCHLORIDE 2 MG/HR: 5 INJECTION, SOLUTION INTRAMUSCULAR; INTRAVENOUS at 13:05

## 2020-01-01 RX ADMIN — IPRATROPIUM BROMIDE AND ALBUTEROL SULFATE 1 AMPULE: .5; 3 SOLUTION RESPIRATORY (INHALATION) at 06:31

## 2020-01-01 RX ADMIN — WHITE PETROLATUM 57.7 %-MINERAL OIL 31.9 % EYE OINTMENT: at 23:50

## 2020-01-01 RX ADMIN — FENTANYL CITRATE 50 MCG: 50 INJECTION, SOLUTION INTRAMUSCULAR; INTRAVENOUS at 05:06

## 2020-01-01 RX ADMIN — Medication 15 ML: at 20:20

## 2020-01-01 RX ADMIN — MIDAZOLAM HYDROCHLORIDE 2 MG: 1 INJECTION, SOLUTION INTRAMUSCULAR; INTRAVENOUS at 10:43

## 2020-01-01 RX ADMIN — Medication 2 PUFF: at 07:13

## 2020-01-01 RX ADMIN — CARVEDILOL 25 MG: 25 TABLET, FILM COATED ORAL at 08:34

## 2020-01-01 RX ADMIN — MUPIROCIN: 20 OINTMENT TOPICAL at 09:00

## 2020-01-01 RX ADMIN — FENTANYL CITRATE 150 MCG/HR: 50 INJECTION, SOLUTION INTRAMUSCULAR; INTRAVENOUS at 00:30

## 2020-01-01 RX ADMIN — DILTIAZEM HYDROCHLORIDE 60 MG: 60 TABLET, FILM COATED ORAL at 13:18

## 2020-01-01 RX ADMIN — NOREPINEPHRINE BITARTRATE 5 MCG/MIN: 1 INJECTION INTRAVENOUS at 09:03

## 2020-01-01 RX ADMIN — DIGOXIN 250 MCG: 0.25 INJECTION INTRAMUSCULAR; INTRAVENOUS at 04:00

## 2020-01-01 RX ADMIN — PANTOPRAZOLE SODIUM 40 MG: 40 INJECTION, POWDER, FOR SOLUTION INTRAVENOUS at 08:34

## 2020-01-01 RX ADMIN — MIDAZOLAM HYDROCHLORIDE 7 MG/HR: 5 INJECTION, SOLUTION INTRAMUSCULAR; INTRAVENOUS at 02:51

## 2020-01-01 RX ADMIN — WHITE PETROLATUM 57.7 %-MINERAL OIL 31.9 % EYE OINTMENT: at 04:00

## 2020-01-01 RX ADMIN — PANTOPRAZOLE SODIUM 40 MG: 40 INJECTION, POWDER, FOR SOLUTION INTRAVENOUS at 08:30

## 2020-01-01 RX ADMIN — IPRATROPIUM BROMIDE AND ALBUTEROL SULFATE 1 AMPULE: .5; 3 SOLUTION RESPIRATORY (INHALATION) at 03:23

## 2020-01-01 RX ADMIN — DILTIAZEM HYDROCHLORIDE 180 MG: 180 CAPSULE, COATED, EXTENDED RELEASE ORAL at 08:47

## 2020-01-01 RX ADMIN — ENOXAPARIN SODIUM 40 MG: 40 INJECTION SUBCUTANEOUS at 07:55

## 2020-01-01 RX ADMIN — MUPIROCIN: 20 OINTMENT TOPICAL at 08:49

## 2020-01-01 RX ADMIN — MIDAZOLAM HYDROCHLORIDE 2 MG: 1 INJECTION, SOLUTION INTRAMUSCULAR; INTRAVENOUS at 12:40

## 2020-01-01 RX ADMIN — IPRATROPIUM BROMIDE AND ALBUTEROL SULFATE 1 AMPULE: .5; 3 SOLUTION RESPIRATORY (INHALATION) at 06:52

## 2020-01-01 RX ADMIN — DILTIAZEM HYDROCHLORIDE 60 MG: 60 TABLET, FILM COATED ORAL at 16:55

## 2020-01-01 RX ADMIN — MIDAZOLAM HYDROCHLORIDE 2 MG: 1 INJECTION, SOLUTION INTRAMUSCULAR; INTRAVENOUS at 11:46

## 2020-01-01 RX ADMIN — CEFEPIME 2 G: 2 INJECTION, POWDER, FOR SOLUTION INTRAVENOUS at 20:30

## 2020-01-01 RX ADMIN — FUROSEMIDE 40 MG: 10 INJECTION, SOLUTION INTRAMUSCULAR; INTRAVENOUS at 18:03

## 2020-01-01 RX ADMIN — METRONIDAZOLE 500 MG: 500 INJECTION, SOLUTION INTRAVENOUS at 18:06

## 2020-01-01 RX ADMIN — PROPOFOL 50 MCG/KG/MIN: 10 INJECTION, EMULSION INTRAVENOUS at 14:52

## 2020-01-01 RX ADMIN — VASOPRESSIN 0.04 UNITS/MIN: 20 INJECTION INTRAVENOUS at 06:50

## 2020-01-01 RX ADMIN — PROPOFOL 40 MCG/KG/MIN: 10 INJECTION, EMULSION INTRAVENOUS at 12:18

## 2020-01-01 RX ADMIN — CARBOXYMETHYLCELLULOSE SODIUM 1 DROP: 10 GEL OPHTHALMIC at 10:46

## 2020-01-01 RX ADMIN — INSULIN LISPRO 2 UNITS: 100 INJECTION, SOLUTION INTRAVENOUS; SUBCUTANEOUS at 09:26

## 2020-01-01 RX ADMIN — METRONIDAZOLE 500 MG: 500 INJECTION, SOLUTION INTRAVENOUS at 18:41

## 2020-01-01 RX ADMIN — CARBOXYMETHYLCELLULOSE SODIUM 1 DROP: 10 GEL OPHTHALMIC at 03:45

## 2020-01-01 RX ADMIN — MICONAZOLE NITRATE: 20 POWDER TOPICAL at 21:17

## 2020-01-01 RX ADMIN — MIDAZOLAM HYDROCHLORIDE 2 MG: 1 INJECTION, SOLUTION INTRAMUSCULAR; INTRAVENOUS at 20:38

## 2020-01-01 RX ADMIN — PROPOFOL 20 MCG/KG/MIN: 10 INJECTION, EMULSION INTRAVENOUS at 23:51

## 2020-01-01 RX ADMIN — FENTANYL CITRATE 150 MCG/HR: 50 INJECTION, SOLUTION INTRAMUSCULAR; INTRAVENOUS at 08:17

## 2020-01-01 RX ADMIN — DILTIAZEM HYDROCHLORIDE 60 MG: 60 TABLET, FILM COATED ORAL at 04:47

## 2020-01-01 RX ADMIN — WHITE PETROLATUM 57.7 %-MINERAL OIL 31.9 % EYE OINTMENT: at 21:19

## 2020-01-01 RX ADMIN — ENOXAPARIN SODIUM 40 MG: 40 INJECTION SUBCUTANEOUS at 11:03

## 2020-01-01 RX ADMIN — CASTOR OIL AND BALSAM, PERU: 788; 87 OINTMENT TOPICAL at 10:55

## 2020-01-01 RX ADMIN — MAGNESIUM SULFATE HEPTAHYDRATE 4 G: 40 INJECTION, SOLUTION INTRAVENOUS at 17:31

## 2020-01-01 RX ADMIN — WHITE PETROLATUM 57.7 %-MINERAL OIL 31.9 % EYE OINTMENT: at 08:33

## 2020-01-01 RX ADMIN — Medication 15 ML: at 19:36

## 2020-01-01 RX ADMIN — DEXMEDETOMIDINE HYDROCHLORIDE 0.2 MCG/KG/HR: 100 INJECTION, SOLUTION INTRAVENOUS at 10:53

## 2020-01-01 RX ADMIN — MUPIROCIN: 20 OINTMENT TOPICAL at 20:09

## 2020-01-01 RX ADMIN — INSULIN LISPRO 1 UNITS: 100 INJECTION, SOLUTION INTRAVENOUS; SUBCUTANEOUS at 20:21

## 2020-01-01 RX ADMIN — CITALOPRAM HYDROBROMIDE 40 MG: 20 TABLET ORAL at 09:02

## 2020-01-01 RX ADMIN — POTASSIUM CHLORIDE 10 MEQ: 7.46 INJECTION, SOLUTION INTRAVENOUS at 09:56

## 2020-01-01 RX ADMIN — METRONIDAZOLE 500 MG: 500 INJECTION, SOLUTION INTRAVENOUS at 11:44

## 2020-01-01 RX ADMIN — MIDAZOLAM HYDROCHLORIDE 2 MG: 1 INJECTION, SOLUTION INTRAMUSCULAR; INTRAVENOUS at 16:36

## 2020-01-01 RX ADMIN — ALTEPLASE 1 MG: 2.2 INJECTION, POWDER, LYOPHILIZED, FOR SOLUTION INTRAVENOUS at 13:18

## 2020-01-01 RX ADMIN — FUROSEMIDE 80 MG: 10 INJECTION, SOLUTION INTRAMUSCULAR; INTRAVENOUS at 16:48

## 2020-01-01 RX ADMIN — IPRATROPIUM BROMIDE AND ALBUTEROL SULFATE 1 AMPULE: .5; 3 SOLUTION RESPIRATORY (INHALATION) at 03:33

## 2020-01-01 RX ADMIN — CARBOXYMETHYLCELLULOSE SODIUM 1 DROP: 10 GEL OPHTHALMIC at 14:34

## 2020-01-01 RX ADMIN — CARBOXYMETHYLCELLULOSE SODIUM 1 DROP: 10 GEL OPHTHALMIC at 11:38

## 2020-01-01 RX ADMIN — Medication: at 09:01

## 2020-01-01 RX ADMIN — PROPOFOL 40 MCG/KG/MIN: 10 INJECTION, EMULSION INTRAVENOUS at 22:54

## 2020-01-01 RX ADMIN — CARBOXYMETHYLCELLULOSE SODIUM 1 DROP: 10 GEL OPHTHALMIC at 16:10

## 2020-01-01 RX ADMIN — ACETAMINOPHEN 650 MG: 325 TABLET ORAL at 19:59

## 2020-01-01 RX ADMIN — CEFTRIAXONE 2 G: 2 INJECTION, POWDER, FOR SOLUTION INTRAMUSCULAR; INTRAVENOUS at 13:17

## 2020-01-01 RX ADMIN — SODIUM CHLORIDE 500 ML: 9 INJECTION, SOLUTION INTRAVENOUS at 20:32

## 2020-01-01 RX ADMIN — WHITE PETROLATUM 57.7 %-MINERAL OIL 31.9 % EYE OINTMENT: at 09:04

## 2020-01-01 RX ADMIN — CARVEDILOL 25 MG: 25 TABLET, FILM COATED ORAL at 09:16

## 2020-01-01 RX ADMIN — Medication 15 ML: at 21:02

## 2020-01-01 RX ADMIN — CHLORHEXIDINE GLUCONATE 0.12% ORAL RINSE 15 ML: 1.2 LIQUID ORAL at 20:29

## 2020-01-01 RX ADMIN — Medication 15 ML: at 08:23

## 2020-01-01 RX ADMIN — PANTOPRAZOLE SODIUM 40 MG: 40 INJECTION, POWDER, FOR SOLUTION INTRAVENOUS at 07:58

## 2020-01-01 RX ADMIN — INSULIN LISPRO 1 UNITS: 100 INJECTION, SOLUTION INTRAVENOUS; SUBCUTANEOUS at 20:00

## 2020-01-01 RX ADMIN — Medication 10 ML: at 19:26

## 2020-01-01 RX ADMIN — MIDAZOLAM HYDROCHLORIDE 2 MG: 1 INJECTION, SOLUTION INTRAMUSCULAR; INTRAVENOUS at 03:52

## 2020-01-01 RX ADMIN — CARBOXYMETHYLCELLULOSE SODIUM 1 DROP: 10 GEL OPHTHALMIC at 05:15

## 2020-01-01 RX ADMIN — SODIUM CHLORIDE 1000 ML: 9 INJECTION, SOLUTION INTRAVENOUS at 16:12

## 2020-01-01 RX ADMIN — FENTANYL CITRATE 50 MCG: 50 INJECTION, SOLUTION INTRAMUSCULAR; INTRAVENOUS at 14:33

## 2020-01-01 RX ADMIN — PROPOFOL 15 MCG/KG/MIN: 10 INJECTION, EMULSION INTRAVENOUS at 21:27

## 2020-01-01 RX ADMIN — PROPOFOL 50 MCG/KG/MIN: 10 INJECTION, EMULSION INTRAVENOUS at 21:40

## 2020-01-01 RX ADMIN — ACETAZOLAMIDE 250 MG: 250 TABLET ORAL at 18:50

## 2020-01-01 RX ADMIN — FENTANYL CITRATE 50 MCG: 50 INJECTION, SOLUTION INTRAMUSCULAR; INTRAVENOUS at 03:25

## 2020-01-01 RX ADMIN — SODIUM CHLORIDE 4 MCG/KG/MIN: 9 INJECTION, SOLUTION INTRAVENOUS at 04:43

## 2020-01-01 RX ADMIN — MIDAZOLAM HYDROCHLORIDE 2 MG: 1 INJECTION, SOLUTION INTRAMUSCULAR; INTRAVENOUS at 17:52

## 2020-01-01 RX ADMIN — IPRATROPIUM BROMIDE AND ALBUTEROL SULFATE 1 AMPULE: .5; 3 SOLUTION RESPIRATORY (INHALATION) at 16:01

## 2020-01-01 RX ADMIN — INSULIN LISPRO 2 UNITS: 100 INJECTION, SOLUTION INTRAVENOUS; SUBCUTANEOUS at 12:22

## 2020-01-01 RX ADMIN — ENOXAPARIN SODIUM 40 MG: 40 INJECTION SUBCUTANEOUS at 09:24

## 2020-01-01 RX ADMIN — Medication 10 ML: at 09:26

## 2020-01-01 RX ADMIN — FUROSEMIDE 80 MG: 10 INJECTION, SOLUTION INTRAMUSCULAR; INTRAVENOUS at 09:20

## 2020-01-01 RX ADMIN — FENTANYL CITRATE 50 MCG: 50 INJECTION, SOLUTION INTRAMUSCULAR; INTRAVENOUS at 16:56

## 2020-01-01 RX ADMIN — IPRATROPIUM BROMIDE AND ALBUTEROL SULFATE 1 AMPULE: .5; 3 SOLUTION RESPIRATORY (INHALATION) at 15:40

## 2020-01-01 RX ADMIN — INSULIN LISPRO 2 UNITS: 100 INJECTION, SOLUTION INTRAVENOUS; SUBCUTANEOUS at 12:23

## 2020-01-01 RX ADMIN — DEXMEDETOMIDINE HYDROCHLORIDE 1.2 MCG/KG/HR: 100 INJECTION, SOLUTION INTRAVENOUS at 10:02

## 2020-01-01 RX ADMIN — FENTANYL CITRATE 50 MCG: 50 INJECTION, SOLUTION INTRAMUSCULAR; INTRAVENOUS at 09:10

## 2020-01-01 RX ADMIN — CARBOXYMETHYLCELLULOSE SODIUM 1 DROP: 10 GEL OPHTHALMIC at 17:15

## 2020-01-01 RX ADMIN — EPINEPHRINE 1 MG: 0.1 INJECTION, SOLUTION ENDOTRACHEAL; INTRACARDIAC; INTRAVENOUS at 10:57

## 2020-01-01 RX ADMIN — CARBOXYMETHYLCELLULOSE SODIUM 1 DROP: 10 GEL OPHTHALMIC at 17:33

## 2020-01-01 RX ADMIN — DEXAMETHASONE SODIUM PHOSPHATE 6 MG: 10 INJECTION, SOLUTION INTRAMUSCULAR; INTRAVENOUS at 08:23

## 2020-01-01 RX ADMIN — METRONIDAZOLE 500 MG: 500 INJECTION, SOLUTION INTRAVENOUS at 20:26

## 2020-01-01 RX ADMIN — INSULIN LISPRO 2 UNITS: 100 INJECTION, SOLUTION INTRAVENOUS; SUBCUTANEOUS at 14:25

## 2020-01-01 RX ADMIN — METOLAZONE 5 MG: 2.5 TABLET ORAL at 08:20

## 2020-01-01 RX ADMIN — PROPOFOL 50 MCG/KG/MIN: 10 INJECTION, EMULSION INTRAVENOUS at 04:45

## 2020-01-01 RX ADMIN — WHITE PETROLATUM 57.7 %-MINERAL OIL 31.9 % EYE OINTMENT: at 03:35

## 2020-01-01 RX ADMIN — Medication 10 ML: at 08:30

## 2020-01-01 RX ADMIN — PROPOFOL 55 MCG/KG/MIN: 10 INJECTION, EMULSION INTRAVENOUS at 18:31

## 2020-01-01 RX ADMIN — CARBOXYMETHYLCELLULOSE SODIUM 1 DROP: 10 GEL OPHTHALMIC at 09:31

## 2020-01-01 RX ADMIN — MIDAZOLAM HYDROCHLORIDE 2 MG: 1 INJECTION, SOLUTION INTRAMUSCULAR; INTRAVENOUS at 08:20

## 2020-01-01 RX ADMIN — MIDAZOLAM HYDROCHLORIDE 2 MG: 1 INJECTION, SOLUTION INTRAMUSCULAR; INTRAVENOUS at 19:38

## 2020-01-01 RX ADMIN — PROPOFOL 50 MCG/KG/MIN: 10 INJECTION, EMULSION INTRAVENOUS at 16:23

## 2020-01-01 RX ADMIN — IPRATROPIUM BROMIDE AND ALBUTEROL SULFATE 1 AMPULE: .5; 3 SOLUTION RESPIRATORY (INHALATION) at 14:50

## 2020-01-01 RX ADMIN — CEFEPIME 2 G: 2 INJECTION, POWDER, FOR SOLUTION INTRAVENOUS at 12:53

## 2020-01-01 RX ADMIN — Medication 10 ML: at 19:51

## 2020-01-01 RX ADMIN — SODIUM CHLORIDE: 234 INJECTION, SOLUTION INTRAVENOUS at 15:07

## 2020-01-01 RX ADMIN — VANCOMYCIN HYDROCHLORIDE 1000 MG: 1 INJECTION, POWDER, LYOPHILIZED, FOR SOLUTION INTRAVENOUS at 09:08

## 2020-01-01 RX ADMIN — CARVEDILOL 25 MG: 25 TABLET, FILM COATED ORAL at 11:05

## 2020-01-01 RX ADMIN — ENOXAPARIN SODIUM 40 MG: 40 INJECTION SUBCUTANEOUS at 08:24

## 2020-01-01 RX ADMIN — CARBOXYMETHYLCELLULOSE SODIUM 1 DROP: 10 GEL OPHTHALMIC at 09:10

## 2020-01-01 RX ADMIN — WHITE PETROLATUM 57.7 %-MINERAL OIL 31.9 % EYE OINTMENT: at 17:15

## 2020-01-01 RX ADMIN — MIDAZOLAM HYDROCHLORIDE 2 MG/HR: 5 INJECTION, SOLUTION INTRAMUSCULAR; INTRAVENOUS at 03:41

## 2020-01-01 RX ADMIN — IPRATROPIUM BROMIDE AND ALBUTEROL SULFATE 1 AMPULE: .5; 3 SOLUTION RESPIRATORY (INHALATION) at 10:46

## 2020-01-01 RX ADMIN — Medication 10 ML: at 08:20

## 2020-01-01 RX ADMIN — IPRATROPIUM BROMIDE AND ALBUTEROL SULFATE 1 AMPULE: .5; 3 SOLUTION RESPIRATORY (INHALATION) at 13:27

## 2020-01-01 RX ADMIN — CARBOXYMETHYLCELLULOSE SODIUM 1 DROP: 10 GEL OPHTHALMIC at 02:11

## 2020-01-01 RX ADMIN — Medication 10 ML: at 21:18

## 2020-01-01 RX ADMIN — INSULIN LISPRO 1 UNITS: 100 INJECTION, SOLUTION INTRAVENOUS; SUBCUTANEOUS at 12:19

## 2020-01-01 RX ADMIN — MAGNESIUM SULFATE HEPTAHYDRATE 2 G: 40 INJECTION, SOLUTION INTRAVENOUS at 09:25

## 2020-01-01 RX ADMIN — INSULIN LISPRO 1 UNITS: 100 INJECTION, SOLUTION INTRAVENOUS; SUBCUTANEOUS at 18:04

## 2020-01-01 RX ADMIN — POTASSIUM BICARBONATE 40 MEQ: 782 TABLET, EFFERVESCENT ORAL at 08:57

## 2020-01-01 RX ADMIN — CARBOXYMETHYLCELLULOSE SODIUM 1 DROP: 10 GEL OPHTHALMIC at 07:05

## 2020-01-01 RX ADMIN — CARBOXYMETHYLCELLULOSE SODIUM 1 DROP: 10 GEL OPHTHALMIC at 06:09

## 2020-01-01 RX ADMIN — SODIUM CHLORIDE 500 ML: 9 INJECTION, SOLUTION INTRAVENOUS at 08:14

## 2020-01-01 RX ADMIN — CHLOROTHIAZIDE SODIUM 500 MG: 500 INJECTION, POWDER, LYOPHILIZED, FOR SOLUTION INTRAVENOUS at 12:23

## 2020-01-01 RX ADMIN — FENTANYL CITRATE 50 MCG: 50 INJECTION, SOLUTION INTRAMUSCULAR; INTRAVENOUS at 11:50

## 2020-01-01 RX ADMIN — MUPIROCIN: 20 OINTMENT TOPICAL at 08:32

## 2020-01-01 RX ADMIN — VASOPRESSIN 0.04 UNITS/MIN: 20 INJECTION INTRAVENOUS at 21:41

## 2020-01-01 RX ADMIN — METRONIDAZOLE 500 MG: 500 INJECTION, SOLUTION INTRAVENOUS at 10:58

## 2020-01-01 RX ADMIN — WHITE PETROLATUM 57.7 %-MINERAL OIL 31.9 % EYE OINTMENT: at 05:11

## 2020-01-01 RX ADMIN — CARBOXYMETHYLCELLULOSE SODIUM 1 DROP: 10 GEL OPHTHALMIC at 02:15

## 2020-01-01 RX ADMIN — ACETAMINOPHEN 650 MG: 325 TABLET ORAL at 21:38

## 2020-01-01 RX ADMIN — PROPOFOL 40 MCG/KG/MIN: 10 INJECTION, EMULSION INTRAVENOUS at 04:11

## 2020-01-01 RX ADMIN — Medication 10 ML: at 07:55

## 2020-01-01 RX ADMIN — Medication 15 ML: at 08:43

## 2020-01-01 RX ADMIN — FENTANYL CITRATE 50 MCG: 50 INJECTION, SOLUTION INTRAMUSCULAR; INTRAVENOUS at 13:19

## 2020-01-01 RX ADMIN — Medication 15 ML: at 22:16

## 2020-01-01 RX ADMIN — MICONAZOLE NITRATE: 20 POWDER TOPICAL at 21:01

## 2020-01-01 RX ADMIN — WHITE PETROLATUM 57.7 %-MINERAL OIL 31.9 % EYE OINTMENT: at 07:57

## 2020-01-01 RX ADMIN — DILTIAZEM HYDROCHLORIDE 30 MG: 60 TABLET, FILM COATED ORAL at 00:07

## 2020-01-01 RX ADMIN — IPRATROPIUM BROMIDE AND ALBUTEROL SULFATE 1 AMPULE: .5; 3 SOLUTION RESPIRATORY (INHALATION) at 06:53

## 2020-01-01 RX ADMIN — CARBOXYMETHYLCELLULOSE SODIUM 1 DROP: 10 GEL OPHTHALMIC at 13:47

## 2020-01-01 RX ADMIN — IPRATROPIUM BROMIDE AND ALBUTEROL SULFATE 1 AMPULE: .5; 3 SOLUTION RESPIRATORY (INHALATION) at 23:02

## 2020-01-01 RX ADMIN — MIDAZOLAM HYDROCHLORIDE 6 MG/HR: 5 INJECTION, SOLUTION INTRAMUSCULAR; INTRAVENOUS at 00:30

## 2020-01-01 RX ADMIN — WHITE PETROLATUM 57.7 %-MINERAL OIL 31.9 % EYE OINTMENT: at 14:33

## 2020-01-01 RX ADMIN — MIDAZOLAM HYDROCHLORIDE 2 MG: 1 INJECTION, SOLUTION INTRAMUSCULAR; INTRAVENOUS at 15:15

## 2020-01-01 RX ADMIN — Medication 2 PUFF: at 15:01

## 2020-01-01 RX ADMIN — Medication 15 ML: at 21:18

## 2020-01-01 RX ADMIN — PROPOFOL 40 MCG/KG/MIN: 10 INJECTION, EMULSION INTRAVENOUS at 21:49

## 2020-01-01 RX ADMIN — MIDAZOLAM HYDROCHLORIDE 2 MG: 1 INJECTION, SOLUTION INTRAMUSCULAR; INTRAVENOUS at 04:51

## 2020-01-01 RX ADMIN — FENTANYL CITRATE 100 MCG/HR: 50 INJECTION, SOLUTION INTRAMUSCULAR; INTRAVENOUS at 09:44

## 2020-01-01 RX ADMIN — Medication 10 ML: at 08:23

## 2020-01-01 RX ADMIN — MIDAZOLAM HYDROCHLORIDE 2 MG: 1 INJECTION, SOLUTION INTRAMUSCULAR; INTRAVENOUS at 21:01

## 2020-01-01 RX ADMIN — FUROSEMIDE 40 MG: 10 INJECTION, SOLUTION INTRAMUSCULAR; INTRAVENOUS at 16:42

## 2020-01-01 RX ADMIN — PROPOFOL 35 MCG/KG/MIN: 10 INJECTION, EMULSION INTRAVENOUS at 11:23

## 2020-01-01 RX ADMIN — WHITE PETROLATUM 57.7 %-MINERAL OIL 31.9 % EYE OINTMENT: at 16:09

## 2020-01-01 RX ADMIN — DILTIAZEM HYDROCHLORIDE 60 MG: 60 TABLET, FILM COATED ORAL at 18:35

## 2020-01-01 RX ADMIN — WHITE PETROLATUM 57.7 %-MINERAL OIL 31.9 % EYE OINTMENT: at 04:49

## 2020-01-01 RX ADMIN — IPRATROPIUM BROMIDE AND ALBUTEROL SULFATE 1 AMPULE: .5; 3 SOLUTION RESPIRATORY (INHALATION) at 23:07

## 2020-01-01 RX ADMIN — PROPOFOL 40 MCG/KG/MIN: 10 INJECTION, EMULSION INTRAVENOUS at 07:56

## 2020-01-01 RX ADMIN — ENOXAPARIN SODIUM 40 MG: 40 INJECTION SUBCUTANEOUS at 08:30

## 2020-01-01 RX ADMIN — MICONAZOLE NITRATE: 20 POWDER TOPICAL at 08:46

## 2020-01-01 RX ADMIN — LEVOFLOXACIN 750 MG: 5 INJECTION, SOLUTION INTRAVENOUS at 00:46

## 2020-01-01 RX ADMIN — WHITE PETROLATUM 57.7 %-MINERAL OIL 31.9 % EYE OINTMENT: at 15:15

## 2020-01-01 RX ADMIN — Medication 10 ML: at 08:26

## 2020-01-01 RX ADMIN — Medication 15 ML: at 20:07

## 2020-01-01 RX ADMIN — NOREPINEPHRINE BITARTRATE 5 MCG/MIN: 1 INJECTION INTRAVENOUS at 08:06

## 2020-01-01 RX ADMIN — CARBOXYMETHYLCELLULOSE SODIUM 1 DROP: 10 GEL OPHTHALMIC at 20:53

## 2020-01-01 RX ADMIN — EPINEPHRINE 1 MG: 0.1 INJECTION, SOLUTION ENDOTRACHEAL; INTRACARDIAC; INTRAVENOUS at 11:28

## 2020-01-01 RX ADMIN — FENTANYL CITRATE 100 MCG/HR: 50 INJECTION, SOLUTION INTRAMUSCULAR; INTRAVENOUS at 20:21

## 2020-01-01 RX ADMIN — ACETAZOLAMIDE 250 MG: 250 TABLET ORAL at 23:44

## 2020-01-01 RX ADMIN — FENTANYL CITRATE 50 MCG: 50 INJECTION, SOLUTION INTRAMUSCULAR; INTRAVENOUS at 18:06

## 2020-01-01 RX ADMIN — FENTANYL CITRATE 50 MCG: 50 INJECTION, SOLUTION INTRAMUSCULAR; INTRAVENOUS at 12:16

## 2020-01-01 RX ADMIN — MUPIROCIN: 20 OINTMENT TOPICAL at 08:27

## 2020-01-01 RX ADMIN — PROPOFOL 15 MCG/KG/MIN: 10 INJECTION, EMULSION INTRAVENOUS at 23:00

## 2020-01-01 RX ADMIN — MEROPENEM 500 MG: 500 INJECTION, POWDER, FOR SOLUTION INTRAVENOUS at 08:18

## 2020-01-01 RX ADMIN — ACETAZOLAMIDE 250 MG: 250 TABLET ORAL at 06:36

## 2020-01-01 RX ADMIN — MIDAZOLAM HYDROCHLORIDE 2 MG: 1 INJECTION, SOLUTION INTRAMUSCULAR; INTRAVENOUS at 12:13

## 2020-01-01 RX ADMIN — MUPIROCIN: 20 OINTMENT TOPICAL at 20:29

## 2020-01-01 RX ADMIN — IPRATROPIUM BROMIDE AND ALBUTEROL SULFATE 1 AMPULE: .5; 3 SOLUTION RESPIRATORY (INHALATION) at 07:04

## 2020-01-01 RX ADMIN — Medication 10 ML: at 20:27

## 2020-01-01 RX ADMIN — WHITE PETROLATUM 57.7 %-MINERAL OIL 31.9 % EYE OINTMENT: at 22:09

## 2020-01-01 RX ADMIN — Medication 10 ML: at 21:37

## 2020-01-01 RX ADMIN — MIDAZOLAM HYDROCHLORIDE 2 MG: 1 INJECTION, SOLUTION INTRAMUSCULAR; INTRAVENOUS at 19:43

## 2020-01-01 RX ADMIN — PROPOFOL 30 MCG/KG/MIN: 10 INJECTION, EMULSION INTRAVENOUS at 18:43

## 2020-01-01 RX ADMIN — CARVEDILOL 25 MG: 25 TABLET, FILM COATED ORAL at 08:42

## 2020-01-01 RX ADMIN — DILTIAZEM HYDROCHLORIDE 10 MG: 5 INJECTION INTRAVENOUS at 18:40

## 2020-01-01 RX ADMIN — Medication 10 ML: at 20:30

## 2020-01-01 RX ADMIN — MIDAZOLAM HYDROCHLORIDE 2 MG: 1 INJECTION, SOLUTION INTRAMUSCULAR; INTRAVENOUS at 04:06

## 2020-01-01 RX ADMIN — DEXMEDETOMIDINE HYDROCHLORIDE 0.4 MCG/KG/HR: 100 INJECTION, SOLUTION INTRAVENOUS at 20:26

## 2020-01-01 RX ADMIN — FENTANYL CITRATE 50 MCG: 50 INJECTION, SOLUTION INTRAMUSCULAR; INTRAVENOUS at 08:04

## 2020-01-01 RX ADMIN — CASTOR OIL AND BALSAM, PERU: 788; 87 OINTMENT TOPICAL at 20:47

## 2020-01-01 RX ADMIN — POTASSIUM BICARBONATE 40 MEQ: 391 TABLET, EFFERVESCENT ORAL at 20:37

## 2020-01-01 RX ADMIN — MICONAZOLE NITRATE: 20 POWDER TOPICAL at 01:10

## 2020-01-01 RX ADMIN — Medication 15 ML: at 20:13

## 2020-01-01 RX ADMIN — CARVEDILOL 25 MG: 25 TABLET, FILM COATED ORAL at 17:13

## 2020-01-01 RX ADMIN — PROPOFOL 20 MCG/KG/MIN: 10 INJECTION, EMULSION INTRAVENOUS at 18:05

## 2020-01-01 RX ADMIN — FUROSEMIDE 40 MG: 10 INJECTION, SOLUTION INTRAMUSCULAR; INTRAVENOUS at 12:18

## 2020-01-01 RX ADMIN — MICONAZOLE NITRATE: 20 POWDER TOPICAL at 20:08

## 2020-01-01 RX ADMIN — FENTANYL CITRATE 175 MCG/HR: 50 INJECTION, SOLUTION INTRAMUSCULAR; INTRAVENOUS at 16:48

## 2020-01-01 RX ADMIN — WHITE PETROLATUM 57.7 %-MINERAL OIL 31.9 % EYE OINTMENT: at 12:18

## 2020-01-01 RX ADMIN — WHITE PETROLATUM 57.7 %-MINERAL OIL 31.9 % EYE OINTMENT: at 13:17

## 2020-01-01 RX ADMIN — Medication 2 PUFF: at 15:41

## 2020-01-01 RX ADMIN — DEXAMETHASONE SODIUM PHOSPHATE 6 MG: 10 INJECTION, SOLUTION INTRAMUSCULAR; INTRAVENOUS at 07:59

## 2020-01-01 RX ADMIN — PROPOFOL 20 MCG/KG/MIN: 10 INJECTION, EMULSION INTRAVENOUS at 09:16

## 2020-01-01 RX ADMIN — MICONAZOLE NITRATE: 20 POWDER TOPICAL at 07:58

## 2020-01-01 RX ADMIN — Medication 15 ML: at 08:04

## 2020-01-01 RX ADMIN — METRONIDAZOLE 500 MG: 500 INJECTION, SOLUTION INTRAVENOUS at 10:00

## 2020-01-01 RX ADMIN — WHITE PETROLATUM 57.7 %-MINERAL OIL 31.9 % EYE OINTMENT: at 08:48

## 2020-01-01 RX ADMIN — PROPOFOL 40 MCG/KG/MIN: 10 INJECTION, EMULSION INTRAVENOUS at 08:24

## 2020-01-01 RX ADMIN — FENTANYL CITRATE 50 MCG: 50 INJECTION, SOLUTION INTRAMUSCULAR; INTRAVENOUS at 10:20

## 2020-01-01 RX ADMIN — MIDAZOLAM HYDROCHLORIDE 5 MG/HR: 5 INJECTION, SOLUTION INTRAMUSCULAR; INTRAVENOUS at 05:15

## 2020-01-01 RX ADMIN — Medication 10 ML: at 20:08

## 2020-01-01 RX ADMIN — MIDAZOLAM HYDROCHLORIDE 2 MG: 1 INJECTION, SOLUTION INTRAMUSCULAR; INTRAVENOUS at 02:01

## 2020-01-01 RX ADMIN — ALBUMIN (HUMAN) 25 G: 0.25 INJECTION, SOLUTION INTRAVENOUS at 20:07

## 2020-01-01 RX ADMIN — PROPOFOL 50 MCG/KG/MIN: 10 INJECTION, EMULSION INTRAVENOUS at 22:29

## 2020-01-01 RX ADMIN — FUROSEMIDE 40 MG: 10 INJECTION, SOLUTION INTRAMUSCULAR; INTRAVENOUS at 11:31

## 2020-01-01 RX ADMIN — ENOXAPARIN SODIUM 40 MG: 40 INJECTION SUBCUTANEOUS at 08:04

## 2020-01-01 RX ADMIN — DIGOXIN 250 MCG: 0.25 INJECTION INTRAMUSCULAR; INTRAVENOUS at 22:14

## 2020-01-01 RX ADMIN — EPINEPHRINE 1 MG: 0.1 INJECTION, SOLUTION ENDOTRACHEAL; INTRACARDIAC; INTRAVENOUS at 12:03

## 2020-01-01 RX ADMIN — CEFTRIAXONE 2 G: 2 INJECTION, POWDER, FOR SOLUTION INTRAMUSCULAR; INTRAVENOUS at 11:45

## 2020-01-01 RX ADMIN — MIDAZOLAM HYDROCHLORIDE 2 MG: 1 INJECTION, SOLUTION INTRAMUSCULAR; INTRAVENOUS at 21:54

## 2020-01-01 RX ADMIN — CHLOROTHIAZIDE SODIUM 500 MG: 500 INJECTION, POWDER, LYOPHILIZED, FOR SOLUTION INTRAVENOUS at 20:11

## 2020-01-01 RX ADMIN — CARVEDILOL 25 MG: 25 TABLET, FILM COATED ORAL at 08:46

## 2020-01-01 RX ADMIN — IPRATROPIUM BROMIDE AND ALBUTEROL SULFATE 1 AMPULE: .5; 3 SOLUTION RESPIRATORY (INHALATION) at 20:51

## 2020-01-01 RX ADMIN — Medication 10 ML: at 07:45

## 2020-01-01 RX ADMIN — CARBOXYMETHYLCELLULOSE SODIUM 1 DROP: 10 GEL OPHTHALMIC at 03:56

## 2020-01-01 RX ADMIN — Medication 10 ML: at 09:02

## 2020-01-01 RX ADMIN — FENTANYL CITRATE 100 MCG/HR: 50 INJECTION, SOLUTION INTRAMUSCULAR; INTRAVENOUS at 15:16

## 2020-01-01 RX ADMIN — INSULIN LISPRO 2 UNITS: 100 INJECTION, SOLUTION INTRAVENOUS; SUBCUTANEOUS at 20:55

## 2020-01-01 RX ADMIN — CEFTRIAXONE 2 G: 2 INJECTION, POWDER, FOR SOLUTION INTRAMUSCULAR; INTRAVENOUS at 12:21

## 2020-01-01 RX ADMIN — WHITE PETROLATUM 57.7 %-MINERAL OIL 31.9 % EYE OINTMENT: at 23:45

## 2020-01-01 RX ADMIN — POTASSIUM CHLORIDE 40 MEQ: 750 TABLET, EXTENDED RELEASE ORAL at 10:56

## 2020-01-01 RX ADMIN — IPRATROPIUM BROMIDE AND ALBUTEROL SULFATE 1 AMPULE: .5; 3 SOLUTION RESPIRATORY (INHALATION) at 20:25

## 2020-01-01 RX ADMIN — ALBUMIN (HUMAN) 25 G: 0.25 INJECTION, SOLUTION INTRAVENOUS at 12:21

## 2020-01-01 RX ADMIN — DEXMEDETOMIDINE HYDROCHLORIDE 1.2 MCG/KG/HR: 100 INJECTION, SOLUTION INTRAVENOUS at 16:55

## 2020-01-01 RX ADMIN — Medication 10 ML: at 21:01

## 2020-01-01 RX ADMIN — VANCOMYCIN HYDROCHLORIDE 1000 MG: 1 INJECTION, POWDER, LYOPHILIZED, FOR SOLUTION INTRAVENOUS at 13:34

## 2020-01-01 ASSESSMENT — PAIN SCALES - GENERAL
PAINLEVEL_OUTOF10: 0
PAINLEVEL_OUTOF10: 7
PAINLEVEL_OUTOF10: 0
PAINLEVEL_OUTOF10: 7
PAINLEVEL_OUTOF10: 0
PAINLEVEL_OUTOF10: 8
PAINLEVEL_OUTOF10: 3
PAINLEVEL_OUTOF10: 0
PAINLEVEL_OUTOF10: 4
PAINLEVEL_OUTOF10: 0
PAINLEVEL_OUTOF10: 3
PAINLEVEL_OUTOF10: 0
PAINLEVEL_OUTOF10: 0
PAINLEVEL_OUTOF10: 7
PAINLEVEL_OUTOF10: 8
PAINLEVEL_OUTOF10: 0
PAINLEVEL_OUTOF10: 3
PAINLEVEL_OUTOF10: 2
PAINLEVEL_OUTOF10: 0
PAINLEVEL_OUTOF10: 10
PAINLEVEL_OUTOF10: 0
PAINLEVEL_OUTOF10: 9
PAINLEVEL_OUTOF10: 0
PAINLEVEL_OUTOF10: 5
PAINLEVEL_OUTOF10: 0
PAINLEVEL_OUTOF10: 4
PAINLEVEL_OUTOF10: 0
PAINLEVEL_OUTOF10: 2

## 2020-01-01 ASSESSMENT — PULMONARY FUNCTION TESTS
PIF_VALUE: 31
PIF_VALUE: 25
PIF_VALUE: 25
PIF_VALUE: 29
PIF_VALUE: 41
PIF_VALUE: 44
PIF_VALUE: 31
PIF_VALUE: 40
PIF_VALUE: 28
PIF_VALUE: 34
PIF_VALUE: 39
PIF_VALUE: 22
PIF_VALUE: 42
PIF_VALUE: 39
PIF_VALUE: 31
PIF_VALUE: 29
PIF_VALUE: 38
PIF_VALUE: 36
PIF_VALUE: 28
PIF_VALUE: 29
PIF_VALUE: 40
PIF_VALUE: 19
PIF_VALUE: 27
PIF_VALUE: 39
PIF_VALUE: 41
PIF_VALUE: 35
PIF_VALUE: 31
PIF_VALUE: 34
PIF_VALUE: 30
PIF_VALUE: 25
PIF_VALUE: 38
PIF_VALUE: 30
PIF_VALUE: 32
PIF_VALUE: 29
PIF_VALUE: 28
PIF_VALUE: 28
PIF_VALUE: 36
PIF_VALUE: 24
PIF_VALUE: 31
PIF_VALUE: 32
PIF_VALUE: 23
PIF_VALUE: 25
PIF_VALUE: 27
PIF_VALUE: 31
PIF_VALUE: 34
PIF_VALUE: 23
PIF_VALUE: 37
PIF_VALUE: 23
PIF_VALUE: 35
PIF_VALUE: 37
PIF_VALUE: 38
PIF_VALUE: 41
PIF_VALUE: 26
PIF_VALUE: 23
PIF_VALUE: 42
PIF_VALUE: 28
PIF_VALUE: 42
PIF_VALUE: 40
PIF_VALUE: 41
PIF_VALUE: 36
PIF_VALUE: 33
PIF_VALUE: 20
PIF_VALUE: 28
PIF_VALUE: 25
PIF_VALUE: 43
PIF_VALUE: 45

## 2020-01-01 ASSESSMENT — PAIN DESCRIPTION - DESCRIPTORS
DESCRIPTORS: CONSTANT
DESCRIPTORS: SORE
DESCRIPTORS: CONSTANT
DESCRIPTORS: SHOOTING;CONSTANT

## 2020-01-01 ASSESSMENT — PAIN DESCRIPTION - LOCATION
LOCATION: GENERALIZED
LOCATION: HEAD
LOCATION: CHEST
LOCATION: HEAD
LOCATION: HEAD

## 2020-01-01 ASSESSMENT — ENCOUNTER SYMPTOMS
COUGH: 1
SHORTNESS OF BREATH: 1
DIARRHEA: 0
ABDOMINAL DISTENTION: 0
WHEEZING: 0
NAUSEA: 0
BACK PAIN: 0
PHOTOPHOBIA: 0
RHINORRHEA: 0
VOMITING: 0
SHORTNESS OF BREATH: 1

## 2020-01-01 ASSESSMENT — PAIN DESCRIPTION - ORIENTATION
ORIENTATION: RIGHT;LEFT;MID
ORIENTATION: RIGHT;LEFT;MID

## 2020-01-01 ASSESSMENT — PAIN DESCRIPTION - FREQUENCY
FREQUENCY: CONTINUOUS
FREQUENCY: INTERMITTENT
FREQUENCY: CONTINUOUS

## 2020-01-01 ASSESSMENT — PAIN DESCRIPTION - ONSET
ONSET: GRADUAL

## 2020-01-01 ASSESSMENT — PAIN DESCRIPTION - PROGRESSION
CLINICAL_PROGRESSION: GRADUALLY WORSENING

## 2020-01-01 ASSESSMENT — PAIN - FUNCTIONAL ASSESSMENT
PAIN_FUNCTIONAL_ASSESSMENT: ACTIVITIES ARE NOT PREVENTED
PAIN_FUNCTIONAL_ASSESSMENT: ACTIVITIES ARE NOT PREVENTED

## 2020-01-01 ASSESSMENT — PAIN DESCRIPTION - PAIN TYPE
TYPE: CHRONIC PAIN

## 2020-11-07 PROBLEM — J96.01 ACUTE RESPIRATORY FAILURE WITH HYPOXIA (HCC): Status: ACTIVE | Noted: 2020-01-01

## 2020-11-07 NOTE — ED PROVIDER NOTES
Emergency Department Provider Note  Location: [address] EMERGENCY DEPARTMENT  2020     Patient Identification  Mya Mishra is a 62 y.o. female    Chief Complaint  Shortness of Breath          HPI  (History provided by patient)  Patient is a 57-year-old female history of A. fib on Coreg, history of breast cancer on Ibrance, who was recently hospitalized for found left-sided pleural effusion who presents with hypoxic respiratory failure. Was found hypoxic at her residence placed on nasal cannula and O2 sats improved. States that she has had progressive shortness of breath over the past 2 days and a nonproductive cough. She arrives mild respiratory distress, tachycardic 130s. She denies any fever chills. She denies any chest pain. She denies lightheadedness. I have reviewed the following nursing documentation:  Allergies: Allergies   Allergen Reactions    Sucralfate Hives and Nausea Only    Ampicillin Rash    Ampicillin-Sulbactam Sodium Rash    Sulfamethoxazole-Trimethoprim Rash       Past medical history:  has a past medical history of AC (acromioclavicular) joint bone spurs, Atrial fibrillation ([address] Cancer ([address] Cellulitis, CHF (congestive heart failure) ([address] Depression, Diabetes mellitus ([address] and Hypertension. Past surgical history:  has a past surgical history that includes Hysterectomy; Cholecystectomy; knee surgery (Right); Hysterectomy;  section; Colonoscopy (2017); sigmoidoscopy (2019); and sigmoidoscopy (N/A, 2019). Home medications:   Prior to Admission medications    Medication Sig Start Date End Date Taking?  Authorizing Provider   hydrALAZINE (APRESOLINE) 50 MG tablet Take 1 tablet by mouth 3 times daily 19  Yes Mele Hagen MD   furosemide (LASIX) 20 MG tablet Take 1 tablet by mouth as needed (edema) 19  Yes Mele Hagen MD   amLODIPine (NORVASC) 5 MG tablet Take 1 tablet by mouth daily 19  Yes Mele Hagen MD   ondansetron (ZOFRAN) 8 MG tablet Take 1 tablet by mouth every 8 hours as needed for Nausea 7/24/19  Yes Wellington Jose MD   morphine (MS CONTIN) 60 MG extended release tablet Take 60 mg by mouth 2 times daily. Yes Historical Provider, MD   morphine (MSIR) 15 MG tablet Take 30 mg by mouth 2 times daily. Yes Historical Provider, MD   raNITIdine HCl (RANITIDINE 150 MAX STRENGTH PO) Take 150 mg by mouth 2 times daily   Yes Historical Provider, MD   metoclopramide (REGLAN) 10 MG tablet Take 1 tablet by mouth 3 times daily as needed (nausea and vomiting) 6/6/18  Yes Calin Subramanian PA-C   calcium carbonate (OSCAL) 500 MG TABS tablet Take 500 mg by mouth daily   Yes Historical Provider, MD   ferrous sulfate 325 (65 FE) MG EC tablet Take 325 mg by mouth 3 times daily (with meals)   Yes Historical Provider, MD   palbociclib (IBRANCE) 100 MG capsule Take 100 mg by mouth daily   Yes Historical Provider, MD   fulvestrant (FASLODEX) 250 MG/5ML SOLN IM injection Inject 500 mg into the muscle once   Yes Historical Provider, MD   carvedilol (COREG) 25 MG tablet Take 25 mg by mouth 2 times daily (with meals)   Yes Historical Provider, MD   citalopram (CELEXA) 20 MG tablet Take 40 mg by mouth daily. Yes Historical Provider, MD   LANTUS SOLOSTAR 100 UNIT/ML injection pen Inject 10 Units as directed nightly 9/23/16   Historical Provider, MD       Social history:  reports that she has never smoked. She has never used smokeless tobacco. She reports that she does not drink alcohol or use drugs. Family history:    Family History   Problem Relation Age of Onset    Heart Disease Maternal Grandmother     Kidney Disease Mother     Cancer Maternal Aunt          ROS  Review of Systems   Constitutional: Negative for chills and fever. HENT: Negative for congestion and rhinorrhea. Eyes: Negative for photophobia and visual disturbance. Respiratory: Positive for cough and shortness of breath. Negative for wheezing. Cardiovascular: Negative for chest pain and palpitations. Gastrointestinal: Negative for abdominal distention, diarrhea, nausea and vomiting. Genitourinary: Negative for dysuria and hematuria. Musculoskeletal: Negative for back pain and neck pain. Skin: Negative for rash and wound. Neurological: Negative for syncope and weakness. Psychiatric/Behavioral: Negative for agitation and confusion. Exam  ED Triage Vitals   BP Temp Temp src Pulse Resp SpO2 Height Weight   -- -- -- -- -- -- -- --       Physical Exam  Vitals signs and nursing note reviewed. Constitutional:       General: She is not in acute distress. Appearance: She is well-developed. HENT:      Head: Normocephalic and atraumatic. Nose: Nose normal. No congestion. Eyes:      Pupils: Pupils are equal, round, and reactive to light. Neck:      Musculoskeletal: Normal range of motion and neck supple. Cardiovascular:      Rate and Rhythm: Regular rhythm. Tachycardia present. Heart sounds: No murmur. Comments: Equal radial pulses  Pulmonary:      Effort: Pulmonary effort is normal.      Breath sounds: Normal breath sounds. Abdominal:      General: There is no distension. Palpations: Abdomen is soft. Tenderness: There is no abdominal tenderness. Musculoskeletal: Normal range of motion. General: No deformity. Skin:     General: Skin is warm. Findings: No rash. Neurological:      Mental Status: She is alert and oriented to person, place, and time. Motor: No abnormal muscle tone.       Coordination: Coordination normal.   Psychiatric:         Mood and Affect: Mood normal.         Behavior: Behavior normal.           ED Course    ED Medication Orders (From admission, onward)    Start Ordered     Status Ordering Provider    11/07/20 1945 11/07/20 1917  ceFEPIme (MAXIPIME) 2 g in sterile water 20 mL IV syringe  EVERY 12 HOURS     Question Answer Comment   Antimicrobial Indications Urinary Tract Infection    Antimicrobial Indications Pneumonia (CAP)        Ordered NAFISA DOWNEY    11/07/20 1945 11/07/20 1917  vancomycin 1000 mg IVPB in 250 mL D5W addavial  ONCE     Question Answer Comment   Antimicrobial Indications Urinary Tract Infection    Antimicrobial Indications Pneumonia (CAP)        Ordered NAFISA DOWNEY    11/07/20 1800 11/07/20 1743  dilTIAZem injection 10 mg  ONCE      Last MAR action:  Given - by Yovany Floras on 11/07/20 at [address] NAFISA Cunha    11/07/20 1800 11/07/20 1743  dilTIAZem 125 mg in dextrose 5 % 125 mL infusion  CONTINUOUS      Last MAR action:  New Bag - by Yovany Bonnies [facility]07[facility]20 [facility]    11/07/20 1740 11/07/20 1740  iopamidol (ISOVUE-370) 76 % injection 75 mL  IMG ONCE PRN      Last MAR action:  Given - by Pierre Ponce on 11/07/20 at [facility]FISA    11/07/20 1615 11/07/20 1558  0.9 % sodium chloride bolus  ONCE      Last MAR action:  Stopped - by Yovany Bonnies on 11/07/20 at 1715 NAFISA DOWNEY          EKG  Narrow complex tachycardic rhythm rate approximately 140, SVT versus A. fib or flutter, normal axis, QTC prolonged 555, no diagnostic ischemic changes noted, nonspecific ST changes noted in the lateral leads. Repeat shows A. fib RVR rate presently 120. Second repeat shows normal sinus rhythm rate 83, normal axis normal intervals no evidence of conduction abnormalities or diagnostic ischemic changes, QTc 427. Radiology  Ct Head Wo Contrast    Result Date: 11/7/2020  EXAMINATION: CT OF THE HEAD WITHOUT CONTRAST  11/7/2020 4:05 pm TECHNIQUE: CT of the head was performed without the administration of intravenous contrast. Dose modulation, iterative reconstruction, and/or weight based adjustment of the mA/kV was utilized to reduce the radiation dose to as low as reasonably achievable.  COMPARISON: CT head 07/05/2019 HISTORY: ORDERING SYSTEM PROVIDED HISTORY: CA, pre heparin TECHNOLOGIST PROVIDED HISTORY: Reason for 07/10/2020 HISTORY: ORDERING SYSTEM PROVIDED HISTORY: tachy, hypoxic TECHNOLOGIST PROVIDED HISTORY: Reason for exam:->tachy, hypoxic Reason for Exam: sob Acuity: Acute Type of Exam: Initial Additional history includes metastatic breast cancer. FINDINGS: Pulmonary Arteries: Pulmonary arteries are adequately opacified for evaluation. Motion artifact degrades evaluation, notably of the segmental and subsegmental branches. No evidence for central pulmonary embolism. Main pulmonary artery is enlarged measuring 39 mm. Mediastinum: Enlarged subcarinal and left hilar lymph nodes are noted measuring 2.2 and 1.8 cm in short axis respectively. Mildly confluent soft tissue along the inferior left internal mammary chain. The heart and pericardium demonstrate no acute abnormality. There is no acute abnormality of the thoracic aorta. Lungs/pleura: Multifocal patchy consolidative opacities are present. Some of these opacities have a nodular appearance in the left upper lobe and medial right lower lobe as well. Moderate-sized left pleural effusion with suspected pleural thickening posteriorly. Trace right pleural fluid. The central airway is patent. Upper Abdomen: No acute findings. Soft Tissues/Bones: Grossly similar appearance of small nonspecific right axillary lymph nodes. Mixed lytic and blastic lesion in the body of the sternum again demonstrated. Heterogeneous sclerosis in the left humeral head also appears unchanged. Suspected bone lesions also noted in the lateral left 4th rib and posterolateral right 7th rib, similar compared to the prior PET-CT. Vertebral hemangioma at the T6 level. 1.  Multifocal consolidative opacity compatible with inflammatory process or infection. 2.  Motion artifact degrades evaluation of the pulmonary arteries. No evidence for central embolism.  3.  Nodular appearance of some of these opacities are also noted, favored to represent a component of the acute inflammatory process/infection versus metastatic disease. Short-term follow-up in 3 months is recommended to ensure resolution. 4.  Moderate-sized left pleural effusion and suspected pleural thickening. Neoplastic involvement is not excluded. 5.  Osseous metastatic disease again demonstrated. Similar appearance of mediastinal lymphadenopathy and left internal lymphadenopathy.          Labs  Results for orders placed or performed during the hospital encounter of 11/07/20   CBC Auto Differential   Result Value Ref Range    WBC 7.3 4.0 - 11.0 K/uL    RBC 3.02 (L) 4.00 - 5.20 M/uL    Hemoglobin 9.1 (L) 12.0 - 16.0 g/dL    Hematocrit 27.5 (L) 36.0 - 48.0 %    MCV 91.2 80.0 - 100.0 fL    MCH 30.3 26.0 - 34.0 pg    MCHC 33.3 31.0 - 36.0 g/dL    RDW 22.2 (H) 12.4 - 15.4 %    Platelets 896 332 - 336 K/uL    MPV 7.5 5.0 - 10.5 fL    Neutrophils % 93.7 %    Lymphocytes % 1.8 %    Monocytes % 3.6 %    Eosinophils % 0.2 %    Basophils % 0.7 %    Neutrophils Absolute 6.8 1.7 - 7.7 K/uL    Lymphocytes Absolute 0.1 (L) 1.0 - 5.1 K/uL    Monocytes Absolute 0.3 0.0 - 1.3 K/uL    Eosinophils Absolute 0.0 0.0 - 0.6 K/uL    Basophils Absolute 0.0 0.0 - 0.2 K/uL   Lactate, Sepsis   Result Value Ref Range    Lactic Acid, Sepsis 1.1 0.4 - 1.9 mmol/L   Urinalysis Reflex to Culture    Specimen: Urine, clean catch   Result Value Ref Range    Color, UA DARK YELLOW (A) Straw/Yellow    Clarity, UA Clear Clear    Glucose, Ur Negative Negative mg/dL    Bilirubin Urine SMALL (A) Negative    Ketones, Urine TRACE (A) Negative mg/dL    Specific Gravity, UA >=1.030 1.005 - 1.030    Blood, Urine Negative Negative    pH, UA 5.0 5.0 - 8.0    Protein, UA TRACE (A) Negative mg/dL    Urobilinogen, Urine 0.2 <2.0 E.U./dL    Nitrite, Urine Negative Negative    Leukocyte Esterase, Urine TRACE (A) Negative    Microscopic Examination YES     Urine Type NotGiven     Urine Reflex to Culture Not Indicated    Brain Natriuretic Peptide   Result Value Ref Range    Pro-BNP 13,426 (H) 0 - 124 pg/mL   Comprehensive Metabolic Panel w/ Reflex to MG   Result Value Ref Range    Sodium 135 (L) 136 - 145 mmol/L    Potassium reflex Magnesium 4.0 3.5 - 5.1 mmol/L    Chloride 95 (L) 99 - 110 mmol/L    CO2 29 21 - 32 mmol/L    Anion Gap 11 3 - 16    Glucose 137 (H) 70 - 99 mg/dL    BUN 14 7 - 20 mg/dL    CREATININE 1.5 (H) 0.6 - 1.2 mg/dL    GFR Non-African American 35 (A) >60    GFR  43 (A) >60    Calcium 8.9 8.3 - 10.6 mg/dL    Total Protein 7.5 6.4 - 8.2 g/dL    Alb 3.7 3.4 - 5.0 g/dL    Albumin/Globulin Ratio 1.0 (L) 1.1 - 2.2    Total Bilirubin 1.2 (H) 0.0 - 1.0 mg/dL    Alkaline Phosphatase 62 40 - 129 U/L    ALT 7 (L) 10 - 40 U/L    AST 12 (L) 15 - 37 U/L    Globulin 3.8 g/dL   Troponin   Result Value Ref Range    Troponin <0.01 <0.01 ng/mL   Blood gas, venous   Result Value Ref Range    pH, Migue 7.380 7.350 - 7.450    pCO2, M[place] 49.1 40.0 - 50.0 mmHg    pO2, M[place] 50.9 (H) 25.0 - 40.0 mmHg    HCO3, Venous 28.4 23.0 - 29.0 mmol/L    Base Excess, Migue 2.7 -3.0 - 3.0 mmol/L    O2 Sat, Migue 85 Not Established %    Carboxyhemoglobin 3.8 (H) 0.0 - 1.5 %    MetHgb, Migue 0.3 <1.5 %    TC02 (Calc), Migue 30 Not Established mmol/L    O2 Content, Migue 12 Not Established VOL %    O2 Therapy Unknown    Microscopic Urinalysis   Result Value Ref Range    Mucus, UA 1+ (A) None Seen /LPF    WBC, UA 6-9 (A) 0 - 5 /HPF    RBC, UA 3-4 0 - 4 /HPF    Epithelial Cells, UA 0-1 0 - 5 /HPF    Bacteria, UA 2+ (A) None Seen /HPF   EKG 12 Lead   Result Value Ref Range    Ventricular Rate 140 BPM    Atrial Rate 133 BPM    QRS Duration 86 ms    Q-T Interval 364 ms    QTc Calculation (Bazett) 555 ms    R Axis 40 degrees    T Axis 220 degrees    Diagnosis       Supraventricular tachycardiaAbnormal ECGNo previous ECGs availableConfirmed by ARMIDA PINEDA MD ([id number]) on 11/7/2020 4:44:34 PM   EKG 12 Lead   Result Value Ref Range    Ventricular Rate 128 BPM    Atrial Rate 129 BPM    QRS Duration 82 ms    Q-T Interval 316 ms QTc Calculation (Bazett) 461 ms    R Axis 42 degrees    T Axis 181 degrees    Diagnosis       Atrial fibrillation with rapid ventricular responseST & T wave abnormality, consider inferolateral ischemia or digitalis effectAbnormal ECGNo previous ECGs available         MDM  Patient seen and evaluated. Relevant records reviewed. This is a 69-year-old female with history of breast cancer A. fib who presents with hypoxic respiratory failure. Patient mild respiratory distress maintaining O2 sats on nonrebreather which was transitioned to 6 L nasal cannula. Initial EKG showing narrow complex tachycardic rhythm 130s to 140s unclear rhythm. She received IV fluid bolus and repeat EKG shows A. fib with RVR. She was then started on diltiazem infusion after bolus which controlled her rates. She was sent for CT PE study which does not show evidence of acute PE but does show multifocal pneumonia with a left-sided effusion which is now recurrent. And concern for Covid. We will treat for hospital-acquired pneumonia given her recent admission to [facility]. During her course patient did convert back to a normal sinus rhythm so diltiazem infusion is being held at this time. Discussed the case with hospitalist [facility] who will admit for hypoxic respiratory failure. Clinical Impression:  1. Acute respiratory failure with hypoxia ([address]    2. Pneumonia due to organism    3. Pleural effusion    4. Atrial fibrillation with RVR ([facility])          Disposition:  Admit to telemetry in stable condition. Blood pressure 117/80, pulse 81, temperature 99.5 °F (37.5 °C), temperature source Oral, resp. rate 18, SpO2 95 %, not currently breastfeeding. Patient was given scripts for the following medications. I counseled patient how to take these medications. New Prescriptions    No medications on file       Disposition referral (if applicable):  No follow-up provider specified.       Total critical care time is 35 minutes, which excludes separately billable procedures and updating family. Time spent is specifically for management of the presenting complaint and symptoms initially, direct bedside care, reevaluation, review of records, and consultation. There was a high probability of clinically significant life-threatening deterioration in the patient's condition, which required my urgent intervention. This chart was generated in part by using Dragon Dictation system and may contain errors related to that system including errors in grammar, punctuation, and spelling, as well as words and phrases that may be inappropriate. If there are any questions or concerns please feel free to contact the dictating provider for clarification.      MD Dave Diop MD  11/07/20 1940

## 2020-11-07 NOTE — ED TRIAGE NOTES
Presents per [facility] for c/o shortness of breath. Recent thoracentesis for 70 oz of fluid from the left lung. History right breast CA  5 years ago with lumpectomy and continues with chemo pills. States she has been short of breath for a month. Felt better after tap. Worsened again over the last few days. EMS report pt was 47% on room air upon their arrival that quickly robbin to 90% on NRB with high flow oxygen. Denies home oxygen use. Pt has history of atrial fib and is atrial fib on the monitor. Denies chest pain at this time.

## 2020-11-08 PROBLEM — I48.0 PAROXYSMAL ATRIAL FIBRILLATION (HCC): Status: ACTIVE | Noted: 2020-01-01

## 2020-11-08 PROBLEM — J18.9 HCAP (HEALTHCARE-ASSOCIATED PNEUMONIA): Status: ACTIVE | Noted: 2020-01-01

## 2020-11-08 NOTE — PROGRESS NOTES
Initial assessment completed, VSS, afebrile, pt currently denies having pain. Pt a/o x4, lungs diminished to auscultation, pt satting in the low 90's on 8 L HFNC. Pt experiencing BARRAZA. POC discussed with pt, questions/concerns addressed at this time, call light within reach, will continue to monitor.

## 2020-11-08 NOTE — CONSULTS
Pharmacy Note  Vancomycin Consult    Micky Ornelas is a 62 y.o. female started on Vancomycin for CAP; consult received from Dr. Siddhartha Latif to manage therapy. Also receiving the following antibiotics: levaquin. Patient Active Problem List   Diagnosis    Cellulitis and abscess of leg    Chest pain    Acute lateral meniscus tear of left knee    Left knee pain    SOB (shortness of breath)    Primary cancer of right breast with metastasis to other site ([facility]    Anxiety    DM2 (diabetes mellitus, type 2) ([facility]    GERD (gastroesophageal reflux disease)    Hypertension, uncontrolled    Morbid obesity due to excess calories (HCC)    Acute renal injury ([facility]    Chemotherapy-induced neutropenia (HCC)    Diarrhea    Acute respiratory failure with hypoxia (HCC)       Allergies:  Sucralfate; Ampicillin; Ampicillin-sulbactam sodium; and Sulfamethoxazole-trimethoprim     Temp max:     Recent Labs     11/07/20  1517   BUN 14       Recent Labs     11/07/20  1517   CREATININE 1.5*       Recent Labs     11/07/20  1517   WBC 7.3       No intake or output data in the 24 hours ending 11/08/20 0021    Culture Date      Source                       Results      Ht Readings from Last 1 Encounters:   11/07/20 5' 4\" (1.626 m)        Wt Readings from Last 1 Encounters:   11/07/20 235 lb (106.6 kg)         Body mass index is 40.34 kg/m². Estimated Creatinine Clearance: 47 mL/min (A) (based on SCr of 1.5 mg/dL (H)). Goal Trough Level: 15-20 mcg/mL    Assessment/Plan:  Patient received vancomycin 1 gram ivpb x1 dose in ER at 2034 on 11-7-20. Please start vancomycin 1 gram ivpb q24h at 2100 on 11-8-20. Please check vancomycin trough 11-10-20 at 2030. Thank you for the consult. Will continue to follow. [address] Ph.  11/8/2020 12:23 AM

## 2020-11-08 NOTE — H&P
Hospital Medicine History & Physical      PCP: Rhea Sapp MD    Date of Admission: 2020    Date of Service: Pt seen/examined on 2020    Chief Complaint: Shortness of breath    History Of Present Illness:    62 y.o. female who presented to the hospital with a chief complaint of shortness of breath. The patient has history of atrial fibrillation, breast cancer currently on Ibrance. EMS was called for worsening shortness of breath. When they got to the patient's residence she was hypoxic on room air had been placed on supplemental oxygen. When she got to the emergency department she was tachycardic to the 130s in atrial fibrillation, she was placed on high flow oxygen and was eventually weaned down to 6 L with oxygen. Imaging obtained shows a worsening left-sided pleural effusion and consolidations concerning for pneumonia. She was started on IV antibiotic therapy cardizem and was transferred for further medical care. Past Medical History:        Diagnosis Date    AC (acromioclavicular) joint bone spurs     knees    Atrial fibrillation (HCC)     Cancer (HCC)     stage 4 breast    Cellulitis     [facility]     Depression     anxiety    Diabetes mellitus ([facility]     Hypertension        Past Surgical History:        Procedure Laterality Date     SECTION      CHOLECYSTECTOMY      COLONOSCOPY  2017    polyps    HYSTERECTOMY      HYSTERECTOMY      KNEE SURGERY Right     SIGMOIDOSCOPY  2019    SIGMOIDOSCOPY N/A 2019    SIGMOIDOSCOPY BIOPSY FLEXIBLE performed by Dontae Pike DO at [facility]       Medications Prior to Admission:   Prior to Admission medications    Medication Sig Start Date End Date Taking?  Authorizing Provider   hydrALAZINE (APRESOLINE) 50 MG tablet Take 1 tablet by mouth 3 times daily 19  Yes David Salas MD   furosemide (LASIX) 20 MG tablet Take 1 tablet by mouth as needed (edema) 19 Yes Cameron Erickson MD   amLODIPine [facility]) 5 MG tablet Take 1 tablet by mouth daily 8/1/19  Yes Cameron Erickson MD   ondansetron [facility]) 8 MG tablet Take 1 tablet by mouth every 8 hours as needed for Nausea 7/24/19  Yes Connie Tipton MD   morphine (MS CONTIN) 60 MG extended release tablet Take 60 mg by mouth 2 times daily. Yes Historical Provider, MD   morphine (MSIR) 15 MG tablet Take 30 mg by mouth 2 times daily. Yes Historical Provider, MD   metoclopramide (REGLAN) 10 MG tablet Take 1 tablet by mouth 3 times daily as needed (nausea and vomiting) 6/6/18  Yes Viridiana Bradley PA-C   calcium carbonate (OSCAL) 500 MG TABS tablet Take 500 mg by mouth daily   Yes Historical Provider, MD   ferrous sulfate 325 (65 FE) MG EC tablet Take 325 mg by mouth 3 times daily (with meals)   Yes Historical Provider, MD   palbociclib (IBRANCE) 100 MG capsule Take 100 mg by mouth daily   Yes Historical Provider, MD   fulvestrant (FASLODEX) 250 MG/5ML SOLN IM injection Inject 500 mg into the muscle once   Yes Historical Provider, MD   carvedilol (COREG) 25 MG tablet Take 25 mg by mouth 2 times daily (with meals)   Yes Historical Provider, MD   citalopram (CELEXA) 20 MG tablet Take 40 mg by mouth daily. Yes Historical Provider, MD SHEY LYNNOSTAR 100 UNIT/ML injection pen Inject 10 Units as directed nightly 9/23/16   Historical Provider, MD       Allergies:  Sucralfate; Ampicillin; Ampicillin-sulbactam sodium; and Sulfamethoxazole-trimethoprim    Social History:      TOBACCO:   reports that she has never smoked. She has never used smokeless tobacco.  ETOH:   reports no history of alcohol use. Family History:          Problem Relation Age of Onset    Heart Disease Maternal Grandmother     Kidney Disease Mother     Cancer Maternal Aunt        REVIEW OF SYSTEMS:   Constitutional:  Negative for fever,chills or night sweats  ENT:  Negative for rhinorrhea, epistaxis, hoarseness, sore throat.   Respiratory: Positive for shortness of breath  Cardiovascular:   Negative for  chest pain, palpitations   Gastrointestinal:  Negative for nausea, vomiting, diarrhea  Genitourinary:  Negative for polyuria, dysuria   Hematologic/Lymphatic:  Negative for  bleeding tendency, easy bruising  Musculoskeletal:  Negative for myalgias and arthralgias  Neurologic:  Negative for  confusion,dysarthria. Skin:  Negative for itching,rash  Psychiatric:  Negative for depression,anxiety, agitation. Endocrine:  Negative for polydipsia,polyuria,heat /cold intolerance. PHYSICAL EXAM:  /70   Pulse 83   Temp 99.3 °F (37.4 °C) (Oral)   Resp 20   SpO2 90%   General appearance: Moderate respiratory distress  HEENT:  Normal cephalic, atraumatic without obvious deformity. Pupils equal, round, and reactive to light. Extra ocular muscles intact. Conjunctivae/corneas clear. Neck: Supple, with full range of motion. No jugular venous distention. Trachea midline. Respiratory: Tachypnea, diminished breath sounds especially on the left, scattered inspiratory and expiratory wheezing  Cardiovascular:  Regular rate and rhythm with normal S1/S2 without murmurs, rubs or gallops. Abdomen: Soft, non-tender, non-distended with normal bowel sounds. Musculoskeletal:  No clubbing, cyanosis or edema bilaterally. Full range of motion without deformity. Skin: Skin color, texture, turgor normal.  No rashes or lesions. Neurologic:  Neurovascularly intact without any focal sensory/motor deficits.  Cranial nerves: II-XII intact, grossly non-focal.  Psychiatric:  Alert and oriented, thought content appropriate, normal insight  Capillary Refill: Brisk,< 3 seconds   Peripheral Pulses: +2 palpable, equal bilaterally       Labs:   Recent Labs     11/07/20  1517   WBC 7.3   HGB 9.1*   HCT 27.5*        Recent Labs     11/07/20  1517   *   K 4.0   CL 95*   CO2 29   BUN 14   CREATININE 1.5*   CALCIUM 8.9     Recent Labs     11/07/20  1517   AST 12*   ALT 7* BILITOT 1.2*   ALKPHOS 62     No results for input(s): INR in the last 72 hours. Recent Labs     11/07/20  1517   TROPONINI <0.01       Urinalysis:      Lab Results   Component Value Date    NITRU Negative 11/07/2020    WBCUA 6-9 11/07/2020    BACTERIA 2+ 11/07/2020    RBCUA 3-4 11/07/2020    BLOODU Negative 11/07/2020    SPECGRAV >=1.030 11/07/2020    GLUCOSEU Negative 11/07/2020       Radiology:   EKG:  I have reviewed the EKG with the following interpretation: Atrial fibrillation with RVR    CT CHEST PULMONARY EMBOLISM W CONTRAST   Final Result   1. Multifocal consolidative opacity compatible with inflammatory process or   infection. 2.  Motion artifact degrades evaluation of the pulmonary arteries. No   evidence for central embolism. 3.  Nodular appearance of some of these opacities are also noted, favored to   represent a component of the acute inflammatory process/infection versus   metastatic disease. Short-term follow-up in 3 months is recommended to   ensure resolution. 4.  Moderate-sized left pleural effusion and suspected pleural thickening. Neoplastic involvement is not excluded. 5.  Osseous metastatic disease again demonstrated. Similar appearance of   mediastinal lymphadenopathy and left internal lymphadenopathy. CT HEAD WO CONTRAST   Final Result   No acute intracranial abnormality. XR CHEST PORTABLE   Final Result   Patchy bilateral pulmonary opacities, concerning for multifocal pneumonia. Imaging features can be seen with COVID-19 pneumonia, though are nonspecific   and can occur with a variety of infectious and noninfectious processes.  PneInd             ASSESSMENT:  Acute respiratory failure hypoxia  Healthcare associated pneumonia  Pleural effusion likely metastatic, recurrent  Metastatic breast cancer on ibrance   Atrial fibrillation with RVR  Rule out COVID-19  Hypertension  Heart failure with preserved ejection fraction    PLAN:  Continue supplemental oxygen  Antibiotics for healthcare associated pneumonia given recent hospitalization. Vancomycin, cefepime and Levaquin ordered. Swab for COVID-19, droplet isolation.  -Rate controlled on fluid back into sinus rhythm. Will give 1 dose of Cardizem, resume Coreg in the a.m.  -We will likely need a therapeutic thoracentesis, consult pulmonary  -Continue pain control    DVT Prophylaxis: Lovenox  Diet: DIET GENERAL;  Code Status: Full Code    Dispo -ad[facility] to [facility]      Thank you for the opportunity to be involved in this patient's care.       (Please note that portions of this note were completed with a voice recognition program. Efforts were made to edit the dictations but occasionally words are mis-transcribed.)

## 2020-11-08 NOTE — ED NOTES
Patient transported to [facility] per [facility].      Vinod English RN  11/07/20 [id number]

## 2020-11-08 NOTE — PROGRESS NOTES
Pt helped to the [facility] and oxygen saturation started dropping, Pt was 81% on 9L high flow oxygen, Pt placed back in bed and instructed to take slow deep breaths and was not recovering. Oxygen turned to 15L High flow and sat around 96%.  Oxygen turned down to 10L at this time, Pt sustaining 94%

## 2020-11-08 NOTE — ED NOTES
Patient now pain free after HHN and breathing easier. O2 sat 94% after HHN.       Vinod English RN  11/07/20 [id number]

## 2020-11-08 NOTE — PROGRESS NOTES
Progress Note    Admit Date:  11/7/2020    Subjective:  Ms. Priti Cook was admitted to hospital with increasing shortness of breath. She had been admitted to [facility] about 3 weeks ago when she is diagnosed with left pleural effusion. This is tapped. She felt better the time of discharge. Patient is diagnosed with metastatic right breast cancer 4 years ago. She is on Damir. She is a past medical history of atrial fibrillation. She also has a history of diabetes, hypertension, morbid obesity and depression. At the time of her admission patient was found to have acute respiratory failure. Her initial heart rate was in the 130s. She was in A. fib. She is placed on oxygen. She is requiring up to 12 L of oxygen. This has been weaned down to 5.5 L of oxygen at present. Work-up in the ER included a chest x-ray that showed recurrent left pleural effusion. Review of the cytology from [facility] showed no malignant cells. This was of course on just one sample. When I saw the patient she was still somewhat short of breath. She is complaining of leg edema. She denies any anosmia. No diarrhea. Objective:   BP (!) 158/78   Pulse 78   Temp 98 °F (36.7 °C) (Oral)   Resp 20   Ht 5' 4\" (1.626 m)   Wt 235 lb (106.6 kg)   SpO2 90%   BMI 40.34 kg/m²          Intake/Output Summary (Last 24 hours) at 11/8/2020 1617  Last data filed at 11/8/2020 1445  Gross per 24 hour   Intake 460 ml   Output 900 ml   Net -440 ml       Physical Exam:    General appearance: She is awake and alert. She has a sick appearance. Head: Normocephalic, without obvious abnormality, atraumatic  Eyes: conjunctivae/corneas clear. PERRL, EOM's intact. Neck: no adenopathy, no carotid bruit, no JVD, supple, symmetrical, trachea midline and thyroid not enlarged, symmetric, no tenderness/mass/nodules  Lungs: Dullness to percussion left lung. He had absent breath sounds in the left lung.   Right lung sounds clear. Heart: regular rate and rhythm, S1, S2 normal, no murmur, click, rub or gallop  Abdomen: soft, non-tender; bowel sounds normal; no masses,  no organomegaly  Extremities: extremities normal, atraumatic, no cyanosis, trace edema  Pulses: 2+ and symmetric  Skin: Skin color, texture, turgor normal. No rashes or lesions  Neurologic: Grossly normal    Scheduled Meds:   albuterol sulfate HFA  2 puff Inhalation 4x daily    [START ON 11/9/2020] pantoprazole  40 mg Oral QAM AC    morphine  60 mg Oral BID    carvedilol  25 mg Oral BID WC    citalopram  40 mg Oral Daily    sodium chloride flush  10 mL Intravenous 2 times per day    enoxaparin  40 mg Subcutaneous Daily    vancomycin  1 g Intravenous Q24H    levofloxacin  750 mg Intravenous Q48H    insulin lispro  0-12 Units Subcutaneous TID WC    insulin lispro  0-6 Units Subcutaneous Nightly       Continuous Infusions:   dextrose         PRN Meds:  albuterol sulfate HFA, furosemide, sodium chloride flush, acetaminophen **OR** acetaminophen, polyethylene glycol, promethazine **OR** ondansetron, glucose, dextrose, glucagon (rDNA), dextrose, morphine      Data:  CBC:   Recent Labs     11/07/20  1517 11/08/20  0555   WBC 7.3 8.0   HGB 9.1* 8.1*   HCT 27.5* 24.7*   MCV 91.2 93.8    193     BMP:   Recent Labs     11/07/20  1517 11/08/20  0555   * 134*   K 4.0 4.1   CL 95* 92*   CO2 29 28   BUN 14 16   CREATININE 1.5* 1.4*     LIVER PROFILE:   Recent Labs     11/07/20  1517 11/08/20  0555   AST 12* 11*   ALT 7* 6*   BILITOT 1.2* 1.3*   ALKPHOS 62 55     PT/INR:   Recent Labs     11/08/20  0555   PROTIME 15.3*   INR 1.32*     Cultures:   Results for Shiraz Ho (MRN [id number]) as of 11/8/2020 16:17   Ref. Range 11/8/2020 03:11   SARS-CoV-2, NAAT Latest Ref Range: Not Detected  Not Detected         CT CHEST PULMONARY EMBOLISM W CONTRAST   Final Result   1.   Multifocal consolidative opacity compatible with inflammatory process or infection. 2.  Motion artifact degrades evaluation of the pulmonary arteries. No   evidence for central embolism. 3.  Nodular appearance of some of these opacities are also noted, favored to   represent a component of the acute inflammatory process/infection versus   metastatic disease. Short-term follow-up in 3 months is recommended to   ensure resolution. 4.  Moderate-sized left pleural effusion and suspected pleural thickening. Neoplastic involvement is not excluded. 5.  Osseous metastatic disease again demonstrated. Similar appearance of   mediastinal lymphadenopathy and left internal lymphadenopathy. CT HEAD WO CONTRAST   Final Result   No acute intracranial abnormality. XR CHEST PORTABLE   Final Result   Patchy bilateral pulmonary opacities, concerning for multifocal pneumonia. Imaging features can be seen with COVID-19 pneumonia, though are nonspecific   and can occur with a variety of infectious and noninfectious processes. PneInd         US THORACENTESIS    (Results Pending)          Assessment:  Principal Problem:    Acute respiratory failure with hypoxia (HCC)  Active Problems:    SOB (shortness of breath)    Primary cancer of right breast with metastasis to other site (HCC)    Anxiety    DM2 (diabetes mellitus, type 2) (HCC)    GERD (gastroesophageal reflux disease)    Morbid obesity due to excess calories (HCC)    HCAP (healthcare-associated pneumonia)    Paroxysmal atrial fibrillation (HCC)  Resolved Problems:    * No resolved hospital problems. *      Plan:    #Acute respiratory failure with hypoxia. This is likely secondary to pneumonia and pleural effusion. Pulmonary consultation was obtained. She is requiring oxygen. She was initially on 12 L but is now down to 5.5 L. COVID-19 rapid test was negative. PCR is pending. Clinical suspicion low. Droplet plus isolation. #Healthcare associated pneumonia from MRSA or gram-negative organism.   With recent hospitalization metastatic breast cancer she is at risk for MRSA and for gram-negative pneumonia. She was started on IV vancomycin and cefepime. Levaquin for atypical coverage. #Left pleural effusion. She had this at [facility] 3 weeks ago. Thoracentesis was done. Cytology that did not show any malignant cells. Repeat thoracentesis will be ordered for tomorrow. The possibility exists that this could be malignant pleural effusion. #Diabetes mellitus type 2. Monitor sugars closely. #Atrial fibrillation with RVR. Presently in sinus rhythm. #Morbid Obesity  - Body mass index is 40.34 kg/m². - Complicating assessment and treatment. Placing patient at risk for multiple co-morbidities as well as early death and contributing to the patient's presentation.   - Counseled on weight loss. #Metastatic breast cancer. On Ibrance. #Nodular lung opacity in the lung may be metastatic breast disease. #Lovenox for DVT prophylaxis. Comment: Please note this report has been produced using speech recognition software and may contain errors related to that system including errors in grammar, punctuation, and spelling, as well as words and phrases that may be inappropriate. If there are any questions or concerns please feel free to contact the dictating provider for clarification. All questions and concerns were addressed to the patient/family. Alternatives to my treatment were discussed. The note was completed using EMR. Every effort was made to ensure accuracy; however, inadvertent computerized transcription errors may be present.             Bailee Nava MD 11/8/2020 4:17 PM

## 2020-11-08 NOTE — CARE COORDINATION
agreement/Understanding:   Not Indicated    Community Service Affiliation  Dialysis:  No    · Agency:  · Location:  · Dialysis Schedule:  · Phone:   · Fax: Other Community Services: (ex:PT/OT,Mental Health,Wound Clinic, [address])  [address] for breast CA-    DISCHARGE PLAN: Explained Case Management role/services. Reviewed chart. Role of discharge planner explained and patient verbalized understanding. Pt is alert and oriented. [place] home with her dad, sister, and son, and plans to return. Pt states that she is active with [address] for breast CA. Pt states that she takes a chemo pill x 17 days and then off 7 days. Follow for possible home O2.

## 2020-11-08 NOTE — PROGRESS NOTES
RESPIRATORY THERAPY ASSESSMENT    Name:  Shelbie Candelario  Medical Record Number:  [id number]  Age: 62 y.o. Gender: female  : 1960  Today's Date:  2020  Room:  0232/0232-01    Assessment     Is the patient being admitted for a COPD or Asthma exacerbation? No   (If yes the patient will be seen every 4 hours for the first 24 hours and then reassessed)    Patient Admission Diagnosis      Allergies  Allergies   Allergen Reactions    Sucralfate Hives and Nausea Only    Ampicillin Rash    Ampicillin-Sulbactam Sodium Rash    Sulfamethoxazole-Trimethoprim Rash       Minimum Predicted Vital Capacity:               Actual Vital Capacity:                    Pulmonary History:CHF/Pulmonary Edema  Home Oxygen Therapy:  room air  Home Respiratory Therapy:None   Current Respiratory Therapy:  Albuterol 2PUFFS Q6PRN. Respiratory Severity Index(RSI)   Patients with orders for inhalation medications, oxygen, or any therapeutic treatment modality will be placed on Respiratory Protocol. They will be assessed with the first treatment and at least every 72 hours thereafter. The following severity scale will be used to determine frequency of treatment intervention.     Smoking History: Pulmonary Disease or Smoking History, Greater than 15 pack year = 2    Social History  Social History     Tobacco Use    Smoking status: Never Smoker    Smokeless tobacco: Never Used   Substance Use Topics    Alcohol use: No    Drug use: No       Recent Surgical History: None = 0  Past Surgical History  Past Surgical History:   Procedure Laterality Date     SECTION      CHOLECYSTECTOMY      COLONOSCOPY  2017    polyps    HYSTERECTOMY      HYSTERECTOMY      KNEE SURGERY Right     SIGMOIDOSCOPY  2019    SIGMOIDOSCOPY N/A 2019    SIGMOIDOSCOPY BIOPSY FLEXIBLE performed by Jonas Brooks DO at [address]       Level of Consciousness: Alert, Oriented, and Cooperative = 0    Level of Activity: Walking with assistance = 1    Respiratory Pattern: Dyspnea with exertion;Irregular pattern;or RR less than 6 = 2    Breath Sounds: Diminshed bilaterally and/or crackles = 2    Sputum   ,  ,    Cough: Strong, spontaneous, non-productive = 0    Vital Signs   BP (!) 164/73   Pulse 82   Temp 97.8 °F (36.6 °C) (Oral)   Resp 20   Ht 5' 4\" (1.626 m)   Wt 235 lb (106.6 kg)   SpO2 94%   BMI 40.34 kg/m²   SPO2 (COPD values may differ): Less than 86% on room air or greater than 92% on FiO2 greater than 50% = 4    Peak Flow (asthma only): not applicable = 0    RSI: 04-28 = Q6H or QID and Q4HPRN for dyspnea        Plan       Goals: medication delivery    Patient/caregiver was educated on the proper method of use for Respiratory Care Devices:        Level of patient/caregiver understanding able to:   ? Verbalize understanding   ? Demonstrate understanding       ? Teach back        ? Needs reinforcement       ? No available caregiver               ? Other:     Response to education:       Is patient being placed on Home Treatment Regimen? No     Does the patient have everything they need prior to discharge? NA     Comments: Chart reviewed and patient assessed. Plan of Care: Albuterol 2PUFFS QID & Q4PRN. Electronically signed by Leroy Jordan RCP on 11/8/2020 at 3:38 AM    Respiratory Protocol Guidelines     1. Assessment and treatment by Respiratory Therapy will be initiated for medication and therapeutic interventions upon initiation of aerosolized medication. 2. Physician will be contacted for respiratory rate (RR) greater than 35 breaths per minute. Therapy will be held for heart rate (HR) greater than 140 beats per minute, pending direction from physician. 3. Bronchodilators will be administered via Metered Dose Inhaler (MDI) with spacer when the following criteria are met:  a.  Alert and cooperative     b. HR < 140 bpm  c. RR < 30 bpm                d. Can demonstrate a 2-3 second inspiratory hold  4. Bronchodilators will be administered via Hand Held Nebulizer [facility]) to patients when ANY of the following criteria are met  a. Incognizant or uncooperative          b. Patients treated with HHN at Home        c. Unable to demonstrate proper use of MDI with spacer     d. RR > 30 bpm   5. Bronchodilators will be delivered via Metered Dose Inhaler (MDI), HHN, Aerogen to intubated patients on mechanical ventilation. 6. Inhalation medication orders will be delivered and/or substituted as outlined below. Aerosolized Medications Ordering and Administration Guidelines:    1. All Medications will be ordered by a physician, and their frequency and/or modality will be adjusted as defined by the patients Respiratory Severity Index (RSI) score. 2. If the patient does not have documented COPD, consider discontinuing anticholinergics when RSI is less than 9.  3. If the bronchospasm worsens (increased RSI), then the bronchodilator frequency can be increased to a maximum of every 4 hours. If greater than every 4 hours is required, the physician will be contacted. 4. If the bronchospasm improves, the frequency of the bronchodilator can be decreased, based on the patient's RSI, but not less than home treatment regimen frequency. 5. Bronchodilator(s) will be discontinued if patient has a RSI less than 9 and has received no scheduled or as needed treatment for 72  Hrs. Patients Ordered on a Mucolytic Agent:    1. Must always be administered with a bronchodilator. 2. Discontinue if patient experiences worsened bronchospasm, or secretions have lessened to the point that the patient is able to clear them with a cough. Anti-inflammatory and Combination Medications:    1. If the patient lacks prior history of lung disease, is not using inhaled anti-inflammatory medication at home, and lacks wheezing by examination or by history for at least 24 hours, contact physician for possible discontinuation.

## 2020-11-08 NOTE — CARE COORDINATION
Advance Care Planning     Advance Care Planning Activator (Inpatient)  Conversation Note      Date of ACP Conversation: 11/7/2020    Conversation Conducted with: Patient with Decision Making Capacity    ACP Activator: [address] Chrissy Davis makes decisions on behalf of the incapacitated patient: Decision Maker is asked to consider and make decisions based on patient values, known preferences, or best interests. Health Care Decision Maker:     Current Designated Health Care Decision Maker:   Primary Decision Maker: Vinicius Andrea - Child - [phone]  (If there is a [address] named in the [facility] Makers\" box in the ACP activity, but it is not visible above, be sure to open that field and then select the health care decision maker relationship (ie \"primary\") in the blank space to the right of the name.) Validate  this information as still accurate & up-to-date; edit [place] field as needed.)    Note: Assess and validate information in current ACP documents, as indicated. If no Decision Maker listed above or available through scanned documents, then:    If no Authorized Decision Maker has previously been identified, then patient chooses [place]:  \"Who would you like to name as your primary health care decision-maker? \"               Name: Tommie Fernandez     Relationship: son          Phone number: [phone]  Wendi Render this person be reached easily? \" Yes    Note: If the relationship of these Decision-Makers to the patient does NOT follow your state's Next of Kin hierarchy, recommend that patient complete ACP document that meets state-specific requirements to allow them to act on the patient's behalf when appropriate. Care Preferences    Ventilation:   \"If you were in your present state of health and suddenly became very ill and were unable to breathe on your own, what would your preference be about the use of a ventilator (breathing machine) if it were available to you? \"      Would the patient desire the use of ventilator (breathing machine)?: yes    \"If your health worsens and it becomes clear that your chance of recovery is unlikely, what would your preference be about the use of a ventilator (breathing machine) if it were available to you? \"     Would the patient desire the use of ventilator (breathing machine)?: Yes      Resuscitation  \"CPR works best to restart the heart when there is a sudden event, like a heart attack, in someone who is otherwise healthy. Unfortunately, CPR does not typically restart the heart for people who have serious health conditions or who are very sick. \"    \"In the event your heart stopped as a result of an underlying serious health condition, would you want attempts to be made to restart your heart (answer \"yes\" for attempt to resuscitate) or would you prefer a natural death (answer \"no\" for do not attempt to resuscitate)? \" yes      NOTE: If the patient has a valid advance directive AND now provides care preference(s) that are inconsistent with that prior directive, advise the patient to consider either: creating a new advance directive that complies with state-specific requirements; or, if that is not possible, orally revoking that prior directive in accordance with state-specific requirements, which must be documented in the EHR. [x] Yes   [] No   Educated Patient / Romero Kaufman regarding differences between Advance Directives and portable DNR orders.     Length of ACP Conversation in minutes:      Conversation Outcomes:  [x] ACP discussion completed  [] Existing advance directive reviewed with patient; no changes to patient's previously recorded wishes  [] New Advance Directive completed  [] Portable Do Not Rescitate prepared for Provider review and signature  [] POLST/POST/MOLST/MOST prepared for Provider review and signature      Follow-up plan:    [] Schedule follow-up conversation to continue planning  [] Referred individual to Provider for additional questions/concerns   [] Advised patient/agent/surrogate to review completed ACP document and update if needed with changes in condition, patient preferences or care setting    [] This note routed to one or more involved healthcare providers

## 2020-11-08 NOTE — ED NOTES
Patient transfer from ER stretcher to transport stretcher and felt short of breath and chest pain. MD notified. EKG done.      Priti Gutierrez RN  11/07/20 8798

## 2020-11-08 NOTE — PROGRESS NOTES
Pt received into 232 from . O2 88% on 6 liters N/C. Pt desats with movement and is C/O misternal chest pain. Pt has audible expiratory wheezing. Spoke with Dr Leonie Mccall regarding pt. Respiratory in to start pt on high flow O2. Pt at 8 liters O2 sat- 91%.

## 2020-11-08 NOTE — PLAN OF CARE
Problem: Falls - Risk of:  Goal: Will remain free from falls  Description: Will remain free from falls  11/8/2020 1608 by Julio Wilhelm RN  Outcome: Met This Shift  11/8/2020 1607 by Julio Wilhelm RN  Outcome: Met This Shift  Goal: Absence of physical injury  Description: Absence of physical injury  11/8/2020 1608 by Julio Wilhelm RN  Outcome: Met This Shift  11/8/2020 1607 by Julio Wilhelm RN  Outcome: Met This Shift     Problem: Airway Clearance - Ineffective  Goal: Achieve or maintain patent airway  Outcome: Met This Shift     Problem: Gas Exchange - Impaired  Goal: Absence of hypoxia  Outcome: Ongoing  Goal: Promote optimal lung function  Outcome: Ongoing     Problem: Breathing Pattern - Ineffective  Goal: Ability to achieve and maintain a regular respiratory rate  Outcome: Ongoing     Problem:  Body Temperature -  Risk of, Imbalanced  Goal: Ability to maintain a body temperature within defined limits  Outcome: Met This Shift  Goal: Will regain or maintain usual level of consciousness  Outcome: Met This Shift  Goal: Complications related to the disease process, condition or treatment will be avoided or minimized  Outcome: Met This Shift     Problem: Isolation Precautions - Risk of Spread of Infection  Goal: Prevent transmission of infection  Outcome: Met This Shift     Problem: Nutrition Deficits  Goal: Optimize nutrtional status  Outcome: Met This Shift     Problem: Risk for Fluid Volume Deficit  Goal: Maintain normal heart rhythm  Outcome: Met This Shift  Goal: Maintain absence of muscle cramping  Outcome: Met This Shift  Goal: Maintain normal serum potassium, sodium, calcium, phosphorus, and pH  Outcome: Met This Shift     Problem: Loneliness or Risk for Loneliness  Goal: Demonstrate positive use of time alone when socialization is not possible  Outcome: Met This Shift     Problem: Fatigue  Goal: Verbalize increase energy and improved vitality  Outcome: Met This Shift     Problem: Patient Education: Go to Patient Education Activity  Goal: Patient/Family Education  Outcome: Met This Shift

## 2020-11-09 NOTE — PROGRESS NOTES
RT notified of pt wheezing and requesting treatment - will see her first during rounds.  Zana Brandon Clinical

## 2020-11-09 NOTE — PROGRESS NOTES
Dr. Con Dowell ordered rapid sequence intubation. 20 mg of etomidate ordered and administered at 1842 hours  5 mg of versed ordered and administered at 1841 hours    Preoxygenated with BVM to 97%. Dr. Con Dowell inserted a number  7.5 ET tube at 1843. The ET tube is secured at 23 cm measured at the patients lip line. Breath sounds are Equal bilaterally. . There is a positive color change noted in the colorimetric CO2 detector. RT connected the patient to a ventilator. See orders for ventilator orders. (16/400/80%/10 PEEP)    5 mL of Propofol pushed by Dr. Con Dowell at 2608. Dr Con Dowell ordered propofol infusion to start at 50 mcg/kg/min started at 1848    OG tube inserted to a depth of 66 cm. Ausculation of air bolus is positive. POST Intubation ORDERS    ABG ordered in 1 hours  Portable Chest x-ray ordered to confirm placement of the patients ET tube and gastric tube. Bilateral wrist restraints ordered and initiated. Pulses present distal to restraints. Propofol drip ordered for sedation. See EMAR and orders. See physician note to follow.  Electronically signed by Jose Best RN on 11/9/2020 at 6:35 PM

## 2020-11-09 NOTE — PROGRESS NOTES
11/09/20 1856   Vent Information   $Ventilation $Initial Day   Skin Assessment Clean, dry, & intact   Suction Catheter Diameter 14   Vent Type 840   Vent Mode AC/VC   Vt Ordered 400 mL   Rate Set 16 bmp   Peak Flow 60 L/min   FiO2  80 %   SpO2 95 %   SpO2/FiO2 ratio 118.75   Sensitivity 3   PEEP/CPAP 10   Humidification Source Heated wire   Humidification Temp 37   Vent Patient Data   Peak Inspiratory Pressure 31 cmH2O   Mean Airway Pressure 16 cmH20   Rate Measured 29 br/min   Vt Exhaled 401 mL   Minute Volume 6.33 Liters   I:E Ratio 1:4.2   Cough/Sputum   Sputum How Obtained Suctioned;Endotracheal   Cough Non-productive   Spontaneous Breathing Trial (SBT) RT Doc   Pulse 142   Additional Respiratory  Assessments   Resp 15   Position Semi-Mooney's   Alarm Settings   High Pressure Alarm 40 cmH2O   Low Minute Volume Alarm 2 L/min   Apnea (secs) 20 secs   High Respiratory Rate 40 br/min   Low Exhaled Vt  300 mL   ETT (adult)   Placement Date/Time: 11/09/20 1840   Preoxygenation: Yes  Location: Oral  Measured From: Lips   Measured From [address] [employer]   Site Condition Dry

## 2020-11-09 NOTE — PROGRESS NOTES
Notified by monitor tech that pt's HR is in the 130's-140's sustaining. Entered pt's room to pt c/o chest pain 6/10 to L chest, pt denies nausea, pain does not radiate to jaw or down LUE. Scheduled Coreg administered per order. STAT EKG ordered, Dr. Ozzy Bob made aware. Pt also c/o chest palpitations, feels as if she's been \"runing around. \"

## 2020-11-09 NOTE — CARE COORDINATION
INTERDISCIPLINARY PLAN OF CARE CONFERENCE    Date/Time: 11/9/2020 4:09 PM  Completed by: Eris Christie Case Management      Patient Name:  Jessie Mcnulty  YOB: 1960  Admitting Diagnosis: Acute respiratory failure with hypoxia ([facility] [address] [J96.01]  Acute respiratory failure with hypoxia ([facility] [address] [J96.01]     Admit Date/Time:  11/7/2020  1:46 PM    Chart reviewed. Interdisciplinary team contacted or reviewed plan related to patient progress and discharge plans. Disciplines included Case Management, Nursing, and Dietitian. Current Status: IP 11/07/2020  PT/OT recommendation for discharge plan of care:     Expected D/C Disposition:  Home  Confirmed plan with patient via phone  Discharge Plan Comments: Chart reviewed. Met with pt and explained the role of the CM. palsn to return home. IPTA. NO new HHC needs identified. CM following for possible new home O2 pending further testing and C19 result. Home O2 in place on admit: No - currently 90-93% on 6 liters.

## 2020-11-09 NOTE — PROGRESS NOTES
Progress Note    Admit Date:  11/7/2020    Subjective:  Ms. Priti Cook was admitted to hospital with increasing shortness of breath. She had been admitted to [facility] about 3 weeks ago when she is diagnosed with left pleural effusion. This is tapped. She felt better the time of discharge. Patient is diagnosed with metastatic right breast cancer 4 years ago. She is on Damir. She is a past medical history of atrial fibrillation. She also has a history of diabetes, hypertension, morbid obesity and depression. At the time of her admission patient was found to have acute respiratory failure. Her initial heart rate was in the 130s. She was in A. fib. She is placed on oxygen. She is requiring up to 12 L of oxygen. This has been weaned down to 5.5 L of oxygen at present. Work-up in the ER included a chest x-ray that showed recurrent left pleural effusion. Review of the cytology from [facility] showed no malignant cells. This was of course on just one sample. When I saw the patient she was still somewhat short of breath. She is complaining of leg edema. She denies any anosmia. No diarrhea. 11/9. Persistent shortness of breath . thoracentesis was ordered for pleural effusion (not done yet as patient is on droplet plus precautions)  - COVID-19 results are still pendin. - she continues to feel dyspneic .     she is complaining of chest pain she has become tachycardic heart rate is up to 140->  sinus tachycardia  EKG - sinus tachycardia , lateral ST changes.        Objective:   /72   Pulse 140   Temp 97 °F (36.1 °C) (Oral)   Resp 20   Ht 5' 4\" (1.626 m)   Wt 235 lb (106.6 kg)   SpO2 90% Comment: INCREASED TO 6l   BMI 40.34 kg/m²          Intake/Output Summary (Last 24 hours) at 11/9/2020 1739  Last data filed at 11/9/2020 1508  Gross per 24 hour   Intake 240 ml   Output 1650 ml   Net -1410 ml       Physical Exam:    General appearance: She is awake and alert.  She has a sick appearance. Worsening respiratory distress  Head: Normocephalic, without obvious abnormality, atraumatic  Eyes: conjunctivae/corneas clear. PERRL, EOM's intact. Neck: no adenopathy, no carotid bruit, no JVD, supple, symmetrical, trachea midline and thyroid not enlarged, symmetric, no tenderness/mass/nodules  Lungs: Diminished breath sounds on the left, increasing bibasilar crackles. Heart: Tachycardic.   Abdomen: soft, non-tender; bowel sounds normal; no masses,  no organomegaly  Extremities: extremities normal, atraumatic, no cyanosis, trace edema  Pulses: 2+ and symmetric  Skin: Skin color, texture, turgor normal. No rashes or lesions  Neurologic: Grossly normal    Scheduled Meds:   albuterol sulfate HFA  2 puff Inhalation 4x daily    pantoprazole  40 mg Oral QAM AC    morphine  60 mg Oral BID    carvedilol  25 mg Oral BID WC    citalopram  40 mg Oral Daily    sodium chloride flush  10 mL Intravenous 2 times per day    enoxaparin  40 mg Subcutaneous Daily    vancomycin  1 g Intravenous Q24H    levofloxacin  750 mg Intravenous Q48H    insulin lispro  0-12 Units Subcutaneous TID WC    insulin lispro  0-6 Units Subcutaneous Nightly       Continuous Infusions:   dextrose         PRN Meds:  albuterol sulfate HFA, furosemide, sodium chloride flush, acetaminophen **OR** acetaminophen, polyethylene glycol, promethazine **OR** ondansetron, glucose, dextrose, glucagon (rDNA), dextrose, morphine      Data:  CBC:   Recent Labs     11/07/20  1517 11/08/20  0555   WBC 7.3 8.0   HGB 9.1* 8.1*   HCT 27.5* 24.7*   MCV 91.2 93.8    193     BMP:   Recent Labs     11/07/20  1517 11/08/20  0555   * 134*   K 4.0 4.1   CL 95* 92*   CO2 29 28   BUN 14 16   CREATININE 1.5* 1.4*     LIVER PROFILE:   Recent Labs     11/07/20 1517 11/08/20  0555   AST 12* 11*   ALT 7* 6*   BILITOT 1.2* 1.3*   ALKPHOS 62 55     PT/INR:   Recent Labs     11/08/20  0555   PROTIME 15.3*   INR 1.32*     Cultures: Results for Bryan Giraldo (MRN [id number]) as of 11/8/2020 16:17   Ref. Range 11/8/2020 03:11   SARS-CoV-2, NAAT Latest Ref Range: Not Detected  Not Detected       Radiology  CT CHEST PULMONARY EMBOLISM W CONTRAST   Final Result   1. Multifocal consolidative opacity compatible with inflammatory process or   infection. 2.  Motion artifact degrades evaluation of the pulmonary arteries. No   evidence for central embolism. 3.  Nodular appearance of some of these opacities are also noted, favored to   represent a component of the acute inflammatory process/infection versus   metastatic disease. Short-term follow-up in 3 months is recommended to   ensure resolution. 4.  Moderate-sized left pleural effusion and suspected pleural thickening. Neoplastic involvement is not excluded. 5.  Osseous metastatic disease again demonstrated. Similar appearance of   mediastinal lymphadenopathy and left internal lymphadenopathy. CT HEAD WO CONTRAST   Final Result   No acute intracranial abnormality. XR CHEST PORTABLE   Final Result   Patchy bilateral pulmonary opacities, concerning for multifocal pneumonia. Imaging features can be seen with COVID-19 pneumonia, though are nonspecific   and can occur with a variety of infectious and noninfectious processes. PneInd         US THORACENTESIS    (Results Pending)          Assessment:  Principal Problem:    Acute respiratory failure with hypoxia (HCC)  Active Problems:    SOB (shortness of breath)    Primary cancer of right breast with metastasis to other site (HCC)    Anxiety    DM2 (diabetes mellitus, type 2) (HCC)    GERD (gastroesophageal reflux disease)    Morbid obesity due to excess calories (HCC)    HCAP (healthcare-associated pneumonia)    Paroxysmal atrial fibrillation (HCC)    Pleural effusion    Atrial fibrillation with RVR ([address]  Resolved Problems:    * No resolved hospital problems.  *      Plan:    #Acute respiratory failure with hypoxia. - This is likely secondary to pneumonia and pleural effusion.    -Pulmonary consultation was obtained. - O2 12L->. 5.5 L  -Remains on droplet plus precautions. Awaiting thoracentesis  -Worsening respiratory distress now, patient on 100% nonrebreather, severely hypoxic. Transferred to ICU--> subsequently intubated ( 11/9 PM )  -  COVID-19 rapid test was negative. PCR is pending. Droplet plus isolation. #Healthcare associated pneumonia from MRSA or gram-negative organism. With recent hospitalization metastatic breast cancer she is at risk for MRSA and for gram-negative pneumonia.    -She was started on IV vancomycin and cefepime. Levaquin for atypical coverage. #Left pleural effusion. She had this at [facility] 3 weeks ago. Thoracentesis was done. Cytology that did not show any malignant cells. Repeat thoracentesis  Ordered. .  The possibility exists that this could be malignant pleural effusion. #Diabetes mellitus type 2. Monitor sugars closely. #Atrial fibrillation with RVR. Presently in sinus rhythm. #Morbid Obesity  - Body mass index is 40.34 kg/m². - Complicating assessment and treatment. Placing patient at risk for multiple co-morbidities as well as early death and contributing to the patient's presentation.   - Counseled on weight loss. #Metastatic breast cancer. On Ibrance. #Nodular lung opacity in the lung may be metastatic breast disease. #Lovenox for DVT prophylaxis. Patient in droplet plus precautions. Examined twice. Significant decline this evening. Worsening respiratory distress. Transferred to the ICU.      Garrison Maldonado MD 11/9/2020 5:39 PM

## 2020-11-09 NOTE — PROCEDURES
ENDOTRACHEAL INTUBATION    INDICATION: Life threatening respiratory failure    TIME OUT: taken    SEDATION: etomidate and versed    PROCEDURE: Using video laryngoscopy, the vocal cords were well visualized and a 7.5 mm endotracheal tube was place directly through the cords. Good breath sounds auscultated bilaterally without sounds over abdomen. Good return of CO2 on the monitor. CXR is pending.

## 2020-11-09 NOTE — PLAN OF CARE
Problem: Falls - Risk of:  Goal: Will remain free from falls  Description: Will remain free from falls  Outcome: Met This Shift  Goal: Absence of physical injury  Description: Absence of physical injury  Outcome: Met This Shift     Problem: Airway Clearance - Ineffective  Goal: Achieve or maintain patent airway  Outcome: Met This Shift     Problem: Gas Exchange - Impaired  Goal: Absence of hypoxia  Outcome: Ongoing  Goal: Promote optimal lung function  Outcome: Ongoing     Problem: Breathing Pattern - Ineffective  Goal: Ability to achieve and maintain a regular respiratory rate  Outcome: Ongoing     Problem:  Body Temperature -  Risk of, Imbalanced  Goal: Ability to maintain a body temperature within defined limits  Outcome: Met This Shift  Goal: Will regain or maintain usual level of consciousness  Outcome: Met This Shift  Goal: Complications related to the disease process, condition or treatment will be avoided or minimized  Outcome: Met This Shift     Problem: Isolation Precautions - Risk of Spread of Infection  Goal: Prevent transmission of infection  Outcome: Met This Shift     Problem: Nutrition Deficits  Goal: Optimize nutrtional status  Outcome: Met This Shift     Problem: Risk for Fluid Volume Deficit  Goal: Maintain normal heart rhythm  Outcome: Met This Shift  Goal: Maintain absence of muscle cramping  Outcome: Met This Shift  Goal: Maintain normal serum potassium, sodium, calcium, phosphorus, and pH  Outcome: Met This Shift     Problem: Loneliness or Risk for Loneliness  Goal: Demonstrate positive use of time alone when socialization is not possible  Outcome: Met This Shift     Problem: Fatigue  Goal: Verbalize increase energy and improved vitality  Outcome: Met This Shift     Problem: Patient Education: Go to Patient Education Activity  Goal: Patient/Family Education  Outcome: Met This Shift

## 2020-11-09 NOTE — PROGRESS NOTES
SA complete, pt resting quietly in bed, call light In reach scheduled meds given. No further needs stated at this time. Will continue to monitor.

## 2020-11-09 NOTE — PROGRESS NOTES
I was requested to see the patient again by Dr. Zach Bonilla due to an acute decompensation. A rapid response was called for markedly increased work of breathing with severe hypoxemia, the patient been transitioned to a 100% nonrebreather and was still saturating only at 89%. On my arrival, the patient was in respiratory extremis. She had been given morphine and was given Ativan without significant improvement. I emergently transferred her to the ICU where I intubated her with improvement in her oxygenation. She was given IV Lasix and is having good urine output. Stat portable chest x-ray is requested. Vent orders were placed including sedation. Plan will be for diuresis and thoracentesis, tracheal aspirate. Total critical care time caring for this patient with life threatening, unstable organ failure, including direct patient contact, management of life support systems, review of data including imaging and labs, discussions with other team members and physicians is 40 minutes so far today, excluding procedures.

## 2020-11-09 NOTE — PROGRESS NOTES
Pt audibly wheezing, O2 sats reading 80% on 5 L HFNC. Pt's HR still sustaining in the 140's - pt c/o chest palpitations and SOB. This RN increased O2 to 15 L HFNC, called RAPID RESPONSE. Trinity Alaniz, RT at bedside, transitioned pt to non-breather at 15 L - sats increased but was still struggling to breathe, Dr. Elian Lacey at bedside, ordered 40 mg IV lasix x1, van placement, transfer pt to ICU, and 2 mg IV Morphine. RT at bedside, south ABG and sent to lab. When this RN, along with Robson Walsh, RN, inserted van into pt, pt became audily wheezing, very anxious, sats dropped to 90% on 15 L non-breather. Dr. Negin Melton right outside room came to bedside and ordered 1 mg IV Ativan and ordered pt to be intubated once pt was in ICU. This RN and Robson Walsh transferred pt to ICU room #2 where she was then intubated. Report was given to ICU RN. Questions/concerns addressed at this time.

## 2020-11-09 NOTE — PROGRESS NOTES
Vancomycin Day: 3    Patient's labs, cultures, vitals, and vancomycin regimen reviewed. No changes today.   Trough due on 10th at 2030  Antelmo DUMONT 11/9/20201:16 PM  .

## 2020-11-09 NOTE — PROGRESS NOTES
Lungs/pleura: Multifocal patchy consolidative opacities are present.  Some of these opacities have a nodular appearance in the left upper lobe and medial right lower lobe as well.  Moderate-sized left pleural effusion with suspected pleural thickening posteriorly.  Trace right pleural fluid. Head CT - no acute intracranial abnormality      CT report [facility] 10/19/20:   No evidence of pulmonary embolism. Multiple left lung nodules and pleural nodularity compatible with metastatic disease. Left hilar lymphadenopathy, reactive or metastatic.  Moderate left pleural effusion    ASSESSMENT:  · Acute respiratory failure with hypoxemia  · HCAP  · Nodular opacities in lung- may represent mets from breast cancer  · L pleural effusion- recurrent  · AFIB with RVR    PLAN:  · Supplemental oxygen to maintain SaO2 >92%; wean as tolerated   · Vancomycin and Levaquin day #3  · Left-sided ultrasound-guided thoracentesis ordered

## 2020-11-10 NOTE — PROGRESS NOTES
FiO2 decreased to 35%           11/10/20 1459   Vent Information   Skin Assessment Clean, dry, & intact   Vent Mode AC/VC   Vt Ordered 400 mL   Rate Set 12 bmp   Peak Flow 55 L/min   FiO2  35 %   SpO2 96 %   SpO2/FiO2 ratio 274.29   Sensitivity 3   PEEP/CPAP 8   Humidification Source Heated wire   Humidification Temp Measured 37   Vent Patient Data   Peak Inspiratory Pressure 22 cmH2O   Mean Airway Pressure 11 cmH20   Rate Measured 18 br/min   Vt Exhaled 440 mL   Minute Volume 8.7 Liters   I:E Ratio 1:3   Cough/Sputum   Sputum How Obtained Suctioned;Endotracheal   Cough Non-productive   Spontaneous Breathing Trial (SBT) RT Doc   Pulse 76   Breath Sounds   Right Upper Lobe Diminished   Right Middle Lobe Diminished   Right Lower Lobe Diminished   Left Upper Lobe Diminished   Left Lower Lobe Diminished   Additional Respiratory  Assessments   Resp 17   Position Semi-Mooney's   Oral Care Completed? Yes   Oral Care Mouth suctioned   Subglottic Suction Done?  Yes   Alarm Settings   High Pressure Alarm 40 cmH2O   Low Minute Volume Alarm 3 L/min   Apnea (secs) 20 secs   High Respiratory Rate 40 br/min   Low Exhaled Vt  300 mL   ETT (adult)   Placement Date/Time: 11/09/20 1840   Preoxygenation: Yes  Location: Oral  Measured From: Lips   Secured at 23 cm   Measured From [address] [employer]   Site Condition Dry

## 2020-11-10 NOTE — PROGRESS NOTES
4 Eyes Skin Assessment     The patient is being assess for   Shift Handoff    I agree that 2 RN's have performed a thorough Head to Toe Skin Assessment on the patient. ALL assessment sites listed below have been assessed. Areas assessed by both nurses:   [x]   Head, Face, and Ears   [x]   Shoulders, Back, and Chest, Abdomen  [x]   Arms, Elbows, and Hands   [x]   Coccyx, Sacrum, and Ischium  [x]   Legs, Feet, and Heels        Blanchable redness to coccyx. Scattered bruising. **SHARE this note so that the co-signing nurse is able to place an eSignature**    Co-signer eSignature: Electronically signed by Morris Jaquez RN on 11/10/20 at 8:10 AM EST    Does the Patient have Skin Breakdown? No          Rigoberto Prevention initiated:  Yes   Wound Care Orders initiated:  NA    [facility] nurse consulted for Pressure Injury (Stage 3,4, Unstageable, DTI, NWPT, Complex wounds)and New or Established Ostomies:  NA      Primary Nurse eSignature: Electronically signed by Cary Pedraza RN on 11/10/20 at 7:36 AM EST        \Patient is not able to demonstrate the ability to move from a reclining position to an upright position within the recliner due to pt being sedated on mechanical ventilation.

## 2020-11-10 NOTE — PROGRESS NOTES
Pt resting in bed with eyes closed. NSR on the monitor. Propofol gtt continues to infuse, see SULEIMAN Galan RN

## 2020-11-10 NOTE — PROGRESS NOTES
Pulmonary & Critical Care Medicine ICU Progress Note    CC: Shortness of breath    Events of Last 24 hours:   Acute respiratory decompensation requiring urgent intubation  Brief hypotension, responded to fluids     Invasive Lines: Peripheral    MV: 2020  Vent Mode: AC/VC Rate Set: 16 bmp/Vt Ordered: 400 mL/ /FiO2 : 40 %  Recent Labs     20   PHART 7.418 7.430   VPY1QDB 51.8* 48.0*   PO2ART 79.8 121.0*       IV:   propofol 25 mcg/kg/min (11/10/20 0414)    dextrose         Vitals:  Blood pressure 131/73, pulse 75, temperature 97.5 °F (36.4 °C), temperature source Axillary, resp. rate 17, height 5' 4\" (1.626 m), weight 235 lb (106.6 kg), SpO2 100 %, not currently breastfeeding. on 40%  Temp  Av.7 °F (36.5 °C)  Min: 97 °F (36.1 °C)  Max: 98.9 °F (37.2 °C)    Intake/Output Summary (Last 24 hours) at 11/10/2020 7293  Last data filed at 11/10/2020 0600  Gross per 24 hour   Intake 1366 ml   Output 950 ml   Net 416 ml     EXAM:  General: intubated, ill appearing    Eyes: PERRL. No sclera icterus. No conjunctival injection. ENT: No discharge. Pharynx with ETT. Neck: Trachea midline. Normal thyroid. Resp: No accessory muscle use. No crackles. No wheezing. No rhonchi. No dullness on percussion. CV: Regular rate. Regular rhythm. No mumur or rub. No edema. Peripheral pulses are 2+. Capillary refill is less than 3 seconds. GI: Non-tender. Non-distended. No masses. No organomegaly. Normal bowel sounds. No hernia. Skin: Warm and dry. No nodule on exposed extremities. No rash on exposed extremities. Lymph: No cervical LAD. No supraclavicular LAD. M/S: No cyanosis. No joint deformity. No clubbing. Neuro: Sedated, not following commands.  Patellar reflexes are symmetric  Psych: Unable to obtain because the patient is non-communicative      Scheduled Meds:   carboxymethylcellulose PF  1 drop Both Eyes Q4H    And    artificial tears   Both Eyes Q4H    chlorhexidine  15 mL Mouth/Throat BID    mupirocin   Nasal BID    ipratropium-albuterol  1 ampule Inhalation Q4H    pantoprazole  40 mg Oral QAM AC    morphine  60 mg Oral BID    carvedilol  25 mg Oral BID WC    citalopram  40 mg Oral Daily    sodium chloride flush  10 mL Intravenous 2 times per day    enoxaparin  40 mg Subcutaneous Daily    vancomycin  1 g Intravenous Q24H    levofloxacin  750 mg Intravenous Q48H    insulin lispro  0-12 Units Subcutaneous TID WC    insulin lispro  0-6 Units Subcutaneous Nightly     PRN Meds:  fentanNYL, midazolam, albuterol sulfate HFA **AND** ipratropium **AND** MDI Treatment, furosemide, sodium chloride flush, acetaminophen **OR** acetaminophen, polyethylene glycol, promethazine **OR** ondansetron, glucose, dextrose, glucagon (rDNA), dextrose, morphine    Results:  CBC:   Recent Labs     11/07/20  1517 11/08/20  0555   WBC 7.3 8.0   HGB 9.1* 8.1*   HCT 27.5* 24.7*   MCV 91.2 93.8    193     BMP:   Recent Labs     11/07/20  1517 11/08/20  0555   * 134*   K 4.0 4.1   CL 95* 92*   CO2 29 28   BUN 14 16   CREATININE 1.5* 1.4*     LIVER PROFILE:   Recent Labs     11/07/20  1517 11/08/20  0555   AST 12* 11*   ALT 7* 6*   BILITOT 1.2* 1.3*   ALKPHOS 62 55       Cultures:  11/8/2020 SARS-CoV-2 PCR negative  11/8/2020 SARS-CoV-2 NAAT negative  11/7/2020 blood no growth  11/9/2020 tracheal aspirate pending    Films:  Chest CT 11/7/20:   Mediastinum: Enlarged subcarinal and left hilar lymph nodes are noted measuring 2.2 and 1.8 cm in short axis respectively. Ali Cleaves confluent soft tissue along the inferior left internal mammary chain.    Lungs/pleura: Multifocal patchy consolidative opacities are present.  Some of these opacities have a nodular appearance in the left upper lobe and medial right lower lobe as well.  Moderate-sized left pleural effusion with suspected pleural thickening posteriorly.  Trace right pleural fluid.      CT report [facility] 10/19/20:   No evidence of pulmonary embolism. Multiple left lung nodules and pleural nodularity compatible with metastatic disease. Left hilar lymphadenopathy, reactive or metastatic. Moderate left pleural effusion    CXR 11/9/2020 worsening bilateral airspace disease    ASSESSMENT:  · Acute respiratory failure with hypoxemia  · HCAP  · Nodular opacities in lung- may represent mets from breast cancer  · L pleural effusion- recurrent  · AFIB with RVR    PLAN:  Mechanical ventilation as per my orders. The ventilator was adjusted by me at the bedside for unstable, life threatening respiratory failure. IV Propofol for sedation, target RASS -2, with daily spontaneous awakening trial.  Fentanyl PRN pain, gtt as needed  Head of bed 30 degrees or higher at all times  · Daily spontaneous breathing trial beginning tomorrow  · Vancomycin and Levaquin day #4  · Left-sided ultrasound-guided thoracentesis as previously ordered  · F/U labs, Diuresis   · Prophylaxis: Lovenox, Protonix, Bactroban    Total critical care time caring for this patient with life threatening, unstable organ failure, including direct patient contact, management of life support systems, review of data including imaging and labs, discussions with other team members and physicians is 35 minutes so far today, excluding procedures.

## 2020-11-10 NOTE — PROGRESS NOTES
Pt's son and primary decision maker, Shelbie Munoz, called and updated on change in pt's condition. Questions/concerns addressed at this time. 1 - Pt's sister, Mell called and updates were given, Jorge voiced appreciation.

## 2020-11-10 NOTE — PROGRESS NOTES
and Uncooperative = 2    Level of Activity: Mostly sedentary, minimal walking = 2    Respiratory Pattern: Dyspnea with exertion;Irregular pattern;or RR less than 6 = 2    Breath Sounds: Diminshed bilaterally and/or crackles = 2    Sputum  Sputum Color: Dark Yellow, Tenacity: Thick, Sputum How Obtained: Suctioned, Endotracheal  Cough: Strong, productive = 1    Vital Signs   BP 90/65   Pulse 135   Temp 98.9 °F (37.2 °C) (Axillary)   Resp 16   Ht 5' 4\" (1.626 m)   Wt 235 lb (106.6 kg)   SpO2 100%   BMI 40.34 kg/m²   SPO2 (COPD values may differ): Less than 86% on room air or greater than 92% on FiO2 greater than 50% = 4    Peak Flow (asthma only): not applicable = 0    RSI: 24-94 = Q4WA (every four hours while awake) and Q4hrs PRN        Plan       Goals: medication delivery and improve oxygenation    Patient/caregiver was educated on the proper method of use for Respiratory Care Devices:  No:       Level of patient/caregiver understanding able to:   ? Verbalize understanding   ? Demonstrate understanding       ? Teach back        ? Needs reinforcement       ? No available caregiver               ? Other:     Response to education:  N/A     Is patient being placed on Home Treatment Regimen? No     Does the patient have everything they need prior to discharge? NA     Comments: Patient chart reviewed, patient assessed. Plan of Care: change patient to duoneb Q4    Electronically signed by Javier Molina RCP on 11/9/2020 at 11:56 PM    Respiratory Protocol Guidelines     1. Assessment and treatment by Respiratory Therapy will be initiated for medication and therapeutic interventions upon initiation of aerosolized medication. 2. Physician will be contacted for respiratory rate (RR) greater than 35 breaths per minute. Therapy will be held for heart rate (HR) greater than 140 beats per minute, pending direction from physician.   3. Bronchodilators will be administered via Metered Dose Inhaler (MDI) with spacer when the following criteria are met:  a. Alert and cooperative     b. HR < 140 bpm  c. RR < 30 bpm                d. Can demonstrate a 2-3 second inspiratory hold  4. Bronchodilators will be administered via Hand Held Nebulizer GIL [facility] [place]) to patients when ANY of the following criteria are met  a. Incognizant or uncooperative          b. Patients treated with HHN at Home        c. Unable to demonstrate proper use of MDI with spacer     d. RR > 30 bpm   5. Bronchodilators will be delivered via Metered Dose Inhaler (MDI), HHN, Aerogen to intubated patients on mechanical ventilation. 6. Inhalation medication orders will be delivered and/or substituted as outlined below. Aerosolized Medications Ordering and Administration Guidelines:    1. All Medications will be ordered by a physician, and their frequency and/or modality will be adjusted as defined by the patients Respiratory Severity Index (RSI) score. 2. If the patient does not have documented COPD, consider discontinuing anticholinergics when RSI is less than 9.  3. If the bronchospasm worsens (increased RSI), then the bronchodilator frequency can be increased to a maximum of every 4 hours. If greater than every 4 hours is required, the physician will be contacted. 4. If the bronchospasm improves, the frequency of the bronchodilator can be decreased, based on the patient's RSI, but not less than home treatment regimen frequency. 5. Bronchodilator(s) will be discontinued if patient has a RSI less than 9 and has received no scheduled or as needed treatment for 72  Hrs. Patients Ordered on a Mucolytic Agent:    1. Must always be administered with a bronchodilator. 2. Discontinue if patient experiences worsened bronchospasm, or secretions have lessened to the point that the patient is able to clear them with a cough. Anti-inflammatory and Combination Medications:    1.  If the patient lacks prior history of lung disease, is not using inhaled anti-inflammatory medication at home, and lacks wheezing by examination or by history for at least 24 hours, contact physician for possible discontinuation.

## 2020-11-10 NOTE — FLOWSHEET NOTE
11/10/20 1100   Vital Signs   Temp 97.6 °F (36.4 °C)   Temp Source Oral   Pulse 72   Resp 14   /72   MAP (mmHg) 89   Level of Consciousness 1   MEWS Score 1   Oxygen Therapy   SpO2 91 %   Pt reassessed, see doc flow. NSR 72 on ICU bedside monitor. #7.5 ETT @ 23LL. Vent: AC  RR:12  Vt: 400 FIO2: 40% PEEP 10 SPO2 91% with CSPO2 monitoring. RASS-1. PERRL. PIV x 3 WDL propofol gtt @ 40 mcg/kg/min, fentanyl gtt @ 25 mcg/hr. OG placement checked via gastric contents. . Medications administered via OG, flushed easily, clamped. Mercedes secured draining clear yellow urine. Pt repositioned for comfort. Will continue to closely monitor.

## 2020-11-10 NOTE — PROGRESS NOTES
FiO2 decreased to 40%         11/10/20 0655   Vent Information   Skin Assessment Clean, dry, & intact   Vent Type 840   Vent Mode AC/VC   Vt Ordered 400 mL   Rate Set 16 bmp   Peak Flow 60 L/min   FiO2  40 %   SpO2 100 %   SpO2/FiO2 ratio 250   Sensitivity 3   PEEP/CPAP 10   Humidification Source Heated wire   Humidification Temp Measured 37   Vent Patient Data   Peak Inspiratory Pressure 28 cmH2O   Mean Airway Pressure 15 cmH20   Rate Measured 1.8 br/min   Vt Exhaled 422 mL   Minute Volume 7.4 Liters   I:E Ratio 1:3   Plateau Pressure 21 DLP40   Static Compliance 38 mL/cmH2O   Dynamic Compliance 20 mL/cmH2O   Cough/Sputum   Sputum How Obtained Suctioned;Endotracheal   Cough Productive   Sputum Amount Moderate   Sputum Color Tan   Tenacity Thick   Spontaneous Breathing Trial (SBT) RT Doc   Pulse 75   Breath Sounds   Right Upper Lobe Rhonchi   Right Middle Lobe Diminished   Right Lower Lobe Diminished   Left Upper Lobe Rhonchi   Left Lower Lobe Diminished   Additional Respiratory  Assessments   Resp 17   Position Semi-Mooney's   Oral Care Completed? Yes   Oral Care Mouth suctioned   Subglottic Suction Done?  Yes   Cuff Pressure (cm H2O) 28 cm H2O   Alarm Settings   High Pressure Alarm 40 cmH2O   Apnea (secs) 20 secs   High Respiratory Rate 40 br/min   Low Exhaled Vt  300 mL   ETT (adult)   Placement Date/Time: 11/09/20 1840   Preoxygenation: Yes  Location: Oral  Measured From: Lips   Secured at 23 cm   Measured From Lips   ET Placement Left   Secured By [employer]   Site Condition Dry

## 2020-11-10 NOTE — PROGRESS NOTES
Progress Note    Admit Date:  11/7/2020    Subjective:  Ms. Dianna Storey was admitted to hospital with increasing shortness of breath. She had been admitted to [facility] about 3 weeks ago when she is diagnosed with left pleural effusion. This is tapped. She felt better the time of discharge. Patient is diagnosed with metastatic right breast cancer 4 years ago. She is on Damir. She is a past medical history of atrial fibrillation. She also has a history of diabetes, hypertension, morbid obesity and depression. At the time of her admission patient was found to have acute respiratory failure. Her initial heart rate was in the 130s. She was in A. fib. She is placed on oxygen. She is requiring up to 12 L of oxygen. This has been weaned down to 5.5 L of oxygen at present. Work-up in the ER included a chest x-ray that showed recurrent left pleural effusion. Review of the cytology from [facility] showed no malignant cells. This was of course on just one sample. When I saw the patient she was still somewhat short of breath. She is complaining of leg edema. She denies any anosmia. No diarrhea. 11/9. Persistent shortness of breath . thoracentesis was ordered for pleural effusion (not done yet as patient is on droplet plus precautions)  - COVID-19 results are still pendin. - she continues to feel dyspneic .     she is complaining of chest pain she has become tachycardic heart rate is up to 140->  sinus tachycardia  EKG - sinus tachycardia , lateral ST changes. 11/10  Rapid response called yesterday when she became tachycardic with a heart rate of 140. Complained of chest pain. Also became hypoxic requiring a nonrebreather. Transferred to the ICU and intubated and started on mechanical ventilation.        Objective:   /72   Pulse 76   Temp 97.8 °F (36.6 °C) (Oral)   Resp 17   Ht 5' 4\" (1.626 m)   Wt 235 lb (106.6 kg)   SpO2 96%   BMI 40.34 kg/m² Intake/Output Summary (Last 24 hours) at 11/10/2020 0908  Last data filed at 11/10/2020 0844  Gross per 24 hour   Intake 1366 ml   Output 1250 ml   Net 116 ml       Physical Exam:    General appearance: sedated,intubated  Head: Normocephalic, without obvious abnormality, atraumatic  Eyes: conjunctivae/corneas clear. PERRL, EOM's intact. Neck: no adenopathy, no carotid bruit, no JVD, supple, symmetrical, trachea midline and thyroid not enlarged, symmetric, no tenderness/mass/nodules  Lungs: Diminished breath sounds on the left, increasing bibasilar crackles. Heart: Tachycardic.   Abdomen: soft, non-tender; bowel sounds normal; no masses,  no organomegaly  Extremities: extremities normal, atraumatic, no cyanosis, trace edema  Pulses: 2+ and symmetric  Skin: Skin color, texture, turgor normal. No rashes or lesions  Neurologic: sedated   Scheduled Meds:   carboxymethylcellulose PF  1 drop Both Eyes Q4H    And    artificial tears   Both Eyes Q4H    chlorhexidine  15 mL Mouth/Throat BID    mupirocin   Nasal BID    ipratropium-albuterol  1 ampule Inhalation Q4H    pantoprazole  40 mg Oral QAM AC    morphine  60 mg Oral BID    carvedilol  25 mg Oral BID WC    citalopram  40 mg Oral Daily    sodium chloride flush  10 mL Intravenous 2 times per day    enoxaparin  40 mg Subcutaneous Daily    vancomycin  1 g Intravenous Q24H    levofloxacin  750 mg Intravenous Q48H    insulin lispro  0-12 Units Subcutaneous TID WC    insulin lispro  0-6 Units Subcutaneous Nightly       Continuous Infusions:   propofol 25 mcg/kg/min (11/10/20 0716)    dextrose         PRN Meds:  fentanNYL, midazolam, albuterol sulfate HFA **AND** ipratropium **AND** MDI Treatment, furosemide, sodium chloride flush, acetaminophen **OR** acetaminophen, polyethylene glycol, promethazine **OR** ondansetron, glucose, dextrose, glucagon (rDNA), dextrose, morphine      Data:  CBC:   Recent Labs     11/07/20  1517 11/08/20  0555 11/10/20  0600 WBC 7.3 8.0 4.8   HGB 9.1* 8.1* 8.2*   HCT 27.5* 24.7* 25.6*   MCV 91.2 93.8 93.2    193 162     BMP:   Recent Labs     11/07/20  1517 11/08/20  0555 11/10/20  0600   * 134* 142   K 4.0 4.1 3.7   CL 95* 92* 98*   CO2 29 28 30   BUN 14 16 18   CREATININE 1.5* 1.4* 1.4*     LIVER PROFILE:   Recent Labs     11/07/20  1517 11/08/20  0555   AST 12* 11*   ALT 7* 6*   BILITOT 1.2* 1.3*   ALKPHOS 62 55     PT/INR:   Recent Labs     11/08/20  0555 11/10/20  0551   PROTIME 15.3* 14.9*   INR 1.32* 1.28*     Cultures:   Results for Rhys Evans (MRN [id number]) as of 11/8/2020 16:17   Ref. Range 11/8/2020 03:11   SARS-CoV-2, NAAT Latest Ref Range: Not Detected  Not Detected       Radiology  XR CHEST PORTABLE   Final Result   Persistent multifocal bilateral airspace disease, slightly increased at the   left base as compared to prior. XR CHEST PORTABLE   Final Result   Endotracheal tube 3.3 cm above the [place]. Gastric tube in the mid stomach. Increasing         CT CHEST PULMONARY EMBOLISM W CONTRAST   Final Result   1. Multifocal consolidative opacity compatible with inflammatory process or   infection. 2.  Motion artifact degrades evaluation of the pulmonary arteries. No   evidence for central embolism. 3.  Nodular appearance of some of these opacities are also noted, favored to   represent a component of the acute inflammatory process/infection versus   metastatic disease. Short-term follow-up in 3 months is recommended to   ensure resolution. 4.  Moderate-sized left pleural effusion and suspected pleural thickening. Neoplastic involvement is not excluded. 5.  Osseous metastatic disease again demonstrated. Similar appearance of   mediastinal lymphadenopathy and left internal lymphadenopathy. CT HEAD WO CONTRAST   Final Result   No acute intracranial abnormality.          XR CHEST PORTABLE   Final Result   Patchy bilateral pulmonary opacities, concerning for multifocal pneumonia. Imaging features can be seen with COVID-19 pneumonia, though are nonspecific   and can occur with a variety of infectious and noninfectious processes. PneInd         US THORACENTESIS    (Results Pending)   XR CHEST PORTABLE    (Results Pending)        CXR 11/9  Endotracheal tube 3.3 cm above the [place].  Gastric tube in the mid stomach. Increasing     11/10 CXR   Persistent multifocal bilateral airspace disease, slightly increased at the   left base as compared to prior. Assessment:  Principal Problem:    Acute respiratory failure with hypoxia (HCC)  Active Problems:    SOB (shortness of breath)    Primary cancer of right breast with metastasis to other site (HCC)    Anxiety    DM2 (diabetes mellitus, type 2) (HCC)    GERD (gastroesophageal reflux disease)    Morbid obesity due to excess calories (HCC)    Pneumonia due to organism    Paroxysmal atrial fibrillation (HCC)    Pleural effusion    Atrial fibrillation with RVR ([address]  Resolved Problems:    * No resolved hospital problems. *      Plan:    #Acute respiratory failure with hypoxia. - This is likely secondary to pneumonia and pleural effusion.    -Pulmonary consultation was obtained. - O2 12L->. 5.5 L  -Remains on droplet plus precautions. Awaiting thoracentesis  -Worsening respiratory distress now, patient on 100% nonrebreather, severely hypoxic. Transferred to ICU--> subsequently intubated ( 11/9 PM )  -  COVID-19  negative    #Healthcare associated pneumonia from MRSA or gram-negative organism. With recent hospitalization metastatic breast cancer she is at risk for MRSA and for gram-negative pneumonia.    -She was started on IV vancomycin and cefepime. Levaquin for atypical coverage. #Left pleural effusion. She had this at [facility] weeks ago. Thoracentesis was done. Cytology that did not show any malignant cells. Repeat thoracentesis  Ordered. .  The possibility exists that this could be

## 2020-11-10 NOTE — PROGRESS NOTES
11/09/20 7458   Vent Information   Vent Type 840   Vent Mode AC/VC   Vt Ordered 400 mL   Rate Set 16 bmp   Peak Flow 60 L/min   FiO2  70 %   SpO2 100 %   SpO2/FiO2 ratio 142.86   Sensitivity 3   PEEP/CPAP 10   Humidification Source Heated wire   Humidification Temp 37   Humidification Temp Measured 37   Circuit Condensation Drained   Vent Patient Data   Peak Inspiratory Pressure 26 cmH2O   Mean Airway Pressure 14 cmH20   Rate Measured 18 br/min   Vt Exhaled 411 mL   Minute Volume 7.63 Liters   I:E Ratio 1:3.9   Plateau Pressure 23 [id number]   Static Compliance 32 mL/cmH2O   Dynamic Compliance 26 mL/cmH2O   Cough/Sputum   Sputum How Obtained Suctioned;Endotracheal   $Obtained Sample $Induced Sputum   Cough Productive   Sputum Amount Small   Sputum Color Dark Yellow   Tenacity Thick   Spontaneous Breathing Trial (SBT) RT Doc   Pulse 146   Breath Sounds   Right Upper Lobe Diminished   Right Middle Lobe Diminished   Right Lower Lobe Diminished   Left Upper Lobe Diminished   Left Lower Lobe Diminished   Additional Respiratory  Assessments   Resp 16   Position Semi-Mooeny's   Oral Care Completed? Yes   Oral Care Mouth suctioned   Subglottic Suction Done?  Yes   Cuff Pressure (cm H2O)   (MOV)   Alarm Settings   High Pressure Alarm 40 cmH2O   Low Minute Volume Alarm 2 L/min   Apnea (secs) 20 secs   High Respiratory Rate 40 br/min   Low Exhaled Vt  300 mL   ETT (adult)   Placement Date/Time: 11/09/20 1840   Preoxygenation: Yes  Location: Oral  Measured From: Lips   Measured From [address] By [employer]   Site Condition Dry

## 2020-11-10 NOTE — PROGRESS NOTES
Shift assessment completed and documented. Pt on mechanical ventilation. Propofol gtt decreased to 10 mcg/kg/min to help with BP. Pt receiving a 500 ml bolus per Dr. Kerri Ny order. BP has improved at this time with bolus and decreased sedation. Update give to [facility], all questions answered.  Nara Cortes RN

## 2020-11-10 NOTE — PROGRESS NOTES
Comprehensive Nutrition Assessment    Type and Reason for Visit:  Initial, Consult(Tube Feed Management)    Nutrition Recommendations/Plan:   1. Start TF - Vital High-Protein (\"low calorie, high-protein\" formula name in Epic) with a goal rate of 15 ml/hr x 20 hours. Start at 15 ml/hr and maintain this rate until propofol can be weaned down/off since propofol is contributing 678 kcals from lipids at 40 mcg x 24 hours. Water flushes, 60 ml every 6 hours or per MD guidance. Please administer one proteinex P2Go TWICE daily via feeding tube. 2. Monitor TF start, rate, intake, and tolerance + water flushes + administration of one proteinex P2Go TWICE daily. 3. Monitor vent status, sedation type/amount (propofol at 40 mcg x 24 hours = 678 kcals from lipids), and check TG every 72 hours while patient is receiving propofol for sedation. 4. Monitor nutrition-related labs, bowel function, and weight trends. Nutrition Assessment:  patient is nutritionally compromised AEB recent hospitalization for L-sided pleural effusion and respiratory dysfunction + poor appetite/po intake and patient is at risk for compromise d/t ongoing respiratory dysfunction + intubation and NPO status; will continue NPO until medically cleared to receive nutrition therapy    Malnutrition Assessment:  Malnutrition Status: At risk for malnutrition    Context:  Acute Illness     Findings of the 6 clinical characteristics of malnutrition:  Energy Intake:  Mild decrease in energy intake (Comment)  Weight Loss:  No significant weight loss     Body Fat Loss:  No significant body fat loss     Muscle Mass Loss:  No significant muscle mass loss    Fluid Accumulation:  No significant fluid accumulation     Strength:  Not Performed    Estimated Daily Nutrient Needs:  Energy (kcal):  170-7049 kcals/day based on 8-11 kcals/kg/CBW; Weight Used for Energy Requirements:  Current     Protein (g):  138-149 g protein/day based on 2.5-2.7 g/kg/IBW;  Weight Used for Protein Requirements:  Ideal        Fluid (ml/day):  800-1200 mls/day; Method Used for Fluid Requirements:  1 ml/kcal      Nutrition Related Findings:  pt was admitted d/t SOB; pt recently had US-guided thoracentesis today with -700 ml fluid removed from left lung; she was intubated on 11/9/20; nodular lung opacity found on scan, possible metastatic breast disease; hx of breast cancer, CHF, DM, GERD, HTN; currently on coreg, protonix, MD SSI, levaquin, propofol at 40 mcg/kg/min; trace edema; Cr 1.4 and GFR 38      Wounds:  None       Current Nutrition Therapies:    Current Tube Feeding (TF) Orders:  · Feeding Route: Orogastric  · Formula: Low Calorie, High Protein  · Schedule: Continuous  · Additives/Modulars: Protein(1 proteinex p2go TWICE daily)  · Water Flushes: 60 ml water flushes every 6 hours or per MD guidance  · Current TF & Flush Orders Provides: TF to be started today  · Goal TF & Flush Orders Provides: Vital High Protein at a goal rate of 15 ml/hr x 20 hours =  ml, 300 kcals, 26 g protein, 251 ml free water + (208 kcals + 52 g protein from one proteinex p2go TWICE daily) + 678 kcals from propofol = 1186 kcals and 78 g protein total      Anthropometric Measures:  · Height: 5' 4\" (162.6 cm)  · Current Body Weight: 235 lb 7.2 oz (106.8 kg)(obtained on 11/10/20)   · Admission Body Weight: 235 lb 7.2 oz (106.8 kg)(11/9/20)    · Usual Body Weight: 235 lb (106.6 kg)(obtained on 11/10/20)     · Ideal Body Weight: 120 lbs; % Ideal Body Weight 196.2 %   · BMI: 40.4  · BMI Categories: Obese Class 3 (BMI 40.0 or greater)(40.34)       Nutrition Diagnosis:   · Inadequate oral intake related to impaired respiratory function, endocrine dysfuntion, inadequate protein-energy intake as evidenced by NPO or clear liquid status due to medical condition, intubation      Nutrition Interventions:   Food and/or Nutrient Delivery:  Continue NPO, Start Tube Feeding  Nutrition Education/Counseling:  No recommendation at this time   Coordination of Nutrition Care:  Continue to monitor while inpatient, Interdisciplinary Rounds    Goals:  patient will remain in NPO status until medically cleared to receive nutrition therapy       Nutrition Monitoring and Evaluation:   Behavioral-Environmental Outcomes:  None Identified   Food/Nutrient Intake Outcomes:  Diet Advancement/Tolerance, IVF Intake  Physical Signs/Symptoms Outcomes:  Biochemical Data, Nutrition Focused Physical Findings, Skin, Weight, Hemodynamic Status     Discharge Planning:     Too soon to determine     Electronically signed by Deborah Pittman RD, LD on 11/10/20 at 3:45 PM EST    Contact: [phone]

## 2020-11-10 NOTE — PROGRESS NOTES
Elias Steward (sister) called with family in the background. Updated family about patient and covid pending status.

## 2020-11-10 NOTE — PROGRESS NOTES
Madeline Amaya pt's son called left [phone] as his contact number. States Dima Hyaes is pt's sister. Encouraged family to select one spokesperson for updates to allow staff to spend the most time at the bedside. Primary nurse now at the bedside and will call him and Gatito Ghosh back when time allows tonight. Abigail Brennan gives verbal understanding. Ronal Walsh Clinical       @[phone], nephew called for update. Again explained that the nurse is with the patient and we reccommend one contact to allow staff more time at the bedside. We will update Abigail Brennan in the next hour and he can contact this nephew after that update. Nephew give understanding and informed Gatito Ghosh and Abigail Brennan were updated at 1900 when pt transferred as well.  Ronal Walsh Clinical

## 2020-11-10 NOTE — FLOWSHEET NOTE
1255 pt placed on portable ICU monitor and vent transported to [address] w/ RT RN and transport. US guided  thoracentesis by radiologist removed aprox 700 ml pleural fluid pt tolerated fairly well. Stat chest post. Awaiting results  1340 pt returned to ICU placed on room vent and monitor.  Dressing CDI.    11/10/20 1340   Vital Signs   Pulse 77   Resp 19   /78   MAP (mmHg) 94   Oxygen Therapy   SpO2 99 %

## 2020-11-10 NOTE — PLAN OF CARE
Patient is not able to demonstrate the ability to move from a reclining position to an upright position within the recliner due to being intubated and sedated. Patient's EF (Ejection Fraction) is greater than 40% (per 2015 stress test)    Heart Failure Medications:  Diuretics[de-identified] None    (One of the following REQUIRED for EF <40%/SYSTOLIC FAILURE but MAY be used in EF% >40%/DIASTOLIC FAILURE)        ACE[de-identified] None        ARB[de-identified] None         ARNI[de-identified] None    (Beta Blockers)  NON- Evidenced Based Beta Blocker (for EF% >40%/DIASTOLIC FAILURE): None    Evidenced Based Beta Blocker::(REQUIRED for EF% <40%/SYSTOLIC FAILURE) Carvedilol- Coreg  . .................................................................................................................................................. Patient's Last Weight: 106.8 kg obtained by bed scale. Difference in weight is 0 pounds  no change  than last documented weight. Intake/Output Summary (Last 24 hours) at 11/10/2020 0947  Last data filed at 11/10/2020 0844  Gross per 24 hour   Intake 1126 ml   Output 1250 ml   Net -124 ml       Comorbidities Reviewed Yes  Patient has a past medical history of AC (acromioclavicular) joint bone spurs, Atrial fibrillation ([address] Cancer ([address] Cellulitis, CHF (congestive heart failure) ([address] Depression, Diabetes mellitus ([address] and Hypertension. >> For CHF and Comorbidity Education Time and Topics, please see Education Tab. Pt is currently intubated on a ventilator. Pt without lower extremity edema.  Patient's weights and intake/output reviewed:      Patient and/or Family's stated Goal of Care this Admission:  [place] pt intubated and sedated  prior to discharge

## 2020-11-10 NOTE — FLOWSHEET NOTE
11/10/20 0800   Vital Signs   Pulse 76   Resp 15   /63   MAP (mmHg) 84   Oxygen Therapy   SpO2 95 %   Pt resting in bed, vitals per doc flow. Assessment complete see doc flow. NSR 77 on ICU bedside monitor. #7.5 ETT @ 23LL. Vent: AC  RR:16  Vt: 400 FIO2: 40% PEEP 10 SPO2 95% with CSPO2 monitoring. RASS-1. PERRL. PIV x 3 WDL propofol gtt @ 25 mcg/kg/min. OG placement checked via gastric contents. . Medications administered via OG, flushed easily, clamped. Mercedes secured draining clear yellow urine. Pt repositioned for comfort. Will continue to closely monitor.

## 2020-11-10 NOTE — PROGRESS NOTES
11/10/20 0323   Vent Information   Vent Type 840   Vent Mode AC/VC   Vt Ordered 400 mL   Rate Set 16 bmp   Peak Flow 60 L/min   FiO2  60 %   SpO2 100 %   SpO2/FiO2 ratio 166.67   Sensitivity 3   PEEP/CPAP 10   Humidification Source Heated wire   Humidification Temp 37   Humidification Temp Measured 37   Vent Patient Data   Peak Inspiratory Pressure 27 cmH2O   Mean Airway Pressure 14 cmH20   Rate Measured 16 br/min   Vt Exhaled 420 mL   Minute Volume 6.77 Liters   I:E Ratio 1:4.2   Plateau Pressure 19 [id number]   Cough/Sputum   Sputum How Obtained Suctioned;Endotracheal   Cough Non-productive   Spontaneous Breathing Trial (SBT) RT Doc   Pulse 83   Breath Sounds   Right Upper Lobe Diminished   Right Middle Lobe Diminished   Right Lower Lobe Diminished   Left Upper Lobe Diminished   Left Lower Lobe Diminished   Additional Respiratory  Assessments   Resp 16   Position Semi-Mooney's   Alarm Settings   High Pressure Alarm 40 cmH2O   Low Minute Volume Alarm 2 L/min   Apnea (secs) 20 secs   High Respiratory Rate 40 br/min   Low Exhaled Vt  300 mL   ETT (adult)   Placement Date/Time: 11/09/20 1840   Preoxygenation: Yes  Location: Oral  Measured From: Lips   Secured at 23 cm   Measured From [address] [employer]   Site Condition Dry

## 2020-11-10 NOTE — PROGRESS NOTES
Dr. Daphne Burger @ bedside assessing pt for interdisciplinary rounds. All labs lines assessment POC, thoracentesis @ 1300, held Lovenox for thoracentesis, held morphine ER as do not crush and thick secretions reviewed. Per MD pt to start  Lasix 40 mq BID, 20 meq po KCL, RR decreased to 12, morphine to be d/c and pt to start fentanyl gtt @ 25 mcg/hr and pt to start TF.  MD to place orders

## 2020-11-10 NOTE — PLAN OF CARE
Nutrition Problem #1: Inadequate oral intake  Intervention: Food and/or Nutrient Delivery: Continue NPO, Start Tube Feeding  Nutritional Goals: patient will remain in NPO status until medically cleared to receive nutrition therapy

## 2020-11-10 NOTE — BRIEF OP NOTE
Brief Postoperative Note    Mirela Zaragoza  YOB: 1960  [id number]    Pre-operative Diagnosis: left pleural effusion    Post-operative Diagnosis: Same    Procedure: US-guided left thoracentesis    Anesthesia: Local    Surgeons: Sid Potts MD    Estimated Blood Loss: Less than 5 mL    Complications: None    Specimens: Was Obtained: left pleural fluid    Findings: Successful US-guided left thoracentesis.     Electronically signed by Sid Potts MD on 11/10/2020 at 1:26 PM

## 2020-11-11 NOTE — PROGRESS NOTES
11/10/20 2345   Vent Information   Vent Type 840   Vent Mode AC/VC   Vt Ordered 400 mL   Rate Set 12 bmp   Peak Flow 55 L/min   FiO2  35 %   SpO2 96 %   SpO2/FiO2 ratio 274.29   Sensitivity 3   PEEP/CPAP 8   Humidification Source Heated wire   Humidification Temp 37   Humidification Temp Measured 35.4   Circuit Condensation Drained   Vent Patient Data   Peak Inspiratory Pressure 23 cmH2O   Mean Airway Pressure 12 cmH20   Rate Measured 19 br/min   Vt Exhaled 414 mL   Minute Volume 7.85 Liters   I:E Ratio 1:2.7   Plateau Pressure 22 [id number]   Cough/Sputum   Sputum How Obtained Suctioned;Endotracheal   Cough Productive   Sputum Amount Small   Sputum Color Creamy   Tenacity Thick   Spontaneous Breathing Trial (SBT) RT Doc   Pulse 86   Breath Sounds   Right Upper Lobe Diminished   Right Middle Lobe Diminished   Right Lower Lobe Diminished   Left Upper Lobe Diminished   Left Lower Lobe Diminished   Additional Respiratory  Assessments   Resp 19   Position Semi-Mooney's   Oral Care Completed? Yes   Oral Care Mouth suctioned   Subglottic Suction Done?  Yes   Alarm Settings   High Pressure Alarm 40 cmH2O   Low Minute Volume Alarm 3 L/min   Apnea (secs) 20 secs   High Respiratory Rate 40 br/min   Low Exhaled Vt  300 mL   ETT (adult)   Placement Date/Time: 11/09/20 1840   Preoxygenation: Yes  Location: Oral  Measured From: Lips   Secured at 23 cm   Measured From Lips   ET Placement Right   Secured By [employer] tube brothers   Site Condition Dry

## 2020-11-11 NOTE — CONSULTS
11-10-20 (2030) vancomycin trough 9.8. Please increase vancomycin to 1250mg ivpb q24h on 11-11-20 at 2100. Please perform vancomycin trough 11-14-20 at 2030. [address] [place] [address]. Ph.  11/11/2020 12:01 AM

## 2020-11-11 NOTE — PROGRESS NOTES
11/11/20 0250   Vent Information   Vent Type 840   Vent Mode AC/VC   Vt Ordered 400 mL   Rate Set 12 bmp   Peak Flow 55 L/min   FiO2  35 %   SpO2 97 %   SpO2/FiO2 ratio 277.14   Sensitivity 3   PEEP/CPAP 8   Humidification Source Heated wire   Humidification Temp 37   Humidification Temp Measured 36.8   Circuit Condensation Drained   Vent Patient Data   Peak Inspiratory Pressure 23 cmH2O   Mean Airway Pressure 11 cmH20   Rate Measured 17 br/min   Vt Exhaled 420 mL   Minute Volume 7 Liters   I:E Ratio 1:3.1   Plateau Pressure 21 [id number]   Cough/Sputum   Sputum How Obtained Endotracheal;Suctioned   Cough Productive   Sputum Amount Small   Sputum Color Creamy   Tenacity Thick   Spontaneous Breathing Trial (SBT) RT Doc   Pulse 83   Breath Sounds   Right Upper Lobe Diminished   Right Middle Lobe Diminished   Right Lower Lobe Diminished   Left Upper Lobe Diminished   Left Lower Lobe Diminished   Additional Respiratory  Assessments   Resp 17   Position Semi-Mooney's   Alarm Settings   High Pressure Alarm 40 cmH2O   Low Minute Volume Alarm 3 L/min   Apnea (secs) 20 secs   High Respiratory Rate 40 br/min   Low Exhaled Vt  300 mL   ETT (adult)   Placement Date/Time: 11/09/20 1840   Preoxygenation: Yes  Location: Oral  Measured From: Lips   Secured at 23 cm   Measured From Lips   ET Placement Left  (MOVED TO LEFT)   Secured By [employer] tube brothers   Site Condition Dry

## 2020-11-11 NOTE — PLAN OF CARE
Problem: Falls - Risk of:  Goal: Will remain free from falls  Description: Will remain free from falls  Outcome: Ongoing  Goal: Absence of physical injury  Description: Absence of physical injury  Outcome: Ongoing     Problem: Airway Clearance - Ineffective  Goal: Achieve or maintain patent airway  Outcome: Ongoing     Problem: Gas Exchange - Impaired  Goal: Absence of hypoxia  Outcome: Ongoing  Goal: Promote optimal lung function  Outcome: Ongoing     Problem: Risk for Fluid Volume Deficit  Goal: Maintain normal heart rhythm  Outcome: Ongoing  Goal: Maintain absence of muscle cramping  Outcome: Ongoing  Goal: Maintain normal serum potassium, sodium, calcium, phosphorus, and pH  Outcome: Ongoing     Problem: Restraint Use - Nonviolent/Non-Self-Destructive Behavior:  Goal: Absence of restraint indications  Description: Absence of restraint indications  Outcome: Ongoing  Goal: Absence of restraint-related injury  Description: Absence of restraint-related injury  Outcome: Ongoing     Problem: OXYGENATION/RESPIRATORY FUNCTION  Goal: Patient will maintain patent airway  Outcome: Ongoing  Goal: Patient will achieve/maintain normal respiratory rate/effort  Description: Respiratory rate and effort will be within normal limits for the patient  Outcome: Ongoing

## 2020-11-11 NOTE — PROGRESS NOTES
Patient's EF (Ejection Fraction) is greater than 40%    Patient's weights and intake/output reviewed:    Patient's Last Weight: 230.8 lbs obtained by bed scale. Difference of 6.2 lbs less than last documented weight. Intake/Output Summary (Last 24 hours) at 11/11/2020 1056  Last data filed at 11/11/2020 1000  Gross per 24 hour   Intake 2205 ml   Output 4075 ml   Net -1870 ml         Pt is currently intubated on a ventilator. Pt  unable to verbalize if they are experiencing shortness of breath. Pt with nonpitting lower extremity edema. Patient and/or Family's stated Goal of Care this Admission: reduce shortness of breath, increase activity tolerance, better understand heart failure and disease management, be more comfortable, reduce lower extremity edema and unable to assess, will attempt again prior to discharge      Comorbidities Reviewed Yes  Patient has a past medical history of AC (acromioclavicular) joint bone spurs, Atrial fibrillation ([address] Cancer ([address] Cellulitis, CHF (congestive heart failure) ([address] Depression, Diabetes mellitus ([address] and Hypertension.          >>For CHF and Comorbidity documentation on Education Time and Topics, please see Education Tab

## 2020-11-11 NOTE — PROGRESS NOTES
ABG drawn from R radial. x1 attempt(s). Site prepped per policy and procedure. Modified Javad's test positive. Pressure held to site after procedure for five minutes. No hematoma present. Pulse present after procedure. Pt tolerated procedure very well. Specimen sent to lab.

## 2020-11-11 NOTE — PROGRESS NOTES
Shift assessment completed, see flow sheet. RASS -1. Intubated and sedated on AC # 7.5 ETT, at 23 LL. 12 / 400 /35 %/ +5. SpO2 93%. Respirations are easy, even, and unlabored. Bilateral lung sounds diminished throughout. VSS   OG in place at 62, with TF at 15 mL/hr. 60mL flushes Q6hrs. PIVs WDL  Propofol infusing at 40 mcg/kg/min. Fentanyl infusing at 25 mcg/h. All lines and monitoring devices in place. Bilateral soft wrist restraints in place for patient safety. Mercedes is patent and secured with yellow/clear urine. Bed in lowest position with wheels locked. No needs expressed at this time. Will continue to monitor.

## 2020-11-11 NOTE — PROGRESS NOTES
Patient assessment completed, see flow sheet. Medications being administered per MAR. Patient in bed, bed in lowest, locked position. All equipment appears to be functioning appropriately and within order parameters at this time. Patient resting comfortably at this time. VSS. Will continue to monitor and assess.

## 2020-11-11 NOTE — PROGRESS NOTES
Pt placed back on previous vent settings due to RR remaining in the mid to high 30s, VT remaining in the 200s ad F/V staying above 100. WOB also visibly increased.

## 2020-11-11 NOTE — PROGRESS NOTES
Rounds completed with Dr. Aissatou Bagley and multidisciplinary team at this time. POC and labs reviewed. Orders to be placed by MD for replacement of mag and K. Repeat renal @ 1400.

## 2020-11-11 NOTE — PROGRESS NOTES
11/11/20 1837   Vent Information   $Ventilation $Subsequent Day   Skin Assessment Clean, dry, & intact   Vent Type 840   Vent Mode AC/VC   Vt Ordered 400 mL   Rate Set 12 bmp   Peak Flow 55 L/min   FiO2  30 %   SpO2 93 %   SpO2/FiO2 ratio 310   Sensitivity 3   PEEP/CPAP 5   Humidification Source Heated wire   Humidification Temp 37   Humidification Temp Measured 37   Circuit Condensation Drained   Vent Patient Data   Peak Inspiratory Pressure 31 cmH2O   Mean Airway Pressure 9.8 cmH20   Rate Measured 17 br/min   Vt Exhaled 424 mL   Minute Volume 7.38 Liters   I:E Ratio 1:3.8   Plateau Pressure 20 [id number]   Static Compliance 28 mL/cmH2O   Dynamic Compliance 16 mL/cmH2O   Cough/Sputum   Sputum How Obtained Endotracheal;Suctioned   Cough Productive   Sputum Amount Small   Sputum Color Creamy   Tenacity Thick   Spontaneous Breathing Trial (SBT) RT Doc   Pulse 80   Breath Sounds   Right Upper Lobe Diminished   Right Middle Lobe Diminished   Right Lower Lobe Diminished   Left Upper Lobe Diminished   Left Lower Lobe Diminished   Additional Respiratory  Assessments   Resp 17   Position Semi-Mooney's   Alarm Settings   High Pressure Alarm 40 cmH2O   Low Minute Volume Alarm 3 L/min   Apnea (secs) 20 secs   High Respiratory Rate 40 br/min   Low Exhaled Vt  300 mL   Patient Observation   Observations ETT SIZE 7.5, SECURED AT 22 LIP LINE. AMBU BAG AT HEAD OF BED. WATER BAG GOOD.     ETT (adult)   Placement Date/Time: 11/09/20 1840   Preoxygenation: Yes  Location: Oral  Measured From: Lips   Secured at 22 cm   Measured From Lips   ET Placement Left   Secured By [employer] tube brothers   Site Condition Dry

## 2020-11-11 NOTE — PROGRESS NOTES
11/10/20 2005   Vent Information   $Ventilation $Subsequent Day   Vent Type 840   Vent Mode AC/VC   Vt Ordered 400 mL   Rate Set 12 bmp   Peak Flow 55 L/min   FiO2  35 %   SpO2 94 %   SpO2/FiO2 ratio 268.57   Sensitivity 3   PEEP/CPAP 8   Humidification Source Heated wire   Humidification Temp 37   Humidification Temp Measured 37.1   Circuit Condensation Drained   Vent Patient Data   Peak Inspiratory Pressure 19 cmH2O   Mean Airway Pressure 11 cmH20   Rate Measured 18 br/min   Vt Exhaled 423 mL   Minute Volume 7.32 Liters   I:E Ratio 1:3.7   Plateau Pressure 21 [id number]   Static Compliance 33 mL/cmH2O   Dynamic Compliance 38 mL/cmH2O   Cough/Sputum   Sputum How Obtained Suctioned;Endotracheal   Cough Non-productive   Spontaneous Breathing Trial (SBT) RT Doc   Pulse 80   Breath Sounds   Right Upper Lobe Diminished   Right Middle Lobe Diminished   Right Lower Lobe Diminished   Left Upper Lobe Diminished   Left Lower Lobe Diminished   Additional Respiratory  Assessments   Resp 17   Position Semi-Mooney's   Oral Care Completed? Yes   Oral Care Mouth suctioned   Subglottic Suction Done?  Yes   Cuff Pressure (cm H2O)   (MOV)   Alarm Settings   High Pressure Alarm 40 cmH2O   Low Minute Volume Alarm 3 L/min   Apnea (secs) 20 secs   High Respiratory Rate 40 br/min   Low Exhaled Vt  300 mL   ETT (adult)   Placement Date/Time: 11/09/20 1840   Preoxygenation: Yes  Location: Oral  Measured From: Lips   Secured at 23 cm   Measured From Lips   ET Placement Right   Secured By [employer] tube brothers   Site Condition Dry        11/10/20 2005   Vent Information   $Ventilation $Subsequent Day   Vent Type 840   Vent Mode AC/VC   Vt Ordered 400 mL   Rate Set 12 bmp   Peak Flow 55 L/min   FiO2  35 %   SpO2 94 %   SpO2/FiO2 ratio 268.57   Sensitivity 3   PEEP/CPAP 8   Humidification Source Heated wire   Humidification Temp 37   Humidification Temp Measured 37.1   Circuit Condensation Drained   Vent Patient Data   Peak Inspiratory Pressure 19 cmH2O   Mean Airway Pressure 11 cmH20   Rate Measured 18 br/min   Vt Exhaled 423 mL   Minute Volume 7.32 Liters   I:E Ratio 1:3.7   Plateau Pressure 21 [id number]   Static Compliance 33 mL/cmH2O   Dynamic Compliance 38 mL/cmH2O   Cough/Sputum   Sputum How Obtained Suctioned;Endotracheal   Cough Non-productive   Spontaneous Breathing Trial (SBT) RT Doc   Pulse 80   Breath Sounds   Right Upper Lobe Diminished   Right Middle Lobe Diminished   Right Lower Lobe Diminished   Left Upper Lobe Diminished   Left Lower Lobe Diminished   Additional Respiratory  Assessments   Resp 17   Position Semi-Mooney's   Oral Care Completed? Yes   Oral Care Mouth suctioned   Subglottic Suction Done?  Yes   Cuff Pressure (cm H2O)   (MOV)   Alarm Settings   High Pressure Alarm 40 cmH2O   Low Minute Volume Alarm 3 L/min   Apnea (secs) 20 secs   High Respiratory Rate 40 br/min   Low Exhaled Vt  300 mL   ETT (adult)   Placement Date/Time: 11/09/20 1840   Preoxygenation: Yes  Location: Oral  Measured From: Lips   Secured at 23 cm   Measured From Lips   ET Placement Right   Secured By [employer] tube brothers   Site Condition Dry

## 2020-11-11 NOTE — PROGRESS NOTES
Vancomycin Day: 5    Patient's labs, cultures, vitals, and vancomycin regimen reviewed. No changes today.   Trough due on 14th at 2030  Antelmo DUMONT 11/11/202012:01 PM  .

## 2020-11-11 NOTE — PROGRESS NOTES
Pulmonary & Critical Care Medicine ICU Progress Note    CC: Shortness of breath    Events of Last 24 hours:   Left thoracentesis  Low grade temp    Invasive Lines: Peripheral    MV: 2020  Vent Mode: AC/VC Rate Set: 12 bmp/Vt Ordered: 400 mL/ /FiO2 : 35 %  Recent Labs     20  0435   PHART 7.430 7.514*   YCN3BUT 48.0* 40.7   PO2ART 121.0* 98.9       IV:   fentaNYL (SUBLIMAZE) infusion 25 mcg/hr (11/10/20 1132)    propofol 40 mcg/kg/min (20 0355)    dextrose         Vitals:  Blood pressure 117/66, pulse 85, temperature 99.8 °F (37.7 °C), temperature source Axillary, resp. rate 19, height 5' 4\" (1.626 m), weight 235 lb 7.2 oz (106.8 kg), SpO2 96 %, not currently breastfeeding. on 35%  Temp  Av.5 °F (36.9 °C)  Min: 97.6 °F (36.4 °C)  Max: 99.8 °F (37.7 °C)    Intake/Output Summary (Last 24 hours) at 2020 0545  Last data filed at 11/10/2020 2236  Gross per 24 hour   Intake 759 ml   Output 3300 ml   Net -2541 ml     EXAM:  General: intubated, ill appearing    Eyes: PERRL. No sclera icterus. No conjunctival injection. ENT: No discharge. Pharynx with ETT. Neck: Trachea midline. Normal thyroid. Resp: No accessory muscle use. No crackles. No wheezing. No rhonchi. No dullness on percussion. CV: Regular rate. Regular rhythm. No mumur or rub. No edema. Peripheral pulses are 2+. Capillary refill is less than 3 seconds. GI: Non-tender. Non-distended. No masses. No organomegaly. Normal bowel sounds. No hernia. Skin: Warm and dry. No nodule on exposed extremities. No rash on exposed extremities. Lymph: No cervical LAD. No supraclavicular LAD. M/S: No cyanosis. No joint deformity. No clubbing. Neuro: Sedated,+ following commands.  Patellar reflexes are symmetric  Psych: Unable to obtain because the patient is non-communicative      Scheduled Meds:   furosemide  40 mg Intravenous BID    pantoprazole  40 mg Intravenous Daily    insulin lispro  0-12 Units Subcutaneous Q4H  vancomycin  1,250 mg Intravenous Q24H    carboxymethylcellulose PF  1 drop Both Eyes Q4H    And    artificial tears   Both Eyes Q4H    chlorhexidine  15 mL Mouth/Throat BID    mupirocin   Nasal BID    ipratropium-albuterol  1 ampule Inhalation Q4H    carvedilol  25 mg Oral BID WC    citalopram  40 mg Oral Daily    sodium chloride flush  10 mL Intravenous 2 times per day    enoxaparin  40 mg Subcutaneous Daily    levofloxacin  750 mg Intravenous Q48H     PRN Meds:  acetaminophen, fentanNYL, midazolam, sodium chloride flush, acetaminophen **OR** acetaminophen, polyethylene glycol, promethazine **OR** ondansetron, glucose, dextrose, glucagon (rDNA), dextrose, morphine    Results:  CBC:   Recent Labs     11/08/20  0555 11/10/20  0600   WBC 8.0 4.8   HGB 8.1* 8.2*   HCT 24.7* 25.6*   MCV 93.8 93.2    162     BMP:   Recent Labs     11/08/20  0555 11/10/20  0600 11/11/20  0413   * 142 139   K 4.1 3.7 3.0*   CL 92* 98* 91*   CO2 28 30 35*   PHOS  --   --  3.9   BUN 16 18 21*   CREATININE 1.4* 1.4* 1.3*     LIVER PROFILE:   Recent Labs     11/08/20  0555   AST 11*   ALT 6*   BILITOT 1.3*   ALKPHOS 55       Cultures:  11/8/2020 SARS-CoV-2 PCR negative  11/8/2020 SARS-CoV-2 NAAT negative  11/7/2020 blood no growth  11/9/2020 tracheal aspirate no organisms    Films:  Chest CT 11/7/20:   Mediastinum: Enlarged subcarinal and left hilar lymph nodes are noted measuring 2.2 and 1.8 cm in short axis respectively.  Mildly confluent soft tissue along the inferior left internal mammary chain.    Lungs/pleura: Multifocal patchy consolidative opacities are present.  Some of these opacities have a nodular appearance in the left upper lobe and medial right lower lobe as well.  Moderate-sized left pleural effusion with suspected pleural thickening posteriorly.  Trace right pleural fluid. [facility] 10[facility]/20:   No evidence of pulmonary embolism.  Multiple left lung nodules and pleural nodularity

## 2020-11-11 NOTE — PROGRESS NOTES
Progress Note    Admit Date:  11/7/2020    Subjective:  Ms. Syl Malcolm was admitted to hospital with increasing shortness of breath. She had been admitted to [facility] about 3 weeks ago when she is diagnosed with left pleural effusion. This is tapped. She felt better the time of discharge. Patient is diagnosed with metastatic right breast cancer 4 years ago. She is on [place]. She is a past medical history of atrial fibrillation. She also has a history of diabetes, hypertension, morbid obesity and depression. At the time of her admission patient was found to have acute respiratory failure. Her initial heart rate was in the 130s. She was in A. fib. She is placed on oxygen. She is requiring up to 12 L of oxygen. This has been weaned down to 5.5 L of oxygen at present. Work-up in the ER included a chest x-ray that showed recurrent left pleural effusion. Review of the cytology from [facility] showed no malignant cells. This was of course on just one sample. When I saw the patient she was still somewhat short of breath. She is complaining of leg edema. She denies any anosmia. No diarrhea. 11/9. Persistent shortness of breath . thoracentesis was ordered for pleural effusion (not done yet as patient is on droplet plus precautions)  - COVID-19 results are still pendin. - she continues to feel dyspneic .     she is complaining of chest pain she has become tachycardic heart rate is up to 140->  sinus tachycardia  EKG - sinus tachycardia , lateral ST changes. 11/10  Rapid response called yesterday when she became tachycardic with a heart rate of 140. Complained of chest pain. Also became hypoxic requiring a nonrebreather. Transferred to the ICU and intubated and started on mechanical ventilation. 11/11  Continues to be on the vent. Low-grade temps. Underwent SBT.   Was very anxious during the trial.  Underwent thoracentesis yesterday    Objective:   BP 119/63   Pulse 109   Temp 99.2 °F (37.3 °C) (Axillary)   Resp 21   Ht 5' 4\" (1.626 m)   Wt 235 lb 7.2 oz (106.8 kg)   SpO2 94%   BMI 40.42 kg/m²          Intake/Output Summary (Last 24 hours) at 11/11/2020 0934  Last data filed at 11/11/2020 0800  Gross per 24 hour   Intake 2067 ml   Output 3990 ml   Net -1923 ml       Physical Exam:    General appearance: sedated,intubated  Head: Normocephalic, without obvious abnormality, atraumatic  Eyes: conjunctivae/corneas clear. PERRL, EOM's intact. Neck: no adenopathy, no carotid bruit, no JVD, supple, symmetrical, trachea midline and thyroid not enlarged, symmetric, no tenderness/mass/nodules  Lungs: Diminished breath sounds on the left, increasing bibasilar crackles. Heart: Tachycardic.   Abdomen: soft, non-tender; bowel sounds normal; no masses,  no organomegaly  Extremities: extremities normal, atraumatic, no cyanosis, trace edema  Pulses: 2+ and symmetric  Skin: Skin color, texture, turgor normal. No rashes or lesions  Neurologic: sedated   Scheduled Meds:   potassium bicarb-citric acid  40 mEq Oral Once    potassium chloride  10 mEq Intravenous Q1H    magnesium sulfate  2 g Intravenous Once    furosemide  40 mg Intravenous BID    pantoprazole  40 mg Intravenous Daily    insulin lispro  0-12 Units Subcutaneous Q4H    vancomycin  1,250 mg Intravenous Q24H    carboxymethylcellulose PF  1 drop Both Eyes Q4H    And    artificial tears   Both Eyes Q4H    chlorhexidine  15 mL Mouth/Throat BID    mupirocin   Nasal BID    ipratropium-albuterol  1 ampule Inhalation Q4H    carvedilol  25 mg Oral BID     citalopram  40 mg Oral Daily    sodium chloride flush  10 mL Intravenous 2 times per day    enoxaparin  40 mg Subcutaneous Daily    levofloxacin  750 mg Intravenous Q48H       Continuous Infusions:   fentaNYL (SUBLIMAZE) infusion 25 mcg/hr (11/10/20 1132)    propofol Stopped (11/11/20 0901)    dextrose         PRN Meds:  acetaminophen, fentanNYL, midazolam, sodium chloride flush, acetaminophen **OR** acetaminophen, polyethylene glycol, promethazine **OR** ondansetron, glucose, dextrose, glucagon (rDNA), dextrose      Data:  CBC:   Recent Labs     11/10/20  0600 11/11/20  0627   WBC 4.8 7.1   HGB 8.2* 8.7*   HCT 25.6* 26.3*   MCV 93.2 92.2    159     BMP:   Recent Labs     11/10/20  0600 11/11/20  0413    139   K 3.7 3.0*   CL 98* 91*   CO2 30 35*   PHOS  --  3.9   BUN 18 21*   CREATININE 1.4* 1.3*     LIVER PROFILE:   No results for input(s): AST, ALT, LIPASE, BILIDIR, BILITOT, ALKPHOS in the last 72 hours. Invalid input(s): AMYLASE,  ALB  PT/INR:   Recent Labs     11/10/20  0551   PROTIME 14.9*   INR 1.28*     Cultures:   Results for Trip Hansen (MRN [id number]) as of 11/8/2020 16:17   Ref. Range 11/8/2020 03:11   SARS-CoV-2, NAAT Latest Ref Range: Not Detected  Not Detected       Radiology  US THORACENTESIS   Final Result   Successful ultrasound guided left thoracentesis. XR CHEST PORTABLE   Final Result   Cardiomegaly with scattered right pulmonary infiltrates consistent pneumonia. Small right basilar effusion. Support tubes as described above. XR CHEST PORTABLE   Final Result   Persistent multifocal bilateral airspace disease, slightly increased at the   left base as compared to prior. XR CHEST PORTABLE   Final Result   Endotracheal tube 3.3 cm above the [place]. Gastric tube in the mid stomach. Increasing         CT CHEST PULMONARY EMBOLISM W CONTRAST   Final Result   1. Multifocal consolidative opacity compatible with inflammatory process or   infection. 2.  Motion artifact degrades evaluation of the pulmonary arteries. No   evidence for central embolism. 3.  Nodular appearance of some of these opacities are also noted, favored to   represent a component of the acute inflammatory process/infection versus   metastatic disease.   Short-term follow-up in 3 months is recommended to ensure resolution. 4.  Moderate-sized left pleural effusion and suspected pleural thickening. Neoplastic involvement is not excluded. 5.  Osseous metastatic disease again demonstrated. Similar appearance of   mediastinal lymphadenopathy and left internal lymphadenopathy. CT HEAD WO CONTRAST   Final Result   No acute intracranial abnormality. XR CHEST PORTABLE   Final Result   Patchy bilateral pulmonary opacities, concerning for multifocal pneumonia. Imaging features can be seen with COVID-19 pneumonia, though are nonspecific   and can occur with a variety of infectious and noninfectious processes. PneInd         XR CHEST PORTABLE    (Results Pending)        CXR 11/9  Endotracheal tube 3.3 cm above the [place].  Gastric tube in the mid stomach. Increasing     11/10 CXR   Persistent multifocal bilateral airspace disease, slightly increased at the   left base as compared to prior. Assessment:  Principal Problem:    Acute respiratory failure with hypoxia (HCC)  Active Problems:    SOB (shortness of breath)    Primary cancer of right breast with metastasis to other site (HCC)    Anxiety    DM2 (diabetes mellitus, type 2) (HCC)    GERD (gastroesophageal reflux disease)    Morbid obesity due to excess calories (HCC)    Pneumonia due to organism    Paroxysmal atrial fibrillation (HCC)    Pleural effusion    Atrial fibrillation with RVR ([address]    HCAP (healthcare-associated pneumonia)  Resolved Problems:    * No resolved hospital problems. *      Plan:    #Acute respiratory failure with hypoxia. - This is likely secondary to pneumonia and pleural effusion.    -Pulmonary consultation was obtained. - O2 12L->. 5.5 L  -Remains on droplet plus precautions. Awaiting thoracentesis  -Worsening respiratory distress now, patient on 100% nonrebreather, severely hypoxic.    Transferred to ICU--> subsequently intubated ( 11/9 PM )  -  COVID-19  Negative  Did not do well on a spontaneous breathing trial today. #Healthcare associated pneumonia from MRSA or gram-negative organism. With recent hospitalization metastatic breast cancer she is at risk for MRSA and for gram-negative pneumonia.    -She was started on IV vancomycin and Levaquin    #Left pleural effusion. She had this at [facility] 3 weeks ago. Thoracentesis was done. Cytology that did not show any malignant cells. Repeat thoracentesis  Ordered- 700 ml removed  11/10. The possibility exists that this could be malignant pleural effusion. #Diabetes mellitus type 2. Monitor sugars closely. #Atrial fibrillation with RVR. Presently in sinus rhythm. #Morbid Obesity  - Body mass index is 40.42 kg/m². - Complicating assessment and treatment. Placing patient at risk for multiple co-morbidities as well as early death and contributing to the patient's presentation.   - Counseled on weight loss. #Metastatic breast cancer. On [place]. #Nodular lung opacity in the lung may be metastatic breast disease. #Lovenox for DVT prophylaxis.       Manju Raymundo MD 11/11/2020 9:34 AM

## 2020-11-12 NOTE — PROGRESS NOTES
Pulmonary & Critical Care Medicine ICU Progress Note    CC: Shortness of breath    Events of Last 24 hours: Failed SBT    Invasive Lines: Peripheral    MV: 2020  Vent Mode: AC/VC Rate Set: 12 bmp/Vt Ordered: 400 mL/ /FiO2 : 30 %  Recent Labs     205 20   PHART 7.514* 7.514*   NAH4MWW 40.7 43.3   PO2ART 98.9 78.3       IV:   dexmedetomidine (PRECEDEX) IV infusion 0.6 mcg/kg/hr (20)    fentaNYL (SUBLIMAZE) infusion 25 mcg/hr (20)    propofol 15 mcg/kg/min (20)    dextrose         Vitals:  Blood pressure 121/66, pulse 86, temperature 98.9 °F (37.2 °C), temperature source Axillary, resp. rate 18, height 5' 4\" (1.626 m), weight 230 lb 12.8 oz (104.7 kg), SpO2 95 %, not currently breastfeeding. on 30%  Temp  Av °F (37.2 °C)  Min: 98.5 °F (36.9 °C)  Max: 99.8 °F (37.7 °C)    Intake/Output Summary (Last 24 hours) at 2020 0725  Last data filed at 2020 0513  Gross per 24 hour   Intake 2463 ml   Output 1920 ml   Net 543 ml     EXAM:  General: intubated, ill appearing    Eyes: PERRL. No sclera icterus. No conjunctival injection. ENT: No discharge. Pharynx with ETT. Neck: Trachea midline. Normal thyroid. Resp: No accessory muscle use. No crackles. No wheezing. No rhonchi. No dullness on percussion. CV: Regular rate. Regular rhythm. No mumur or rub. No edema. Peripheral pulses are 2+. Capillary refill is less than 3 seconds. GI: Non-tender. Non-distended. No masses. No organomegaly. Normal bowel sounds. No hernia. Skin: Warm and dry. No nodule on exposed extremities. No rash on exposed extremities. Lymph: No cervical LAD. No supraclavicular LAD. M/S: No cyanosis. No joint deformity. No clubbing. Neuro: Sedated,+ following commands.  Patellar reflexes are symmetric  Psych: Unable to obtain because the patient is non-communicative      Scheduled Meds:   furosemide  40 mg Intravenous BID    pantoprazole  40 mg Intravenous Daily    insulin lispro  0-12 Units Subcutaneous Q4H    vancomycin  1,250 mg Intravenous Q24H    carboxymethylcellulose PF  1 drop Both Eyes Q4H    And    artificial tears   Both Eyes Q4H    chlorhexidine  15 mL Mouth/Throat BID    mupirocin   Nasal BID    ipratropium-albuterol  1 ampule Inhalation Q4H    carvedilol  25 mg Oral BID WC    citalopram  40 mg Oral Daily    sodium chloride flush  10 mL Intravenous 2 times per day    enoxaparin  40 mg Subcutaneous Daily    levofloxacin  750 mg Intravenous Q48H     PRN Meds:  acetaminophen, fentanNYL, midazolam, sodium chloride flush, acetaminophen **OR** acetaminophen, polyethylene glycol, promethazine **OR** ondansetron, glucose, dextrose, glucagon (rDNA), dextrose    Results:  CBC:   Recent Labs     11/10/20  0600 11/11/20  0627 11/12/20  0413   WBC 4.8 7.1 7.3   HGB 8.2* 8.7* 8.4*   HCT 25.6* 26.3* 25.3*   MCV 93.2 92.2 91.9    159 147     BMP:   Recent Labs     11/11/20  0413 11/11/20  1350 11/12/20  0413    133* 137   K 3.0* 3.6 3.1*   CL 91* 91* 89*   CO2 35* 34* 34*   PHOS 3.9 3.5 4.7   BUN 21* 21* 29*   CREATININE 1.3* 1.3* 1.3*     LIVER PROFILE:   No results for input(s): AST, ALT, LIPASE, BILIDIR, BILITOT, ALKPHOS in the last 72 hours. Invalid input(s): AMYLASE,  ALB    Cultures:  11/8/2020 SARS-CoV-2 PCR negative  11/8/2020 SARS-CoV-2 NAAT negative  11/7/2020 blood no growth  11/9/2020 tracheal aspirate NRF    Films:  Chest CT 11/7/20:   Mediastinum: Enlarged subcarinal and left hilar lymph nodes are noted measuring 2.2 and 1.8 cm in short axis respectively. [place] confluent soft tissue along the inferior left internal mammary chain.    Lungs/pleura: Multifocal patchy consolidative opacities are present.  Some of these opacities have a nodular appearance in the left upper lobe and medial right lower lobe as well.  Moderate-sized left pleural effusion with suspected pleural thickening posteriorly.  Trace right pleural fluid.      CT report [facility] 10/19/20:   No evidence of pulmonary embolism. Multiple left lung nodules and pleural nodularity compatible with metastatic disease. Left hilar lymphadenopathy, reactive or metastatic. Moderate left pleural effusion    CXR 11/12/2020 bilateral effusions    ASSESSMENT:  · Acute respiratory failure with hypoxemia  · HCAP  · Nodular opacities in lung -pneumonia v metastatic disease  · L pleural effusion - recurrent, thoracentesis 11/10/2020 700 ml, exudative, cytology pending  · AFIB with RVR    PLAN:  Mechanical ventilation as per my orders. The ventilator was adjusted by me at the bedside for unstable, life threatening respiratory failure. IV Propofol for sedation, target RASS -2, with daily spontaneous awakening trial.  Fentanyl PRN pain, gtt as needed. Started precedex 2/2 anxious with SBT  Head of bed 30 degrees or higher at all times  · Daily spontaneous breathing trial beginning tomorrow  · Vancomycin and Levaquin day #6/7. D/C Vancomycin  · F/U pleural fluid cytology  · Diuresis - lasix, metolazone  · Replace electrolytes  · Prophylaxis: Lovenox, Protonix, Bactroban    Total critical care time caring for this patient with life threatening, unstable organ failure, including direct patient contact, management of life support systems, review of data including imaging and labs, discussions with other team members and physicians is 32 minutes so far today, excluding procedures.

## 2020-11-12 NOTE — PROGRESS NOTES
Progress Note    Admit Date:  11/7/2020    Subjective:  Ms. Li Parada was admitted to hospital with increasing shortness of breath. She had been admitted to [facility] about 3 weeks ago when she is diagnosed with left pleural effusion. This is tapped. She felt better the time of discharge. Patient is diagnosed with metastatic right breast cancer 4 years ago. She is on [place]. She is a past medical history of atrial fibrillation. She also has a history of diabetes, hypertension, morbid obesity and depression. At the time of her admission patient was found to have acute respiratory failure. Her initial heart rate was in the 130s. She was in A. fib. She is placed on oxygen. She is requiring up to 12 L of oxygen. This has been weaned down to 5.5 L of oxygen at present. Work-up in the ER included a chest x-ray that showed recurrent left pleural effusion. Review of the cytology from [facility] showed no malignant cells. This was of course on just one sample. When I saw the patient she was still somewhat short of breath. She is complaining of leg edema. She denies any anosmia. No diarrhea. 11/9. Persistent shortness of breath . thoracentesis was ordered for pleural effusion (not done yet as patient is on droplet plus precautions)  - COVID-19 results are still pendin. - she continues to feel dyspneic .     she is complaining of chest pain she has become tachycardic heart rate is up to 140->  sinus tachycardia  EKG - sinus tachycardia , lateral ST changes. 11/10  Rapid response called yesterday when she became tachycardic with a heart rate of 140. Complained of chest pain. Also became hypoxic requiring a nonrebreather. Transferred to the ICU and intubated and started on mechanical ventilation. 11/11  Continues to be on the vent. Low-grade temps. Underwent SBT.   Was very anxious during the trial.  Underwent thoracentesis yesterday      11/12  On spontaneous breathing trial this morning. She has been placed on Precedex. Objective:   BP (!) 99/58   Pulse 112   Temp 98.9 °F (37.2 °C) (Axillary)   Resp 18   Ht 5' 4\" (1.626 m)   Wt 230 lb 12.8 oz (104.7 kg)   SpO2 92%   BMI 39.62 kg/m²          Intake/Output Summary (Last 24 hours) at 11/12/2020 0854  Last data filed at 11/12/2020 0800  Gross per 24 hour   Intake 2077 ml   Output 1995 ml   Net 82 ml       Physical Exam:    General appearance: Awake intubated  Head: Normocephalic, without obvious abnormality, atraumatic  Eyes: conjunctivae/corneas clear. PERRL, EOM's intact. Neck: no adenopathy, no carotid bruit, no JVD, supple, symmetrical, trachea midline and thyroid not enlarged, symmetric, no tenderness/mass/nodules  Lungs: Diminished breath sounds on the left, increasing bibasilar crackles. Heart: Tachycardic.   Abdomen: soft, non-tender; bowel sounds normal; no masses,  no organomegaly  Extremities: extremities normal, atraumatic, no cyanosis, trace edema  Pulses: 2+ and symmetric  Skin: Skin color, texture, turgor normal. No rashes or lesions  Neurologic: Awake, oriented  Scheduled Meds:   metOLazone  5 mg Oral Daily    furosemide  40 mg Intravenous BID    pantoprazole  40 mg Intravenous Daily    insulin lispro  0-12 Units Subcutaneous Q4H    vancomycin  1,250 mg Intravenous Q24H    carboxymethylcellulose PF  1 drop Both Eyes Q4H    And    artificial tears   Both Eyes Q4H    chlorhexidine  15 mL Mouth/Throat BID    mupirocin   Nasal BID    ipratropium-albuterol  1 ampule Inhalation Q4H    carvedilol  25 mg Oral BID     citalopram  40 mg Oral Daily    sodium chloride flush  10 mL Intravenous 2 times per day    enoxaparin  40 mg Subcutaneous Daily    levofloxacin  750 mg Intravenous Q48H       Continuous Infusions:   dexmedetomidine (PRECEDEX) IV infusion 1 mcg/kg/hr (11/12/20 0732)    fentaNYL (SUBLIMAZE) infusion 25 mcg/hr (11/11/20 1957)    propofol 15 mcg/kg/min (11/12/20 CT CHEST PULMONARY EMBOLISM W CONTRAST   Final Result   1. Multifocal consolidative opacity compatible with inflammatory process or   infection. 2.  Motion artifact degrades evaluation of the pulmonary arteries. No   evidence for central embolism. 3.  Nodular appearance of some of these opacities are also noted, favored to   represent a component of the acute inflammatory process/infection versus   metastatic disease. Short-term follow-up in 3 months is recommended to   ensure resolution. 4.  Moderate-sized left pleural effusion and suspected pleural thickening. Neoplastic involvement is not excluded. 5.  Osseous metastatic disease again demonstrated. Similar appearance of   mediastinal lymphadenopathy and left internal lymphadenopathy. CT HEAD WO CONTRAST   Final Result   No acute intracranial abnormality. XR CHEST PORTABLE   Final Result   Patchy bilateral pulmonary opacities, concerning for multifocal pneumonia. Imaging features can be seen with COVID-19 pneumonia, though are nonspecific   and can occur with a variety of infectious and noninfectious processes. PneInd              CXR 11/9  Endotracheal tube 3.3 cm above the [place].  Gastric tube in the mid stomach. Increasing     11/10 CXR   Persistent multifocal bilateral airspace disease, slightly increased at the   left base as compared to prior. Assessment:  Principal Problem:    Acute respiratory failure with hypoxia (HCC)  Active Problems:    SOB (shortness of breath)    Primary cancer of right breast with metastasis to other site (HCC)    Anxiety    DM2 (diabetes mellitus, type 2) (HCC)    GERD (gastroesophageal reflux disease)    Morbid obesity due to excess calories (HCC)    Pneumonia due to organism    Paroxysmal atrial fibrillation (HCC)    Pleural effusion    Atrial fibrillation with RVR ([address]    HCAP (healthcare-associated pneumonia)  Resolved Problems:    * No resolved hospital problems. *      Plan:    #Acute respiratory failure with hypoxia. - This is likely secondary to pneumonia and pleural effusion.    -Pulmonary consultation was obtained. - O2 12L->. 5.5 L  -Remains on droplet plus precautions. Awaiting thoracentesis  -Worsening respiratory distress now, patient on 100% nonrebreather, severely hypoxic. Transferred to ICU--> subsequently intubated ( 11/9 PM )  -  COVID-19  Negative  Did not do well on a spontaneous breathing trial 11/11  SBT  today with Precedex. #Healthcare associated pneumonia from MRSA or gram-negative organism. With recent hospitalization metastatic breast cancer she is at risk for MRSA and for gram-negative pneumonia.    -Continue IV vancomycin and Levaquin  Cultures are negative so far    #Left pleural effusion. She had this at [facility] 3 weeks ago. Thoracentesis was done. Cytology that did not show any malignant cells. Repeat thoracentesis  Ordered- 700 ml removed  11/10. The possibility exists that this could be malignant pleural effusion. #Diabetes mellitus type 2. Monitor sugars closely. #Atrial fibrillation with RVR. Presently in sinus rhythm. #Morbid Obesity  - Body mass index is 39.62 kg/m². - Complicating assessment and treatment. Placing patient at risk for multiple co-morbidities as well as early death and contributing to the patient's presentation.   - Counseled on weight loss. #Metastatic breast cancer. On [place]. #Nodular lung opacity in the lung may be metastatic breast disease. #Lovenox for DVT prophylaxis.       Romero Wagner MD 11/12/2020 8:54 AM

## 2020-11-12 NOTE — PROGRESS NOTES
11/11/20 2236   Vent Information   Skin Assessment Clean, dry, & intact   Vent Type 840   Vent Mode AC/VC   Vt Ordered 400 mL   Rate Set 12 bmp   Peak Flow 55 L/min   FiO2  30 %   SpO2 95 %   SpO2/FiO2 ratio 316.67   Sensitivity 3   PEEP/CPAP 5   Humidification Source Heated wire   Humidification Temp 37   Humidification Temp Measured 37   Circuit Condensation Drained   Vent Patient Data   Peak Inspiratory Pressure 28 cmH2O   Mean Airway Pressure 9.5 cmH20   Rate Measured 16 br/min   Vt Exhaled 409 mL   Minute Volume 6.33 Liters   I:E Ratio 1:2.4   Cough/Sputum   Sputum How Obtained Endotracheal;Suctioned   Cough Productive   Sputum Amount Small   Sputum Color Creamy; Yellow   Tenacity Thick   Spontaneous Breathing Trial (SBT) RT Doc   Pulse 132   Breath Sounds   Right Upper Lobe Diminished   Right Middle Lobe Diminished   Right Lower Lobe Diminished   Left Upper Lobe Diminished   Left Lower Lobe Diminished   Additional Respiratory  Assessments   Resp 17   Position Semi-Mooney's   Alarm Settings   High Pressure Alarm 40 cmH2O   Low Minute Volume Alarm 3 L/min   Apnea (secs) 20 secs   High Respiratory Rate 40 br/min   Low Exhaled Vt  300 mL   Patient Observation   Observations ETT SIZE 7.5, SECURED AT 22 LIP LINE. AMBU BAG AT HEAD OF BED. WATER BAG GOOD.     ETT (adult)   Placement Date/Time: 11/09/20 1840   Preoxygenation: Yes  Location: Oral  Measured From: Lips   Secured at 22 cm   Measured From [address] By [employer]   Site Condition Dry

## 2020-11-12 NOTE — PROGRESS NOTES
11/12/20 0255   Vent Information   Vent Type 840   Vent Mode AC/VC   Vt Ordered 400 mL   Rate Set 12 bmp   Peak Flow 55 L/min   FiO2  30 %   SpO2 95 %   SpO2/FiO2 ratio 316.67   Sensitivity 3   PEEP/CPAP 5   Humidification Source Heated wire   Humidification Temp 37   Humidification Temp Measured 36.9   Circuit Condensation Drained   Vent Patient Data   Peak Inspiratory Pressure 27 cmH2O   Mean Airway Pressure 10 cmH20   Rate Measured 17 br/min   Vt Exhaled 425 mL   Minute Volume 6.8 Liters   I:E Ratio 1:3.5   Plateau Pressure 20 [id number]   Cough/Sputum   Sputum How Obtained Endotracheal;Suctioned   Cough Productive   Sputum Amount Small   Sputum Color Creamy   Tenacity Thick   Spontaneous Breathing Trial (SBT) RT Doc   Pulse 92   Breath Sounds   Right Upper Lobe Diminished   Right Middle Lobe Diminished   Right Lower Lobe Diminished   Left Upper Lobe Diminished   Left Lower Lobe Diminished   Additional Respiratory  Assessments   Resp 18   Position Semi-Mooney's   Alarm Settings   High Pressure Alarm 40 cmH2O   Low Minute Volume Alarm 3 L/min   Apnea (secs) 20 secs   High Respiratory Rate 40 br/min   Low Exhaled Vt  300 mL   ETT (adult)   Placement Date/Time: 11/09/20 1840   Preoxygenation: Yes  Location: Oral  Measured From: Lips   Secured at 22 cm   Measured From Lips   ET Placement Left   Secured By [employer]   Site Condition Dry

## 2020-11-12 NOTE — PROGRESS NOTES
Pt was given a bath and the mattress was found to be completely deflated. Pt was turned onto the left side. When patient was turned, there was a DTI and a stage II noted to the L buttock. Jake Riddle was completed.

## 2020-11-12 NOTE — PROGRESS NOTES
11/12/20 0738   Vent Information   Vent Mode AC/VC   Vt Ordered 400 mL   Rate Set 12 bmp   Peak Flow 55 L/min   FiO2  30 %   SpO2 92 %   Sensitivity 3   PEEP/CPAP 5   Vent Patient Data   Peak Inspiratory Pressure 30 cmH2O   Mean Airway Pressure 9.8 cmH20   Rate Measured 16 br/min   Vt Exhaled 420 mL   Minute Volume 7.6 Liters   I:E Ratio 1:2.7   Plateau Pressure 18 [id number]   Static Compliance 32 mL/cmH2O   Dynamic Compliance 17 mL/cmH2O   Cough/Sputum   Cough Productive   Sputum Amount Small   Sputum Color Yellow   Tenacity Thick

## 2020-11-12 NOTE — PROGRESS NOTES
Patient assessment completed, see flow sheet. Medications administered per MAR. Patient in bed bed in lowest, locked position. Patient able to nod head appropriately to yes no questions. HR increased into the 140s this evening without apparent outward signs of patient distress. Patient denied being in pain but nodded head 'yes' when asked if she was feeling anxious; Versed given per PRN orders (see MAR). Will continue to monitor and assess.

## 2020-11-12 NOTE — PROGRESS NOTES
Shift assessment completed, see flow sheet. RASS -1. Intubated and sedated on AC # 7.5 ETT, at 22 LL. 12 / 400 /30 %/ +5. SpO2 93%. Respirations are easy, even, and unlabored. Bilateral lung sounds rhonchi throughout. VSS   OG in place at 62, with TF at 15 mL/hr. 60mL flushes Q6hrs. PIVs WDL  Propofol infusing at 20 mcg/kg/min. Fentanyl infusing at 25 mcg/h. Precedex infusing at 1 mcg/kg/h.      All lines and monitoring devices in place. Bilateral soft wrist restraints in place for patient safety. Mercedes is patent and secured with yellow/clear urine. Bed in lowest position with wheels locked. No needs expressed at this time.  Will continue to monitor.

## 2020-11-12 NOTE — PROGRESS NOTES
Rounds completed with Dr. Bianca Yang and multidisciplinary team at this time. POC, lines, and labs reviewed.

## 2020-11-13 NOTE — FLOWSHEET NOTE
11/13/20 0900   Vital Signs   Pulse 102   Heart Rate Source Monitor   Resp (!) 31   /71   MAP (mmHg) 82   Oxygen Therapy   SpO2 93 %   O2 Device Ventilator   FiO2  30 %   Pt resting in bed, vitals per doc flow. Assessment complete see doc flow. afib 87 on ICU bedside monitor. #7.5 ETT @ 23LL. Vent: SBT 5/5 RR:33  Vt: 353 FIO2: 40% SPO2 94% with CSPO2 monitoring. RASS 0. PERRL. PIV x 2 WDL, propofol gtt @ 10 mcg/kg/min precedex gtt @ 1.5 mcg/kg/hr. OG placement checked via gastric contents. Minimal residual noted. Medications administered via OG, clamped Mercedes secured draining clear yellow urine. Pt repositioned for comfort. Will continue to closely monitor.

## 2020-11-13 NOTE — PLAN OF CARE
Nutrition Problem #1: Inadequate oral intake  Intervention: Food and/or Nutrient Delivery: Continue NPO  Nutritional Goals: patient will remain in NPO status until medically cleared to receive nutrition therapy

## 2020-11-13 NOTE — PROGRESS NOTES
Dr. Aliza Suggs @ bedside assessing pt for interdisciplinary rounds. All labs lines assessment POC, SBT, K, plts and Tmax reviewed. Per MD pt to be extubated prox 0905 w/o ABG to vapotherm or HFNC. See new orders.

## 2020-11-13 NOTE — DISCHARGE INSTR - COC
Continuity of Care Form    Patient Name: Nimesh Nava   :  1960  MRN:  [id number]    Admit date:  2020  Discharge date:  2020    Code Status Order: Full Code   Advance Directives:   Advance Care Flowsheet Documentation       Date/Time Healthcare Directive Type of Healthcare Directive Copy in [address] Agent's Name Healthcare Agent's Phone Number    20 0018  No, patient does not have an advance directive for healthcare treatment -- -- -- -- --            Admitting Physician:  Winsome Yan MD  PCP: Nay Flower MD    Discharging Nurse: Macey Moss Unit/Room#: 3002/3002-01  Discharging Unit Phone Number: [phone]    Emergency Contact:   Extended Emergency Contact Information  Primary Emergency Contact: [facility] Phone: [phone]  Relation: Child  Secondary Emergency Contact: [address] [place] [address] Phone: [phone]  Relation: Brother/Sister    Past Surgical History:  Past Surgical History:   Procedure Laterality Date     SECTION      CHOLECYSTECTOMY      COLONOSCOPY  2017    polyps    HYSTERECTOMY      HYSTERECTOMY      KNEE SURGERY Right     SIGMOIDOSCOPY  2019    SIGMOIDOSCOPY N/A 2019    SIGMOIDOSCOPY BIOPSY FLEXIBLE performed by Asim Kan DO at [address]       Immunization History:   Immunization History   Administered Date(s) Administered    Influenza Virus Vaccine 2012, 2017    Influenza, Mellisa Spies, IM, PF (6 mo and older Fluzone, Flulaval, Fluarix, and 3 yrs and older Afluria) 12/10/2018, 2020       Active Problems:  Patient Active Problem List   Diagnosis Code    Cellulitis and abscess of leg L03.119, L02.419    Chest pain R07.9    Acute lateral meniscus tear of left knee S83.282A    Left knee pain M25.562    SOB (shortness of breath) R06.02    Primary cancer of right breast with metastasis to other site ([facility] [address] C50.911    Anxiety F41.9 Wound cleanser 11/12/20 2243   Dressing/Treatment Other (comment) 11/12/20 2243   Number of days: 0        Elimination:  Continence:   · Bowel: No  · Bladder: No  Urinary Catheter: None   Colostomy/Ileostomy/Ileal Conduit: No       Date of Last BM: 11/18/20    Intake/Output Summary (Last 24 hours) at 11/13/2020 0208  Last data filed at 11/13/2020 0154  Gross per 24 hour   Intake 2662 ml   Output 4510 ml   Net -1848 ml     I/O last 3 completed shifts: In: 2403 [I.V.:1704; NG/GT:699]  Out: 2792 [YEDJZ:7209]    Safety Concerns: At Risk for Falls and Aspiration Risk    Impairments/Disabilities:      None    Nutrition Therapy:  Current Nutrition Therapy:   - Oral Diet:  Carb Control 4 carbs/meal (1800kcals/day)    Routes of Feeding: Oral  Liquids: Thin Liquids  Daily Fluid Restriction: no  Last Modified Barium Swallow with Video (Video Swallowing Test): done on 11/16/2020/***    Treatments at the Time of Hospital Discharge:   Respiratory Treatments: see MAR  Oxygen Therapy:  is not on home oxygen therapy.   Ventilator:    - No ventilator support    Rehab Therapies: Physical Therapy, Occupational Therapy and SN  Weight Bearing Status/Restrictions: No weight bearing restirctions  Other Medical Equipment (for information only, NOT a DME order):  walker and bedside commode  Other Treatments: ***    Patient's personal belongings (please select all that are sent with patient):  None    RN SIGNATURE:  Electronically signed by Aldo Banda RN on 11/18/20 at 2:20 PM EST    CASE MANAGEMENT/SOCIAL WORK SECTION    Inpatient Status Date: 11/7/2020    Readmission Risk Assessment Score:  Readmission Risk              Risk of Unplanned Readmission:        30           Discharging to Facility/ Agency    [facility]: [facility] Address: [address]   Phone: [phone]   Fax: [phone]          / signature: Electronically signed by Gilda Black RN on 11/17/20 at 3:07 PM EST    PHYSICIAN SECTION    Prognosis: Good    Condition at Discharge: Stable    Rehab Potential (if transferring to Rehab): Good    Recommended Labs or Other Treatments After Discharge: PT and OT    Physician Certification: I certify the above information and transfer of Alisha Connors  is necessary for the continuing treatment of the diagnosis listed and that she requires Adams Guo for less 30 days. Update Admission H&P: No change in H&P    PHYSICIAN SIGNATURE:  Electronically signed by Jessica Potter MD on 11/18/2020 8:53 AM   During your forty minute visit we will cover things about your medical history, update your medical records, discuss the best ways to keep you healthy in the future, and much more. This is an opportunity you dont want to miss. Please call the [facility] [address] directly at [phone] to schedule your free appointment.

## 2020-11-13 NOTE — PROGRESS NOTES
Report given to [facility], RN. POC transferred at this time.

## 2020-11-13 NOTE — FLOWSHEET NOTE
11/13/20 1132   Vital Signs   Pulse 86   Resp (!) 32   BP (!) 103/56   MAP (mmHg) 72   Oxygen Therapy   SpO2 91 %   Pt reassessed, NSR 81 w/ occasional pvc's on ICU bedside monitor. SPO2 93% on 3L O2 via HFNC with CSPO2 monitoring. A&Ox4. PERRL. PIV x 2 WDL, precedex gtt @ 1 mcg/kg/hr, will continue to wean off. Pills crushed and given in pudding, no s/s aspiration noted. Pt repositioned for comfort. Sister to bedside. Will continue to closely monitor.

## 2020-11-13 NOTE — PLAN OF CARE
Problem: Falls - Risk of:  Goal: Will remain free from falls  Description: Will remain free from falls  Outcome: Ongoing     Problem: Airway Clearance - Ineffective  Goal: Achieve or maintain patent airway  Outcome: Ongoing     Problem: Gas Exchange - Impaired  Goal: Absence of hypoxia  Outcome: Ongoing     Problem: Breathing Pattern - Ineffective  Goal: Ability to achieve and maintain a regular respiratory rate  Outcome: Ongoing     Problem:  Body Temperature -  Risk of, Imbalanced  Goal: Ability to maintain a body temperature within defined limits  Outcome: Ongoing

## 2020-11-13 NOTE — CONSULTS
[facility] Nurse  Consult Note       NAME:  Perla Cunningham  MEDICAL RECORD NUMBER:  [id number]  AGE: 56 y.o.    GENDER: female  : 1960  TODAY'S DATE:  2020    Subjective Pt is awake and alert, complains of pain on her tailbone   Reason for WOCN Evaluation and Assessment: sacrum      Perla Cunningham is a 56 y.o. female referred by:   [] Physician  [] Nursing  [] Other:     Wound Identification:  Wound Type: pressure  Contributing Factors: chronic pressure, decreased mobility and shear force        Patient Goal of Care:  [x] Wound Healing  [] Odor Control  [] Palliative Care  [] Pain Control   [] Other:         PAST MEDICAL HISTORY        Diagnosis Date    AC (acromioclavicular) joint bone spurs     knees    Atrial fibrillation (HCC)     Cancer (HCC)     stage 4 breast    Cellulitis     [facility]est[facility]     Depression     anxiety    Diabetes mellitus ([facility] [facility]     Hypertension        PAST SURGICAL HISTORY    Past Surgical History:   Procedure Laterality Date     SECTION      CHOLECYSTECTOMY      COLONOSCOPY  2017    polyps    HYSTERECTOMY      HYSTERECTOMY      KNEE SURGERY Right     SIGMOIDOSCOPY  2019    SIGMOIDOSCOPY N/A 2019    SIGMOIDOSCOPY BIOPSY FLEXIBLE performed by Lacey Aviles DO at [facility]       FAMILY HISTORY    Family History   Problem Relation Age of Onset    Heart Disease Maternal Grandmother     Kidney Disease Mother     Cancer Maternal Aunt        SOCIAL HISTORY    Social History     Tobacco Use    Smoking status: Never Smoker    Smokeless tobacco: Never Used   Substance Use Topics    Alcohol use: No    Drug use: No       ALLERGIES    Allergies   Allergen Reactions    Sucralfate Hives and Nausea Only    Ampicillin Rash    Ampicillin-Sulbactam Sodium Rash    Sulfamethoxazole-Trimethoprim Rash       MEDICATIONS    No current facility-administered medications on file prior to encounter. Current Outpatient Medications on File Prior to Encounter   Medication Sig Dispense Refill    omeprazole (PRILOSEC) 40 MG delayed release capsule Take 40 mg by mouth daily      hydrALAZINE (APRESOLINE) 50 MG tablet Take 1 tablet by mouth 3 times daily 90 tablet 0    furosemide (LASIX) 20 MG tablet Take 1 tablet by mouth as needed (edema) 60 tablet 3    amLODIPine (NORVASC) 5 MG tablet Take 1 tablet by mouth daily 30 tablet 0    ondansetron (ZOFRAN) 8 MG tablet Take 1 tablet by mouth every 8 hours as needed for Nausea 10 tablet 0    morphine (MS CONTIN) 60 MG extended release tablet Take 60 mg by mouth 2 times daily.  morphine (MSIR) 15 MG tablet Take 30 mg by mouth 2 times daily.  metoclopramide (REGLAN) 10 MG tablet Take 1 tablet by mouth 3 times daily as needed (nausea and vomiting) 12 tablet 0    calcium carbonate (OSCAL) 500 MG TABS tablet Take 500 mg by mouth daily      ferrous sulfate 325 (65 FE) MG EC tablet Take 325 mg by mouth 3 times daily (with meals)      palbociclib (IBRANCE) 100 MG capsule Take 100 mg by mouth daily Is off right now while she is sick      fulvestrant (FASLODEX) 250 MG/5ML SOLN IM injection Inject 500 mg into the muscle once      carvedilol (COREG) 25 MG tablet Take 25 mg by mouth 2 times daily (with meals)      citalopram (CELEXA) 20 MG tablet Take 40 mg by mouth daily.         LANTUS SOLOSTAR 100 UNIT/ML injection pen Inject 10 Units as directed nightly Has not taken oit recently after loosing 50 lbs  5       Objective    BP (!) 91/48   Pulse 114   Temp 99.2 °F (37.3 °C) (Axillary)   Resp 27   Ht 5' 4\" (1.626 m)   Wt 224 lb (101.6 kg)   SpO2 98%   BMI 38.45 kg/m²     LABS:  WBC:    Lab Results   Component Value Date    WBC 8.0 11/13/2020     H/H:    Lab Results   Component Value Date    HGB 10.0 11/13/2020    HCT 30.1 11/13/2020     PTT:    Lab Results   Component Value Date    APTT 26.1 06/18/2019   [APTT}  PT/INR:    Lab Results Component Value Date    PROTIME 14.9 11/10/2020    INR 1.28 11/10/2020     HgBA1c:    Lab Results   Component Value Date    LABA1C 4.7 11/08/2020       Assessment   Rigoberto Risk Score: Rigoberto Scale Score: 14    Patient Active Problem List   Diagnosis Code    Cellulitis and abscess of leg L03.119, L02.419    Chest pain R07.9    Acute lateral meniscus tear of left knee S83.282A    Left knee pain M25.562    SOB (shortness of breath) R06.02    Primary cancer of right breast with metastasis to other site ([facility] [facility] C50.911    Anxiety F41.9    DM2 (diabetes mellitus, type 2) ([facility]) E11.9    GERD (gastroesophageal reflux disease) K21.9    Hypertension, uncontrolled I10    Morbid obesity due to excess calories ([facility]) E66.01    Acute renal injury [facility] N17.9    Chemotherapy-induced neutropenia ([facility]) D70.1, T45.1X5A    Diarrhea R19.7    Acute respiratory failure with hypoxia ([facility]) J96.01    Pneumonia due to organism J18.9    Paroxysmal atrial fibrillation ([facility]) I48.0    Pleural effusion J90    Atrial fibrillation with RVR ([facility]) I48.91    HCAP (healthcare-associated pneumonia) J18.9       Measurements:  Wound 07/27/19 Abdomen (Active)   Number of days: 475       Wound 07/27/19 Abdomen (Active)   Number of days: 475       Wound 11/12/20 Buttocks Left (Active)   Wound Image   11/12/20 2243   Wound Etiology Deep tissue/Injury 11/13/20 0918   Dressing Status Reinforced dressing 11/13/20 1143   Wound Cleansed Wound cleanser 11/12/20 2243   Dressing/Treatment Other (comment) 11/13/20 0918   Wound Assessment Pink/red;Purple/maroon 11/13/20 0918   Drainage Amount None 11/13/20 0918   Number of days: 0      Sacrum: 5.2x4.1cm, 100% dark purple nonblanchable discoloration. Partial thickness skin loss on periwound skin. Deep tissue injury               Plan Start Venelex ointment 2-3 times daily and prn. Turn and reposition every 2 hours and prn.      Plan of Care: Wound 11/12/20 Buttocks Left-Dressing/Treatment: Other (comment)(mepilex)    Specialty Bed Required : Yes   [x] Low Air Loss   [] Pressure Redistribution  [] Fluid Immersion  [] Bariatric  [] Total Pressure Relief  [] Other:     Current Diet: Diet NPO Effective Now  Dietician consult:  N/A    Discharge Plan:  Placement for patient upon discharge: undertermined   Patient appropriate for Outpatient [address]: Yes    Referrals:  []   [] [address]  [] Supplies  [] Other    Patient/Caregiver Teaching:  Level of patient/caregiver understanding able to: pt agreeable and verbalizes an understanding of pressure injury prevention  [] Indicates understanding       [] Needs reinforcement  [] Unsuccessful      [x] Verbal Understanding  [] Demonstrated understanding       [] No evidence of learning  [] Refused teaching         [] N/A       Electronically signed by Deidre Jensen RN, Dot Nolan on 11/13/2020 at 4:26 PM

## 2020-11-13 NOTE — PROGRESS NOTES
Comprehensive Nutrition Assessment    Type and Reason for Visit:  Reassess    Nutrition Recommendations/Plan:   1. Continue NPO status - diet order modified this am after patient was extubated - recommend CCC-4 diet order when diet can be advanced. 2. Monitor need for swallow evaluation post-extubation and diet progression. 3. Monitor nutrition-related labs, bowel function (last documented BM was on 11/5/20), and weight trends. Nutrition Assessment:  patient remains unchanged from a nutritional standpoint except that patient was extubated this am so OG was d/c'd and patient is currently NPO and she remains at risk for further compromise d/t NPO status, DTI on L buttocks, and altered nutrition-related labs; will continue NPO status and monitor for nutrition progression    Malnutrition Assessment:  Malnutrition Status: At risk for malnutrition     Context:  Acute Illness     Findings of the 6 clinical characteristics of malnutrition:  Energy Intake:  Mild decrease in energy intake (Comment)  Weight Loss:  No significant weight loss(- 11# or 4.7% weight loss x 6 days admission; patient is -6.4L fluid since admission)     Body Fat Loss:  No significant body fat loss     Muscle Mass Loss:  No significant muscle mass loss    Fluid Accumulation:  No significant fluid accumulation     Strength:  Not Performed    Estimated Daily Nutrient Needs:  Energy (kcal):  1122 - 1428 kcals based on 11-14 kcals/kg/CBW; Weight Used for Energy Requirements:  Current     Protein (g):  110 - 121 g protein based on 2.0-2.2 g/kg/IBW; Weight Used for Protein Requirements:  Ideal        Fluid (ml/day):  1100 - 1430 ml; Method Used for Fluid Requirements:  1 ml/kcal      Nutrition Related Findings:  patient was extubated this am; patient had thoracentesis on 11/10/20 with - 700 ml fluid removal; + scattered bruising; last documented BM on 11/5/20;  Phos and BUN/Cr are elevated; K and Cl are low; GFR = 38; patient is receiving med-dose SSI, protonix, precedex, and fentanyl; bowel sounds are active      Wounds:  Deep Tissue Injury, Pressure Injury(DTI on L buttocks)       Current Nutrition Therapies:    Diet NPO Effective Now    Anthropometric Measures:  · Height: 5' 4\" (162.6 cm)  · Current Body Weight: 224 lb (101.6 kg)(obtained on 11/13/20)   · Admission Body Weight: 235 lb 7.2 oz (106.8 kg)(obtained on 11/9/20)    · Usual Body Weight: 235 lb (106.6 kg)(obtained on 11/10/20)     · Ideal Body Weight: 120 lbs; % Ideal Body Weight 186.7 %   · BMI: 38.4    · BMI Categories: Obese Class 2 (BMI 35.0 -39.9)       Nutrition Diagnosis:   · Inadequate oral intake related to inadequate protein-energy intake, impaired respiratory function as evidenced by NPO or clear liquid status due to medical condition      Nutrition Interventions:   Food and/or Nutrient Delivery:  Continue NPO  Nutrition Education/Counseling:  No recommendation at this time   Coordination of Nutrition Care:  Continue to monitor while inpatient, Interdisciplinary Rounds    Goals:  patient will remain in NPO status until medically cleared to receive nutrition therapy       Nutrition Monitoring and Evaluation:   Behavioral-Environmental Outcomes:  None Identified   Food/Nutrient Intake Outcomes:  Diet Advancement/Tolerance  Physical Signs/Symptoms Outcomes:  Biochemical Data, Chewing or Swallowing, Constipation, Hemodynamic Status, Weight, Skin, Nutrition Focused Physical Findings     Discharge Planning:     Too soon to determine     Electronically signed by Nohemi Tran RD, LD on 11/13/20 at 11:08 AM EST    Contact: [phone]

## 2020-11-13 NOTE — PROGRESS NOTES
RESPIRATORY THERAPY ASSESSMENT    Name:  Amarjit Mishra  Medical Record Number:  [id number]  Age: 56 y.o. Gender: female  : 1960  Today's Date:  2020  Room:  3002/3002-01    Assessment     Is the patient being admitted for a COPD or Asthma exacerbation? No   (If yes the patient will be seen every 4 hours for the first 24 hours and then reassessed)    Patient Admission Diagnosis      Allergies  Allergies   Allergen Reactions    Sucralfate Hives and Nausea Only    Ampicillin Rash    Ampicillin-Sulbactam Sodium Rash    Sulfamethoxazole-Trimethoprim Rash       Minimum Predicted Vital Capacity:               Actual Vital Capacity:                    Pulmonary History: none  Home Oxygen Therapy:   none  Home Respiratory Therapy: none   Current Respiratory Therapy:  Duoneb Q4  Treatment Type: Aerosol generator  Medications: Albuterol/Ipratropium    Respiratory Severity Index(RSI)   Patients with orders for inhalation medications, oxygen, or any therapeutic treatment modality will be placed on Respiratory Protocol. They will be assessed with the first treatment and at least every 72 hours thereafter. The following severity scale will be used to determine frequency of treatment intervention.     Smoking History: No Smoking History = 0    Social History  Social History     Tobacco Use    Smoking status: Never Smoker    Smokeless tobacco: Never Used   Substance Use Topics    Alcohol use: No    Drug use: No       Recent Surgical History: None = 0  Past Surgical History  Past Surgical History:   Procedure Laterality Date     SECTION      CHOLECYSTECTOMY      COLONOSCOPY  2017    polyps    HYSTERECTOMY      HYSTERECTOMY      KNEE SURGERY Right     SIGMOIDOSCOPY  2019    SIGMOIDOSCOPY N/A 2019    SIGMOIDOSCOPY BIOPSY FLEXIBLE performed by Dontae Pkie DO at [facility]       Level of Consciousness: Alert, Oriented, and Cooperative = 0    Level of Activity: Bedridden, unresponsive or quadriplegic = 4    Respiratory Pattern: Regular Pattern; RR 8-20 = 0    Breath Sounds: Diminshed bilaterally and/or crackles = 2    Sputum  Sputum Color: Yellow, Tenacity: Thick, Sputum How Obtained: Cough on request  Cough: Strong, spontaneous, non-productive = 0    Vital Signs   BP (!) 103/56   Pulse 78   Temp 98.7 °F (37.1 °C) (Axillary)   Resp (!) 32   Ht 5' 4\" (1.626 m)   Wt 224 lb (101.6 kg)   SpO2 91%   BMI 38.45 kg/m²   SPO2 (COPD values may differ): 88-89% on room air or greater than 92% on FiO2 28- 35% = 2    Peak Flow (asthma only): not applicable = 0    RSI: 7-8 = BID and Q4HPRN (every four hours as needed) for dyspnea        Plan       Goals: medication delivery     Patient/caregiver was educated on the proper method of use for Respiratory Care Devices:  Yes      Level of patient/caregiver understanding able to:   ? Verbalize understanding   ? Demonstrate understanding       ? Teach back        ? Needs reinforcement       ? No available caregiver               ? Other:     Response to education:  Good     Is patient being placed on Home Treatment Regimen? No     Does the patient have everything they need prior to discharge? NA     Comments:  Chart reviewed, patient assessed    Plan of Care: Duoneb TID, Duoneb prn     Electronically signed by Mazin Massey RCP on 11/13/2020 at 12:00 PM    Respiratory Protocol Guidelines     1. Assessment and treatment by Respiratory Therapy will be initiated for medication and therapeutic interventions upon initiation of aerosolized medication. 2. Physician will be contacted for respiratory rate (RR) greater than 35 breaths per minute. Therapy will be held for heart rate (HR) greater than 140 beats per minute, pending direction from physician. 3. Bronchodilators will be administered via Metered Dose Inhaler (MDI) with spacer when the following criteria are met:  a.  Alert and cooperative     b. HR < 140 bpm  c. RR < 30 bpm d. Can demonstrate a 2-3 second inspiratory hold  4. Bronchodilators will be administered via Hand Held Nebulizer [facility]) to patients when ANY of the following criteria are met  a. Incognizant or uncooperative          b. Patients treated with HHN at Home        c. Unable to demonstrate proper use of MDI with spacer     d. RR > 30 bpm   5. Bronchodilators will be delivered via Metered Dose Inhaler (MDI), HHN, Aerogen to intubated patients on mechanical ventilation. 6. Inhalation medication orders will be delivered and/or substituted as outlined below. Aerosolized Medications Ordering and Administration Guidelines:    1. All Medications will be ordered by a physician, and their frequency and/or modality will be adjusted as defined by the patients Respiratory Severity Index (RSI) score. 2. If the patient does not have documented COPD, consider discontinuing anticholinergics when RSI is less than 9.  3. If the bronchospasm worsens (increased RSI), then the bronchodilator frequency can be increased to a maximum of every 4 hours. If greater than every 4 hours is required, the physician will be contacted. 4. If the bronchospasm improves, the frequency of the bronchodilator can be decreased, based on the patient's RSI, but not less than home treatment regimen frequency. 5. Bronchodilator(s) will be discontinued if patient has a RSI less than 9 and has received no scheduled or as needed treatment for 72  Hrs. Patients Ordered on a Mucolytic Agent:    1. Must always be administered with a bronchodilator. 2. Discontinue if patient experiences worsened bronchospasm, or secretions have lessened to the point that the patient is able to clear them with a cough. Anti-inflammatory and Combination Medications:    1.  If the patient lacks prior history of lung disease, is not using inhaled anti-inflammatory medication at home, and lacks wheezing by examination or by history for at least 24 hours, contact physician for possible discontinuation.

## 2020-11-13 NOTE — PROGRESS NOTES
Reassessment complete. Blood pressure 107/62, pulse 84, temperature 98.3 °F (36.8 °C), temperature source Axillary, resp. rate 19, height 5' 4\" (1.626 m), weight 230 lb 12.8 oz (104.7 kg), SpO2 92 %, not currently breastfeeding. Patient resting comfortably. No changes in vent settings or drips. RASS score -1. No other changes at this time.      Electronically signed by Ishmael Collins RN on 11/13/2020 at 3:55 AM

## 2020-11-13 NOTE — CARE COORDINATION
INTERDISCIPLINARY PLAN OF CARE CONFERENCE    Date/Time: 11/13/2020 11:33 AM  Completed by: Judy Otero, Case Management      Patient Name:  Mu Goodman  YOB: 1960  Admitting Diagnosis: Acute respiratory failure with hypoxia ([facility] [J96.01]  Acute respiratory failure with hypoxia ([facility] [address] [J96.01]     Admit Date/Time:  11/7/2020  1:46 PM    Chart reviewed. Interdisciplinary team contacted or reviewed plan related to patient progress and discharge plans. Disciplines included Case Management, Nursing, and Dietitian. Current Status:cont in ICU, extubated to 10 L HFNC  PT/OT recommendation for discharge plan of care: n/a    Expected D/C Disposition:  Home    Discharge Plan Comments: Plan prior was to return home if possible. Will likely need PT/OT eval prior to discharge. Will follow for poss home O2, hhc vs STR needs.     Home O2 in place on admit: No  Pt informed of need to bring portable home O2 tank on day of discharge for nursing to connect prior to leaving:  Not Indicated  Verbalized agreement/Understanding:  Not Indicated

## 2020-11-13 NOTE — PROGRESS NOTES
11/12/20 1903   Vent Information   $Ventilation $Subsequent Day   Skin Assessment Clean, dry, & intact   Vent Type 840   Vent Mode AC/VC   Vt Ordered 400 mL   Rate Set 12 bmp   Peak Flow 55 L/min   FiO2  30 %   SpO2 93 %   SpO2/FiO2 ratio 310   Sensitivity 3   PEEP/CPAP 5   Humidification Source Heated wire   Humidification Temp 37   Humidification Temp Measured 37   Circuit Condensation Drained   Vent Patient Data   Peak Inspiratory Pressure 25 cmH2O   Mean Airway Pressure 11 cmH20   Rate Measured 23 br/min   Vt Exhaled 447 mL   Minute Volume 10.1 Liters   I:E Ratio 1:2.1   Plateau Pressure 20 HHU26   Static Compliance 30 mL/cmH2O   Dynamic Compliance 22 mL/cmH2O   Cough/Sputum   Sputum How Obtained Suctioned;Endotracheal   Sputum Amount Small   Sputum Color Yellow   Tenacity Thick   Spontaneous Breathing Trial (SBT) RT Doc   Pulse 72   Breath Sounds   Right Upper Lobe Rhonchi   Right Middle Lobe Diminished   Right Lower Lobe Diminished   Left Upper Lobe Rhonchi   Left Lower Lobe Diminished   Additional Respiratory  Assessments   Resp 14   Position Semi-Mooney's   Alarm Settings   High Pressure Alarm 40 cmH2O   Low Minute Volume Alarm 3 L/min   Apnea (secs) 20 secs   High Respiratory Rate 40 br/min   Low Exhaled Vt  300 mL   ETT (adult)   Placement Date/Time: 11/09/20 1840   Preoxygenation: Yes  Location: Oral  Measured From: Lips   Secured at 22 cm   Measured From [address] [employer]   Site Condition Dry

## 2020-11-13 NOTE — FLOWSHEET NOTE
11/13/20 1652   Vital Signs   Temp 98.6 °F (37 °C)   Temp Source Oral   Pulse 92   Resp 24   BP (!) 96/59   MAP (mmHg) 72   Level of Consciousness 0   MEWS Score 3   Pain Assessment   RASS Score 0   Oxygen Therapy   SpO2 95 %   Pt reassessed, NSR 99 w/ occasional pvc's & pac's on ICU bedside monitor. SPO2 96% on 3L O2 via HFNC with CSPO2 monitoring. A&Ox4. PERRL.  PIV x 2 WDL. Pt repositioned for comfort. Will continue to closely monitor.

## 2020-11-13 NOTE — PROGRESS NOTES
Speech Language Pathology  Facility/Department: [facility]   CLINICAL BEDSIDE SWALLOW EVALUATION    NAME: Yehuda Nicole  : 1960  MRN: [id number]    ADMISSION DATE: 2020  ADMITTING DIAGNOSIS: has Cellulitis and abscess of leg; Chest pain; Acute lateral meniscus tear of left knee; Left knee pain; SOB (shortness of breath); Primary cancer of right breast with metastasis to other site [facility]); Anxiety; DM2 (diabetes mellitus, type 2) ([address] GERD (gastroesophageal reflux disease); Hypertension, uncontrolled; Morbid obesity due to excess calories ([address] Acute renal injury ([address] Chemotherapy-induced neutropenia ([address] Diarrhea; Acute respiratory failure with hypoxia ([address] Pneumonia due to organism; Paroxysmal atrial fibrillation ([address] Pleural effusion; Atrial fibrillation with RVR ([address] and HCAP (healthcare-associated pneumonia) on their problem list.  ONSET DATE: The patient was admitted to [facility] on 2020    Recent Chest Xray/CT of Chest: (11/10/2020)  Impression    Cardiomegaly with scattered right pulmonary infiltrates consistent pneumonia.         Small right basilar effusion.         Support tubes as described above. Date of Eval: 2020  Evaluating Therapist: Leanne Blank    Current Diet level:  Current Diet : NPO  Current Liquid Diet : NPO      Primary Complaint  Patient Complaint: The patient was admitted  with respiratory failure 2/2 PNA. Intubated emergently 11/10 and extubated this date to 8L of HF NC.  Currently on 4L of O2 via NC    Pain:  Pain Assessment  Pain Assessment: 0-10  Pain Level: 0  Patient's Stated Pain Goal: 3  Pain Type: Chronic pain  Pain Location: Generalized  Pain Descriptors: Sore  Pain Frequency: Intermittent  Pain Onset: Gradual  Clinical Progression: Gradually worsening  Functional Pain Assessment: Activities are not prevented  RASS Score: Alert and calm    Reason for Referral  Yehuda Nicole was referred for a bedside swallow evaluation to assess the efficiency of her swallow function, identify signs and symptoms of aspiration and make recommendations regarding safe dietary consistencies, effective compensatory strategies, and safe eating environment. Impression  Dysphagia Diagnosis: Mild oral stage dysphagia;Mild pharyngeal stage dysphagia  Dysphagia Impression : The patient presents with mild oropharyngeal dysphagia at this time. The patient is on 4L of O2 via NC at time of SLP entry into room. SLP and RN adjusting pt to upright position in bed. Difficulty maintaining O2 sats above 90. Steady at 88. SLP increasing O2 to 5L with RN permission. Pt with SPO2% of 93 on 5L. Delayed volitional swallow noted with suspected reduced hyolaryngeal elevation via digital palpation to anterior neck. Congested and productive cough of white phlegm noted. Suctioned via SLP. The patient is noted with immediate coughing after the swallow with thin liquids 5/5. RR increasing to 44. Pt tolerating ice chips with no clinical s/s of aspiration 5/5. No clinical s/s of aspiration with puree solids. Delayed swallow with puree. Suspect rapid premature bolus loss over BOT with thin liquids. Pt reports loss of appetite and altered taste. At this time, SLP recommends the patient remain NPO. Exceptions are ice chips. Meds ok to give PO via crushed in puree. MBSS is warranted. SLP to re-assess pt tomorrow. SLP to follow. Dysphagia Outcome Severity Scale: Level 1: Severe dysphagia- NPO. Unable to tolerate any PO safely     Treatment Plan  Requires SLP Intervention: Yes  Duration/Frequency of Treatment: 3-5x/wk  D/C Recommendations: To be determined  Referral To: Pulmonology    Recommended Diet and Intervention  Diet Solids Recommendation: NPO  Liquid Consistency Recommendation: NPO  Recommended Form of Meds: Crushed in puree as able  Recommendations: NPO;Dysphagia treatment; Modified barium swallow study  Therapeutic Interventions: Oral care; Patient/Family education    Compensatory Swallowing Strategies:  n/a       Treatment/Goals  Short-term Goals  Timeframe for Short-term Goals: 5 days (11/18/2020)  Goal 1: The patient will tolerate repeat BSE  Long-term Goals  Timeframe for Long-term Goals: 7 days (11/20/2020)  Goal 1: The patient will tolerate LRD with no clinical s/s of aspiration for optimal nutrition and hydration    General  Chart Reviewed: Yes  Comments: Chart reviewed for completion of assessment. Subjective  Subjective: The patient is seen in room with RN permission. Pt cooperative and pleasant  Behavior/Cognition: Alert; Cooperative;Pleasant mood  Respiratory Status: O2 via nasual cannula  O2 Device: Nasal cannula  Liters of Oxygen: 4 L  Communication Observation: Functional  Follows Directions: Complex  Dentition: Adequate  Patient Positioning: Upright in bed  Baseline Vocal Quality: Hoarse  Volitional Cough: Strong;Congested  Volitional Swallow: Delayed  Prior Dysphagia History: No prior dysphagia hx per chart or pt report  Consistencies Administered: Dysphagia Pureed (Dysphagia I); Thin - cup; Ice Chips       Vision/Hearing  Vision  Vision: Within Functional Limits  Hearing  Hearing: Within functional limits    Oral Motor Deficits  Oral/Motor  Oral Motor: Within functional limits    Oral Phase Dysfunction  Oral Phase  Oral Phase: Exceptions  Oral Phase Dysfunction  Suspected Premature Bolus Loss: Thin - cup     Indicators of Pharyngeal Phase Dysfunction   Pharyngeal Phase  Pharyngeal Phase: Exceptions  Indicators of Pharyngeal Phase Dysfunction  Delayed Swallow: Puree  Decreased Laryngeal Elevation: All  Cough - Immediate: Thin - cup  Change in Vital Signs:  Thin - cup    Prognosis  Prognosis  Prognosis for safe diet advancement: fair  Individuals consulted  Consulted and agree with results and recommendations: Patient;RN    Education  Patient Education: SLP re: Role of SLP, rationale for completion of assessment, results of assessment, recommendations, and POC  Patient Education Response: Verbalizes understanding;Demonstrated understanding  Safety Devices in place: Yes  Type of devices: All fall risk precautions in place; Left in bed;Bed alarm in place;Call light within reach;Nurse notified       Therapy Time  SLP Individual Minutes  Time In: 2340  Time Out: 4552  Minutes: [address] [place], [place]  11/13/2020 2:43 PM  Judson Bernheim, M.A., Lydia Burt #[id number]  Speech-Language Pathologist  Phone: [phone]

## 2020-11-13 NOTE — PROGRESS NOTES
11/12/20 2243   Vent Information   Vent Type 840   Vent Mode AC/VC   Vt Ordered 400 mL   Rate Set 12 bmp   Peak Flow 55 L/min   FiO2  30 %   SpO2 93 %   SpO2/FiO2 ratio 310   Sensitivity 3   PEEP/CPAP 5   Humidification Source Heated wire   Humidification Temp 37   Humidification Temp Measured 37   Circuit Condensation Drained   Vent Patient Data   Peak Inspiratory Pressure 29 cmH2O   Mean Airway Pressure 11 cmH20   Rate Measured 19 br/min   Vt Exhaled 403 mL   Minute Volume 7.5 Liters   I:E Ratio 1:2.4   Plateau Pressure 22 [id number]   Cough/Sputum   Sputum How Obtained Endotracheal;Suctioned   Cough Productive   Sputum Amount Small   Sputum Color Yellow   Tenacity Thick   Spontaneous Breathing Trial (SBT) RT Doc   Pulse 80   Breath Sounds   Right Upper Lobe Clear   Right Middle Lobe Diminished   Right Lower Lobe Diminished   Left Upper Lobe Clear   Left Lower Lobe Diminished   Additional Respiratory  Assessments   Resp 17   Position Semi-Mooney's   Alarm Settings   High Pressure Alarm 40 cmH2O   Low Minute Volume Alarm 3 L/min   Apnea (secs) 20 secs   High Respiratory Rate 40 br/min   Low Exhaled Vt  300 mL   ETT (adult)   Placement Date/Time: 11/09/20 1840   Preoxygenation: Yes  Location: Oral  Measured From: Lips   Secured at 22 cm   Measured From [address] [place] [address],[address] By [employer]   Site Condition Dry

## 2020-11-13 NOTE — PROGRESS NOTES
Reassessment complete. Blood pressure 109/62, pulse 78, temperature 98.3 °F (36.8 °C), temperature source Axillary, resp. rate 17, height 5' 4\" (1.626 m), weight 230 lb 12.8 oz (104.7 kg), SpO2 94 %, not currently breastfeeding. Patient resting peacefully. Tolerating the Ventilator well at this time. All vent settings and drips continue the same since last assessment. Bed on automatic side to side rotation turns to help assist with patients skin. No needs expressed at this time. Will continue to monitor.      Electronically signed by Delfin Costello RN on 11/13/2020 at 12:31 AM

## 2020-11-13 NOTE — PROGRESS NOTES
Assessment complete. Blood pressure 120/72, pulse 77, temperature 97.8 °F (36.6 °C), temperature source Axillary, resp. rate 15, height 5' 4\" (1.626 m), weight 230 lb 12.8 oz (104.7 kg), SpO2 94 %, not currently breastfeeding. Patient currently intubated with ETT at 22LL. Vent settings are 12 RR, 400 VT, 5 of PEEP and 30% FiO2. Patient tolerating vent well at this time. Small amount of secretions suctioned. No s.s of distress. BUE soft wrist restraints in place due to attempts of pulling at ETT and lines. No signs of injury noted and PROM performed. Lung sounds clear, diminished. Occasional coughing noted with suctioning. Abdomen soft. BS positive x 4 quads. Edema noted in BUE and BLE. Pillows placed under extremities for elevation. Lasix and metolazone continues per order. Mercedes catheter intact draining yellow clear urine. Propofol infusing at 15 mcg/kg/min. Fentanyl infusing at 25 mcg//h. Precedex infusing at 1.2 mcg/kg/h. Patient opens eyes to speech. Is able to follow commands like squeezing staff hands and moving feet. All lines and monitoring devices in place. No needs expressed at this time. Will continue to monitor.      Electronically signed by Laila Nicole RN on 11/12/2020 at 8:27 PM

## 2020-11-13 NOTE — PROGRESS NOTES
11/13/20 0631   Vent Information   Vent Mode AC/VC   Vt Ordered 400 mL   Rate Set 12 bmp   Peak Flow 55 L/min   FiO2  30 %   SpO2 92 %   PEEP/CPAP 5   Vent Patient Data   Peak Inspiratory Pressure 25 cmH2O   Mean Airway Pressure 11 cmH20   Rate Measured 19 br/min   Vt Exhaled 435 mL   Minute Volume 8.84 Liters   I:E Ratio 1:2.2   Plateau Pressure 22 [id number]   Static Compliance 26 mL/cmH2O   Dynamic Compliance 22 mL/cmH2O

## 2020-11-13 NOTE — PROGRESS NOTES
11/13/20 0304   Vent Information   Vent Type 840   Vent Mode AC/VC   Vt Ordered 400 mL   Rate Set 12 bmp   Peak Flow 55 L/min   FiO2  30 %   SpO2 92 %   SpO2/FiO2 ratio 306.67   Sensitivity 3   PEEP/CPAP 5   Humidification Source Heated wire   Humidification Temp 37   Humidification Temp Measured 37.2   Circuit Condensation Drained   Vent Patient Data   Peak Inspiratory Pressure 25 cmH2O   Mean Airway Pressure 14 cmH20   Rate Measured 24 br/min   Vt Exhaled 419 mL   Minute Volume 7.5 Liters   I:E Ratio 1:2.0   Plateau Pressure 21 IIV84   Cough/Sputum   Sputum How Obtained Endotracheal;Suctioned   Cough Productive   Sputum Amount Small   Sputum Color Yellow   Tenacity Thick   Spontaneous Breathing Trial (SBT) RT Doc   Pulse 84   Breath Sounds   Right Upper Lobe Clear   Right Middle Lobe Diminished   Right Lower Lobe Diminished   Left Upper Lobe Clear   Left Lower Lobe Diminished   Additional Respiratory  Assessments   Resp 19   Position Semi-Mooney's   Alarm Settings   High Pressure Alarm 40 cmH2O   Low Minute Volume Alarm 3 L/min   Apnea (secs) 20 secs   High Respiratory Rate 40 br/min   Low Exhaled Vt  300 mL   ETT (adult)   Placement Date/Time: 11/09/20 1840   Preoxygenation: Yes  Location: Oral  Measured From: Lips   Secured at 22 cm   Measured From [address] [employer]   Site Condition Dry

## 2020-11-13 NOTE — PROGRESS NOTES
Pulmonary & Critical Care Medicine ICU Progress Note    CC: Shortness of breath    Events of Last 24 hours: Failed SBT  Bed malfunction with possible pressure injury    Invasive Lines: Peripheral    MV: 2020  Vent Mode: AC/VC Rate Set: 12 bmp/Vt Ordered: 400 mL/ /FiO2 : 30 %  Recent Labs     20  0415 20  0500   PHART 7.514* 7.560*   [id number] 43.3 45.6*   PO2ART 78.3 56.2*       IV:   dexmedetomidine (PRECEDEX) IV infusion 1.2 mcg/kg/hr (20)    fentaNYL (SUBLIMAZE) infusion 25 mcg/hr (20)    propofol 15 mcg/kg/min (20)    dextrose         Vitals:  Blood pressure (!) 102/59, pulse 78, temperature 98.6 °F (37 °C), temperature source Axillary, resp. rate 18, height 5' 4\" (1.626 m), weight 224 lb (101.6 kg), SpO2 100 %, not currently breastfeeding. on 30%  Temp  Av.5 °F (36.9 °C)  Min: 97.8 °F (36.6 °C)  Max: 99.3 °F (37.4 °C)    Intake/Output Summary (Last 24 hours) at 2020 0540  Last data filed at 2020 0349  Gross per 24 hour   Intake 1796 ml   Output 4310 ml   Net -2514 ml     EXAM:  General: intubated, ill appearing    Eyes: PERRL. No sclera icterus. No conjunctival injection. ENT: No discharge. Pharynx with ETT. Neck: Trachea midline. Normal thyroid. Resp: No accessory muscle use. No crackles. No wheezing. No rhonchi. No dullness on percussion. CV: Regular rate. Regular rhythm. No mumur or rub. No edema. Peripheral pulses are 2+. Capillary refill is less than 3 seconds. GI: Non-tender. Non-distended. No masses. No organomegaly. Normal bowel sounds. No hernia. Skin: Warm and dry. No nodule on exposed extremities. No rash on exposed extremities. Lymph: No cervical LAD. No supraclavicular LAD. M/S: No cyanosis. No joint deformity. No clubbing. Neuro: Sedated,+ following commands.  Patellar reflexes are symmetric  Psych: Unable to obtain because the patient is non-communicative      Scheduled Meds:   metOLazone  5 mg Oral Daily    furosemide  40 mg Intravenous BID    pantoprazole  40 mg Intravenous Daily    insulin lispro  0-12 Units Subcutaneous Q4H    carboxymethylcellulose PF  1 drop Both Eyes Q4H    And    artificial tears   Both Eyes Q4H    chlorhexidine  15 mL Mouth/Throat BID    mupirocin   Nasal BID    ipratropium-albuterol  1 ampule Inhalation Q4H    carvedilol  25 mg Oral BID WC    citalopram  40 mg Oral Daily    sodium chloride flush  10 mL Intravenous 2 times per day    enoxaparin  40 mg Subcutaneous Daily    levofloxacin  750 mg Intravenous Q48H     PRN Meds:  acetaminophen, fentanNYL, midazolam, sodium chloride flush, acetaminophen **OR** acetaminophen, polyethylene glycol, promethazine **OR** ondansetron, glucose, dextrose, glucagon (rDNA), dextrose    Results:  CBC:   Recent Labs     11/10/20  0600 11/11/20  0627 11/12/20  0413 11/13/20  0411   WBC 4.8 7.1 7.3 8.0   HGB 8.2* 8.7* 8.4* 10.0*   HCT 25.6* 26.3* 25.3* 30.1*   MCV 93.2 92.2 91.9 90.9    159 147  --      BMP:   Recent Labs     11/12/20  0413 11/12/20  1048 11/13/20  0411    136 136   K 3.1* 4.1 3.3*   CL 89* 89* 88*   CO2 34* 35* 35*   PHOS 4.7 4.5 5.7*   BUN 29* 29* 32*   CREATININE 1.3* 1.4* 1.4*     LIVER PROFILE:   No results for input(s): AST, ALT, LIPASE, BILIDIR, BILITOT, ALKPHOS in the last 72 hours. Invalid input(s):   AMYLASE,  ALB    Cultures:  11/8/2020 SARS-CoV-2 PCR negative  11/8/2020 SARS-CoV-2 NAAT negative  11/7/2020 blood no growth  11/9/2020 tracheal aspirate NRF    Films:  Chest CT 11/7/20:   Mediastinum: Enlarged subcarinal and left hilar lymph nodes are noted measuring 2.2 and 1.8 cm in short axis respectively. Tally Savage confluent soft tissue along the inferior left internal mammary chain.    Lungs/pleura: Multifocal patchy consolidative opacities are present.  Some of these opacities have a nodular appearance in the left upper lobe and medial right lower lobe as well.  Moderate-sized left pleural effusion with

## 2020-11-14 NOTE — PROGRESS NOTES
Progress Note    Admit Date:  11/7/2020    Subjective:  Ms. Jimmy Levine was admitted to hospital with increasing shortness of breath. She had been admitted to [facility] about 3 weeks ago when she is diagnosed with left pleural effusion. This is tapped. She felt better the time of discharge. Patient is diagnosed with metastatic right breast cancer 4 years ago. She is on [place]. She is a past medical history of atrial fibrillation. She also has a history of diabetes, hypertension, morbid obesity and depression. At the time of her admission patient was found to have acute respiratory failure. Her initial heart rate was in the 130s. She was in A. fib. She is placed on oxygen. She is requiring up to 12 L of oxygen. This has been weaned down to 5.5 L of oxygen at present. Work-up in the ER included a chest x-ray that showed recurrent left pleural effusion. Review of the cytology from [facility] showed no malignant cells. This was of course on just one sample. When I saw the patient she was still somewhat short of breath. She is complaining of leg edema. She denies any anosmia. No diarrhea. 11/9. Persistent shortness of breath . thoracentesis was ordered for pleural effusion (not done yet as patient is on droplet plus precautions)  - COVID-19 results are still pendin. - she continues to feel dyspneic .     she is complaining of chest pain she has become tachycardic heart rate is up to 140->  sinus tachycardia  EKG - sinus tachycardia , lateral ST changes. 11/10  Rapid response called yesterday when she became tachycardic with a heart rate of 140. Complained of chest pain. Also became hypoxic requiring a nonrebreather. Transferred to the ICU and intubated and started on mechanical ventilation. 11/11  Continues to be on the vent. Low-grade temps. Underwent SBT.   Was very anxious during the trial.  Underwent thoracentesis yesterday      11/12  On 11/12/20  0413 11/13/20  0411 11/14/20  0424   WBC 7.3 8.0 8.6   HGB 8.4* 10.0* 8.9*   HCT 25.3* 30.1* 27.6*   MCV 91.9 90.9 92.1    129* 190     BMP:   Recent Labs     11/12/20  1048 11/13/20  0411 11/14/20  0424    136 141   K 4.1 3.3* 2.7*   CL 89* 88* 86*   CO2 35* 35* 38*   PHOS 4.5 5.7* 6.8*   BUN 29* 32* 48*   CREATININE 1.4* 1.4* 1.7*     LIVER PROFILE:   No results for input(s): AST, ALT, LIPASE, BILIDIR, BILITOT, ALKPHOS in the last 72 hours. Invalid input(s): AMYLASE,  ALB  PT/INR:   No results for input(s): PROTIME, INR in the last 72 hours. Cultures:   Results for Ramesh Barajas (MRN [id number]) as of 11/8/2020 16:17   Ref. Range 11/8/2020 03:11   SARS-CoV-2, NAAT Latest Ref Range: Not Detected  Not Detected       Radiology  XR CHEST PORTABLE   Final Result      XR CHEST PORTABLE   Final Result   Stable appropriate endotracheal tube positioning. Bilateral effusions and borderline edema, not substantially changed. XR CHEST PORTABLE   Final Result   Improving airspace opacities within the right hemithorax. Support tubes and lines as above, in grossly unchanged positioning. US THORACENTESIS   Final Result   Successful ultrasound guided left thoracentesis. XR CHEST PORTABLE   Final Result   Cardiomegaly with scattered right pulmonary infiltrates consistent pneumonia. Small right basilar effusion. Support tubes as described above. XR CHEST PORTABLE   Final Result   Persistent multifocal bilateral airspace disease, slightly increased at the   left base as compared to prior. XR CHEST PORTABLE   Final Result   Endotracheal tube 3.3 cm above the [place]. Gastric tube in the mid stomach. Increasing         CT CHEST PULMONARY EMBOLISM W CONTRAST   Final Result   1. Multifocal consolidative opacity compatible with inflammatory process or   infection. 2.  Motion artifact degrades evaluation of the pulmonary arteries.   No 12L->. 5.5 L  -Remains on droplet plus precautions. Awaiting thoracentesis  -Worsening respiratory distress now, patient on 100% nonrebreather, severely hypoxic. Transferred to ICU--> subsequently intubated ( 11/9 PM )  -  COVID-19  Negative  Did not do well on a spontaneous breathing trial   SBT again today with Precedex. Extubated 11/13  Currently on 2 L of oxygen. #Healthcare associated pneumonia from MRSA or gram-negative organism. With recent hospitalization metastatic breast cancer she is at risk for MRSA and for gram-negative pneumonia. -Received 6 days of IV vancomycin. finished Levaquin   Cultures are negative so far    #Left pleural effusion. She had this at [facility] 3 weeks ago. Thoracentesis was done. Cytology that did not show any malignant cells. Repeat thoracentesis  Ordered- 700 ml removed  11/10. The possibility exists that this could be malignant pleural effusion. #Diabetes mellitus type 2. Monitor sugars closely. #Atrial fibrillation with RVR. Presently in sinus rhythm. MIGUELANGEL. Hold diuretics given mild worsening   start IVF       #Morbid Obesity  - Body mass index is 36.89 kg/m². - Complicating assessment and treatment. Placing patient at risk for multiple co-morbidities as well as early death and contributing to the patient's presentation.   - Counseled on weight loss. #Metastatic breast cancer. On [place]. #Nodular lung opacity in the lung may be metastatic breast disease. #Lovenox for DVT prophylaxis.     Transfer to PCU      Mona Goodman MD 11/14/2020 10:10 AM

## 2020-11-14 NOTE — PROGRESS NOTES
Inpatient Occupational Therapy  Evaluation and Treatment    Unit: ICU  Date:  11/14/2020  Patient Name:    Salazar Rogel  Admitting diagnosis:  Acute respiratory failure with hypoxia ([facility] [address] [J96.01]  Acute respiratory failure with hypoxia ([facility] [address] [J96.01]  Admit Date:  11/7/2020  Precautions/Restrictions/WB Status/ Lines/ Wounds/ Oxygen: fall risk, bed/chair alarm, van catheter , supplemental O2 (2L), telemetry, ICU monitoring and continuous pulse ox    Treatment Time:  0925-1030  Treatment Number: 1     Billable Treatment Time: 55 minutes   Total Treatment Time:   65   minutes    Patient Goals for Therapy:  \" go home \"      Discharge Recommendations: SNF, will continue to assess progress  DME needs for discharge: defer to facility       Therapy recommendations for staff:    bed mobility with assist x1-2     History of Present Illness: 62 y.o. female who presented to the hospital with a chief complaint of shortness of breath.  The patient has history of atrial fibrillation, breast cancer currently on Ibrance.  EMS was called for worsening shortness of breath.  When they got to the patient's residence she was hypoxic on room air had been placed on supplemental oxygen.  When she got to the emergency department she was tachycardic to the 130s in atrial fibrillation, she was placed on high flow oxygen and was eventually weaned down to 6 L with oxygen.  Imaging obtained shows a worsening left-sided pleural effusion and consolidations concerning for pneumonia.  She was started on IV antibiotic therapy cardizem and was transferred for further medical care. 11/9 admitted to ICU and intubated due to resp failure and pneumonia  11/13 extubated  11/13per wound care pressure wound L buttocks, deep tissue injury    Home Health S4 Level Recommendation:  NA  AM-PAC Score: AM-PAC Inpatient Daily Activity Raw Score: 12    Preadmission Environment    Pt.  Liliana Delvalle family (father, sister and son )  Home environment:    one story home  Steps to enter first floor:   2 with railing  steps to enter               Steps to second floor: N/A  Bathroom:       Walk-in Shower, Shower Chair  and Raised toilet seat with grab bars  Equipment owned:      SPC, manual W/C, lift chair, hospital bed and reacher, RW      Preadmission Status / PLOF:  History of falls             Yes, slid out of bed, assisted back up by family, no injury  Pt. Able to drive          Yes  Pt Fully independent with ADL's         Yes  Pt. Required assistance from family for:  Cooking, Cleaning and [address]  Pt. Fully independent for transfers and gait and walked with: No AD and assist from family            Pain   Yes  Location:Chest wall  Ratin /10  Pain Medicine Status: RN notified     Cognition    A&O x4   Able to follow 2 step commands     Subjective  Patient lying supine in bed with no family present. Pt agreeable to this OT eval & tx. Upper Extremity ROM:    WFL,  pt able to perform all bed mobility, transfers, and gait without ROM limitation. Upper Extremity Strength:    BUE strength impaired but not formally assessed w/ MMT      Upper Extremity Sensation    WFL    Upper Extremity Proprioception:  WFL    Coordination and Tone  WFL    Balance  Functional Sitting Balance:  NT, pt too SOB rolling to attempt sitting at EOB   Functional Standing Balance:NT    Bed mobility:    Supine to sit:   Not Tested  Sit to supine:   Not Tested  Rolling: Mod A for rolling during pericare  Scooting in sitting:  Not Tested  Scooting to head of bed:   Max A of 2    Bridging:   Not Tested    Transfers:    Sit to stand:  Not Tested  Stand to sit:  Not Tested  Bed to chair:   Not Tested  Standard toilet: Not Tested  Bed to [facility]:  Not Tested    Dressing:      UE:   Not Tested  LE:    Total A     Bathing:    UE:  Not Tested  LE:  Not Tested    Eating:   Not Tested    Toileting:   Total A catheter, incontinent of stool, rolling in bed for pericare    Activity Tolerance   Pt completed therapy session with SOB noted with rolling and sidelying    BP HR SpO2   Supine, at rest 111/81 120bpm 96% on 2L   After turning onto R side 87/47  133/64 78bpm 97%   Turning R side 128/66 75bpm 95%          Positioning Needs: In bed, call light and needs in reach. Alarm Set    Exercise / Activities Initiated:   N/A    Patient/Family Education:   Role of OT  Recommendations for DC    Assessment of Deficits: Pt seen for Occupational therapy evaluation in acute care setting. Pt demonstrated decreased Activity tolerance, ADLs, Balance , Bed mobility, Strength, Transfers and Coping Skills. Pt functioning below baseline and will likely benefit from skilled occupational therapy services to maximize safety and independence. Goal(s) : To be met in 3 Visits:  1). Bed to toilet/BSC: Assess at next visit as able    To be met in 5 Visits:  1). Supine to/from Sit:  Max A  2). Upper Body Bathing:   Min A  3). Lower Body Bathing: Mod A  4). Upper Body Dressing:  Min A  5). Lower Body Dressing: Mod A  6). Pt to demonstrate UE exs x 15 reps with minimal cues    Rehabilitation Potential:  Good for goals listed above. Strengths for achieving goals include: Pt motivated and PLOF  Barriers to achieving goals include:  Complexity of condition     Plan: To be seen 3-5 x/wk while in acute care setting for therapeutic exercises, bed mobility, transfers, dressing, bathing, family/patient education, ADL/IADL retraining, energy conservation training.      Alejandra Mercado OTR/L [id number]    If patient discharges from this facility prior to next visit, this note will serve as the Discharge Summary

## 2020-11-14 NOTE — PROGRESS NOTES
4 Eyes Skin Assessment     The patient is being assess for   Shift Handoff    I agree that 2 RN's have performed a thorough Head to Toe Skin Assessment on the patient. ALL assessment sites listed below have been assessed. Areas assessed by both nurses:   [x]   Head, Face, and Ears   [x]   Shoulders, Back, and Chest, Abdomen  [x]   Arms, Elbows, and Hands   [x]   Coccyx, Sacrum, and Ischium  [x]   Legs, Feet, and Heels        Refer to doc flow for wound    **SHARE this note so that the co-signing nurse is able to place an eSignature**    Co-signer eSignature: Electronically signed by Wai Kruse RN on 11/13/20 at 7:55 PM EST    Does the Patient have Skin Breakdown?   Yes LDA WOUND CARE was Initiated documentation include the Christina-wound, Wound Assessment, Measurements, Dressing Treatment, Drainage, and Color\",          Rigoberto Prevention initiated:  Yes   Wound Care Orders initiated:  Yes      [id number]  nurse consulted for Pressure Injury (Stage 3,4, Unstageable, DTI, NWPT, Complex wounds)and New or Established Ostomies:  Yes      Primary Nurse eSignature: Electronically signed by Nellie Maza RN on 11/13/20 at 7:49 PM EST

## 2020-11-14 NOTE — PROGRESS NOTES
Patient incontinent of stool around flexiseal. Patient requested that it be removed. pericare given and flexiseal removed. Repositioned for comfort.

## 2020-11-14 NOTE — PROGRESS NOTES
BID    furosemide  40 mg Intravenous BID    pantoprazole  40 mg Intravenous Daily    insulin lispro  0-12 Units Subcutaneous Q4H    carboxymethylcellulose PF  1 drop Both Eyes Q4H    And    artificial tears   Both Eyes Q4H    chlorhexidine  15 mL Mouth/Throat BID    mupirocin   Nasal BID    carvedilol  25 mg Oral BID WC    citalopram  40 mg Oral Daily    sodium chloride flush  10 mL Intravenous 2 times per day    enoxaparin  40 mg Subcutaneous Daily     PRN Meds:  ipratropium-albuterol, acetaminophen, fentanNYL, midazolam, sodium chloride flush, acetaminophen **OR** acetaminophen, polyethylene glycol, promethazine **OR** ondansetron, glucose, dextrose, glucagon (rDNA), dextrose    Results:  CBC:   Recent Labs     11/12/20  0413 11/13/20  0411 11/14/20  0424   WBC 7.3 8.0 8.6   HGB 8.4* 10.0* 8.9*   HCT 25.3* 30.1* 27.6*   MCV 91.9 90.9 92.1    129* 190     BMP:   Recent Labs     11/12/20  1048 11/13/20  0411 11/14/20  0424    136 141   K 4.1 3.3* 2.7*   CL 89* 88* 86*   CO2 35* 35* 38*   PHOS 4.5 5.7* 6.8*   BUN 29* 32* 48*   CREATININE 1.4* 1.4* 1.7*     LIVER PROFILE:   No results for input(s): AST, ALT, LIPASE, BILIDIR, BILITOT, ALKPHOS in the last 72 hours. Invalid input(s): AMYLASE,  ALB    Cultures:  11/8/2020 SARS-CoV-2 PCR negative  11/8/2020 SARS-CoV-2 NAAT negative  11/7/2020 blood no growth  11/9/2020 tracheal aspirate NRF  11/10/2020 pleural fluid no growth    Films:  Chest CT 11/7/20:   Mediastinum: Enlarged subcarinal and left hilar lymph nodes are noted measuring 2.2 and 1.8 cm in short axis respectively. Bety Nadia confluent soft tissue along the inferior left internal mammary chain.    Lungs/pleura: Multifocal patchy consolidative opacities are present.  Some of these opacities have a nodular appearance in the left upper lobe and medial right lower lobe as well.  Moderate-sized left pleural effusion with suspected pleural thickening posteriorly.  Trace right pleural fluid. CT report [facility] 10/19/20:   No evidence of pulmonary embolism. Multiple left lung nodules and pleural nodularity compatible with metastatic disease. Left hilar lymphadenopathy, reactive or metastatic.  Moderate left pleural effusion    CXR 11/13/2020 multifocal infiltrates    ASSESSMENT:  · Acute respiratory failure with hypoxemia  · Acute kidney failure  · HCAP  · Nodular opacities in lung -pneumonia v metastatic disease  · L pleural effusion - recurrent, thoracentesis 11/10/2020 700 ml, exudative, cytology pending  · AFIB with RVR    PLAN:  · Supplemental oxygen to maintain SaO2 >92%; wean as tolerated    · Levaquin day #8/8, received 6 days vancomycin  · F/U pleural fluid cytology  · Hold diuresis, gentle fluid repletion with LR @ 75  · Replace electrolytes, monitor K    · Prophylaxis: Lovenox, Protonix, Bactroban  · PCU

## 2020-11-14 NOTE — PROGRESS NOTES
Shift assessment complete. 2L NC. Diminished breath sounds. VSS. Held Coreg this AM d/t softer BPs. Still NPO. Needs K+ replaced. Good urine output. -8.2 L. Patient denies any further needs at this time. Call light and table within reach.      Electronically signed by Ilsa Ye RN on 11/14/2020 at 9:30 AM

## 2020-11-14 NOTE — PROGRESS NOTES
Assessment done and documented. Patient resting quietly in bed. Patient has c/o ha. Vitals and SpO2 are stable. Will continue to monitor.

## 2020-11-14 NOTE — PROGRESS NOTES
Speech Language Pathology  Facility/Department: [facility] ICU  Dysphagia Daily Treatment Note    Patient with gagging and regurgitation of all PO trials, even ice chips. Recommend RN to monitor tolerance when patient is ready and able to keep PO down, continue sips of water and ice chips until then. Recommend regular and thin. Meds as patient prefers. NAME: Yehuda Nicole  : 1960  MRN: [id number]    Patient Diagnosis(es):   Patient Active Problem List    Diagnosis Date Noted    Chest pain 2015     Priority: High    HCAP (healthcare-associated pneumonia)     Pneumonia due to organism 2020    Paroxysmal atrial fibrillation ([facility] [address] 2020    Pleural effusion     Atrial fibrillation with RVR (HCC)     Acute respiratory failure with hypoxia (HCC) 2020    Diarrhea     Chemotherapy-induced neutropenia (HCC)     Acute renal injury ([facility] [address] 2019    SOB (shortness of breath) 2016    Primary cancer of right breast with metastasis to other site [facility]) 2016    Anxiety 2016    DM2 (diabetes mellitus, type 2) ([facility] [address] 2016    GERD (gastroesophageal reflux disease) 2016    Hypertension, uncontrolled 2016    Morbid obesity due to excess calories ([facility] [address] 2016    Acute lateral meniscus tear of left knee 2016    Left knee pain 2016    Cellulitis and abscess of leg 2012     Allergies: Allergies   Allergen Reactions    Sucralfate Hives and Nausea Only    Ampicillin Rash    Ampicillin-Sulbactam Sodium Rash    Sulfamethoxazole-Trimethoprim Rash     Onset Date:  20  Subjective:  RN/El cheng'ed SLP entry. Pain:  denied    Current Diet: Diet NPO Effective Now    Diet Tolerance:  Patient tolerating current diet level without signs/symptoms of penetration / aspiration. P.O. Trials:   Thin   x Thin by spoon, ice chips   Nectar / Mildly Thick       Honey / Moderately Thick       Pudding / Extremely Thick       Puree x Pudding x2 half tsp   Solid         Dysphagia Treatment and Impressions:  Patient vocal quality is strong, cough is strong. RN provided meds whole in puree upon entering. Patient staes she always gags and has trouble taking meds \"just the thought of them' With gagging she was able to eventually swallow. 10-30 seconds following began gagging and bringing up what looked like applesauce. Required oral suction to remove bolus. Similar occurrence with ice chips 6 of 6 trials, and pudding 2 of 2 trials. No further PO was given. She states this is new and declines to eat at this time. Patient at risk of aspiration secondary to s/s of regurgitation even with ice chips. Oral and pharyngeal function appear to be within functional limits. An MBS may be beneficial for further evaluation. No overt clinical s/s of aspiration or penetration this date. Dysphagia Goals:  Timeframe for Long-term Goals: 7 days (11/20/2020)  Goal 1: The patient will tolerate LRD with no clinical s/s of aspiration for optimal nutrition and hydration Ongoing 11/14 see above     Short-term Goals  Timeframe for Short-term Goals: 5 days (11/18/2020)  Goal 1: The patient will tolerate repeat BSE  Ongoing 11/14 see above         Recommendations:  Patient with gagging and regurgitation of all PO trials, even ice chips. Recommend RN to monitor tolerance when patient is ready and able to keep PO down, continue sips of water and ice chips until then. Recommend regular and thin. Meds as patient prefers. Patient/Family/Caregiver Education: Role of SLP, rationale for completion of assessment, results of assessment, recommendations, and POC  Patient Education Response: Verbalizes understanding;Demonstrated understanding    Compensatory Strategies: upright for all PO, slow rate, small bites/sips. Plan:    Continued Dysphagia treatment with goals per plan of care.     Discharge Recommendations: no SLP anticipated at d/c pending inpatient progress    If pt discharges from hospital prior to Speech/Swallowing discharge, this note serves as tx and discharge summary. Patient was left upright in bed, with call light in reach, all need met. Safety handoff completed with RN/El, who verbalized understanding      Total Treatment Time / Charges     Time in Time out Total Time / units   Cognitive Tx         Speech Tx      Dysphagia Tx 1102 1117 15 minutes/ 1 unit     Signature:     Tatianna Bowen MS CCC-SLP  [place] .[id number]  Speech Language Pathologist  11/14/2020

## 2020-11-14 NOTE — PROGRESS NOTES
Report given to Celine Gallegos and care of this patient transferred. 4 Eyes Skin Assessment     The patient is being assess for   Shift Handoff    I agree that 2 RN's have performed a thorough Head to Toe Skin Assessment on the patient. ALL assessment sites listed below have been assessed. Areas assessed by both nurses:   [x]   Head, Face, and Ears   [x]   Shoulders, Back, and Chest, Abdomen  [x]   Arms, Elbows, and Hands   [x]   Coccyx, Sacrum, and Ischium  [x]   Legs, Feet, and Heels        DTI to buttock  *see wound flow sheet    **SHARE this note so that the co-signing nurse is able to place an eSignature**    Co-signer eSignature: Electronically signed by Brenda De León RN on 11/14/20 at 9:31 AM EST    Does the Patient have Skin Breakdown?   Yes LDA WOUND CARE was Initiated documentation include the Christina-wound, Wound Assessment, Measurements, Dressing Treatment, Drainage, and Color\",          Rigoberto Prevention initiated:  Yes   Wound Care Orders initiated:  Yes      [address]  nurse consulted for Pressure Injury (Stage 3,4, Unstageable, DTI, NWPT, Complex wounds)and New or Established Ostomies:  Yes      Primary Nurse eSignature: Electronically signed by Jason Robledo RN on 11/14/20 at 7:54 AM EST

## 2020-11-14 NOTE — PROGRESS NOTES
Inpatient Physical Therapy Evaluation and Treatment    Unit: ICU  Date:  11/14/2020  Patient Name:    Jessie Mcnulty  Admitting diagnosis:  Acute respiratory failure with hypoxia ([facility] [address] [J96.01]  [facility] [J96.01]  Admit Date:  11/7/2020  Precautions/Restrictions/WB Status/ Lines/ Wounds/ Oxygen: Fall risk, Bed/chair alarm, Lines -IV, Supplemental O2 (2) and Mercedes catheter, Telemetry and Continuous pulse oximetry    Treatment Time:  9:25-10:30  Treatment Number:  1   Timed Code Treatment Minutes: 55 minutes  Total Treatment Minutes:  65  minutes    Patient Goals for Therapy: \" to go home\"          Discharge Recommendations: SNF  DME needs for discharge: defer to facility       Therapy recommendation for EMS Transport: requires transport by cot due to inability to transfer without lift equiptment    Therapy recommendations for staff:   Bed mobitiy  History Of Present Illness:    62 y.o. female who presented to the hospital with a chief complaint of shortness of breath. The patient has history of atrial fibrillation, breast cancer currently on Ibrance. EMS was called for worsening shortness of breath. When they got to the patient's residence she was hypoxic on room air had been placed on supplemental oxygen. When she got to the emergency department she was tachycardic to the 130s in atrial fibrillation, she was placed on high flow oxygen and was eventually weaned down to 6 L with oxygen. Imaging obtained shows a worsening left-sided pleural effusion and consolidations concerning for pneumonia. She was started on IV antibiotic therapy cardizem and was transferred for further medical care.   11/9 admitted to ICU and intubated due to resp failure and pneumonia  11/13 extubated  11/13per wound care pressure wound L buttocks, deep tissue injury  [facility] S4 Level Recommendation:  NA  AM-PAC Mobility Score    AM-PAC Inpatient Mobility Raw Score : 8    Preadmission Environment    Pt. Abel Sandra family (father, sister and son )  Home environment:    one story home  Steps to enter first floor:   2 with railing  steps to enter               Steps to second floor: N/A  Bathroom:       Walk-in Shower, Shower Chair  and Raised toilet seat with grab bars  Equipment owned:      SPC, manual W/C, lift chair, hospital bed and reacher, RW      Preadmission Status / PLOF:  History of falls             Yes, slid out of bed, assisted back up by family, no injury  Pt. Able to drive          Yes  Pt Fully independent with ADL's         Yes  Pt. Required assistance from family for:  Cooking, Cleaning and [address]  Pt. Fully independent for transfers and gait and walked with: No AD and assist from family          Pain   Yes  Location:Chest wall  Ratin /10  Pain Medicine Status: RN notified    Cognition    A&O x4   Able to follow 2 step commands    Subjective  Patient lying supine in bed with no family present. Pt agreeable to this PT eval & tx. Upper Extremity ROM/Strength  Please see OT evaluation. Lower Extremity ROM / Strength   AROM WFL: Yes  ROM limitations:  Strength grossly 3-/5    Lower Extremity Sensation    WFL    Lower Extremity Proprioception:   NT    Coordination and Tone  NT    Balance  Sitting:  Not tested; N/A  Comments:     Standing: Not tested; Not Tested  Comments:     Bed Mobility   Supine to Sit:    Not Tested  Sit to Supine:   Not Tested  Rolling: Mod A for pericare. Increased SOB, irratic BP response  Scooting in sitting: Not Tested  Scooting in supine:  Max A  and 2 persons    Transfer Training     Sit to stand:   Not Tested  Stand to sit:   Not Tested  Bed to Chair:   Not Tested with use of N/A    Gait gait deferred due to increased SOB with mobility and labile BP; pt ambulated 0 ft.          Activity Tolerance   Pt completed therapy session with SOB noted with rolling and sidelying   BP HR SpO2   Supine, at rest 111/81 120bpm 96% on 2L   After turning transfers, progressive gait training, balance training, and family/patient education. Signature: Marcus Hackett, PT #[id number]    If patient discharges from this facility prior to next visit, this note will serve as the Discharge Summary.

## 2020-11-14 NOTE — PLAN OF CARE
Problem: Falls - Risk of:  Goal: Will remain free from falls  Description: Will remain free from falls  Outcome: Ongoing  Goal: Absence of physical injury  Description: Absence of physical injury  Outcome: Ongoing     Problem: Airway Clearance - Ineffective  Goal: Achieve or maintain patent airway  Outcome: Ongoing     Problem: Gas Exchange - Impaired  Goal: Absence of hypoxia  Outcome: Ongoing  Goal: Promote optimal lung function  Outcome: Ongoing     Problem: Breathing Pattern - Ineffective  Goal: Ability to achieve and maintain a regular respiratory rate  Outcome: Ongoing     Problem:  Body Temperature -  Risk of, Imbalanced  Goal: Ability to maintain a body temperature within defined limits  Outcome: Ongoing  Goal: Will regain or maintain usual level of consciousness  Outcome: Ongoing  Goal: Complications related to the disease process, condition or treatment will be avoided or minimized  Outcome: Ongoing     Problem: Isolation Precautions - Risk of Spread of Infection  Goal: Prevent transmission of infection  Outcome: Ongoing     Problem: Nutrition Deficits  Goal: Optimize nutrtional status  Outcome: Ongoing     Problem: Risk for Fluid Volume Deficit  Goal: Maintain normal heart rhythm  Outcome: Ongoing  Goal: Maintain absence of muscle cramping  Outcome: Ongoing  Goal: Maintain normal serum potassium, sodium, calcium, phosphorus, and pH  Outcome: Ongoing     Problem: Loneliness or Risk for Loneliness  Goal: Demonstrate positive use of time alone when socialization is not possible  Outcome: Ongoing     Problem: Fatigue  Goal: Verbalize increase energy and improved vitality  Outcome: Ongoing     Problem: Patient Education: Go to Patient Education Activity  Goal: Patient/Family Education  Outcome: Ongoing     Problem: Restraint Use - Nonviolent/Non-Self-Destructive Behavior:  Goal: Absence of restraint indications  Description: Absence of restraint indications  Outcome: Ongoing  Goal: Absence of

## 2020-11-14 NOTE — PROGRESS NOTES
Patient incontinent of stool for the 3rd time. flexiseal inserted due to frequent loose stools and patient has a DTI to left buttocks. Repositioned for comfort.

## 2020-11-15 NOTE — PROGRESS NOTES
Progress Note    Admit Date:  11/7/2020    Subjective:  Ms. Francine Figueroa was admitted to hospital with increasing shortness of breath. She had been admitted to [facility] about 3 weeks ago when she is diagnosed with left pleural effusion. This is tapped. She felt better the time of discharge. Patient is diagnosed with metastatic right breast cancer 4 years ago. She is on Damir. She is a past medical history of atrial fibrillation. She also has a history of diabetes, hypertension, morbid obesity and depression. At the time of her admission patient was found to have acute respiratory failure. Her initial heart rate was in the 130s. She was in A. fib. She is placed on oxygen. She is requiring up to 12 L of oxygen. This has been weaned down to 5.5 L of oxygen at present. Work-up in the ER included a chest x-ray that showed recurrent left pleural effusion. Review of the cytology from [facility] showed no malignant cells. This was of course on just one sample. When I saw the patient she was still somewhat short of breath. She is complaining of leg edema. She denies any anosmia. No diarrhea. 11/9. Persistent shortness of breath . thoracentesis was ordered for pleural effusion (not done yet as patient is on droplet plus precautions)  - COVID-19 results are still pendin. - she continues to feel dyspneic .     she is complaining of chest pain she has become tachycardic heart rate is up to 140->  sinus tachycardia  EKG - sinus tachycardia , lateral ST changes. 11/10  Rapid response called yesterday when she became tachycardic with a heart rate of 140. Complained of chest pain. Also became hypoxic requiring a nonrebreather. Transferred to the ICU and intubated and started on mechanical ventilation. 11/11  Continues to be on the vent. Low-grade temps. Underwent SBT.   Was very anxious during the trial.  Underwent thoracentesis yesterday      11/12  On spontaneous breathing trial this morning. She has been placed on Precedex. 11/13  On spontaneous breathing trial again this morning. She is on Precedex and will need low-dose of propofol this morning. She is easily arousable. 11/14  Extubated yesterday  Now on 2 L of O2    11/15- still in atrial fibrillation but HR is better. Doing better. On room air now. No chest pain. Objective:   /69   Pulse 114   Temp 98.5 °F (36.9 °C) (Oral)   Resp 27   Ht 5' 4\" (1.626 m)   Wt 222 lb 0.1 oz (100.7 kg)   SpO2 97%   BMI 38.11 kg/m²          Intake/Output Summary (Last 24 hours) at 11/15/2020 0732  Last data filed at 11/15/2020 0543  Gross per 24 hour   Intake 1508 ml   Output 2045 ml   Net -537 ml       Physical Exam:    General appearance: alert,oriented   Head: Normocephalic, without obvious abnormality, atraumatic  Eyes: conjunctivae/corneas clear. PERRL, EOM's intact. Neck: no adenopathy, no carotid bruit, no JVD, supple, symmetrical, trachea midline and thyroid not enlarged, symmetric, no tenderness/mass/nodules  Lungs: Diminished breath sounds on the left, increasing bibasilar crackles. Heart: increased rate and irregularly irregular rhythm.   Abdomen: soft, non-tender; bowel sounds normal; no masses,  no organomegaly  Extremities: extremities normal, atraumatic, no cyanosis, trace edema  Pulses: 2+ and symmetric  Skin: Skin color, texture, turgor normal. No rashes or lesions  Neurologic: alert,oriented       Scheduled Meds:   potassium chloride  40 mEq Oral Once    potassium chloride  20 mEq Intravenous Once    ipratropium-albuterol  1 ampule Inhalation TID    Venelex   Topical BID    pantoprazole  40 mg Intravenous Daily    insulin lispro  0-12 Units Subcutaneous Q4H    carvedilol  25 mg Oral BID WC    citalopram  40 mg Oral Daily    sodium chloride flush  10 mL Intravenous 2 times per day    enoxaparin  40 mg Subcutaneous Daily       Continuous Infusions:   lactated ringers 75 mL/hr at 11/14/20 2350    dilTIAZem (CARDIZEM) 125 mg in dextrose 5% 125 mL infusion 5 mg/hr (11/14/20 1828)    dextrose         PRN Meds:  ipratropium-albuterol, acetaminophen, sodium chloride flush, acetaminophen **OR** acetaminophen, polyethylene glycol, promethazine **OR** ondansetron, glucose, dextrose, glucagon (rDNA), dextrose      Data:  CBC:   Recent Labs     11/13/20  0411 11/14/20  0424 11/15/20  0415   WBC 8.0 8.6 6.8   HGB 10.0* 8.9* 9.0*   HCT 30.1* 27.6* 28.1*   MCV 90.9 92.1 91.9   * 190 200     BMP:   Recent Labs     11/14/20 0424 11/14/20  1404 11/15/20  0415    139 142   K 2.7* 2.9* 3.1*   CL 86* 86* 93*   CO2 38* 38* 35*   PHOS 6.8* 6.1* 4.2   BUN 48* 51* 48*   CREATININE 1.7* 1.7* 1.5*     LIVER PROFILE:   No results for input(s): AST, ALT, LIPASE, BILIDIR, BILITOT, ALKPHOS in the last 72 hours. Invalid input(s): AMYLASE,  ALB  PT/INR:   No results for input(s): PROTIME, INR in the last 72 hours. Cultures:   Results for Mariely Yoder (MRN [id number]) as of 11/8/2020 16:17   Ref. Range 11/8/2020 03:11   SARS-CoV-2, NAAT Latest Ref Range: Not Detected  Not Detected       Radiology  XR CHEST PORTABLE   Final Result      XR CHEST PORTABLE   Final Result   Stable appropriate endotracheal tube positioning. Bilateral effusions and borderline edema, not substantially changed. XR CHEST PORTABLE   Final Result   Improving airspace opacities within the right hemithorax. Support tubes and lines as above, in grossly unchanged positioning. US THORACENTESIS   Final Result   Successful ultrasound guided left thoracentesis. XR CHEST PORTABLE   Final Result   Cardiomegaly with scattered right pulmonary infiltrates consistent pneumonia. Small right basilar effusion. Support tubes as described above. XR CHEST PORTABLE   Final Result   Persistent multifocal bilateral airspace disease, slightly increased at the   left base as compared to prior. XR CHEST PORTABLE   Final Result   Endotracheal tube 3.3 cm above the [place]. Gastric tube in the mid stomach. Increasing         CT CHEST PULMONARY EMBOLISM W CONTRAST   Final Result   1. Multifocal consolidative opacity compatible with inflammatory process or   infection. 2.  Motion artifact degrades evaluation of the pulmonary arteries. No   evidence for central embolism. 3.  Nodular appearance of some of these opacities are also noted, favored to   represent a component of the acute inflammatory process/infection versus   metastatic disease. Short-term follow-up in 3 months is recommended to   ensure resolution. 4.  Moderate-sized left pleural effusion and suspected pleural thickening. Neoplastic involvement is not excluded. 5.  Osseous metastatic disease again demonstrated. Similar appearance of   mediastinal lymphadenopathy and left internal lymphadenopathy. CT HEAD WO CONTRAST   Final Result   No acute intracranial abnormality. XR CHEST PORTABLE   Final Result   Patchy bilateral pulmonary opacities, concerning for multifocal pneumonia. Imaging features can be seen with COVID-19 pneumonia, though are nonspecific   and can occur with a variety of infectious and noninfectious processes. PneInd         FL MODIFIED BARIUM SWALLOW W VIDEO    (Results Pending)        CXR 11/9  Endotracheal tube 3.3 cm above the [place].  Gastric tube in the mid stomach. Increasing     11/10 CXR   Persistent multifocal bilateral airspace disease, slightly increased at the   left base as compared to prior.      Assessment:  Principal Problem:    Acute respiratory failure with hypoxia (HCC)  Active Problems:    SOB (shortness of breath)    Primary cancer of right breast with metastasis to other site [facility])    Anxiety    DM2 (diabetes mellitus, type 2) (HCC)    GERD (gastroesophageal reflux disease)    Morbid obesity due to excess calories (HCC)    Pneumonia due to organism Paroxysmal atrial fibrillation (HCC)    Pleural effusion    Atrial fibrillation with RVR ([address]    HCAP (healthcare-associated pneumonia)  Resolved Problems:    * No resolved hospital problems. *      Plan:    #Acute respiratory failure with hypoxia. - This is likely secondary to pneumonia and pleural effusion.    -Pulmonary consultation was obtained. - O2 12L->. 5.5 L  -Remains on droplet plus precautions. Awaiting thoracentesis  -Worsening respiratory distress now, patient on 100% nonrebreather, severely hypoxic. Transferred to ICU--> subsequently intubated ( 11/9 PM )  -  COVID-19  Negative  Did not do well on a spontaneous breathing trial   SBT again today with Precedex. Extubated 11/13  Currently on room air. #Healthcare associated pneumonia from MRSA or gram-negative organism. With recent hospitalization metastatic breast cancer she is at risk for MRSA and for gram-negative pneumonia. -Received 6 days of IV vancomycin. finished Levaquin   Cultures are negative so far    #Left pleural effusion. She had this at [facility] 3 weeks ago. Thoracentesis was done. Cytology that did not show any malignant cells. Repeat thoracentesis  Ordered- 700 ml removed  11/10. The possibility exists that this could be malignant pleural effusion. Cytology however was negative. #Diabetes mellitus type 2. Monitor sugars closely. #Atrial fibrillation with RVR. On IV Cardizem. Rates better. MIGUELANGEL. Hold diuretics given mild worsening   started IVF. Creatinine some better. #Morbid Obesity  - Body mass index is 38.11 kg/m². - Complicating assessment and treatment. Placing patient at risk for multiple co-morbidities as well as early death and contributing to the patient's presentation.   - Counseled on weight loss. #Metastatic breast cancer. On Ibrance. #Nodular lung opacity in the lung may be metastatic breast disease. #Lovenox for DVT prophylaxis.     Transfer to PCU      Oksana Wyatt MD 11/15/2020 7:32 AM

## 2020-11-15 NOTE — PROGRESS NOTES
Transfer from ICU VSS, SR on monitor and remains on 1L. Updated sister on transfer and condition of patient.

## 2020-11-15 NOTE — PROGRESS NOTES
Shift assessment done and documented. Patient resting in bed with no distress noted. Vitals and SpO2 are stable. Currently on 2 liters of oxygen. Continues with cardizem gtt @ 5 mg/r and LR @ 75 cc/hr. Mercedes to gravity draining clear yellow urine. Patient taking few ice chips without difficulty. Will continue to monitor.

## 2020-11-15 NOTE — PROGRESS NOTES
lispro  0-12 Units Subcutaneous Q4H    carvedilol  25 mg Oral BID WC    citalopram  40 mg Oral Daily    sodium chloride flush  10 mL Intravenous 2 times per day    enoxaparin  40 mg Subcutaneous Daily     PRN Meds:  ipratropium-albuterol, acetaminophen, sodium chloride flush, acetaminophen **OR** acetaminophen, polyethylene glycol, promethazine **OR** ondansetron, glucose, dextrose, glucagon (rDNA), dextrose    Results:  CBC:   Recent Labs     11/13/20  0411 11/14/20  0424 11/15/20  0415   WBC 8.0 8.6 6.8   HGB 10.0* 8.9* 9.0*   HCT 30.1* 27.6* 28.1*   MCV 90.9 92.1 91.9   * 190 200     BMP:   Recent Labs     11/14/20  0424 11/14/20  1404 11/15/20  0415    139 142   K 2.7* 2.9* 3.1*   CL 86* 86* 93*   CO2 38* 38* 35*   PHOS 6.8* 6.1* 4.2   BUN 48* 51* 48*   CREATININE 1.7* 1.7* 1.5*     LIVER PROFILE:   No results for input(s): AST, ALT, LIPASE, BILIDIR, BILITOT, ALKPHOS in the last 72 hours. Invalid input(s): AMYLASE,  ALB    Cultures:  11/8/2020 SARS-CoV-2 PCR negative  11/8/2020 SARS-CoV-2 NAAT negative  11/7/2020 blood no growth  11/9/2020 tracheal aspirate NRF  11/10/2020 pleural fluid no growth    Films:  Chest CT 11/7/20:   Mediastinum: Enlarged subcarinal and left hilar lymph nodes are noted measuring 2.2 and 1.8 cm in short axis respectively. Tally Andrzej confluent soft tissue along the inferior left internal mammary chain.    Lungs/pleura: Multifocal patchy consolidative opacities are present.  Some of these opacities have a nodular appearance in the left upper lobe and medial right lower lobe as well.  Moderate-sized left pleural effusion with suspected pleural thickening posteriorly.  Trace right pleural fluid. CT report [facility] 10/19/20:   No evidence of pulmonary embolism. Multiple left lung nodules and pleural nodularity compatible with metastatic disease. Left hilar lymphadenopathy, reactive or metastatic.  Moderate left pleural effusion    CXR 11/13/2020 multifocal infiltrates    ASSESSMENT:  · Acute respiratory failure with hypoxemia  · Acute kidney failure  · HCAP  · Nodular opacities in lung -pneumonia v metastatic disease  · L pleural effusion - recurrent, thoracentesis 11/10/2020 700 ml, exudative, cytology pending  · AFIB with RVR    PLAN:  · Supplemental oxygen to maintain SaO2 >92%; wean as tolerated    · On home carvedilol, diltiazem drip for rate control can be discontinued  · Completed 8 days Levaquin, 6 days vancomycin   · F/U pleural fluid cytology with pathologist, read as negative but cell markers seemed partially consistent with prior malignancy  · DC IV fluids  · Prophylaxis: Lovenox, Protonix, Bactroban  · PCU   · D/W Dr. James Navarro    · Plan to check with pathologist tomorrow to clarify pleural fluid cytology

## 2020-11-15 NOTE — PROGRESS NOTES
4 Eyes Skin Assessment     The patient is being assess for   Shift Handoff    I agree that 2 RN's have performed a thorough Head to Toe Skin Assessment on the patient. ALL assessment sites listed below have been assessed. Areas assessed by both nurses:   [x]   Head, Face, and Ears   [x]   Shoulders, Back, and Chest, Abdomen  [x]   Arms, Elbows, and Hands   [x]   Coccyx, Sacrum, and Ischium  [x]   Legs, Feet, and Heels        ****refer to wound flow sheet    **SHARE this note so that the co-signing nurse is able to place an eSignature**    Co-signer eSignature: {Esignature:[id number]}    Does the Patient have Skin Breakdown?   Yes [facility] WOUND CARE was Initiated documentation include the Christina-wound, Wound Assessment, Measurements, Dressing Treatment, Drainage, and Color\",          Rigoberto Prevention initiated:  Yes   Wound Care Orders initiated:  Yes      [id number]  nurse consulted for Pressure Injury (Stage 3,4, Unstageable, DTI, NWPT, Complex wounds)and New or Established Ostomies:  Yes      Primary Nurse eSignature: Electronically signed by Halle Hinkle RN on 11/15/20 at 6:59 AM EST

## 2020-11-15 NOTE — PROGRESS NOTES
Shift assessment complete. Cardizem gtt at 5 mg/h. LR infusing at 75/h. Attempted patient of O2. Desaturated to 88% on RA. Now on 1L NC and sat is 96. A-fib to sinus arrhythmia on monitor. PO coreg given today. K+ replaced with 40 PO.      Electronically signed by Christin Best RN on 11/15/2020 at 8:08 AM     Vitals:    11/15/20 0801   BP: 128/78   Pulse: 109   Resp: 23   Temp: 98.3 °F (36.8 °C)   SpO2: 96%

## 2020-11-15 NOTE — PLAN OF CARE
Problem: Falls - Risk of:  Goal: Will remain free from falls  Description: Will remain free from falls  Outcome: Ongoing  Goal: Absence of physical injury  Description: Absence of physical injury  Outcome: Ongoing     Problem: Airway Clearance - Ineffective  Goal: Achieve or maintain patent airway  Outcome: Ongoing     Problem: Airway Clearance - Ineffective  Goal: Achieve or maintain patent airway  Outcome: Ongoing     Problem: Gas Exchange - Impaired  Goal: Absence of hypoxia  Outcome: Ongoing  Goal: Promote optimal lung function  Outcome: Ongoing     Problem: Breathing Pattern - Ineffective  Goal: Ability to achieve and maintain a regular respiratory rate  Outcome: Ongoing     Problem:  Body Temperature -  Risk of, Imbalanced  Goal: Ability to maintain a body temperature within defined limits  Outcome: Ongoing  Goal: Will regain or maintain usual level of consciousness  Outcome: Ongoing  Goal: Complications related to the disease process, condition or treatment will be avoided or minimized  Outcome: Ongoing     Problem: Nutrition Deficits  Goal: Optimize nutrtional status  Outcome: Ongoing     Problem: Loneliness or Risk for Loneliness  Goal: Demonstrate positive use of time alone when socialization is not possible  Outcome: Ongoing     Problem: Fatigue  Goal: Verbalize increase energy and improved vitality  Outcome: Ongoing     Problem: Patient Education: Go to Patient Education Activity  Goal: Patient/Family Education  Outcome: Ongoing     Problem: Skin Integrity:  Goal: Will show no infection signs and symptoms  Description: Will show no infection signs and symptoms  Outcome: Ongoing  Goal: Absence of new skin breakdown  Description: Absence of new skin breakdown  Outcome: Ongoing     Problem: OXYGENATION/RESPIRATORY FUNCTION  Goal: Patient will maintain patent airway  Outcome: Ongoing  Goal: Patient will achieve/maintain normal respiratory rate/effort  Description: Respiratory rate and effort will be within

## 2020-11-15 NOTE — PROGRESS NOTES
No changes noted. Resting quietly. Continues on Cardizem @ 5 mg with heart rates in the one hundreds.

## 2020-11-15 NOTE — PROGRESS NOTES
4 Eyes Skin Assessment     The patient is being assess for   Transfer to New Unit    I agree that 2 RN's have performed a thorough Head to Toe Skin Assessment on the patient. ALL assessment sites listed below have been assessed. Areas assessed by both nurses: Asia Bills RN and Nain Royal RN  [x]   Head, Face, and Ears   [x]   Shoulders, Back, and Chest, Abdomen  [x]   Arms, Elbows, and Hands   [x]   Coccyx, Sacrum, and Ischium  [x]   Legs, Feet, and Heels        Left buttock DTI with areas that are open and bleeding appear to be Stage 2, redness and moisture to left groin, right perineal redness that is open. Placed mepilex to buttock and zinc cream to moisture in groins and periarea. **SHARE this note so that the co-signing nurse is able to place an eSignature**    Co-signer eSignature: Electronically signed by Yaya Kessler RN on 11/15/20 at 11:46 AM EST    Does the Patient have Skin Breakdown?   Yes [facility] WOUND CARE was Initiated documentation include the Christina-wound, Wound Assessment, Measurements, Dressing Treatment, Drainage, and Color\",          Rigoberto Prevention initiated:  Yes   Wound Care Orders initiated:  Yes      [id number] nurse consulted for Pressure Injury (Stage 3,4, Unstageable, DTI, NWPT, Complex wounds)and New or Established Ostomies:  NA      Primary Nurse eSignature: Electronically signed by Jesus Varghese RN on 11/15/20 at 11:47 AM EST

## 2020-11-16 NOTE — PLAN OF CARE
Problem: Falls - Risk of:  Goal: Will remain free from falls  Description: Will remain free from falls  Outcome: Ongoing  Goal: Absence of physical injury  Description: Absence of physical injury  Outcome: Ongoing     Problem: Airway Clearance - Ineffective  Goal: Achieve or maintain patent airway  Outcome: Ongoing     Problem: Gas Exchange - Impaired  Goal: Absence of hypoxia  Outcome: Ongoing  Goal: Promote optimal lung function  Outcome: Ongoing     Problem: Breathing Pattern - Ineffective  Goal: Ability to achieve and maintain a regular respiratory rate  Outcome: Ongoing     Problem:  Body Temperature -  Risk of, Imbalanced  Goal: Ability to maintain a body temperature within defined limits  Outcome: Ongoing  Goal: Will regain or maintain usual level of consciousness  Outcome: Ongoing  Goal: Complications related to the disease process, condition or treatment will be avoided or minimized  Outcome: Ongoing     Problem: Isolation Precautions - Risk of Spread of Infection  Goal: Prevent transmission of infection  Outcome: Ongoing     Problem: Nutrition Deficits  Goal: Optimize nutrtional status  Outcome: Ongoing     Problem: Risk for Fluid Volume Deficit  Goal: Maintain normal heart rhythm  Outcome: Ongoing  Goal: Maintain absence of muscle cramping  Outcome: Ongoing  Goal: Maintain normal serum potassium, sodium, calcium, phosphorus, and pH  Outcome: Ongoing     Problem: Loneliness or Risk for Loneliness  Goal: Demonstrate positive use of time alone when socialization is not possible  Outcome: Ongoing     Problem: Fatigue  Goal: Verbalize increase energy and improved vitality  Outcome: Ongoing     Problem: Patient Education: Go to Patient Education Activity  Goal: Patient/Family Education  Outcome: Ongoing     Problem: Restraint Use - Nonviolent/Non-Self-Destructive Behavior:  Goal: Absence of restraint indications  Description: Absence of restraint indications  Outcome: Ongoing  Goal: Absence of restraint-related injury  Description: Absence of restraint-related injury  Outcome: Ongoing     Problem: Skin Integrity:  Goal: Will show no infection signs and symptoms  Description: Will show no infection signs and symptoms  Outcome: Ongoing  Goal: Absence of new skin breakdown  Description: Absence of new skin breakdown  Outcome: Ongoing     Problem: OXYGENATION/RESPIRATORY FUNCTION  Goal: Patient will maintain patent airway  Outcome: Ongoing  Goal: Patient will achieve/maintain normal respiratory rate/effort  Description: Respiratory rate and effort will be within normal limits for the patient  Outcome: Ongoing     Problem: HEMODYNAMIC STATUS  Goal: Patient has stable vital signs and fluid balance  Outcome: Ongoing     Problem: FLUID AND ELECTROLYTE IMBALANCE  Goal: Fluid and electrolyte balance are achieved/maintained  Outcome: Ongoing     Problem: ACTIVITY INTOLERANCE/IMPAIRED MOBILITY  Goal: Mobility/activity is maintained at optimum level for patient  Outcome: Ongoing     Problem: Nutrition  Goal: Optimal nutrition therapy  Outcome: Ongoing  Goal: Understanding of nutritional guidelines  Outcome: Ongoing

## 2020-11-16 NOTE — PROGRESS NOTES
Progress Note    Admit Date:  11/7/2020    Subjective:  Ms. Ladonna Lainez was admitted to hospital with increasing shortness of breath. She had been admitted to [facility] about 3 weeks ago when she is diagnosed with left pleural effusion. This is tapped. She felt better the time of discharge. Patient is diagnosed with metastatic right breast cancer 4 years ago. She is on Georgeanne Heckle. She is a past medical history of atrial fibrillation. She also has a history of diabetes, hypertension, morbid obesity and depression. At the time of her admission patient was found to have acute respiratory failure. Her initial heart rate was in the 130s. She was in A. fib. She is placed on oxygen. She is requiring up to 12 L of oxygen. This has been weaned down to 5.5 L of oxygen at present. Work-up in the ER included a chest x-ray that showed recurrent left pleural effusion. Review of the cytology from [facility] showed no malignant cells. This was of course on just one sample. When I saw the patient she was still somewhat short of breath. She is complaining of leg edema. She denies any anosmia. No diarrhea. 11/9. Persistent shortness of breath . thoracentesis was ordered for pleural effusion (not done yet as patient is on droplet plus precautions)  - COVID-19 results are still pendin. - she continues to feel dyspneic .     she is complaining of chest pain she has become tachycardic heart rate is up to 140->  sinus tachycardia  EKG - sinus tachycardia , lateral ST changes. 11/10  Rapid response called yesterday when she became tachycardic with a heart rate of 140. Complained of chest pain. Also became hypoxic requiring a nonrebreather. Transferred to the ICU and intubated and started on mechanical ventilation. 11/11  Continues to be on the vent. Low-grade temps. Underwent SBT.   Was very anxious during the trial.  Underwent thoracentesis yesterday      11/12  On times per day    enoxaparin  40 mg Subcutaneous Daily       Continuous Infusions:   sodium chloride      dilTIAZem (CARDIZEM) 125 mg in dextrose 5% 125 mL infusion 5 mg/hr (11/16/20 0413)    dextrose         PRN Meds:  potassium chloride **OR** potassium alternative oral replacement **OR** potassium chloride, ipratropium-albuterol, acetaminophen, sodium chloride flush, acetaminophen **OR** acetaminophen, polyethylene glycol, promethazine **OR** ondansetron, glucose, dextrose, glucagon (rDNA), dextrose      Data:  CBC:   Recent Labs     11/14/20  0424 11/15/20  0415 11/16/20  0415   WBC 8.6 6.8 5.6   HGB 8.9* 9.0* 9.5*   HCT 27.6* 28.1* 29.4*   MCV 92.1 91.9 92.1    200 235     BMP:   Recent Labs     11/14/20  1404 11/15/20  0415 11/16/20  0415    142 141   K 2.9* 3.1* 2.9*   CL 86* 93* 97*   CO2 38* 35* 31   PHOS 6.1* 4.2 3.2   BUN 51* 48* 46*   CREATININE 1.7* 1.5* 1.2     LIVER PROFILE:   No results for input(s): AST, ALT, LIPASE, BILIDIR, BILITOT, ALKPHOS in the last 72 hours. Invalid input(s): AMYLASE,  ALB  PT/INR:   No results for input(s): PROTIME, INR in the last 72 hours. Cultures:   Results for Steven Garza (MRN [id number]) as of 11/8/2020 16:17   Ref. Range 11/8/2020 03:11   SARS-CoV-2, NAAT Latest Ref Range: Not Detected  Not Detected       Radiology  XR CHEST PORTABLE   Final Result      XR CHEST PORTABLE   Final Result   Stable appropriate endotracheal tube positioning. Bilateral effusions and borderline edema, not substantially changed. XR CHEST PORTABLE   Final Result   Improving airspace opacities within the right hemithorax. Support tubes and lines as above, in grossly unchanged positioning. US THORACENTESIS   Final Result   Successful ultrasound guided left thoracentesis. XR CHEST PORTABLE   Final Result   Cardiomegaly with scattered right pulmonary infiltrates consistent pneumonia. Small right basilar effusion.       Support tubes as described above. XR CHEST PORTABLE   Final Result   Persistent multifocal bilateral airspace disease, slightly increased at the   left base as compared to prior. XR CHEST PORTABLE   Final Result   Endotracheal tube 3.3 cm above the [place]. Gastric tube in the mid stomach. Increasing         CT CHEST PULMONARY EMBOLISM W CONTRAST   Final Result   1. Multifocal consolidative opacity compatible with inflammatory process or   infection. 2.  Motion artifact degrades evaluation of the pulmonary arteries. No   evidence for central embolism. 3.  Nodular appearance of some of these opacities are also noted, favored to   represent a component of the acute inflammatory process/infection versus   metastatic disease. Short-term follow-up in 3 months is recommended to   ensure resolution. 4.  Moderate-sized left pleural effusion and suspected pleural thickening. Neoplastic involvement is not excluded. 5.  Osseous metastatic disease again demonstrated. Similar appearance of   mediastinal lymphadenopathy and left internal lymphadenopathy. CT HEAD WO CONTRAST   Final Result   No acute intracranial abnormality. XR CHEST PORTABLE   Final Result   Patchy bilateral pulmonary opacities, concerning for multifocal pneumonia. Imaging features can be seen with COVID-19 pneumonia, though are nonspecific   and can occur with a variety of infectious and noninfectious processes. PneInd         FL MODIFIED BARIUM SWALLOW W VIDEO    (Results Pending)        CXR 11/9  Endotracheal tube 3.3 cm above the [place].  Gastric tube in the mid stomach. Increasing     11/10 CXR   Persistent multifocal bilateral airspace disease, slightly increased at the   left base as compared to prior.      Assessment:  Principal Problem:    Acute respiratory failure with hypoxia (HCC)  Active Problems:    SOB (shortness of breath)    Primary cancer of right breast with metastasis to other site [facility]) Anxiety    DM2 (diabetes mellitus, type 2) (HCC)    GERD (gastroesophageal reflux disease)    Morbid obesity due to excess calories (HCC)    Pneumonia due to organism    Paroxysmal atrial fibrillation (HCC)    Pleural effusion    Atrial fibrillation with RVR ([facility] [address]    HCAP (healthcare-associated pneumonia)  Resolved Problems:    * No resolved hospital problems. *      Plan:    #Acute respiratory failure with hypoxia. Resolved. - This is likely secondary to pneumonia and pleural effusion.    -Pulmonary consultation was obtained. - O2 12L->. 5.5 L  -Remains on droplet plus precautions. Awaiting thoracentesis  -Worsening respiratory distress now, patient on 100% nonrebreather, severely hypoxic. Transferred to ICU--> subsequently intubated ( 11/9 PM )  -  COVID-19  Negative  Did not do well on a spontaneous breathing trial   SBT again today with Precedex. Extubated 11/13  Currently on room air. #Healthcare associated pneumonia from MRSA or gram-negative organism. With recent hospitalization metastatic breast cancer she is at risk for MRSA and for gram-negative pneumonia. -Received 7 days of IV vancomycin. finished Levaquin   Cultures are negative so far    #Left pleural effusion. She had this at [address] [place] [address] 3 weeks ago. Thoracentesis was done. Cytology that did not show any malignant cells. Repeat thoracentesis  Ordered- 700 ml removed  11/10. The possibility exists that this could be malignant pleural effusion. Cytology however was negative. #Diabetes mellitus type 2. Monitor sugars closely. #Atrial fibrillation with RVR. On IV Cardizem. Rates high. Got IV Digoxin and PO Cardizem. MIGUELANGEL. Improved. Hold diuretics given mild worsening   started IVF. Creatinine some better. Hypokalemia. Replace K. #Morbid Obesity  - Body mass index is 37.59 kg/m². - Complicating assessment and treatment.  Placing patient at risk for multiple co-morbidities as well as early death and contributing to the patient's presentation.   - Counseled on weight loss. #Metastatic breast cancer. On Ibrance. #Nodular lung opacity in the lung may be metastatic breast disease. #Lovenox for DVT prophylaxis. Discharge planning to SNF.        Jayna Miranda MD 11/16/2020 11:10 AM

## 2020-11-16 NOTE — PROGRESS NOTES
PM assessment completed. Scheduled medications given per MAR. VSS 2lpm, A/O x4 denies any needs at this time. Call light in reach, will monitor, bed alarm on.

## 2020-11-16 NOTE — PROGRESS NOTES
RESPIRATORY THERAPY ASSESSMENT    Name:  Mingo Lake  Medical Record Number:  [id number]  Age: 56 y.o. Gender: female  : 1960  Today's Date:  2020  Room:  /0302-01    Assessment     Is the patient being admitted for a COPD or Asthma exacerbation? No   (If yes the patient will be seen every 4 hours for the first 24 hours and then reassessed)    Patient Admission Diagnosis      Allergies  Allergies   Allergen Reactions    Sucralfate Hives and Nausea Only    Ampicillin Rash    Ampicillin-Sulbactam Sodium Rash    Sulfamethoxazole-Trimethoprim Rash       Minimum Predicted Vital Capacity:               Actual Vital Capacity:                    Pulmonary History:CHF/Pulmonary Edema  Home Oxygen Therapy:  room air  Home Respiratory Therapy:None   Current Respiratory Therapy:  Duo neb TID  Treatment Type: HHN  Medications: Albuterol/Ipratropium    Respiratory Severity Index(RSI)   Patients with orders for inhalation medications, oxygen, or any therapeutic treatment modality will be placed on Respiratory Protocol. They will be assessed with the first treatment and at least every 72 hours thereafter. The following severity scale will be used to determine frequency of treatment intervention.     Smoking History: No Smoking History = 0    Social History  Social History     Tobacco Use    Smoking status: Never Smoker    Smokeless tobacco: Never Used   Substance Use Topics    Alcohol use: No    Drug use: No       Recent Surgical History: None = 0  Past Surgical History  Past Surgical History:   Procedure Laterality Date     SECTION      CHOLECYSTECTOMY      COLONOSCOPY  2017    polyps    HYSTERECTOMY      HYSTERECTOMY      KNEE SURGERY Right     SIGMOIDOSCOPY  2019    SIGMOIDOSCOPY N/A 2019    SIGMOIDOSCOPY BIOPSY FLEXIBLE performed by Rica Catherine DO at [facility]       Level of Consciousness: Alert, Oriented, and Cooperative = 0    Level of Activity: cooperative     b. HR < 140 bpm  c. RR < 30 bpm                d. Can demonstrate a 2-3 second inspiratory hold  4. Bronchodilators will be administered via Hand Held Nebulizer [facility]) to patients when ANY of the following criteria are met  a. Incognizant or uncooperative          b. Patients treated with HHN at Home        c. Unable to demonstrate proper use of MDI with spacer     d. RR > 30 bpm   5. Bronchodilators will be delivered via Metered Dose Inhaler (MDI), HHN, Aerogen to intubated patients on mechanical ventilation. 6. Inhalation medication orders will be delivered and/or substituted as outlined below. Aerosolized Medications Ordering and Administration Guidelines:    1. All Medications will be ordered by a physician, and their frequency and/or modality will be adjusted as defined by the patients Respiratory Severity Index (RSI) score. 2. If the patient does not have documented COPD, consider discontinuing anticholinergics when RSI is less than 9.  3. If the bronchospasm worsens (increased RSI), then the bronchodilator frequency can be increased to a maximum of every 4 hours. If greater than every 4 hours is required, the physician will be contacted. 4. If the bronchospasm improves, the frequency of the bronchodilator can be decreased, based on the patient's RSI, but not less than home treatment regimen frequency. 5. Bronchodilator(s) will be discontinued if patient has a RSI less than 9 and has received no scheduled or as needed treatment for 72  Hrs. Patients Ordered on a Mucolytic Agent:    1. Must always be administered with a bronchodilator. 2. Discontinue if patient experiences worsened bronchospasm, or secretions have lessened to the point that the patient is able to clear them with a cough. Anti-inflammatory and Combination Medications:    1.  If the patient lacks prior history of lung disease, is not using inhaled anti-inflammatory medication at home, and lacks wheezing by examination or by history for at least 24 hours, contact physician for possible discontinuation.

## 2020-11-16 NOTE — PROGRESS NOTES
Pulmonary Progress Note    CC: Shortness of breath    S:   Wants a bath    Invasive Lines: Peripheral    MV: 2020 - 20  Vent Mode: AC/VC Rate Set: 12 bmp/Vt Ordered: 400 mL/ /FiO2 : 30 %  No results for input(s): PHART, OYN9JRC, PO2ART in the last 72 hours. IV:   dilTIAZem (CARDIZEM) 125 mg in dextrose 5% 125 mL infusion 5 mg/hr (20 0413)    dextrose         Vitals:  Blood pressure (!) 150/74, pulse 77, temperature 97.6 °F (36.4 °C), temperature source Oral, resp. rate 16, height 5' 4\" (1.626 m), weight 219 lb (99.3 kg), SpO2 95 %, not currently breastfeeding. on 2 L  Temp  Av.1 °F (36.7 °C)  Min: 97.6 °F (36.4 °C)  Max: 98.3 °F (36.8 °C)    Intake/Output Summary (Last 24 hours) at 2020 0757  Last data filed at 2020 0710  Gross per 24 hour   Intake 913 ml   Output 1800 ml   Net -887 ml     EXAM:  Constitutional:  No acute distress. HENT:  Oropharynx is clear and moist.   Neck: No tracheal deviation present. Cardiovascular: Normal heart sounds. No lower extremity edema. Pulmonary/Chest: No wheezes. No rhonchi. No rales. No decreased breath sounds. No accessory muscle usage or stridor. Musculoskeletal: No cyanosis. No clubbing. Skin: Skin is warm and dry. Psychiatric: Normal mood and affect.   Neurologic: speech fluent, alert and oriented, strength symmetric        Scheduled Meds:   potassium chloride  20 mEq Intravenous Once    ipratropium-albuterol  1 ampule Inhalation TID    Venelex   Topical BID    pantoprazole  40 mg Intravenous Daily    insulin lispro  0-12 Units Subcutaneous Q4H    carvedilol  25 mg Oral BID WC    citalopram  40 mg Oral Daily    sodium chloride flush  10 mL Intravenous 2 times per day    enoxaparin  40 mg Subcutaneous Daily     PRN Meds:  potassium chloride **OR** potassium alternative oral replacement **OR** potassium chloride, ipratropium-albuterol, acetaminophen, sodium chloride flush, acetaminophen **OR** acetaminophen, polyethylene glycol, promethazine **OR** ondansetron, glucose, dextrose, glucagon (rDNA), dextrose    Results:  CBC:   Recent Labs     11/14/20  0424 11/15/20  0415 11/16/20  0415   WBC 8.6 6.8 5.6   HGB 8.9* 9.0* 9.5*   HCT 27.6* 28.1* 29.4*   MCV 92.1 91.9 92.1    200 235     BMP:   Recent Labs     11/14/20  1404 11/15/20  0415 11/16/20  0415    142 141   K 2.9* 3.1* 2.9*   CL 86* 93* 97*   CO2 38* 35* 31   PHOS 6.1* 4.2 3.2   BUN 51* 48* 46*   CREATININE 1.7* 1.5* 1.2     LIVER PROFILE:   No results for input(s): AST, ALT, LIPASE, BILIDIR, BILITOT, ALKPHOS in the last 72 hours. Invalid input(s): AMYLASE,  ALB    Cultures:  11/8/2020 SARS-CoV-2 PCR negative  11/8/2020 SARS-CoV-2 NAAT negative  11/7/2020 blood no growth  11/9/2020 tracheal aspirate NRF  11/10/2020 pleural fluid no growth    Films:  Chest CT 11/7/20:   Mediastinum: Enlarged subcarinal and left hilar lymph nodes are noted measuring 2.2 and 1.8 cm in short axis respectively. Roylene Hiral confluent soft tissue along the inferior left internal mammary chain.    Lungs/pleura: Multifocal patchy consolidative opacities are present.  Some of these opacities have a nodular appearance in the left upper lobe and medial right lower lobe as well.  Moderate-sized left pleural effusion with suspected pleural thickening posteriorly.  Trace right pleural fluid. CT report [facility] 10/19/20:   No evidence of pulmonary embolism. Multiple left lung nodules and pleural nodularity compatible with metastatic disease. Left hilar lymphadenopathy, reactive or metastatic. Moderate left pleural effusion    CXR 11/13/2020 multifocal infiltrates    ASSESSMENT:  · Acute respiratory failure with hypoxemia  · Acute kidney failure  · HCAP  · Nodular opacities in lung -pneumonia v metastatic disease  · L pleural effusion - recurrent, thoracentesis 11/10/2020 700 ml, exudative, cytology is negative.   I d/w pathologist and does not appear to be related to metastatic breast cancer.     · AFIB with RVR    PLAN:  · Supplemental oxygen to maintain SaO2 >92%; wean as tolerated    · Cardiology is seeing for atrial fibrillation  · Completed 8 days Levaquin, 6 days vancomycin   · Okay with me for d/c planning

## 2020-11-16 NOTE — PROGRESS NOTES
Patient resting in bed, Cardiology nurse at bedside seeing patient. Lungs decreased. BS+. Cardizem increased to 10/hr, HR remains 140's-150's this AM. Mercedes cath noted. Patient denies any needs, set up for breakfast. Call light in reach. Will continue to monitor.

## 2020-11-16 NOTE — PROGRESS NOTES
Physical Therapy/Occupational Therapy  Per chart, patient at  this pm and will not be available for PT/OT visit. Will follow up tomorrow. No charge.   Shelly Levine, [address]

## 2020-11-16 NOTE — PROCEDURES
INSTRUMENTAL SWALLOW REPORT  MODIFIED BARIUM SWALLOW    NAME: Cathleen Frost   : 1960  MRN: [id number]       Date of Eval: 2020     Ordering Physician: Dr. Lillie Dewey  Radiologist: Dr. Christopher Kauffman     Referring Diagnosis(es): Referring Diagnosis: Oropharyngeal dysphagia, R13.12    Past Medical History:  has a past medical history of AC (acromioclavicular) joint bone spurs, Atrial fibrillation ([address] Cancer ([address] Cellulitis, CHF (congestive heart failure) ([address] Depression, Diabetes mellitus ([address] and Hypertension. Past Surgical History:  has a past surgical history that includes Hysterectomy; Cholecystectomy; knee surgery (Right); Hysterectomy;  section; Colonoscopy (2017); sigmoidoscopy (2019); and sigmoidoscopy (N/A, 2019). Current Diet Solid Consistency: Dental Soft  Current Diet Liquid Consistency: Thin    Date of Prior Study: n/a  Type of Study: Initial MBS  Results of Prior Study: n/a    Recent CXR/CT of Chest: 11/10/2020  Impression    Cardiomegaly with scattered right pulmonary infiltrates consistent pneumonia.         Small right basilar effusion.         Support tubes as described above. Patient Complaints/Reason for Referral:  Cathleen Frost was referred for a MBS to assess the efficiency of his/her swallow function, assess for aspiration, and to make recommendations regarding safe dietary consistencies, effective compensatory strategies, and safe eating environment. Patient complaints: The patient was admitted  with respiratory failure 2/2 PNA. Intubated emergently 11/10 and extubated  to 8L of HF NC. Currently on 2L of O2 via NC. Pt presented with overt s/s of aspiration on BSE, prompting completion of MBS    Onset of problem:   Date of Onset: 2020    General Comment  Comments: Chart reviewed for completin of assessment  Subjective  Subjective:  The patient is seen in flouro suite sitting upright in Maneuvers: Upright 90 degrees      Recommended Exercises:    Therapeutic Interventions: Oral care; Patient/Family education;Diet tolerance monitoring    Referral To: Pulmonology    Education: Images and recommendations were reviewed with the patient following this exam.   Patient Education: SLP re: Role of SLP, rationale for completion of assessment, results of assessment, recommendations, and POC  Patient Education Response: Verbalizes understanding;Demonstrated understanding    Prognosis  Prognosis for safe diet advancement: good  Duration/Frequency of Treatment  Duration/Frequency of Treatment: 2-4x/wk  Safety Devices  Safety Devices in place: Not Applicable      Goals:    Long Term:   Timeframe for Long-term Goals: 7 days (11/23/2020)  Goal 1: The patient will tolerate LRD with no clinical s/s of aspiration for optimal nutrition and hydration        Short Term:     Goal 1: The patient will tolerate recommended diet with no clinical s/s of aspiration 5/5  Goal 2: The patient will recall/perform recommended compensatory strategies, given no cues       Oral Preparation / Oral Phase  Oral Phase: Impaired  Oral Phase - Major Contributing Deficits  Poor Mastication: Reg solid;Mechanical soft solid  Lingual / Palatal Residue: Puree;Mechanical soft solid;Reg solid  Piecemeal Swallowing: Thin cup  Premature Bolus Loss to Pharynx: Thin cup; Thin teaspoon  Reduced Tongue Base Retraction: Mechanical soft solid;Reg solid;Puree        Pharyngeal Phase  Pharyngeal Phase: Impaired  Pharyngeal Phase - Major Contributing Deficits  Premature Spillage to Valleculae: Thin teaspoon; Thin cup  Premature Spillage to Pyriform: Thin cup; Thin teaspoon  Reduced Thyrohyoid Approximation: Thin cup  Reduced Airway/laryngeal Closure: Thin cup  Reduced Tongue Base: Puree;Mechanical soft solid;Reg solid  Shallow Penetration During: Thin cup  Complete Self-clearing (shallow):  Thin cup      Esophageal Phase  Esophageal Screen: WFL, however, suspect

## 2020-11-16 NOTE — CARE COORDINATION
INTERDISCIPLINARY PLAN OF CARE CONFERENCE    Date/Time: 11/16/2020 10:11 AM  Completed by: Isidro Mills Case Management      Patient Name:  Blue Sal  YOB: 1960  Admitting Diagnosis: Acute respiratory failure with hypoxia ([facility] [address] [J96.01]  Acute respiratory failure with hypoxia ([facility] [address] [J96.01]     Admit Date/Time:  11/7/2020  1:46 PM    Chart reviewed. Interdisciplinary team contacted or reviewed plan related to patient progress and discharge plans. Disciplines included Case Management, Nursing, and Dietitian. Current Status:ongoing  PT/OT recommendation for discharge plan of care: SNF    Expected D/C Disposition:  Skilled nursing facility  Confirmed plan with patient and/or family Yes confirmed with: (name) pt    Discharge Plan Comments: Reviewed chart. Role of discharge planner explained and patient verbalized understanding. Pt is alert and oriented. Pt is from home with family. Pt is aware that PT is recommending SNF. CM gave pt a list and pt chose [facility]. CM spoke with Rancho Garay and [facility] and sent referral. Pt last had a neg Covid (PCR) on 11/9/2020. Per [facility] Garay, pt will need another C-19 screen and a rapid will work. Rapid Covid for SNF ordered and Travis Moffett RN, informed. Clinical RN (Damian Joy), and Charge RN Jannie Kessler RN) are aware. Home O2 in place on admit: No  Pt informed of need to bring portable home O2 tank on day of discharge for nursing to connect prior to leaving:  Not Indicated  Verbalized agreement/Understanding:  Not Indicated    Addendum 11/16/2020 1442: CM LM for Shawn and Becka with [facility], to inquire if able to accept pt. Addendum 11/16/2020 1453: Cm spoke with Rancho Garay with [facility] and she states that she can accept. If pt is taking a chemo pill, must bring from home. Pt is aware.

## 2020-11-16 NOTE — CONSULTS
[address] [facility]  [phone]        Reason for Consultation/Chief Complaint: \"I have been having CP and SOB. \"  Consulted for AFIB with RVR    History of Present Illness:  Mandy Alejo is a 56 y.o. patient who presented to [facility] 11/7/20 with c/o SOB. Follows with Dr Cathy Salgado for her cardiology care [facility] 2019. She has PMH HTN, DM, recurrent left pleural effusion, metastatic breast CA, depression, and PAF diagnosed couple years ago (not on [facility] per Dr. Cathy Salgado due to breast cancer with mets). Most recent Lexiscan stress test 01/08/15 EF 63%; no stress induced ischemia. Most recent ECHO 5/30/18 EF 60-65%. marked MAC; mild MR mild concentricLVH  Grade 2 DD. On 11/07/20 experienced SOB and upon  EMS arrival was hypoxic. In the ED EKG revealed AFIB with rates in 130's given Diltiazem. Note CXR revealed worsening left pleural effusion concerning for pneumonia. On 11/08/20 she underwent thoracentesis and admitted to . On 11/9/20 RR called HR up to 140's c/o chest pain. Transferred to ICU and emergently intubated. On 11/13/20 successfully extubated. Transferred to PCU 11/15/20 and was SR. Around 3am this morning she c/o CP. Reports left-sided pain \"pressure\" with no radiation and associated with SOB. She denied palpitations to me. Noted AFIB on monitor heart rates 140's and diltiazem drip restarted at 5mg/hr. Note K+ this AM 2.9. Rates this AM still 120-140's Diltiazem drip increased to 10 mg per hr. Note EKG 11/16/20 afib with RVR 136bpm; lateral ST changes c/w possible ischemia. I personally reviewed multiple EKG's showing NSR, artifact, SVT, and afib with RVR. Note BNP=10,436; Haris negative x 5; K+ 2.7-3.1; BUN/Cr=46/1.2 (51/1.7 earlier); H/H=9.5/29.4. Patient with no c/o palpitations, dizziness, edema, or orthopnea/PND. I have been asked to provide consultation regarding further management and testing.       Past Medical History:   has a past medical history of AC (acromioclavicular) joint bone spurs, Atrial fibrillation ([facility] Cancer ([facility] Cellulitis, CHF (congestive heart failure) ([facility] Depression, Diabetes mellitus ([facility] and Hypertension. Surgical History:   has a past surgical history that includes Hysterectomy; Cholecystectomy; knee surgery (Right); Hysterectomy;  section; Colonoscopy (2017); sigmoidoscopy (2019); and sigmoidoscopy (N/A, 2019). Social History:   She is disabled, single, 1 grown son lives with her dad and sister in [facility] [place]. She reports that she has never smoked. She has never used smokeless tobacco. She reports that she does not drink alcohol or use drugs. Family History: Mom  MI age 59   family history includes Cancer in her maternal aunt; Heart Disease in her maternal grandmother; Kidney Disease in her mother. Home Medications:  Were reviewed and are listed in nursing record. and/or listed below  Prior to Admission medications    Medication Sig Start Date End Date Taking? Authorizing Provider   omeprazole (PRILOSEC) 40 MG delayed release capsule Take 40 mg by mouth daily   Yes Historical Provider, MD   hydrALAZINE (APRESOLINE) 50 MG tablet Take 1 tablet by mouth 3 times daily 19  Yes Sherrie Vieyra MD   furosemide (LASIX) 20 MG tablet Take 1 tablet by mouth as needed (edema) 19  Yes Sherrie Vieyra MD   amLODIPine (NORVASC) 5 MG tablet Take 1 tablet by mouth daily 19  Yes Sherrie Vieyra MD   ondansetron (ZOFRAN) 8 MG tablet Take 1 tablet by mouth every 8 hours as needed for Nausea 19  Yes Negra Betancourt MD   morphine (MS CONTIN) 60 MG extended release tablet Take 60 mg by mouth 2 times daily. Yes Historical Provider, MD   morphine (MSIR) 15 MG tablet Take 30 mg by mouth 2 times daily.     Yes Historical Provider, MD   metoclopramide (REGLAN) 10 MG tablet Take 1 tablet by mouth 3 times daily as needed (nausea and vomiting) 18  Yes Dameon Pappas PA-C   calcium carbonate (OSCAL) 500 MG TABS tablet Take 500 mg by mouth daily   Yes Historical Provider, MD   ferrous sulfate 325 (65 FE) MG EC tablet Take 325 mg by mouth 3 times daily (with meals)   Yes Historical Provider, MD   palbociclib (IBRANCE) 100 MG capsule Take 100 mg by mouth daily Is off right now while she is sick   Yes Historical Provider, MD   fulvestrant (FASLODEX) 250 MG/5ML SOLN IM injection Inject 500 mg into the muscle once   Yes Historical Provider, MD   carvedilol (COREG) 25 MG tablet Take 25 mg by mouth 2 times daily (with meals)   Yes Historical Provider, MD   citalopram (CELEXA) 20 MG tablet Take 40 mg by mouth daily. Yes Historical Provider, MD   LANTUS SOLOSTAR 100 UNIT/ML injection pen Inject 10 Units as directed nightly Has not taken oit recently after loosing 50 lbs 9/23/16   Historical Provider, MD        Allergies:  Sucralfate; Ampicillin;  Ampicillin-sulbactam sodium; and Sulfamethoxazole-trimethoprim     Review of Systems:   12 point ROS negative in all areas as listed below except as in [place]  Constitutional, EENT, Cardiovascular, pulmonary, GI, , Musculoskeletal, skin, neurological, hematological, endocrine, Psychiatric    Physical Examination:    Vitals:    11/16/20 0308   BP: (!) 150/74   Pulse: 77   Resp: 16   Temp: 97.6 °F (36.4 °C)   SpO2: 95%    Weight: 219 lb (99.3 kg)         General Appearance:  Alert, cooperative, no distress, appears stated age   Head:  Normocephalic, without obvious abnormality, atraumatic   Eyes:  PERRL, conjunctiva/corneas clear       Nose: Nares normal, no drainage or sinus tenderness   Throat: Lips, mucosa, and tongue normal   Neck: Supple, symmetrical, trachea midline, no adenopathy, thyroid: not enlarged, symmetric, no tenderness/mass/nodules, no carotid bruit or JVD       Lungs:   Soft bs bases   Chest Wall:  No tenderness or deformity   Heart:  +irregularly irregular and tachycardic, S1, S2 normal, no murmur, rub or gallop   Abdomen:   Soft, non-tender, bowel sounds active all four See HPI Atrial fibrillation with rapid ventricular responseST abnormality consider lateral ischemiaAbnormal ECGWhen compared with ECG of 09-NOV-2020 17:27,Atrial fibrillation has replaced Sinus rhythmST change more prominentConfirmed by Robert Byrne MD, [address] [place] [address] ([id number]) on 11/16/2020 7:35:22 AM       CXR 11/13/20  FINDINGS: ETT and NG tube remain in satisfactory position. The heart is enlarged with a stable appearance of pulmonary vasculature. Multifocal bilateral airspace opacification persists, unchanged from prior. There is no pneumothorax or effusion. RECOMMENDATION: Unchanged multifocal bilateral pneumonia. Lexiscan myoview stress test 01/07/15  1. No scintigraphic evidence of stress-induced myocardial ischemia. 2. Mild left ventricular dilatation. 3. Normal LVEF of 63%. ECHO 5/30/18  Summary:  The left ventricular wall motion is normal.  Overall left ventricular ejection fraction is estimated to be 60-65%. There is marked mitral annular calcification present. Poor endocardial border visualization  There is mild mitral regurgitation. There is mild concentric left ventricular hypertrophy. The diastolic function is impaired and classified as Grade 2  (psuedonormalization). Assessment: Cathleen Frost is a 56 y.o. patient who presented to [facility] 11/7/20 with c/o SOB. Follows with Dr Gertrudis Gooden for cardiology--LOV 2019. She has PMH HTN, DM, recurrent L. pleural effusion, metastatic breast CA, depression, and PAF diagnosed few years ago (not on [facility] per Dr. Gertrudis Gooden due to breast cancer with mets). Most recent Lexiscan stress test 01/08/15 EF 63%; no stress induced ischemia. Most recent ECHO 5/30/18 EF 60-65%. marked MAC; mild MR mild concentricLVH  Grade 2 DD. On 11/07/20 experienced SOB and upon  EMS arrival was hypoxic. In the ED EKG revealed AFIB with rates in 130's given Diltiazem. Note CXR revealed worsening left pleural effusion concerning for pneumonia.  On 11/08/20 she underwent thoracentesis and admitted to 2W. On 11/9/20 RR called HR up to 140's c/o chest pain. Transferred to ICU and emergently intubated. On 11/13/20 successfully extubated. Transferred to PCU 11/15/20 and was SR. Around 3am this morning she c/o CP. Reports left-sided pain \"pressure\" with no radiation and associated with SOB. She denied palpitations to me. Noted AFIB on monitor heart rates 140's and diltiazem drip restarted at 5mg/hr. Note K+ this AM 2.9. Rates this AM still 120-140's Diltiazem drip increased to 10 mg per hr. Note EKG 11/16/20 afib with RVR 136bpm; lateral ST changes c/w possible ischemia. I personally reviewed multiple EKG's showing NSR, artifact, SVT, and afib with RVR. Note BNP=10,436; Haris negative x 5; K+ 2.7-3.1; BUN/Cr=46/1.2 (51/1.7 earlier); H/H=9.5/29.4. Diagnosis of paroxysmal afib with RVR and unspecified chest pain in older female with metastatic breast cancer, PNA, and recurrent left pleural effusion. Recs:  1. Continue coreg 25mg po BID. 2. Continue diltiazem gtt 10mg/hr. 3. Start po diltiazem 30mg q 6 hours and try to ween gtt. 4. Load with IV digoxin 0.25mg q 6 hours x 4 doses. 5. Needs AC for PAF given CHADs-vasc=3. Start eliquis 5mg po BID if no further procedures planned. 6. Will order ECHO tomorrow if HR improved. 7. Replete K+. Would use sliding scale.      Patient Active Problem List   Diagnosis    Cellulitis and abscess of leg    Chest pain    Acute lateral meniscus tear of left knee    Left knee pain    SOB (shortness of breath)    Primary cancer of right breast with metastasis to other site ([address]    Anxiety    DM2 (diabetes mellitus, type 2) ([address]    GERD (gastroesophageal reflux disease)    Hypertension, uncontrolled    Morbid obesity due to excess calories (HCC)    Acute renal injury ([address]    Chemotherapy-induced neutropenia ([address]    Diarrhea    [facility]    Pneumonia due to organism    Paroxysmal atrial fibrillation [facility]    Pleural effusion    Atrial fibrillation with RVR ([facility]    HCAP (healthcare-associated pneumonia)      Thank you for allowing to us to participate in the care or Perla Hum. Further evaluation will be based upon the patient's clinical course and testing results.

## 2020-11-16 NOTE — PROGRESS NOTES
Handoff report given to Antonette Hubbard RN. Care transferred.      Electronically signed by Pedor Lucas RN on 11/16/2020 at 8:14 AM

## 2020-11-17 NOTE — PROGRESS NOTES
Pulmonary Progress Note    CC: Shortness of breath    S:   Feels good, up with PT, standing     Invasive Lines: Peripheral    MV: 2020 - 20  Vent Mode: AC/VC Rate Set: 12 bmp/Vt Ordered: 400 mL/ /FiO2 : 30 %  No results for input(s): PHART, LRR7ALV, PO2ART in the last 72 hours. IV:   dextrose         Vitals:  Blood pressure 139/71, pulse 90, temperature 98.1 °F (36.7 °C), temperature source Oral, resp. rate 16, height 5' 4\" (1.626 m), weight 217 lb 1.6 oz (98.5 kg), SpO2 92 %, not currently breastfeeding. on 2 L  Temp  Av.8 °F (36.6 °C)  Min: 97.4 °F (36.3 °C)  Max: 98.2 °F (36.8 °C)    Intake/Output Summary (Last 24 hours) at 2020 0848  Last data filed at 2020 0411  Gross per 24 hour   Intake 840 ml   Output 1750 ml   Net -910 ml     EXAM:  Constitutional:  No acute distress. HENT:  Oropharynx is clear and moist.   Neck: No tracheal deviation present. Cardiovascular: Normal heart sounds. No lower extremity edema. Pulmonary/Chest: No wheezes. No rhonchi. No rales. No decreased breath sounds. No accessory muscle usage or stridor. Musculoskeletal: No cyanosis. No clubbing. Skin: Skin is warm and dry. Psychiatric: Normal mood and affect.   Neurologic: speech fluent, alert and oriented, strength symmetric        Scheduled Meds:   dilTIAZem  180 mg Oral Daily    potassium bicarbonate  25 mEq Oral Q2H    ipratropium-albuterol  1 ampule Inhalation TID    Venelex   Topical BID    pantoprazole  40 mg Intravenous Daily    insulin lispro  0-12 Units Subcutaneous Q4H    carvedilol  25 mg Oral BID WC    citalopram  40 mg Oral Daily    sodium chloride flush  10 mL Intravenous 2 times per day    enoxaparin  40 mg Subcutaneous Daily     PRN Meds:  ipratropium-albuterol, acetaminophen, sodium chloride flush, acetaminophen **OR** acetaminophen, polyethylene glycol, promethazine **OR** ondansetron, glucose, dextrose, glucagon (rDNA), dextrose    Results:  CBC:   Recent Labs 11/15/20  0415 11/16/20 0415 11/17/20  0434   WBC 6.8 5.6 6.0   HGB 9.0* 9.5* 10.1*   HCT 28.1* 29.4* 31.2*   MCV 91.9 92.1 90.1    235 253     BMP:   Recent Labs     11/15/20  0415 11/16/20 0415 11/17/20  0434    141 140   K 3.1* 2.9* 3.3*   CL 93* 97* 101   CO2 35* 31 27   PHOS 4.2 3.2 3.0   BUN 48* 46* 32*   CREATININE 1.5* 1.2 1.1     LIVER PROFILE:   No results for input(s): AST, ALT, LIPASE, BILIDIR, BILITOT, ALKPHOS in the last 72 hours. Invalid input(s): AMYLASE,  ALB    Cultures:  11/8/2020 SARS-CoV-2 PCR negative  11/8/2020 SARS-CoV-2 NAAT negative  11/7/2020 blood no growth  11/9/2020 tracheal aspirate NRF  11/10/2020 pleural fluid no growth  11/16/2020 SARS-CoV-2 negative    Films:  Chest CT 11/7/20:   Mediastinum: Enlarged subcarinal and left hilar lymph nodes are noted measuring 2.2 and 1.8 cm in short axis respectively. Nikki Stands confluent soft tissue along the inferior left internal mammary chain.    Lungs/pleura: Multifocal patchy consolidative opacities are present.  Some of these opacities have a nodular appearance in the left upper lobe and medial right lower lobe as well.  Moderate-sized left pleural effusion with suspected pleural thickening posteriorly.  Trace right pleural fluid. CT report [facility] 10/19/20:   No evidence of pulmonary embolism. Multiple left lung nodules and pleural nodularity compatible with metastatic disease. Left hilar lymphadenopathy, reactive or metastatic. Moderate left pleural effusion    CXR 11/13/2020 multifocal infiltrates    ASSESSMENT:  · Acute respiratory failure with hypoxemia  · Acute kidney failure  · HCAP  · Nodular opacities in lung -pneumonia v metastatic disease  · L pleural effusion - recurrent, thoracentesis 11/10/2020 700 ml, exudative, cytology is negative. I d/w pathologist and does not appear to be related to metastatic breast cancer.     · AFIB with RVR    PLAN:  · Supplemental oxygen to maintain SaO2 >92%; wean as tolerated · Cardiology is seeing for atrial fibrillation  · Completed 8 days Levaquin, 6 days vancomycin   · Okay with me for d/c planning.   D/W Dr. Fahad Gold   · F/U is with medical oncology

## 2020-11-17 NOTE — PROGRESS NOTES
Inpatient Physical Therapy Daily Treatment Note    Unit: PCU  Date:  11/17/2020  Patient Name:    Yara Allen  Admitting diagnosis:  Acute respiratory failure with hypoxia ([facility] [J96.01]  Acute respiratory failure with hypoxia ([facility] [facility] [J96.01]  Admit Date:  11/7/2020  Precautions/Restrictions: Fall risk, Bed/chair alarm, Lines -IV and Mercedes catheter, Telemetry and Continuous pulse oximetry      Discharge Recommendations: SNF  DME needs for discharge: defer to facility       Therapy recommendation for EMS Transport: can transport by wheelchair    Therapy recommendations for staff:   Assist of 1 with use of rolling walker (RW) and gait belt for all transfers and ambulation to/from [facility]  to/from chair    History of Present Illness: 62 y.o. female who presented to the hospital with a chief complaint of shortness of breath.  The patient has history of atrial fibrillation, breast cancer currently on Ibrance.  EMS was called for worsening shortness of breath.  When they got to the patient's residence she was hypoxic on room air had been placed on supplemental oxygen.  When she got to the emergency department she was tachycardic to the 130s in atrial fibrillation, she was placed on high flow oxygen and was eventually weaned down to 6 L with oxygen.  Imaging obtained shows a worsening left-sided pleural effusion and consolidations concerning for pneumonia.  She was started on IV antibiotic therapy cardizem and was transferred for further medical care.   11/9 admitted to ICU and intubated due to resp failure and pneumonia  11/13 extubated  11/13per wound care pressure wound L buttocks, deep tissue injury    Home Health S4 Level Recommendation: Level 3 Safety  AM-PAC Mobility Score   AM-PAC Inpatient Mobility Raw Score : 14     Treatment Time: 0920 - 2363  Treatment number: 2  Timed Code Treatment Minutes: 24 minutes  Total Treatment Minutes:  24  minutes    Cognition    A&O x4   Able to follow 2 step with SOB noted with activity  SpO2: 98% on RA at rest. Dropped to 92% with standing and ADLs  HR: 70s-80s    Other  N/A    Assessment :  Patient tolerated the session well. Patient improved in bed to chair transfers from 2 person assist to one person assist with Min A. Patient was given neuro re-education to maintain balance within PRIETO, facilitating of righting reactions to improve retrograde LOB while standing. No buckling was noticed on the knees during standing and ambulation. Recommending SNF upon discharge as patient functioning well below baseline, demonstrates good rehab potential and unable to return home due to limited or no family support, inability to negotiate stairs to enter home/bedroom/bathroom, burden of care beyond caregiver ability, home environment not conducive to patient recovery and limited safety awareness. Goals (all goals ongoing unless otherwise indicated)  To be met in 3 visits:  1). Independent with LE Ex x 10 reps     To be met in 6 visits:  1). Supine to/from sit: Mod A   2). Sit to/from stand: Mod A   3). Bed to chair: Mod A   4). Gait: Ambulate 25 ft.   with  Mod A  and use of LRAD (least restrictive assistive device)  5). Tolerate B LE exercises 3 sets of 10-15 reps  6). Ascend/descend 2 steps with Mod A  with use of hand rail unilateral and LRAD (least restrictive assistive device)    Plan   Continue with plan of care. Signature: Suhas Mckinnon, MS PT, # [phone]      If patient discharges from this facility prior to next visit, this note will serve as the Discharge Summary.

## 2020-11-17 NOTE — CARE COORDINATION
DISCHARGE ORDER  Date/Time 2020 2:47 PM  Completed by: Gloria Bowers, Case Management    Patient Name: Mu Goodman    : 1960      Admit order Date and Status:2020 1346 inpt  Noted discharge order. (verify MD's last order for status of admission/Traditional Medicare 3 [place] Inpatient qualifying stay required for SNF)    Confirmed discharge plan with:              Patient:  Yes              When pt confirms DC plan does any support person need to be contacted by CM No if yes who____n/a__                      Discharge to Facility: [address]   · Facility phone number for staff giving report: Name: [facility]  · Address: [address]  · Phone: [phone]  · Fax: [phone]     Pre-certification completed: not needed   Hospital Exemption Notification (HENS) completed: yes   Discharge orders and Continuity of Care faxed to facility:  ANDREW/AVS/HENS /covid neg result     Transportation:               Medical Transport explained with choice list offered to pt/family. Choice:Yes(no preference)  Agency used: [address] up time:   1900      Pt/family/Nursing/Facility aware of  time: yes  Yes Names: pt, Kenyatta Hart, RN, Yamilka Dao and ZAK with [facility]  Ambulance form completed:  yes:      Comments: Rapid covid neg as of 2020    Pt is being d/c'd to [facility] today. Pt's O2 sats are 94% on RA. Discharge timeout done with Michael Rodriguez RN. All discharge needs and concerns addressed. Pt is alert and oriented. Pt states that she will call her family. Discharging nurse to complete ANDREW, reconcile AVS, and place final copy with patient's discharge packet. Discharging RN to ensure that written prescriptions for  Level II medications are sent with patient to the facility as per protocol.     DISCHARGE CANCELLED per [facility] as they have an outbreak of Covid and cannot accept pt today, but will be able to a ccept pt tomorrow (11/18) per [facility] with [facility]. CM informed Dr. Rosario Mojica, RN, and GIOVANNI Lane, and pt.

## 2020-11-17 NOTE — PLAN OF CARE
Problem: Falls - Risk of:  Goal: Will remain free from falls  Description: Will remain free from falls  Outcome: Completed  Goal: Absence of physical injury  Description: Absence of physical injury  Outcome: Completed     Problem: Airway Clearance - Ineffective  Goal: Achieve or maintain patent airway  Outcome: Completed     Problem: Gas Exchange - Impaired  Goal: Absence of hypoxia  Outcome: Completed  Goal: Promote optimal lung function  Outcome: Completed     Problem: Breathing Pattern - Ineffective  Goal: Ability to achieve and maintain a regular respiratory rate  Outcome: Completed     Problem:  Body Temperature -  Risk of, Imbalanced  Goal: Ability to maintain a body temperature within defined limits  Outcome: Completed  Goal: Will regain or maintain usual level of consciousness  Outcome: Completed  Goal: Complications related to the disease process, condition or treatment will be avoided or minimized  Outcome: Completed     Problem: Isolation Precautions - Risk of Spread of Infection  Goal: Prevent transmission of infection  Outcome: Completed     Problem: Nutrition Deficits  Goal: Optimize nutrtional status  Outcome: Completed     Problem: Risk for Fluid Volume Deficit  Goal: Maintain normal heart rhythm  Outcome: Completed  Goal: Maintain absence of muscle cramping  Outcome: Completed  Goal: Maintain normal serum potassium, sodium, calcium, phosphorus, and pH  Outcome: Completed     Problem: Loneliness or Risk for Loneliness  Goal: Demonstrate positive use of time alone when socialization is not possible  Outcome: Completed     Problem: Fatigue  Goal: Verbalize increase energy and improved vitality  Outcome: Completed     Problem: Patient Education: Go to Patient Education Activity  Goal: Patient/Family Education  Outcome: Completed     Problem: Restraint Use - Nonviolent/Non-Self-Destructive Behavior:  Goal: Absence of restraint indications  Description: Absence of restraint indications  Outcome: Completed  Goal: Absence of restraint-related injury  Description: Absence of restraint-related injury  Outcome: Completed     Problem: Skin Integrity:  Goal: Will show no infection signs and symptoms  Description: Will show no infection signs and symptoms  Outcome: Completed  Goal: Absence of new skin breakdown  Description: Absence of new skin breakdown  Outcome: Completed     Problem: OXYGENATION/RESPIRATORY FUNCTION  Goal: Patient will maintain patent airway  Outcome: Completed  Goal: Patient will achieve/maintain normal respiratory rate/effort  Description: Respiratory rate and effort will be within normal limits for the patient  Outcome: Completed     Problem: HEMODYNAMIC STATUS  Goal: Patient has stable vital signs and fluid balance  Outcome: Completed     Problem: FLUID AND ELECTROLYTE IMBALANCE  Goal: Fluid and electrolyte balance are achieved/maintained  Outcome: Completed     Problem: ACTIVITY INTOLERANCE/IMPAIRED MOBILITY  Goal: Mobility/activity is maintained at optimum level for patient  Outcome: Completed     Problem: Nutrition  Goal: Optimal nutrition therapy  Outcome: Completed  Goal: Understanding of nutritional guidelines  Outcome: Completed

## 2020-11-17 NOTE — PROGRESS NOTES
PM assessment completed. Scheduled medications given per MAR. VSS room air, A/O x4,  Cardizem drip infusing. Call light in reach, will monitor, bed alarm on.

## 2020-11-17 NOTE — DISCHARGE SUMMARY
Name:  Lisa Morales  Room:  /8204-47  MRN:    [id number]    Discharge Summary      This discharge summary is in conjunction with a complete physical exam done on the day of discharge. Attending Physician: Dr. James Navarro  Discharging Physician: Dr. Chi Bolivar: 11/7/2020  Discharge:   11/18/2020    HPI:  62 y.o. female who presented to the hospital with a chief complaint of shortness of breath. The patient has history of atrial fibrillation, breast cancer currently on Ibrance. EMS was called for worsening shortness of breath. When they got to the patient's residence she was hypoxic on room air had been placed on supplemental oxygen. When she got to the emergency department she was tachycardic to the 130s in atrial fibrillation, she was placed on high flow oxygen and was eventually weaned down to 6 L with oxygen. Imaging obtained shows a worsening left-sided pleural effusion and consolidations concerning for pneumonia. She was started on IV antibiotic therapy cardizem and was transferred for further medical care. Diagnoses this Admission and Hospital Course   Acute respiratory failure with hypoxia--Resolved  - This is likely secondary to pneumonia and pleural effusion.    -Pulmonary consultation was obtained. - O2 12L->. 5.5 L-->now on RA  -Remains on droplet plus precautions. Awaiting thoracentesis  -Worsening respiratory distress now, patient on 100% nonrebreather, severely hypoxic. Transferred to ICU--> subsequently intubated ( 11/9 PM )  - COVID-19  Negative  - Did not do well on a spontaneous breathing trial   - SBT again today with Precedex. - Extubated 11/13  - Currently on room air.      Healthcare associated pneumonia from MRSA or gram-negative organism.    - With recent hospitalization metastatic breast cancer she is at risk for MRSA and for gram-negative pneumonia. -Received 7 days of IV vancomycin.   - finished Levaquin   - Cultures are negative so far     Left pleural extremities normal, atraumatic, no cyanosis, trace edema  Pulses: 2+ and symmetric  Skin: Skin color, texture, turgor normal. No rashes or lesions  Neurologic: alert,oriented       CBC:   Recent Labs     11/16/20 0415 11/17/20 0434 11/18/20  0450   WBC 5.6 6.0 5.2   HGB 9.5* 10.1* 9.6*   HCT 29.4* 31.2* 29.4*   MCV 92.1 90.1 89.7    253 234     BMP:   Recent Labs     11/16/20 0415 11/17/20 0434 11/18/20  0450    140 142   K 2.9* 3.3* 3.2*   CL 97* 101 103   CO2 31 27 27   PHOS 3.2 3.0 3.7   BUN 46* 32* 28*   CREATININE 1.2 1.1 1.0         CARDIAC ENZYMES  Recent Labs     11/16/20 0415 11/16/20  0938   TROPONINI <0.01 <0.01     CULTURES  Blood: NGTD  Body fluid: NGTD  Resp Cx: NRF  SARS-CoV-2, NAAT  Not Detected        RADIOLOGY  FL MODIFIED BARIUM SWALLOW W VIDEO   Final Result   Swallowing mechanism grossly within normal limits without evidence of   aspiration. Please see separate speech pathology report for full discussion of findings   and recommendations. XR CHEST PORTABLE   Final Result      XR CHEST PORTABLE   Final Result   Stable appropriate endotracheal tube positioning. Bilateral effusions and borderline edema, not substantially changed. XR CHEST PORTABLE   Final Result   Improving airspace opacities within the right hemithorax. Support tubes and lines as above, in grossly unchanged positioning. US THORACENTESIS   Final Result   Successful ultrasound guided left thoracentesis. XR CHEST PORTABLE   Final Result   Cardiomegaly with scattered right pulmonary infiltrates consistent pneumonia. Small right basilar effusion. Support tubes as described above. XR CHEST PORTABLE   Final Result   Persistent multifocal bilateral airspace disease, slightly increased at the   left base as compared to prior. XR CHEST PORTABLE   Final Result   Endotracheal tube 3.3 cm above the [place]. Gastric tube in the mid stomach.    Increasing CT CHEST PULMONARY EMBOLISM W CONTRAST   Final Result   1. Multifocal consolidative opacity compatible with inflammatory process or   infection. 2.  Motion artifact degrades evaluation of the pulmonary arteries. No   evidence for central embolism. 3.  Nodular appearance of some of these opacities are also noted, favored to   represent a component of the acute inflammatory process/infection versus   metastatic disease. Short-term follow-up in 3 months is recommended to   ensure resolution. 4.  Moderate-sized left pleural effusion and suspected pleural thickening. Neoplastic involvement is not excluded. 5.  Osseous metastatic disease again demonstrated. Similar appearance of   mediastinal lymphadenopathy and left internal lymphadenopathy. CT HEAD WO CONTRAST   Final Result   No acute intracranial abnormality. XR CHEST PORTABLE   Final Result   Patchy bilateral pulmonary opacities, concerning for multifocal pneumonia. Imaging features can be seen with COVID-19 pneumonia, though are nonspecific   and can occur with a variety of infectious and noninfectious processes. PneInd           ECHO 5/31/2018  Summary:  The left ventricular wall motion is normal.  Overall left ventricular ejection fraction is estimated to be 60-65%. There is marked mitral annular calcification present. Poor endocardial border visualization  There is mild mitral regurgitation. There is mild concentric left ventricular hypertrophy. The diastolic function is impaired and classified as Grade 2  (psuedonormalization).   Discharge Medications     Medication List      START taking these medications    apixaban 5 MG Tabs tablet  Commonly known as:  ELIQUIS  Take 1 tablet by mouth 2 times daily     dilTIAZem 180 MG extended release capsule  Commonly known as:  CARDIZEM CD  Take 1 capsule by mouth 2 times daily     Venelex Oint ointment  Apply topically 2 times daily        CONTINUE taking these medications    calcium carbonate 500 MG Tabs tablet  Commonly known as:  OSCAL     carvedilol 25 MG tablet  Commonly known as:  COREG     citalopram 20 MG tablet  Commonly known as:  CELEXA     ferrous sulfate 325 (65 Fe) MG EC tablet  Commonly known as:  FE TABS 325     fulvestrant 250 MG/5ML Soln IM injection  Commonly known as:  FASLODEX     furosemide 20 MG tablet  Commonly known as:  LASIX  Take 1 tablet by mouth as needed (edema)     hydrALAZINE 50 MG tablet  Commonly known as:  APRESOLINE  Take 1 tablet by mouth 3 times daily     Lantus SoloStar 100 UNIT/ML injection pen  Generic drug:  insulin glargine     ondansetron 8 MG tablet  Commonly known as:  Zofran  Take 1 tablet by mouth every 8 hours as needed for Nausea     palbociclib 100 MG capsule  Commonly known as:  IBRANCE     PriLOSEC 40 MG delayed release capsule  Generic drug:  omeprazole        STOP taking these medications    amLODIPine 5 MG tablet  Commonly known as:  NORVASC     metoclopramide 10 MG tablet  Commonly known as:  Reglan     morphine 15 MG tablet  Commonly known as:  MSIR     MS Contin 60 MG extended release tablet  Generic drug:  morphine     RANITIDINE 150 MAX STRENGTH PO           Where to Get Your Medications      Information about where to get these medications is not yet available    Ask your nurse or doctor about these medications  · apixaban 5 MG Tabs tablet  · dilTIAZem 180 MG extended release capsule  · Venelex Oint ointment       Discharged in stable condition to [facility]    Follow Up:   Follow up with PCP, f/u with oncology      More than 30 minutes spent      Adrien Huang 11/22/2020 8:54 PM

## 2020-11-17 NOTE — PROGRESS NOTES
Digoxen given at 2214, . Rechecked on patient 15 min later. Patient stated she is starting to feel better, denies shortness of breath, will continue to monitor.

## 2020-11-17 NOTE — PROGRESS NOTES
[facility]   Progress Note  Cardiology    CC: sob and chest pain    HPI: breathing better. No chest pain. Still few palps    Past Medical History   has a past medical history of AC (acromioclavicular) joint bone spurs, Atrial fibrillation ([address] Cancer ([address] Cellulitis, CHF (congestive heart failure) ([address] Depression, Diabetes mellitus ([address] and Hypertension. Past Surgical History   has a past surgical history that includes Hysterectomy; Cholecystectomy; knee surgery (Right); Hysterectomy;  section; Colonoscopy (2017); sigmoidoscopy (2019); and sigmoidoscopy (N/A, 2019). Social History   reports that she has never smoked. She has never used smokeless tobacco. She reports that she does not drink alcohol or use drugs. Family History  family history includes Cancer in her maternal aunt; Heart Disease in her maternal grandmother; Kidney Disease in her mother. Medications  Prior to Admission medications    Medication Sig Start Date End Date Taking? Authorizing Provider   omeprazole (PRILOSEC) 40 MG delayed release capsule Take 40 mg by mouth daily   Yes Historical Provider, MD   hydrALAZINE (APRESOLINE) 50 MG tablet Take 1 tablet by mouth 3 times daily 19  Yes Efrain Serra MD   furosemide (LASIX) 20 MG tablet Take 1 tablet by mouth as needed (edema) 19  Yes Efrain Serra MD   amLODIPine (NORVASC) 5 MG tablet Take 1 tablet by mouth daily 19  Yes Efrain Serra MD   ondansetron (ZOFRAN) 8 MG tablet Take 1 tablet by mouth every 8 hours as needed for Nausea 19  Yes Brian Mccall MD   morphine (MS CONTIN) 60 MG extended release tablet Take 60 mg by mouth 2 times daily. Yes Historical Provider, MD   morphine (MSIR) 15 MG tablet Take 30 mg by mouth 2 times daily.     Yes Historical Provider, MD   metoclopramide (REGLAN) 10 MG tablet Take 1 tablet by mouth 3 times daily as needed (nausea and vomiting) 18  Yes Gaye Maradiaga PA-C   calcium carbonate (OSCAL) 500 MG TABS tablet Take 500 mg by mouth daily   Yes Historical Provider, MD   ferrous sulfate 325 (65 FE) MG EC tablet Take 325 mg by mouth 3 times daily (with meals)   Yes Historical Provider, MD   palbociclib (IBRANCE) 100 MG capsule Take 100 mg by mouth daily Is off right now while she is sick   Yes Historical Provider, MD   fulvestrant (FASLODEX) 250 MG/5ML SOLN IM injection Inject 500 mg into the muscle once   Yes Historical Provider, MD   carvedilol (COREG) 25 MG tablet Take 25 mg by mouth 2 times daily (with meals)   Yes Historical Provider, MD   citalopram (CELEXA) 20 MG tablet Take 40 mg by mouth daily. Yes Historical Provider, MD   LANTUS SOLOSTAR 100 UNIT/ML injection pen Inject 10 Units as directed nightly Has not taken oit recently after loosing 50 lbs 9/23/16   Historical Provider, MD       Allergies  Sucralfate; Ampicillin;  Ampicillin-sulbactam sodium; and Sulfamethoxazole-trimethoprim    Review of Systems      Lab Results   Component Value Date    WBC 6.0 11/17/2020    HGB 10.1 (L) 11/17/2020    HCT 31.2 (L) 11/17/2020    MCV 90.1 11/17/2020     11/17/2020     Lab Results   Component Value Date    CREATININE 1.1 11/17/2020    BUN 32 (H) 11/17/2020     11/17/2020    K 3.3 (L) 11/17/2020     11/17/2020    CO2 27 11/17/2020     Lab Results   Component Value Date    INR 1.28 (H) 11/10/2020    PROTIME 14.9 (H) 11/10/2020        Physical Examination:    BP (!) 142/94   Pulse 89   Temp 98.1 °F (36.7 °C) (Oral)   Resp 16   Ht 5' 4\" (1.626 m)   Wt 217 lb 1.6 oz (98.5 kg)   SpO2 96%   BMI 37.27 kg/m²      Respiratory:  · Resp Assessment: Normal respiratory effort  · Resp Auscultation: few rales auscultation bilaterally   Cardiovascular:  · Auscultation: irregular rate and rhythm, normal S1S2, no murmur, rub or gallop  · Palpation:  Nl PMI  · JVP:  normal  · Extremities: No Edema  Abdomen:  · Soft, non-tender  · Normal bowel sounds  Extremities:  ·  No Cyanosis or

## 2020-11-17 NOTE — PLAN OF CARE
Problem: Falls - Risk of:  Goal: Will remain free from falls  Description: Will remain free from falls  Outcome: Completed  Goal: Absence of physical injury  Description: Absence of physical injury  Outcome: Completed     Problem: Airway Clearance - Ineffective  Goal: Achieve or maintain patent airway  Outcome: Completed     Problem: Gas Exchange - Impaired  Goal: Absence of hypoxia  Outcome: Completed  Goal: Promote optimal lung function  Outcome: Completed     Problem: Breathing Pattern - Ineffective  Goal: Ability to achieve and maintain a regular respiratory rate  Outcome: Completed     Problem:  Body Temperature -  Risk of, Imbalanced  Goal: Ability to maintain a body temperature within defined limits  Outcome: Completed  Goal: Will regain or maintain usual level of consciousness  Outcome: Completed  Goal: Complications related to the disease process, condition or treatment will be avoided or minimized  Outcome: Completed     Problem: Isolation Precautions - Risk of Spread of Infection  Goal: Prevent transmission of infection  Outcome: Completed     Problem: Nutrition Deficits  Goal: Optimize nutrtional status  Outcome: Completed     Problem: Risk for Fluid Volume Deficit  Goal: Maintain normal heart rhythm  Outcome: Completed  Goal: Maintain absence of muscle cramping  Outcome: Completed  Goal: Maintain normal serum potassium, sodium, calcium, phosphorus, and pH  Outcome: Completed     Problem: Loneliness or Risk for Loneliness  Goal: Demonstrate positive use of time alone when socialization is not possible  Outcome: Completed     Problem: Fatigue  Goal: Verbalize increase energy and improved vitality  Outcome: Completed     Problem: Patient Education: Go to Patient Education Activity  Goal: Patient/Family Education  Outcome: Completed     Problem: Restraint Use - Nonviolent/Non-Self-Destructive Behavior:  Goal: Absence of restraint indications  Description: Absence of restraint indications  Outcome: Completed  Goal: Absence of restraint-related injury  Description: Absence of restraint-related injury  Outcome: Completed     Problem: Skin Integrity:  Goal: Will show no infection signs and symptoms  Description: Will show no infection signs and symptoms  Outcome: Completed  Goal: Absence of new skin breakdown  Description: Absence of new skin breakdown  Outcome: Completed     Problem: OXYGENATION/RESPIRATORY FUNCTION  Goal: Patient will maintain patent airway  Outcome: Completed  Goal: Patient will achieve/maintain normal respiratory rate/effort  Description: Respiratory rate and effort will be within normal limits for the patient  Outcome: Completed     Problem: HEMODYNAMIC STATUS  Goal: Patient has stable vital signs and fluid balance  Outcome: Completed     Problem: FLUID AND ELECTROLYTE IMBALANCE  Goal: Fluid and electrolyte balance are achieved/maintained  Outcome: Completed     Problem: ACTIVITY INTOLERANCE/IMPAIRED MOBILITY  Goal: Mobility/activity is maintained at optimum level for patient  Outcome: Completed     Problem: Nutrition  Goal: Optimal nutrition therapy  Outcome: Completed  Goal: Understanding of nutritional guidelines  Outcome: Completed  Goal: Optimal nutrition therapy  Outcome: Completed  Goal: Understanding of nutritional guidelines  Outcome: Completed

## 2020-11-17 NOTE — PROGRESS NOTES
Occupational Therapy Daily Treatment Note    Unit: PCU  Date:  11/17/2020  Patient Name:    Shelbie Candelario  Admitting diagnosis:  Acute respiratory failure with hypoxia ([facility] [J96.01]  Acute respiratory failure with hypoxia ([facility] [address] [J96.01]  Admit Date:  11/7/2020  Precautions/Restrictions:  fall risk, IV, van catheter  and telemetry        Discharge Recommendations: SNF  DME needs for discharge: defer to facility       Therapy recommendations for staff:   Assist of 1 (minimal assist) with use of rolling walker (RW) and gait belt for all transfers to/from BSC/chair    AM-PAC Score: AM-PAC Inpatient Daily Activity Raw Score: 12  Home Health S4 Level: NA       Treatment Time:  9:02-9:45  Treatment number:  2    Total Treatment Time:   43 minutes    History of Present Illness: 62 y.o. female who presented to the hospital with a chief complaint of shortness of breath.  The patient has history of atrial fibrillation, breast cancer currently on Ibrance.  EMS was called for worsening shortness of breath.  When they got to the patient's residence she was hypoxic on room air had been placed on supplemental oxygen.  When she got to the emergency department she was tachycardic to the 130s in atrial fibrillation, she was placed on high flow oxygen and was eventually weaned down to 6 L with oxygen.  Imaging obtained shows a worsening left-sided pleural effusion and consolidations concerning for pneumonia.  She was started on IV antibiotic therapy cardizem and was transferred for further medical care. 11/9 admitted to ICU and intubated due to resp failure and pneumonia  11/13 extubated  11/13per wound care pressure wound L buttocks, deep tissue injury    Subjective: Patient supine in bed and agreeable to treatment. Patient reports having fear of falling.      Pain   No  Rating: NA  Location:  Pain Medicine Status: Denies need      Bed Mobility:   Supine to Sit:  Min A  Sit to Supine:  Not Tested  Rolling:           Not Tested  Scooting:        SBA    Transfer Training:   Sit to stand:   Min A initially. Progressed to [address] after multiple trials and verbal cues. Stand to sit:  Min A- CGA  Bed to Chair:  Min A and with use of RW  Bed to [facility]:   Not Tested  Standard toilet:   Not Tested    Activity Tolerance   Pt completed therapy session with SOB noted with activity  SpO2: 98% on RA at rest. Dropped to 92% with standing and ADLs  HR: 70s-80s  BP:     ADL Training:   Upper body dressing:  SBA  Upper body bathing:  SBA  Lower body dressing: Mod A  Lower body bathing:  Min A  Toileting:   Not Tested  Grooming/Hygiene:  Supervision    Therapeutic Exercise:   N/A    Patient Education:   Role of OT  Recommendations for DC  Energy conservation techniques  Safe RW use/hand placement    Positioning Needs:   Up in chair, call light and needs in reach. Alarm Set    Family Present:  No    Assessment: Patient demonstrated significant improvements with mobility and ADLs this session. Patient would benefit from SNF at DC to continue to improve independence and activity tolerance. GOALS  To be met in 3 Visits:  1). Bed to toilet/BSC: Assess at next visit as able     To be met in 5 Visits:  1). Supine to/from Sit:             Max A  2). Upper Body Bathing:         Min A  3). Lower Body Bathing: Mod A  4). Upper Body Dressing:       Min A  5). Lower Body Dressing: Mod A  6).  Pt to demonstrate UE exs x 15 reps with minimal cues      Plan: cont with [facility], OTR/L #[id number]      If patient discharges from this facility prior to next visit, this note will serve as the Discharge Summary

## 2020-11-17 NOTE — PROGRESS NOTES
Shift assessment complete- see flow sheet. Patient is A/Ox4 VSS. Morning medications given without difficulty. Currently on room air, SpO2 WNL. R Lung sounds diminished. Pt denies SOB or CP. Patient denies any further needs. Call light explained and in reach. Bed alarm on. Will continue to monitor.

## 2020-11-17 NOTE — PROGRESS NOTES
Comprehensive Nutrition Assessment    Type and Reason for Visit:  Reassess    Nutrition Recommendations/Plan:  1. Continue Current CCC-4, dental soft diet order  2. Monitor po intake, weight and nutrition related labs    Nutrition Assessment:  pt has improved from a nutritional standpoint AEB progression to CCC-4, dental soft po intake; however, pt is at risk for further compromise d/t some dysphagia from intubation and altered nutrition labs; will continue CCC-4, dental soft    Malnutrition Assessment:  Malnutrition Status: At risk for malnutrition  Context:  Acute Illness     Findings of the 6 clinical characteristics of malnutrition:  Energy Intake:  Mild decrease in energy intake  Weight Loss:  No significant weight loss(pt is down 7.6% but is also -10.2 Liters since admission)     Body Fat Loss:  No significant body fat loss     Muscle Mass Loss:  No significant muscle mass loss    Fluid Accumulation:  No significant fluid accumulation     Strength:  Not Performed    Estimated Daily Nutrient Needs:  Energy (kcal):  4786-9195 kcals/day based on 15-18 kcals/kg/CBW; Weight Used for Energy Requirements:  Current     Protein (g):  77-88 g protein/day based on 1.4-1.6 g/kg/IBW; Weight Used for Protein Requirements:  Ideal        Fluid (ml/day):  3166-3629 mls/day; Method Used for Fluid Requirements:  1 ml/kcal      Nutrition Related Findings:  pt is much improved, some dysphagia r/t intubations resultingi n need for dental soft diet; dialysis completed on 11/16; -10.2 liters of fluid; bowel sounds are active and last bm 11/7/20; labs still altered K 3.3, BUN 32, GFR 50; expected discharge today but was cancelled d/t VGT not ready d/t to increase in covid-19 patients      Wounds:  Pressure Injury, Deep Tissue Injury       Current Nutrition Therapies:    DIET CARB CONTROL;  Dental Soft    Anthropometric Measures:  · Height: 5' 4\" (162.6 cm)  · Current Body Weight: 217 lb 1.6 oz (98.5 kg)(11/17/20)   · Admission

## 2020-11-17 NOTE — PLAN OF CARE
Nutrition Problem #1: Inadequate oral intake  Intervention: Food and/or Nutrient Delivery: Continue Current Diet  Nutritional Goals: pt will consume 75% or greater of meals x 3 meals per day w/o blood glucose >180 mg/dl

## 2020-11-17 NOTE — PROGRESS NOTES
Progress Note    Admit Date:  11/7/2020    Subjective:  Ms. Dianna Storey was admitted to hospital with increasing shortness of breath. She had been admitted to [facility] about 3 weeks ago when she is diagnosed with left pleural effusion. This is tapped. She felt better the time of discharge. Patient is diagnosed with metastatic right breast cancer 4 years ago. She is on [place]. She is a past medical history of atrial fibrillation. She also has a history of diabetes, hypertension, morbid obesity and depression. At the time of her admission patient was found to have acute respiratory failure. Her initial heart rate was in the 130s. She was in A. fib. She is placed on oxygen. She is requiring up to 12 L of oxygen. This has been weaned down to 5.5 L of oxygen at present. Work-up in the ER included a chest x-ray that showed recurrent left pleural effusion. Review of the cytology from [facility] showed no malignant cells. This was of course on just one sample. When I saw the patient she was still somewhat short of breath. She is complaining of leg edema. She denies any anosmia. No diarrhea. 11/9. Persistent shortness of breath . thoracentesis was ordered for pleural effusion (not done yet as patient is on droplet plus precautions)  - COVID-19 results are still pendin. - she continues to feel dyspneic .     she is complaining of chest pain she has become tachycardic heart rate is up to 140->  sinus tachycardia  EKG - sinus tachycardia , lateral ST changes. 11/10  Rapid response called yesterday when she became tachycardic with a heart rate of 140. Complained of chest pain. Also became hypoxic requiring a nonrebreather. Transferred to the ICU and intubated and started on mechanical ventilation. 11/11  Continues to be on the vent. Low-grade temps. Underwent SBT.   Was very anxious during the trial.  Underwent thoracentesis yesterday      11/12  On spontaneous breathing trial this morning. She has been placed on Precedex. 11/13  On spontaneous breathing trial again this morning. She is on Precedex and will need low-dose of propofol this morning. She is easily arousable. 11/14  Extubated yesterday  Now on 2 L of O2    11/15- still in atrial fibrillation but HR is better. Doing better. On room air now. No chest pain. 11/16- doing better. Still in atrial fibrillation. HR has been high. MBS this am.    11/17- doing better. HR is better. Objective:   BP (!) 146/89   Pulse 101   Temp 97 °F (36.1 °C) (Oral)   Resp 16   Ht 5' 4\" (1.626 m)   Wt 217 lb 1.6 oz (98.5 kg)   SpO2 94%   BMI 37.27 kg/m²          Intake/Output Summary (Last 24 hours) at 11/17/2020 1542  Last data filed at 11/17/2020 1334  Gross per 24 hour   Intake 1200 ml   Output 1575 ml   Net -375 ml       Physical Exam:    General appearance: alert,oriented   Head: Normocephalic, without obvious abnormality, atraumatic  Eyes: conjunctivae/corneas clear. PERRL, EOM's intact. Neck: no adenopathy, no carotid bruit, no JVD, supple, symmetrical, trachea midline and thyroid not enlarged, symmetric, no tenderness/mass/nodules  Lungs: Diminished breath sounds on the left, increasing bibasilar crackles. Heart: normal rate and irregularly irregular rhythm.   Abdomen: soft, non-tender; bowel sounds normal; no masses,  no organomegaly  Extremities: extremities normal, atraumatic, no cyanosis, trace edema  Pulses: 2+ and symmetric  Skin: Skin color, texture, turgor normal. No rashes or lesions  Neurologic: alert,oriented       Scheduled Meds:   dilTIAZem  180 mg Oral BID    potassium bicarb-citric acid  20 mEq Oral Once    ipratropium-albuterol  1 ampule Inhalation TID    Venelex   Topical BID    pantoprazole  40 mg Intravenous Daily    insulin lispro  0-12 Units Subcutaneous Q4H    carvedilol  25 mg Oral BID WC    citalopram  40 mg Oral Daily    sodium chloride flush  10 mL Intravenous 2 times per day    enoxaparin  40 mg Subcutaneous Daily       Continuous Infusions:   dextrose         PRN Meds:  ipratropium-albuterol, acetaminophen, sodium chloride flush, acetaminophen **OR** acetaminophen, polyethylene glycol, promethazine **OR** ondansetron, glucose, dextrose, glucagon (rDNA), dextrose      Data:  CBC:   Recent Labs     11/15/20  0415 11/16/20  0415 11/17/20  0434   WBC 6.8 5.6 6.0   HGB 9.0* 9.5* 10.1*   HCT 28.1* 29.4* 31.2*   MCV 91.9 92.1 90.1    235 253     BMP:   Recent Labs     11/15/20  0415 11/16/20  0415 11/17/20  0434    141 140   K 3.1* 2.9* 3.3*   CL 93* 97* 101   CO2 35* 31 27   PHOS 4.2 3.2 3.0   BUN 48* 46* 32*   CREATININE 1.5* 1.2 1.1     LIVER PROFILE:   No results for input(s): AST, ALT, LIPASE, BILIDIR, BILITOT, ALKPHOS in the last 72 hours. Invalid input(s): AMYLASE,  ALB  PT/INR:   No results for input(s): PROTIME, INR in the last 72 hours. Cultures:   Results for Anthony Mccullough (MRN [id number]) as of 11/8/2020 16:17   Ref. Range 11/8/2020 03:11   SARS-CoV-2, NAAT Latest Ref Range: Not Detected  Not Detected       Radiology  FL MODIFIED BARIUM SWALLOW W VIDEO   Final Result   Swallowing mechanism grossly within normal limits without evidence of   aspiration. Please see separate speech pathology report for full discussion of findings   and recommendations. XR CHEST PORTABLE   Final Result      XR CHEST PORTABLE   Final Result   Stable appropriate endotracheal tube positioning. Bilateral effusions and borderline edema, not substantially changed. XR CHEST PORTABLE   Final Result   Improving airspace opacities within the right hemithorax. Support tubes and lines as above, in grossly unchanged positioning. US THORACENTESIS   Final Result   Successful ultrasound guided left thoracentesis. XR CHEST PORTABLE   Final Result   Cardiomegaly with scattered right pulmonary infiltrates consistent pneumonia. Anxiety    DM2 (diabetes mellitus, type 2) (HCC)    GERD (gastroesophageal reflux disease)    Morbid obesity due to excess calories (HCC)    Pneumonia due to organism    Paroxysmal atrial fibrillation (HCC)    Pleural effusion    Atrial fibrillation with RVR ([address]    HCAP (healthcare-associated pneumonia)  Resolved Problems:    * No resolved hospital problems. *      Plan:    #Acute respiratory failure with hypoxia. Resolved. - This is likely secondary to pneumonia and pleural effusion.    -Pulmonary consultation was obtained. - O2 12L->. 5.5 L  -Remains on droplet plus precautions. Awaiting thoracentesis  -Worsening respiratory distress now, patient on 100% nonrebreather, severely hypoxic. Transferred to ICU--> subsequently intubated ( 11/9 PM )  -  COVID-19  Negative  Did not do well on a spontaneous breathing trial   SBT again today with Precedex. Extubated 11/13  Currently on room air. #Healthcare associated pneumonia from MRSA or gram-negative organism. With recent hospitalization metastatic breast cancer she is at risk for MRSA and for gram-negative pneumonia. -Received 7 days of IV vancomycin. finished Levaquin   Cultures are negative so far    #Left pleural effusion. She had this at [facility] 3 weeks ago. Thoracentesis was done. Cytology that did not show any malignant cells. Repeat thoracentesis  Ordered- 700 ml removed  11/10. The possibility exists that this could be malignant pleural effusion. Cytology however was negative. #Diabetes mellitus type 2. Monitor sugars closely. #Atrial fibrillation with RVR. On IV Cardizem. Rates high. Got IV Digoxin and PO Cardizem. MIGUELANGEL. Improved. Hold diuretics given mild worsening   started IVF. Creatinine some better. Hypokalemia. Replace K. #Morbid Obesity  - Body mass index is 37.27 kg/m². - Complicating assessment and treatment.  Placing patient at risk for multiple co-morbidities as well as early death and contributing to the patient's presentation.   - Counseled on weight loss. #Metastatic breast cancer. On Ibrance. #Nodular lung opacity in the lung may be metastatic breast disease. #Lovenox for DVT prophylaxis. Discharge planning to SNF.        Khadar Kumar MD 11/17/2020 3:42 PM

## 2020-11-17 NOTE — PROGRESS NOTES
Pt complaining of being short of breath. SpO2 91-93% on room air. 's. Pt placed on 2 liters oxygen and SpO2 increased to 97%. HR back down to 1004-120's. Cardizem gtt infusing. Nurse aware.  Nakul Galan RN

## 2020-11-17 NOTE — PROGRESS NOTES
Handoff report given to GIOVANNI Lane. Care transferred.      Electronically signed by Geovany Kovacs RN on 11/17/2020 at 7:43 AM

## 2020-11-18 NOTE — PROGRESS NOTES
[address]   Progress Note  Cardiology      HPI: Seeing Ms. Joan Newman today for paroxysmal afib with RVR and unspecified chest pain. She feels better today and denies any specific complaints. Tele shows NSR 70's bpm now. Physical Examination:    Vitals:    11/18/20 0701   BP:    Pulse:    Resp:    Temp:    SpO2: 95%          Constitutional and General Appearance: NAD   Respiratory:  · Normal excursion and expansion without use of accessory muscles  · Resp Auscultation: Soft breath sounds  Cardiovascular:  · The apical impulses not displaced  · Heart tones are crisp and normal  · Cervical veins are not engorged  · The carotid upstroke is normal in amplitude and contour without delay or bruit  · Normal S1S2, No S3, No Murmur  · Peripheral pulses are symmetrical and full  · There is no clubbing, cyanosis of the extremities.   · No edema  · Pedal Pulses: 2+ and equal   Abdomen:  · No masses or tenderness  · Liver/Spleen: No Abnormalities Noted  Neurological/Psychiatric:  · Alert and oriented in all spheres  · Moves all extremities well  · No abnormalities of mood, affect, memory, mentation, or behavior are noted  Skin: warm and dry      Lab Results   Component Value Date    WBC 5.2 11/18/2020    HGB 9.6 (L) 11/18/2020    HCT 29.4 (L) 11/18/2020    MCV 89.7 11/18/2020     11/18/2020     Lab Results   Component Value Date    CREATININE 1.0 11/18/2020    BUN 28 (H) 11/18/2020     11/18/2020    K 3.2 (L) 11/18/2020     11/18/2020    CO2 27 11/18/2020     Lab Results   Component Value Date    INR 1.28 (H) 11/10/2020    PROTIME 14.9 (H) 11/10/2020        EKG 11/07/20  Supraventricular tachycardiaAbnormal ECGNo previous ECGs availableConfirmed by ARMIDA PINEDA MD ([id number]) on 11/7/2020 4:44:34 PM      EKG 11/07/20  Atrial fibrillation with rapid ventricular responseST & T wave abnormality, consider inferolateral ischemia or digitalis effectAbnormal ECGNo previous ECGs availableConfirmed by PAF diagnosed few years ago (not on [facility] per Dr. Cathy Salgado due to breast cancer with mets). Most recent Lexiscan stress test 01/08/15 EF 63%; no stress induced ischemia. Most recent ECHO 5/30/18 EF 60-65%. marked MAC; mild MR mild concentricLVH  Grade 2 DD. On 11/07/20 experienced SOB and upon  EMS arrival was hypoxic. In the ED EKG revealed AFIB with rates in 130's given Diltiazem. Note CXR revealed worsening left pleural effusion concerning for pneumonia. On 11/08/20 she underwent thoracentesis and admitted to . On 11/9/20 RR called HR up to 140's c/o chest pain. Transferred to ICU and emergently intubated. On 11/13/20 successfully extubated. Transferred to PCU 11/15/20 and was SR. Around 3am this morning she c/o CP. Reports left-sided pain \"pressure\" with no radiation and associated with SOB. She denied palpitations to me. Noted AFIB on monitor heart rates 140's and diltiazem drip restarted at 5mg/hr. Note K+ this AM 2.9. Rates this AM still 120-140's Diltiazem drip increased to 10 mg per hr. Note EKG 11/16/20 afib with RVR 136bpm; lateral ST changes c/w possible ischemia. I personally reviewed multiple EKG's showing NSR, artifact, SVT, and afib with RVR. Note BNP=10,436; Haris negative x 5; K+ 2.7-3.1; BUN/Cr=46/1.2 (51/1.7 earlier); H/H=9.5/29. 4. Diagnosis of paroxysmal afib with RVR and unspecified chest pain in older female with metastatic breast cancer, PNA, and recurrent left pleural effusion.      Recs:  1. Continue coreg 25mg po BID and cardizem CD 180mg BID. Note dilt gtt off and converted to NSR now. 2. She was loaded with IV digoxin 0.25mg q 6 hours x 4 doses. NO need to continue on d/c.   3. Needs AC for PAF given CHADs-vasc=3. Start eliquis 5mg po BID. Dr. Jefferson Jameson is doing script for SNF. 4. ECHO originally ordered but no CHF symptoms and her 2018 ECHO showed normal EF without valvular disease. Repeat study will not  so we cancelled study.  I d/w IM doc and he agrees. 5. Note thoracentesis done at [facility] 3 weeks ago and cytology negative. Repeat thoracentesis on 11/10/20 with 700mL removed and cytology again negative. OK for d/c from cardiac standpoint today to SNF. Will make f/u appt in [place] office since she is going to [facility]. Signing off. Thanks.      Patient Active Problem List   Diagnosis    Cellulitis and abscess of leg    Chest pain    Acute lateral meniscus tear of left knee    Left knee pain    SOB (shortness of breath)    Primary cancer of right breast with metastasis to other site ([address]    Anxiety    DM2 (diabetes mellitus, type 2) ([address]    GERD (gastroesophageal reflux disease)    Hypertension, uncontrolled    Morbid obesity due to excess calories (HCC)    Acute renal injury ([address]    Chemotherapy-induced neutropenia ([address]    Diarrhea    Acute respiratory failure with hypoxia (HCC)    Pneumonia due to organism    Paroxysmal atrial fibrillation (HCC)    Pleural effusion    Atrial fibrillation with RVR ([address]    HCAP (healthcare-associated pneumonia)

## 2020-11-18 NOTE — PROGRESS NOTES
RESPIRATORY THERAPY ASSESSMENT    Name:  Farideh Weinberg  Medical Record Number:  [id number]  Age: 56 y.o. Gender: female  : 1960  Today's Date:  2020  Room:  /0302-01    Assessment     Is the patient being admitted for a COPD or Asthma exacerbation? No   (If yes the patient will be seen every 4 hours for the first 24 hours and then reassessed)    Patient Admission Diagnosis      Allergies  Allergies   Allergen Reactions    Sucralfate Hives and Nausea Only    Ampicillin Rash    Ampicillin-Sulbactam Sodium Rash    Sulfamethoxazole-Trimethoprim Rash       Minimum Predicted Vital Capacity:     na          Actual Vital Capacity:      na              Pulmonary History:CHF/Pulmonary Edema  Home Oxygen Therapy:  room air  Home Respiratory Therapy: None   Current Respiratory Therapy:  duoneb tid  Treatment Type: HHN  Medications: Albuterol/Ipratropium    Respiratory Severity Index(RSI)   Patients with orders for inhalation medications, oxygen, or any therapeutic treatment modality will be placed on Respiratory Protocol. They will be assessed with the first treatment and at least every 72 hours thereafter. The following severity scale will be used to determine frequency of treatment intervention.     Smoking History: No Smoking History = 0    Social History  Social History     Tobacco Use    Smoking status: Never Smoker    Smokeless tobacco: Never Used   Substance Use Topics    Alcohol use: No    Drug use: No       Recent Surgical History: None = 0  Past Surgical History  Past Surgical History:   Procedure Laterality Date     SECTION      CHOLECYSTECTOMY      COLONOSCOPY  2017    polyps    HYSTERECTOMY      HYSTERECTOMY      KNEE SURGERY Right     SIGMOIDOSCOPY  2019    SIGMOIDOSCOPY N/A 2019    SIGMOIDOSCOPY BIOPSY FLEXIBLE performed by Cande Farley DO at [address] [facility]       Level of Consciousness: Alert, Oriented, and Cooperative = 0    Level of Activity: Walking with assistance = 1    Respiratory Pattern: Regular Pattern; RR 8-20 = 0    Breath Sounds: Diminshed bilaterally and/or crackles = 2    Sputum  Sputum Color: Yellow, Tenacity: Thick, Sputum How Obtained: Cough on request  Cough: Strong, spontaneous, non-productive = 0    Vital Signs   BP (!) 161/89   Pulse 80   Temp 97.4 °F (36.3 °C) (Oral)   Resp 16   Ht 5' 4\" (1.626 m)   Wt 217 lb 6.4 oz (98.6 kg)   SpO2 94%   BMI 37.32 kg/m²   SPO2 (COPD values may differ): Greater than or equal to 92% on room air = 0    Peak Flow (asthma only): not applicable = 0    RSI: 0-4 = See once and convert to home regimen or discontinue        Plan       Goals: None  Patient/caregiver was educated on the proper method of use for Respiratory Care Devices:  Yes      Level of patient/caregiver understanding able to:   ? Verbalize understanding   ? Demonstrate understanding       ? Teach back        ? Needs reinforcement       ? No available caregiver               ? Other:     Response to education:  Good     Is patient being placed on Home Treatment Regimen? NA     Does the patient have everything they need prior to discharge? NA     Comments: pt assessed. Chart reviewed. Plan of Care: prn    Electronically signed by Rosangela Cortes RCP on 11/18/2020 at 6:58 AM    Respiratory Protocol Guidelines     1. Assessment and treatment by Respiratory Therapy will be initiated for medication and therapeutic interventions upon initiation of aerosolized medication. 2. Physician will be contacted for respiratory rate (RR) greater than 35 breaths per minute. Therapy will be held for heart rate (HR) greater than 140 beats per minute, pending direction from physician. 3. Bronchodilators will be administered via Metered Dose Inhaler (MDI) with spacer when the following criteria are met:  a.  Alert and cooperative     b. HR < 140 bpm  c. RR < 30 bpm                d. Can demonstrate a 2-3 second inspiratory hold  4. Bronchodilators will be administered via Hand Held Nebulizer [facility] to patients when ANY of the following criteria are met  a. Incognizant or uncooperative          b. Patients treated with HHN at Home        c. Unable to demonstrate proper use of MDI with spacer     d. RR > 30 bpm   5. Bronchodilators will be delivered via Metered Dose Inhaler (MDI), HHN, Aerogen to intubated patients on mechanical ventilation. 6. Inhalation medication orders will be delivered and/or substituted as outlined below. Aerosolized Medications Ordering and Administration Guidelines:    1. All Medications will be ordered by a physician, and their frequency and/or modality will be adjusted as defined by the patients Respiratory Severity Index (RSI) score. 2. If the patient does not have documented COPD, consider discontinuing anticholinergics when RSI is less than 9.  3. If the bronchospasm worsens (increased RSI), then the bronchodilator frequency can be increased to a maximum of every 4 hours. If greater than every 4 hours is required, the physician will be contacted. 4. If the bronchospasm improves, the frequency of the bronchodilator can be decreased, based on the patient's RSI, but not less than home treatment regimen frequency. 5. Bronchodilator(s) will be discontinued if patient has a RSI less than 9 and has received no scheduled or as needed treatment for 72  Hrs. Patients Ordered on a Mucolytic Agent:    1. Must always be administered with a bronchodilator. 2. Discontinue if patient experiences worsened bronchospasm, or secretions have lessened to the point that the patient is able to clear them with a cough. Anti-inflammatory and Combination Medications:    1. If the patient lacks prior history of lung disease, is not using inhaled anti-inflammatory medication at home, and lacks wheezing by examination or by history for at least 24 hours, contact physician for possible discontinuation.

## 2020-11-18 NOTE — PROGRESS NOTES
Patient resting in bed, AM assessment completed. Lungs decreased. BS+. INT intact. Patient on RA. No acute distress noted. Patient requests bath this morning. Call light in reach. Will continue to monitor.

## 2020-11-18 NOTE — PROGRESS NOTES
Patient in bed, eyes closed, no distress noted, call light in reach, will continue to monitor, bed alarm on.

## 2020-11-18 NOTE — PROGRESS NOTES
Pulmonary Progress Note    CC: Shortness of breath    S:   Feels good after getting bath    Invasive Lines: Peripheral    MV: 2020 - 20  Vent Mode: AC/VC Rate Set: 12 bmp/Vt Ordered: 400 mL/ /FiO2 : 30 %  No results for input(s): PHART, ALH1DKN, PO2ART in the last 72 hours. IV:   dextrose         Vitals:  Blood pressure (!) 166/65, pulse 98, temperature 97.9 °F (36.6 °C), temperature source Oral, resp. rate 16, height 5' 4\" (1.626 m), weight 217 lb 6.4 oz (98.6 kg), SpO2 95 %, not currently breastfeeding. on 2 L  Temp  Av.6 °F (36.4 °C)  Min: 97 °F (36.1 °C)  Max: 98.1 °F (36.7 °C)    Intake/Output Summary (Last 24 hours) at 2020 0908  Last data filed at 2020 0854  Gross per 24 hour   Intake 700 ml   Output 975 ml   Net -275 ml     EXAM:  Constitutional:  No acute distress. HENT:  Oropharynx is clear and moist.   Neck: No tracheal deviation present. Cardiovascular: Normal heart sounds. No lower extremity edema. Pulmonary/Chest: No wheezes. No rhonchi. No rales. No decreased breath sounds. No accessory muscle usage or stridor. Musculoskeletal: No cyanosis. No clubbing. Skin: Skin is warm and dry. Psychiatric: Normal mood and affect.   Neurologic: speech fluent, alert and oriented, strength symmetric        Scheduled Meds:   dilTIAZem  180 mg Oral BID    Venelex   Topical BID    pantoprazole  40 mg Intravenous Daily    insulin lispro  0-12 Units Subcutaneous Q4H    carvedilol  25 mg Oral BID WC    citalopram  40 mg Oral Daily    sodium chloride flush  10 mL Intravenous 2 times per day    enoxaparin  40 mg Subcutaneous Daily     PRN Meds:  ipratropium-albuterol, ipratropium-albuterol, acetaminophen, sodium chloride flush, acetaminophen **OR** acetaminophen, polyethylene glycol, promethazine **OR** ondansetron, glucose, dextrose, glucagon (rDNA), dextrose    Results:  CBC:   Recent Labs     20  0415 20  0434 20  0450   WBC 5.6 6.0 5.2   HGB 9.5* 10.1* 9.6*   HCT 29.4* 31.2* 29.4*   MCV 92.1 90.1 89.7    253 234     BMP:   Recent Labs     11/16/20  0415 11/17/20  0434 11/18/20  0450    140 142   K 2.9* 3.3* 3.2*   CL 97* 101 103   CO2 31 27 27   PHOS 3.2 3.0 3.7   BUN 46* 32* 28*   CREATININE 1.2 1.1 1.0     LIVER PROFILE:   No results for input(s): AST, ALT, LIPASE, BILIDIR, BILITOT, ALKPHOS in the last 72 hours. Invalid input(s): AMYLASE,  ALB    Cultures:  11/8/2020 SARS-CoV-2 PCR negative  11/8/2020 SARS-CoV-2 NAAT negative  11/7/2020 blood no growth  11/9/2020 tracheal aspirate NRF  11/10/2020 pleural fluid no growth  11/16/2020 SARS-CoV-2 negative    Films:  Chest CT 11/7/20:   Mediastinum: Enlarged subcarinal and left hilar lymph nodes are noted measuring 2.2 and 1.8 cm in short axis respectively. Park Drain confluent soft tissue along the inferior left internal mammary chain.    Lungs/pleura: Multifocal patchy consolidative opacities are present.  Some of these opacities have a nodular appearance in the left upper lobe and medial right lower lobe as well.  Moderate-sized left pleural effusion with suspected pleural thickening posteriorly.  Trace right pleural fluid. [facility] 10/[facility]20:   No evidence of pulmonary embolism. Multiple left lung nodules and pleural nodularity compatible with metastatic disease. Left hilar lymphadenopathy, reactive or metastatic. Moderate left pleural effusion    CXR 11/13/2020 multifocal infiltrates    ASSESSMENT:  · Acute respiratory failure with hypoxemia  · Acute kidney failure  · HCAP  · Nodular opacities in lung -pneumonia v metastatic disease  · L pleural effusion - recurrent, thoracentesis 11/10/2020 700 ml, exudative, cytology is negative. I d/w pathologist and does not appear to be related to metastatic breast cancer.     · AFIB with RVR    PLAN:  · Supplemental oxygen to maintain SaO2 >92%; wean as tolerated    · Cardiology is seeing for atrial fibrillation  · Completed 8 days Levaquin, 6 days vancomycin   · Okay with me for d/c planning.   D/W Dr. Chris Manley   · F/U is with medical oncology

## 2020-11-18 NOTE — PROGRESS NOTES
4 Eyes Skin Assessment     The patient is being assess for   Shift Handoff    I agree that 2 RN's have performed a thorough Head to Toe Skin Assessment on the patient. ALL assessment sites listed below have been assessed. Areas assessed by both nurses:   [x]   Head, Face, and Ears   [x]   Shoulders, Back, and Chest, Abdomen  [x]   Arms, Elbows, and Hands   [x]   Coccyx, Sacrum, and Ischium  [x]   Legs, Feet, and Heels          Left buttock DTI with areas that are open and bleeding appear to be Stage 2, redness and moisture to left groin, right perineal redness that is open. Placed mepilex to buttock and zinc cream to moisture in groins and periarea.     **SHARE this note so that the co-signing nurse is able to place an eSignature**    Co-signer eSignature: {Esignature:[id number]}    Does the Patient have Skin Breakdown?   No          Rigoberto Prevention initiated:  Yes   Wound Care Orders initiated:  Yes      [address]  nurse consulted for Pressure Injury (Stage 3,4, Unstageable, DTI, NWPT, Complex wounds)and New or Established Ostomies: Yes      Primary Nurse eSignature: Electronically signed by Jeff Guillermo RN on 11/18/20 at 6:20 AM EST

## 2020-11-18 NOTE — CONSULTS
[facility] Continence Nurse  Follow-up Progress Note       NAME:  Brandy Kc  MEDICAL RECORD NUMBER:  [id number]  AGE:  56 y.o. GENDER:  female  :  1960  TODAY'S DATE:  2020    Subjective: Pt states her bottom feels much better   Wound Identification:  Wound Type: pressure  Contributing Factors: chronic pressure, decreased mobility and shear force        Patient Goal of Care:  [x] Wound Healing  [] Odor Control  [] Palliative Care  [] Pain Control   [] Other:     Objective:    BP (!) 166/65   Pulse 98   Temp 97.9 °F (36.6 °C) (Oral)   Resp 16   Ht 5' 4\" (1.626 m)   Wt 217 lb 6.4 oz (98.6 kg)   SpO2 95%   BMI 37.32 kg/m²   Rigoberto Risk Score: Rigoberto Scale Score: 18  Assessment:   Measurements:  Wound 19 Abdomen (Active)   Number of days: 479       Wound 19 Abdomen (Active)   Number of days: 479       Wound 20 Buttocks Left (Active)   Wound Image   20 1334   Wound Etiology Deep tissue/Injury 11/15/20 2051   Dressing Status Intact 20   Wound Cleansed Cleansed with saline 11/15/20 2051   Dressing/Treatment Foam 20   Wound Assessment Pink/red;Purple/maroon 20   Drainage Amount None 20   Number of days: 5     sacrum:  7.5x4.3x0.1cm, 25% purple, 25% open pink, moist, 50% red, nonblanchable tissue to left buttock. Response to treatment:  Well tolerated by patient. Pain Assessment:  Severity:  0 / 10  Quality of pain: N/A  Wound Pain Timing/Severity: none  Premedicated: N/A  Plan: Continue Venelex ointment three times daily and prn OR Mepilex sacral dressing. Not to be used together.    Discharge to [facility] today   Plan of Care: Wound 20 Buttocks Left-Dressing/Treatment: Foam    Specialty Bed Required : No   [] Low Air Loss   [] Pressure Redistribution  [] Fluid Immersion  [] Bariatric  [] Total Pressure Relief  [x] Other:  Pt states she is repositioning herself frequently    Current Diet: DIET CARB CONTROL;  Dental Soft  Dietician consult:  Yes    Discharge Plan:  Placement for patient upon discharge: skilled nursing   Patient appropriate for Outpatient [address]: Yes    Referrals:  [x]   [] [facility]  [] Supplies  [] Other    Patient/Caregiver Teaching:  Level of patient/caregiver understanding able to:   [] Indicates understanding       [] Needs reinforcement  [] Unsuccessful      [x] Verbal Understanding  [] Demonstrated understanding       [] No evidence of learning  [] Refused teaching         [] N/A       Electronically signed by Naa Giron RN, CWOCN on 11/18/2020 at 11:10 AM

## 2020-11-18 NOTE — CARE COORDINATION
DISCHARGE ORDER  Date/Time 2020 9:00 AM  Completed by: Manuela Turner, Case Management    Patient Name: Perla Cunningham      : 1960  Admitting Diagnosis: Acute respiratory failure with hypoxia ([facility] [address] [J96.01]  Acute respiratory failure with hypoxia ([address] [J96.01]      Admit order Date and Status:2020 1346 inpt  Noted discharge order. (verify MD's last order for status of admission/Traditional Medicare [place] Inpatient qualifying stay required for SNF)     Confirmed discharge plan with:              Patient:  Yes              When pt confirms DC plan does any support person need to be contacted by CM No if yes who____n/a__                      Discharge to Facility: [facility]   · Facility phone number for staff giving report: Name: [facility]  · Address: [address]  · Phone: [phone]  · Fax: [phone] completed: not needed   Hospital Exemption Notification (HENS) completed: yes   Discharge orders and Continuity of Care faxed to facility:  ANDREW/AVS/HENS /covid neg result     Transportation:               Medical Transport explained with choice list offered to pt/family.                Choice:Yes(no preference)  Agency used: [facility]   time:   1430    Pt/family/Nursing/Facility aware of  time: yes  Yes Names: pt, Jaydon Freeman, RN, Carrington Ricketts with [facility]  Ambulance form completed:  yes:      Comments: Rapid covid neg as of 2020     Pt is being d/c'd to T today. Pt's O2 sats are 95% on RA. Discharge timeout done with Jaydon Freeman, RN. All discharge needs and concerns addressed.     Pt is alert and oriented. Pt states that she will call her family.      Discharging nurse to complete ANDREW, reconcile AVS, and place final copy with patient's discharge packet.  Discharging RN to ensure that written prescriptions for  Level II medications are sent with patient to the facility as per protocol

## 2020-11-18 NOTE — PROGRESS NOTES
PM assessment completed. Scheduled medications given per [facility], Rhythm appears to be NSR HR 90, Cardizem gtt stopped. VSS room air, A/O x4 denies any needs at this time. Call light in reach, will monitor, bed alarm on.

## 2020-11-18 NOTE — PROGRESS NOTES
Patient's IV and monitor removed at this time. One time dose of 40 MEQ K+ given prior to leaving. Mercedes cath removed. Patient taken out per transport in stable condition per stretcher.

## 2020-11-18 NOTE — PROGRESS NOTES
Report given to [facility], RN. Care transferred.

## 2020-11-18 NOTE — PROGRESS NOTES
Handoff report given to Sierra García RN. Care transferred.      Electronically signed by Romana Osuna RN on 11/18/2020 at 3:17 AM

## 2020-12-10 PROBLEM — I46.9 CARDIAC ARREST (HCC): Status: ACTIVE | Noted: 2020-01-01

## 2020-12-10 NOTE — ED NOTES
Report given to [facility] Pt A&Ox4.  Pt 93% 3L, , Chronic A-fib     Linda Espinoza RN  12/10/20 2533

## 2020-12-10 NOTE — PROGRESS NOTES
This note also relates to the following rows which could not be included:  SpO2 - Cannot attach notes to unvalidated device data  Pulse - Cannot attach notes to unvalidated device data  Resp - Cannot attach notes to unvalidated device data       12/10/20 1429   Vent Information   Vent Type 840   Vent Mode AC/VC   Vt Ordered 450 mL   Rate Set 16 bmp   FiO2  50 %   Sensitivity 3   PEEP/CPAP 5   Humidification Source HME   Vent Patient Data   Peak Inspiratory Pressure 31 cmH2O   Mean Airway Pressure 12 cmH20   Rate Measured 21 br/min   Vt Exhaled 460 mL   Minute Volume 7.32 Liters   I:E Ratio 1:3.5   Breath Sounds   Right Upper Lobe Diminished   Right Middle Lobe Diminished   Right Lower Lobe Diminished   Left Upper Lobe Diminished   Left Lower Lobe Diminished   Additional Respiratory  Assessments   Position Semi-Mooney's   Alarm Settings   High Pressure Alarm 45 cmH2O   Low Minute Volume Alarm 2.5 L/min   Apnea (secs) 20 secs   High Respiratory Rate 40 br/min   Low Exhaled Vt  250 mL   Non-Surgical Airway Endo Tracheal Tube   Placement Date/Time: 12/10/20 1104   Timeout: Patient; Appropriate Equipment  Placed By: In ED  Inserted by: usman  Insertion attempts: 1  Airway Device: Endo Tracheal Tube  Size: 7.5   Secured at 24 cm   Measured From Lips   Secured By [employer]

## 2020-12-10 NOTE — PROGRESS NOTES
Spoke with Dr. Herberth Alcantara. Orders obtained for Insulin drip, updated with CBC results and ABG results.  Xiomara Bazzi

## 2020-12-10 NOTE — H&P
History and Physical        HISTORY OF PRESENT ILLNESS: 64-year-old female with history of metastatic breast cancer on Ibrance, hypertension, type 2 diabetes, congestive heart failure, recently discharged from [facility] after treatment for pneumonia and acute respiratory failure requiring mechanical ventilation, subsequently discharged to a rehab facility presented emergency room shortness of breath. In the emergency room as she was found to have a hemoglobin of 6.2. She was hypotensive. Central venous catheter was placed and unmatched blood was rapidly infused. She subsequently went into PEA. CPR was started. ROSC achieved after several dose of epinephrine and bicarb. She is currently in the ICU on multiple pressors. Patient is allergic to sucralfate; ampicillin; ampicillin-sulbactam sodium; and sulfamethoxazole-trimethoprim.     Past Medical History:   Diagnosis Date    AC (acromioclavicular) joint bone spurs     knees    Atrial fibrillation (HCC)     Cancer (HCC)     stage 4 breast    Cellulitis     CHF [facility]congestive heart failure) ([facility] [address]     COVID-19 12/10/2020    Depression     anxiety    Diabetes mellitus ([facility] [address]     Hypertension        Past Surgical History:   Procedure Laterality Date     SECTION      CHOLECYSTECTOMY      COLONOSCOPY  2017    polyps    HYSTERECTOMY      HYSTERECTOMY      KNEE SURGERY Right     SIGMOIDOSCOPY  2019    SIGMOIDOSCOPY N/A 2019    SIGMOIDOSCOPY BIOPSY FLEXIBLE performed by Nolvia Granger DO at [address]       Scheduled Meds:   carboxymethylcellulose PF  1 drop Both Eyes Q4H    And    artificial tears   Both Eyes Q4H    chlorhexidine  15 mL Mouth/Throat BID    [START ON 2020] cefepime  2 g Intravenous Q24H       Continuous Infusions:   norepinephrine 30 mcg/min (12/10/20 1244)    EPINEPHrine infusion 25 mcg/min (12/10/20 1441)    propofol Stopped (12/10/20 1243)    DOPamine 15 mcg/kg/min (12/10/20 1335)    vasopressin (Septic Shock) infusion 0.04 Units/min (12/10/20 1433)    sodium chloride         PRN Meds:  fentanNYL, albuterol sulfate HFA **AND** ipratropium **AND** MDI Treatment, promethazine **OR** ondansetron, midazolam, ipratropium-albuterol       reports that she has never smoked. She has never used smokeless tobacco.    Family History   Problem Relation Age of Onset    Heart Disease Maternal Grandmother     Kidney Disease Mother     Cancer Maternal Aunt        Social History     Socioeconomic History    Marital status: Single     Spouse name: Not on file    Number of children: Not on file    Years of education: Not on file    Highest education level: Not on file   Occupational History    Not on file   Social Needs    Financial resource strain: Not on file    Food insecurity     Worry: Not on file     Inability: Not on file    Transportation needs     Medical: Not on file     Non-medical: Not on file   Tobacco Use    Smoking status: Never Smoker    Smokeless tobacco: Never Used   Substance and Sexual Activity    Alcohol use: No    Drug use: No    Sexual activity: Never   Lifestyle    Physical activity     Days per week: Not on file     Minutes per session: Not on file    Stress: Not on file   Relationships    Social connections     Talks on phone: Not on file     Gets together: Not on file     Attends Protestant service: Not on file     Active member of club or organization: Not on file     Attends meetings of clubs or organizations: Not on file     Relationship status: Not on file    Intimate partner violence     Fear of current or ex partner: Not on file     Emotionally abused: Not on file     Physically abused: Not on file     Forced sexual activity: Not on file   Other Topics Concern    Not on file   Social History Narrative    Not on file     REVIEW OF SYSTEMS:   Unable to obtain.       Vitals:    12/10/20 1600   BP: (!) 110/54   Pulse: 76   Resp: 13   Temp: 99.1 °F (37.3 °C)   SpO2: 96%     Gen: Intubated  Eyes: Pupils are fixed and dilated. No sclera icterus. No conjunctival injection. ENT: ETT  Neck: Trachea midline. Normal thyroid. Resp: No accessory muscle use. No crackles. No wheezes. No rhonchi. No dullness on percussion. CV: Regular rate. Regular rhythm. No murmur or rub. No edema. GI: Non-tender. Non-distended. No masses. No organomegaly. Normal bowel sounds. No hernia. Skin: Warm and dry. No nodule on exposed extremities. No rash on exposed extremities. Lymph: No cervical LAD. No supraclavicular LAD. M/S: No cyanosis. No joint deformity. No clubbing. Neuro: Unresponsive. CBC:   Recent Labs     12/10/20  0935   WBC 12.8*   HGB 6.2*   HCT 19.8*   MCV 88.4        BMP:   Recent Labs     12/10/20  0935 12/10/20  1410   * 121*   K 4.7 4.2   CL 89* 88*   CO2 25 21   PHOS  --  6.3*   BUN 42* 42*   CREATININE 2.5* 2.7*     LIVER PROFILE:   Recent Labs     12/10/20  0935   AST 22   ALT 19   BILITOT 1.2*   ALKPHOS 115     PT/INR: No results for input(s): PROTIME, INR in the last 72 hours. APTT: No results for input(s): APTT in the last 72 hours. UA:No results for input(s): NITRITE, COLORU, PHUR, LABCAST, WBCUA, RBCUA, MUCUS, TRICHOMONAS, YEAST, BACTERIA, CLARITYU, SPECGRAV, LEUKOCYTESUR, UROBILINOGEN, BILIRUBINUR, BLOODU, GLUCOSEU, AMORPHOUS in the last 72 hours. Invalid input(s): Preeti Levin    Chest imaging was reviewed by me   Persistent multifocal bilateral airspace disease, slightly increased on the   left and slightly decreased on the right.  Overall very similar appearance   over the past 4 weeks. ASSESSMENT:    Active Problems:    Cardiac arrest [facility])  Resolved Problems:    * No resolved hospital problems. *        PLAN:  1. Cardiac arrest of unclear etiology. Currently on multiple pressors including epi, levo and vaso. Dopamine being weaned and discontinued. Currently undergoing cooling. Will attempt to avoid sedation if possible.     2.  Acute hypoxic respiratory failure. Intubated and started on mechanical ventilation. Critical care consult. Continue empiric cefepime and IV vancomycin    3. COVID-19 pneumonia. Not a candidate for remdesivir given elevated creatinine. No CCP S. Not certain if the transfusion reaction  was not the cause of current condition. Start Decadron 6 mg IV daily. 4.  Acute anemia. Discontinue Eliquis status post transfusion of 2 units of packed cells. GI consult. Protonix twice daily. Follow H&H every 6 hours. 5.  Hypertension. Home medications were held for now. 6.  Type 2 diabetes. Placed on sliding scale insulin. SCDs for DVT prophylaxis. Protonix for GI prophylaxis.     Total critical care time spent is 35 minutes      [facility] 12/10/2020 4:27 PM

## 2020-12-10 NOTE — PROGRESS NOTES
Hypothermia protocol started at 1515. At 1615 the machine reported an inadequate water temperature and recommended changing the unit out. A: Changed to a different Artic SUN machine and appears to be functioning normally.  Angelo Cameron

## 2020-12-10 NOTE — PROGRESS NOTES
The patient was biting on her ET tube and breathing over the ventilator. A: Versed administered see. NUBIA Aldrich

## 2020-12-10 NOTE — PROGRESS NOTES
4 Eyes Skin Assessment     The patient is being assess for   {Blank single:57033::\"Admission\",\"Transfer to New Unit\",\"Post-Op Surgical\",\"Cath Lab Post-Op\",\"Shift Handoff\"}    I agree that 2 RN's have performed a thorough Head to Toe Skin Assessment on the patient. ALL assessment sites listed below have been assessed. 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 She has a stage 4 on coccyx. That was packed and had a mepilex placed over it. **SHARE this note so that the co-signing nurse is able to place an eSignature**    Co-signer eSignature: {Esignature:[id number]}    Does the Patient have Skin Breakdown?   Yes [facility] WOUND CARE was Initiated documentation include the Christina-wound, Wound Assessment, Measurements, Dressing Treatment, Drainage, and Color\",          Rigoberto Prevention initiated:  Yes   Wound Care Orders initiated:  Yes      [address]  nurse consulted for Pressure Injury (Stage 3,4, Unstageable, DTI, NWPT, Complex wounds)and New or Established Ostomies:  Yes      Primary Nurse eSignature: Electronically signed by Tyler Comer RN on 12/10/20 at 5:53 PM EST

## 2020-12-10 NOTE — ED PROVIDER NOTES
[facility] ICU  eMERGENCY dEPARTMENT eNCOUnter      Pt Name: Darren Hung  MRN: [id number]  [place] 1960  Date of evaluation: 12/10/2020  Provider: Leisa Delgado MD    [address] [place] [address]       Chief Complaint   Patient presents with    Shortness of Breath     c/o SOB this am, on 2-4 L routine, - COVID 20, HX A-fib, afebrile, in LTC for rehab          HISTORY OF PRESENT ILLNESS   (Location/Symptom, Timing/Onset, Context/Setting, Quality, Duration, Modifying Factors, Severity)  Note limiting factors. Darren Hung is a 56 y.o. female with hx of history of atrial fibrillation CHF, diabetes, and hypertension who presents with shortness of breath for 1 day. Patient also reports substantial fatigue. Reports her symptoms are severe, constant, and worsening. She denies any fever. HPI    Nursing Notes were reviewed. REVIEW OFSYSTEMS    (2-9 systems for level 4, 10 or more for level 5)     Review of Systems   Unable to perform ROS: Severe respiratory distress   Constitutional: Positive for fatigue. Respiratory: Positive for shortness of breath. Except as noted above the remainder of the review of systems was reviewed and negative.        PAST MEDICAL HISTORY     Past Medical History:   Diagnosis Date    AC (acromioclavicular) joint bone spurs     knees    Atrial fibrillation (HCC)     Cancer ([address]     stage 4 breast    Cellulitis     CHF (congestive heart failure) ([address]     COVID-19 12/10/2020    Depression     anxiety    Diabetes mellitus ([address]     Hypertension          SURGICAL HISTORY       Past Surgical History:   Procedure Laterality Date     SECTION      CHOLECYSTECTOMY      COLONOSCOPY  2017    polyps    HYSTERECTOMY      HYSTERECTOMY      KNEE SURGERY Right     SIGMOIDOSCOPY  2019    SIGMOIDOSCOPY N/A 2019    SIGMOIDOSCOPY BIOPSY FLEXIBLE performed by Cornelia Yoo DO at [address] [place]       Current Discharge Medication List      CONTINUE these medications which have NOT CHANGED    Details   apixaban (ELIQUIS) 5 MG TABS tablet Take 1 tablet by mouth 2 times daily  Qty: 60 tablet, Refills: 2      dilTIAZem (CARDIZEM CD) 180 MG extended release capsule Take 1 capsule by mouth 2 times daily  Qty: 30 capsule, Refills: 3      Balsam [place]-Castor Oil (VENELEX) OINT ointment Apply topically 2 times daily  Qty: 30 g, Refills: 0      omeprazole (PRILOSEC) 40 MG delayed release capsule Take 40 mg by mouth daily      hydrALAZINE (APRESOLINE) 50 MG tablet Take 1 tablet by mouth 3 times daily  Qty: 90 tablet, Refills: 0      furosemide (LASIX) 20 MG tablet Take 1 tablet by mouth as needed (edema)  Qty: 60 tablet, Refills: 3      ondansetron (ZOFRAN) 8 MG tablet Take 1 tablet by mouth every 8 hours as needed for Nausea  Qty: 10 tablet, Refills: 0      LANTUS SOLOSTAR 100 UNIT/ML injection pen Inject 10 Units as directed nightly Has not taken oit recently after loosing 50 lbs  Refills: 5      calcium carbonate (OSCAL) 500 MG TABS tablet Take 500 mg by mouth daily      ferrous sulfate 325 (65 FE) MG EC tablet Take 325 mg by mouth 3 times daily (with meals)      palbociclib (IBRANCE) 100 MG capsule Take 100 mg by mouth daily Is off right now while she is sick      fulvestrant (FASLODEX) 250 MG/5ML SOLN IM injection Inject 500 mg into the muscle once      carvedilol (COREG) 25 MG tablet Take 25 mg by mouth 2 times daily (with meals)      citalopram (CELEXA) 20 MG tablet Take 40 mg by mouth daily. ALLERGIES     Sucralfate; Ampicillin;  Ampicillin-sulbactam sodium; and Sulfamethoxazole-trimethoprim    FAMILY HISTORY       Family History   Problem Relation Age of Onset    Heart Disease Maternal Grandmother     Kidney Disease Mother     Cancer Maternal Aunt           SOCIAL HISTORY       Social History     Socioeconomic History    Marital status: Single     Spouse name: Not on file    Number of children: Not on file    Years of education: Not on file    Highest education level: Not on file   Occupational History    Not on file   Social Needs    Financial resource strain: Not on file    Food insecurity     Worry: Not on file     Inability: Not on file    Transportation needs     Medical: Not on file     Non-medical: Not on file   Tobacco Use    Smoking status: Never Smoker    Smokeless tobacco: Never Used   Substance and Sexual Activity    Alcohol use: No    Drug use: No    Sexual activity: Never   Lifestyle    Physical activity     Days per week: Not on file     Minutes per session: Not on file    Stress: Not on file   Relationships    Social connections     Talks on phone: Not on file     Gets together: Not on file     Attends Yazidism service: Not on file     Active member of club or organization: Not on file     Attends meetings of clubs or organizations: Not on file     Relationship status: Not on file    Intimate partner violence     Fear of current or ex partner: Not on file     Emotionally abused: Not on file     Physically abused: Not on file     Forced sexual activity: Not on file   Other Topics Concern    Not on file   Social History Narrative    Not on file         PHYSICAL EXAM    (up to 7 for level 4, 8 or more for level 5)     ED Triage Vitals [12/10/20 0921]   BP Temp Temp Source Pulse Resp SpO2 Height Weight   (!) 74/41 101.8 °F (38.8 °C) Axillary 142 26 92 % 5' 4\" (1.626 m) 214 lb (97.1 kg)       Physical Exam  Vitals signs and nursing note reviewed. Constitutional:       Appearance: She is well-developed. She is not diaphoretic. HENT:      Head: Normocephalic and atraumatic. Right Ear: External ear normal.      Left Ear: External ear normal.   Eyes:      Conjunctiva/sclera: Conjunctivae normal.   Neck:      Musculoskeletal: Neck supple. Vascular: No JVD. Trachea: No tracheal deviation. Cardiovascular:      Rate and Rhythm: Tachycardia present. Pulmonary:      Breath sounds:  No wheezing. Comments: Tachypneic with moderately increased work of breathing  Abdominal:      General: There is no distension. Palpations: Abdomen is soft. Tenderness: There is no abdominal tenderness. There is no guarding or rebound. Musculoskeletal: Normal range of motion. General: No tenderness or deformity. Skin:     General: Skin is warm and dry. Coloration: Skin is pale. Neurological:      Mental Status: She is alert. GCS: GCS eye subscore is 3. GCS verbal subscore is 4. GCS motor subscore is 6. DIAGNOSTIC RESULTS     EKG:All EKG's are interpreted by the Emergency Department Physician who either signs or Co-signs this chart in the absence of a cardiologist.    The Ekg interpreted by me shows  sinus tachycardia, fukb=110   Axis is   Normal  QTc is  520ms  Intervals and Durations are unremarkable. ST Segments: Nonspecific abnormalities    The Ekg interpreted by me shows  normal sinus rhythm with a rate of 85  Axis is   Normal  QTc is  390ms  Intervals and Durations are unremarkable. ST Segments: nonspecific changes  Significant improvement in rate and QTC interval from previous EKG earlier today    RADIOLOGY:     Interpretation per the Radiologist below, if available at the time of this note:    XR CHEST PORTABLE   Final Result   The endotracheal tube has been retracted with tip now at the level the   clavicular heads 5 cm above the [place]. Persistent diffuse bilateral airspace disease consistent with atypical viral   pneumonia. XR CHEST PORTABLE   Final Result   Endotracheal tube is noted at the level of the [place]. Recommend retracting   the tube 2-3 cm more proximally. Enteric tube and right IJ CVC appear to be in satisfactory position. No significant interval change in the bilateral airspace disease.          XR CHEST PORTABLE   Final Result   Persistent multifocal bilateral airspace disease, slightly increased on the   left and slightly decreased on the right. Overall very similar appearance   over the past 4 weeks.          XR CHEST PORTABLE    (Results Pending)         ED BEDSIDE ULTRASOUND:   Performed by ED Physician - none    LABS:  Labs Reviewed   COVID-19 - Abnormal; Notable for the following components:       Result Value    SARS-CoV-2, NAAT DETECTED (*)     All other components within normal limits    Narrative:     [facility],  Chemistry results called to and read back by Marisol Smith RN, 12/10/2020  10:38, by Benjamin Blair  Performed at:  [place] [address],  [facility]   Phone [phone]   CBC WITH AUTO DIFFERENTIAL - Abnormal; Notable for the following components:    WBC 12.8 (*)     RBC 2.24 (*)     Hemoglobin 6.2 (*)     Hematocrit 19.8 (*)     RDW 20.0 (*)     Neutrophils Absolute 12.7 (*)     Lymphocytes Absolute 0.1 (*)     All other components within normal limits    Narrative:     CALL  [place]  [facility] tel. [phone],  Hematology results called to and read back by Tammie Roberts RN, 12/10/2020  10:42, by Juan Ulloa  Performed at:  [place] [address],  [facility]   Phone [phone]   COMPREHENSIVE METABOLIC PANEL W/ REFLEX TO MG FOR LOW K - Abnormal; Notable for the following components:    Sodium 126 (*)     Chloride 89 (*)     BUN 42 (*)     CREATININE 2.5 (*)     GFR Non- 20 (*)     GFR  24 (*)     Alb 2.4 (*)     Albumin/Globulin Ratio 0.6 (*)     Total Bilirubin 1.2 (*)     All other components within normal limits    Narrative:     Performed at:  [facility] - [place] [address],  [facility]   Phone [phone]   LACTIC ACID, PLASMA - Abnormal; Notable for the following components:    Lactic Acid 2.6 (*)     All other components within normal limits    Narrative:     Performed at:  [facility]  [facility],  [employer]   Phone [phone]   BRAIN NATRIURETIC PEPTIDE - Abnormal; Notable for the following components:    Pro-BNP 23,880 (*)     All other components within normal limits    Narrative:     Performed at:  [facility],  [place] [employer]   Phone [phone]   BASIC METABOLIC PANEL - Abnormal; Notable for the following components:    Sodium 121 (*)     Chloride 88 (*)     Glucose 120 (*)     BUN 42 (*)     CREATININE 2.7 (*)     GFR Non- 18 (*)     GFR  22 (*)     All other components within normal limits    Narrative:     Collection has been rescheduled by [facility] at 12/10/2020 14:07 Reason:   Nurse is getting  Performed at:  [facility],  [place]   Phone [phone]   MAGNESIUM - Abnormal; Notable for the following components:    Magnesium 1.30 (*)     All other components within normal limits    Narrative:     Collection has been rescheduled by [facility] at 12/10/2020 14:07 Reason:   Nurse is getting  Performed at:  [facility],  [email or web address]   Phone [phone]   PHOSPHORUS - Abnormal; Notable for the following components:    Phosphorus 6.3 (*)     All other components within normal limits    Narrative:     Collection has been rescheduled by [facility] at 12/10/2020 14:07 Reason:   Nurse is getting  Performed at:  [address] [facility]  [employer] [address],  [place], [place]   Phone [phone]   CALCIUM, IONIZED - Abnormal; Notable for the following components:    pH, Migue 7.310 (*)     All other components within normal limits    Narrative:     Collection has been rescheduled by [facility] at 12/10/2020 14:07 Reason:   Nurse is getting  Performed at:  [address] [facility],  [email or web address]   Phone [phone]   BLOOD GAS, VENOUS - Abnormal; Notable for the following components:    pH, Migue 7.287 (*)     pO2, Migue 115.7 (*)     HCO3, Venous 20.2 (*)     Base Excess, Migue -6.0 (*)     Carboxyhemoglobin 2.9 (*)     All other components within normal limits    Narrative:     Performed at:  [facility],  [employer] [place] [employer]   Phone [phone]   POCT GLUCOSE - Abnormal; Notable for the following components:    POC Glucose 187 (*)     All other components within normal limits    Narrative:     Performed at:  [facility],  [employer], [facility]   Phone [phone]   CULTURE, BLOOD 1   CULTURE, BLOOD 2   BASIC METABOLIC PANEL   BASIC METABOLIC PANEL   MAGNESIUM   MAGNESIUM   PHOSPHORUS   PHOSPHORUS   CALCIUM, IONIZED   CALCIUM, IONIZED   BLOOD GAS, ARTERIAL   BLOOD GAS, ARTERIAL   BLOOD GAS, ARTERIAL   LACTIC ACID, PLASMA   LACTIC ACID, PLASMA   CBC WITH AUTO DIFFERENTIAL   FIBRINOGEN   PROTIME-INR   BLOOD GAS, ARTERIAL   POCT GLUCOSE    Narrative:     Performed at:  [facility],  [employer], [place] [employer]   Phone [phone]   POCT GLUCOSE   TYPE AND SCREEN    Narrative:     Performed at:  [facility]) - [facility],  [place], [facility] [employer]   Phone [phone]   PREPARE RBC (CROSSMATCH)       All otherlabs were within normal range or not returned as of this dictation.     EMERGENCY DEPARTMENT COURSE and DIFFERENTIAL DIAGNOSIS/MDM:   Vitals:    Vitals:    12/10/20 1615 12/10/20 1630 12/10/20 1645 12/10/20 1700   BP: (!) 120/57 127/72 112/65 124/78   Pulse: 79 77 78 75   Resp: 25 22 28 28   Temp: 98.9 °F (37.2 °C) 98.5 °F (36.9 °C) 98.5 °F (36.9 °C) 98.5 °F (36.9 °C)   TempSrc:       SpO2: 97% 97% 97% 99%   Weight:       Height:             MDM  Shortly after patient's initial evaluation she suffers a cardiac arrest.  I was called on the room by nursing staff for a CODE BLUE and CPR was in progress. Patient was PEA on initial rhythm check. Initial labs did show anemia and therefore blood was transfused during the code. I intubated the patient during the code and ROSC was achieved. Epi drip was started. The patient had 2 separate episodes of loss of pulse with subsequent CPR performed according to ACLS recommendations with ROSC every time. She never had a shockable rhythm but was in PEA the entire time for all codes. Was given multiple rounds of epinephrine. I placed a central line. Cultures were obtained and broad-spectrum antibiotics and IV fluids were given. The patient did require continuous reevaluation and given the fact that she had 3 separate episodes of loss of pulses a substantial amount of critical care time was performed. Patient is admitted to the ICU for further care. CONSULTS:  IP CONSULT TO CRITICAL CARE  IP CONSULT TO HOSPITALIST  IP CONSULT TO DIETITIAN  IP CONSULT TO PHARMACY  IP CONSULT TO CARDIOLOGY  IP CONSULT TO PULMONOLOGY    PROCEDURES:  Unless otherwise noted below, none     Intubation    Date/Time: 12/10/2020 10:40 AM  Performed by: Katy Carlson MD  Authorized by: Katy Carlson MD     Pre-procedure details:     Patient status:  Unresponsive  Procedure details:     CPR in progress: yes      Intubation method:  Oral    Oral intubation technique:  Video-assisted    Laryngoscope blade:   Mac 4    Tube size (mm):  7.5    Tube type:  Cuffed    Number of attempts:  1    Cricoid pressure: no      Tube visualized through cords: yes    Placement assessment:     ETT to lip:  25    Tube secured with:  ETT brothers    Breath sounds:  Reduced on left    Placement verification: chest rise, CXR verification and direct visualization      CXR findings:  ETT in right main stem    Tube repositioned: yes    Central Line    Date/Time: 12/10/2020 11:30 AM  Performed by: Katy Carlson MD  Authorized by: Mickey Chaudhari Jimmy Helm MD     Consent:     Consent obtained:  Emergent situation  Pre-procedure details:     Hand hygiene: Hand hygiene performed prior to insertion      Sterile barrier technique: All elements of maximal sterile technique followed      Skin preparation:  2% chlorhexidine    Skin preparation agent: Skin preparation agent completely dried prior to procedure    Procedure details:     Location:  R internal jugular    Patient position:  Flat    Catheter size:  7 Fr    Landmarks identified: yes      Ultrasound guidance: yes      Sterile ultrasound techniques: Sterile gel and sterile probe covers were used      Number of attempts:  1    Successful placement: yes    Post-procedure details:     Post-procedure:  Dressing applied and line sutured    Assessment:  Blood return through all ports and no pneumothorax on x-ray    Patient tolerance of procedure:   Tolerated well, no immediate complications  Critical Care  Performed by: Brendan Dong MD  Authorized by: Brendan Dong MD     Critical care provider statement:     Critical care time (minutes):  120    Critical care time was exclusive of:  Separately billable procedures and treating other patients and teaching time    Critical care was necessary to treat or prevent imminent or life-threatening deterioration of the following conditions:  Sepsis, shock, respiratory failure, cardiac failure and circulatory failure    Critical care was time spent personally by me on the following activities:  Ordering and performing treatments and interventions, development of treatment plan with patient or surrogate, ordering and review of laboratory studies, discussions with consultants, ordering and review of radiographic studies, pulse oximetry, evaluation of patient's response to treatment, examination of patient, review of old charts, obtaining history from patient or surrogate, ventilator management and re-evaluation of patient's condition        FINAL IMPRESSION      1. Cardiac arrest ([facility]    2. Pneumonia due to COVID-19 virus    3. Acute respiratory failure with hypoxia (HCC)    4. Anemia requiring transfusions          DISPOSITION/PLAN   DISPOSITION Admitted 12/10/2020 12:35:11 PM        (Please note that portions of this note were completed with a voice recognition program.  Efforts were made to edit the dictations but occasionally words aremis-transcribed. )    Kwame Perez MD (electronically signed)  Attending Emergency Physician           Kwame Perez MD  12/10/20 3929

## 2020-12-10 NOTE — FLOWSHEET NOTE
12/10/20 1353   Vitals   Temp 100.8 °F (38.2 °C)   Pulse 90   Resp 15   BP (!) 75/50   MAP (mmHg) (!) 59   Oxygen Therapy   SpO2 95 %   Dr. Sridevi Leo at bedside. Orders to stablize blood pressure, starting vasopressin and increasing epinephrine and will consider targeted temperature management.

## 2020-12-10 NOTE — PLAN OF CARE
Admit to ICU    Cardiac Arrest  Acute Respiratory Failure - intubated  COVID Positive  MIGUELANGEL  Lactic Acidosis  Anemia - Hgb 6.2

## 2020-12-10 NOTE — CONSULTS
Pulmonary & Critical Care Medicine ICU Consultation Note  Patient is being seen at the request of Dr. Shira Decker for a consultation for Acute Respiratory Failure     HISTORY OF PRESENT ILLNESS: 55 yo female with metastatic Breast Cancer on Ibrance and fibroid recently discharged after treatment for pneumonia with levaquin and vancomycin, pleural effusion s/p thoracentesis with 700 ml out and hypoxemic respiratory failure including mechanical ventilation, subsequently discharged to [facility]. Last hospital note indicates she was on room air. She presented to the ED today with shortness of breath. While in the ED, she was noted to have Hgb of 6.2 and hypotension. Unmatched blood was rapidly infused and a CVC was placed. Patient was noted to have weak pulse and then PEA. CPR was started. ROSC after multiple doses of epinephrine and bicarbonate. It appears there were four separate episodes where pulselessness was noted, with intervening ROSC. Approximate times of CPR were 9 minutes, 3 minutes, 5 minutes and 1 minute, for total of 18 minutes. PAST MEDICAL HISTORY:  Past Medical History:   Diagnosis Date    AC (acromioclavicular) joint bone spurs     knees    Atrial fibrillation (HCC)     Cancer (HCC)     stage 4 breast    Cellulitis     CHF (congestive heart failure) (HCC)     Depression     anxiety    Diabetes mellitus ([facility] [address]     Hypertension      PAST SURGICAL HISTORY:  Past Surgical History:   Procedure Laterality Date     SECTION      CHOLECYSTECTOMY      COLONOSCOPY  2017    polyps    HYSTERECTOMY      HYSTERECTOMY      KNEE SURGERY Right     SIGMOIDOSCOPY  2019    SIGMOIDOSCOPY N/A 2019    SIGMOIDOSCOPY BIOPSY FLEXIBLE [facility]       FAMILY HISTORY:  family history includes Cancer in her maternal aunt; Heart Disease in her maternal grandmother; Kidney Disease in her mother.     SOCIAL HISTORY:   reports that she has never smoked. She has never used smokeless tobacco.    Scheduled Meds:   vancomycin  1,500 mg Intravenous Once     Continuous Infusions:   norepinephrine 30 mcg/min (12/10/20 1244)    EPINEPHrine infusion 10 mcg/min (12/10/20 1242)    propofol Stopped (12/10/20 1243)    DOPamine 15 mcg/kg/min (12/10/20 1335)     PRN Meds:      ALLERGIES:  Patient is allergic to sucralfate; ampicillin; ampicillin-sulbactam sodium; and sulfamethoxazole-trimethoprim. REVIEW OF SYSTEMS:  Unable to obtain because the patient is non-communicative     Invasive Lines: 12/10/20 RIJ CVC    MV:  12/10/20      / / /   No results for input(s): PHART, SQT9PHK, PO2ART in the last 72 hours. Blood pressure (!) 77/63, pulse 86, temperature 101.8 °F (38.8 °C), temperature source Axillary, resp. rate 15, height 5' 4\" (1.626 m), weight 214 lb (97.1 kg), SpO2 100 %, not currently breastfeeding.'      PHYSICAL EXAM:  General: intubated, ill appearing    Eyes: pupils fixed and dilated. No sclera icterus. No conjunctival injection. ENT: No discharge. Pharynx with ETT. Neck: Trachea midline. Normal thyroid. Resp: No accessory muscle use. No crackles. No wheezing. No rhonchi. No dullness on percussion. CV: Regular rate. Regular rhythm. No mumur or rub. No edema. Peripheral pulses are 2+. Capillary refill is less than 3 seconds. GI: Non-tender. Non-distended. No masses. No organomegaly. Normal bowel sounds. No hernia. Skin: Warm and dry. No nodule on exposed extremities. No rash on exposed extremities. Lymph: No cervical LAD. No supraclavicular LAD. M/S: No cyanosis. No joint deformity. No clubbing. Neuro: Not following commands.  Patellar reflexes could not be elicited   Psych: Unable to obtain because the patient is non-communicative    LABS:  CBC:   Recent Labs     12/10/20  0935   WBC 12.8*   HGB 6.2*   HCT 19.8*   MCV 88.4        BMP:   Recent Labs     12/10/20  0935   *   K 4.7   CL 89*   CO2 25   BUN 42* CREATININE 2.5*     LIVER PROFILE:   Recent Labs     12/10/20  0935   AST 22   ALT 19   BILITOT 1.2*   ALKPHOS 115     PT/INR: No results for input(s): PROTIME, INR in the last 72 hours. APTT: No results for input(s): APTT in the last 72 hours. UA:No results for input(s): NITRITE, COLORU, PHUR, LABCAST, WBCUA, RBCUA, MUCUS, TRICHOMONAS, YEAST, BACTERIA, CLARITYU, SPECGRAV, LEUKOCYTESUR, UROBILINOGEN, BILIRUBINUR, BLOODU, GLUCOSEU, AMORPHOUS in the last 72 hours. Invalid input(s): KETONESU  No results for input(s): PHART, MHV1GAX, PO2ART in the last 72 hours. IMAGING  CXR 12/10/20 ETT too low, RIJ okay, bilateral infiltrates    ASSESSMENT:  · Cardiac Arrest: unclear etiology  · Acute hypoxemic respiratory failure   · COVID-19 pneumonia   · Acute anemia s/p 2 U PRBC  · MIGUELANGEL    PLAN:  COVID-19 isolation, droplet plus  Mechanical ventilation as per my orders. The ventilator was adjusted by me at the bedside for unstable, life threatening respiratory failure. Fentanyl PRN pain/shivering. Versed PRN agitation. Attempt to avoid sedation as able  Epi, Levo, Vaso for MAP 65  TTM 32 degrees after cardiac arrest  No Remdesevir given low GFR  No CCP as we cannot be certain transfusion reaction not part of current pathology   Decadron 6 mg IV daily, D#1  Ensure adequate IV access  Gastroenterology consultation  Follow serial hemoglobin; transfuse to keep hemoglobin greater than 7  Correct coagulopathy  Check fibrinogen, replace with cryo as needed  Protonix BID   Prophylaxis: DVT - SCD; MRSA - mupirocin; GI - on protonix  · Prophylaxis: SCD, bactroban     Total critical care time caring for this patient with life threatening, unstable organ failure, including direct patient contact, management of life support systems, review of data including imaging and labs, discussions with other team members and physicians at least 50 minutes so far today, excluding procedures.

## 2020-12-10 NOTE — ED NOTES
Called was made to [facility] and gave update to Parish Candelario. Called Son Johanne Herman and gave update and was going to be on his way to the hospital. He was informed of her critical state. Isaak Ledezma was made aware that update calls have been made.       Jose R Garcia RN  12/10/20 5443

## 2020-12-10 NOTE — PROGRESS NOTES
Standard potassium/magnesium supplementation prn orders discontinued per protocol for CrCl < 30 ml/min. Orders must be placed on a dose specific basis.   Violet Herman R.Ph.12/10/15844:41 PM

## 2020-12-11 NOTE — PROGRESS NOTES
12/11/20 1549   Vent Information   Vent Type 840   Vent Mode AC/VC   Vt Ordered 340 mL   Rate Set 28 bmp   Peak Flow 60 L/min   FiO2  30 %   SpO2 99 %   SpO2/FiO2 ratio 330   Sensitivity 3   PEEP/CPAP 5   Humidification Source HME   Vent Patient Data   Peak Inspiratory Pressure 29 cmH2O   Mean Airway Pressure 12 cmH20   Rate Measured 28 br/min   Vt Exhaled 353 mL   Minute Volume 9.89 Liters   I:E Ratio 1:2.5   Cough/Sputum   Sputum How Obtained Endotracheal   Cough None   Spontaneous Breathing Trial (SBT) RT Doc   Pulse 62   Breath Sounds   Right Upper Lobe Diminished   Right Middle Lobe Diminished   Right Lower Lobe Diminished   Left Upper Lobe Diminished   Left Lower Lobe Diminished   Additional Respiratory  Assessments   Resp 28   Position Semi-Mooney's   Oral Care Mouth suctioned   Alarm Settings   Apnea (secs) 20 secs   Patient Observation   Observations ambu bs, hme in place   ETT (adult)   Placement Date/Time: 12/10/20 1400   Timeout: Patient  Preoxygenation: Yes  Mask Ventilation: Ventilated by mask (1)  Technique: Direct laryngoscopy  Type: Cuffed  Tube Size: 7.5 mm  Location: Oral  Insertion attempts: 1  Placement Verified By[de-identified] Chest...    Secured at 24 cm   Measured From [address] [place] [address],[address] By [employer]   Site Condition Dry

## 2020-12-11 NOTE — PLAN OF CARE
Nutrition Problem #1: Inadequate oral intake  Intervention: Food and/or Nutrient Delivery: Continue NPO, Start Tube Feeding  Nutritional Goals: patient will tolerate Vital High-Protein at goal rate of 45 ml/hr x 20 hours without Gi distress, without s/s of aspiration, and without additional lab/fluid disturbances

## 2020-12-11 NOTE — FLOWSHEET NOTE
12/10/20 2000   Vitals   Temp 92.6 °F (33.7 °C)   Temp Source CORE   Pulse 54   Heart Rate Source Monitor   Resp (!) 32   BP (!) 135/99   MAP (mmHg) 109   BP Location Right Arm   BP Upper/Lower Upper   BP Method Automatic   Patient Position Semi fowlers   Level of Consciousness Responds to Pain (2)   MEWS Score 5   Cardiac Rhythm NSR   [place]   CVP (Mean) 30 mmHg   Oxygen Therapy   SpO2 100 %   O2 Device Ventilator   FiO2  40 %   Pain Assessment   RASS Score +1   TTM goal reached.

## 2020-12-11 NOTE — CARE COORDINATION
Case Management Assessment  Initial Evaluation      Patient Name: Lane Wise  YOB: 1960  Diagnosis: Cardiac arrest [facility]) [I46.9]  Date / Time: 12/10/2020  9:18 AM    Admission status/Date:INPT 12/10/2020  Chart Reviewed: Yes      Patient Pretty Little: No   Family Interviewed:  Yes - son Wes Chacon  ([phone])    Hospitalization in the last 30 days:  Yes      Health Care Decision Maker :   Primary Decision Maker: Maryuri Matthews - [phone]    (CM - must 1st enter selection under Navigator - emergency contact- Health Care Decision Maker Relationship and pick relationship)   Who do you trust or have selected to make healthcare decisions for you      Met with: n/a  Interview conducted  (bedside/phone): son via telephone    Current PCP: Jeffery Jones required for SNF : Y, N          3 night stay required - Y, N    ADLS  Support Systems/Care Needs:    Transportation: EMS transportation    Meal Preparation: per [employer]    Housing  Living Arrangements: [facility]  Steps: n/a  Intent for return to present living arrangements: No  Identified Issues: -    Home Care Information  Active with [address] : No Agency:(Services)     Passport/Waiver : No  :                      Phone Number:    Passport/Waiver Services: n/a          Durable Medical Equiptment   DME Provider: per facility  Equipment: -  Walker___Cane___RTS___ BSC___Shower Chair___Hospital Bed___W/C____Other________  02 at ____Liter(s)---wears(frequency)_______ [facility] ___ CPAP___ BiPap___   N/A____      Home O2 Use :  Per facility    If No for home O2---if presently on O2 during hospitalization:  Yes  if yes CM to follow for potential DC O2 need  Informed of need for care provider to bring portable home O2 tank on day of discharge for nursing to connect prior to leaving:   Not Indicated  Verbalized agreement/Understanding:   Not Indicated    Community Service Affiliation  Dialysis:  No · Agency:  · Location:  · Dialysis Schedule:  · Phone:   · Fax: Other Community Services: (ex:PT/OT,Mental Health,Wound Clinic, Cardio/Pul [address])    DISCHARGE PLAN: Explained Case Management role/services. Reviewed chart. ISIAH spoke with Don Vergara with [facility] who states that pt can return at discharge but will need PCR completed prior to discharge and if positive, cannot accept pt for 7 days after positive test and pt will need to be fever free and stable. ISIAH spoke with pt son, Heath Armijo via telephone. Heath Armijo states that his mother had been at the [facility] but doesn't want her to return there at discharge. Heath Armijo states concerns that pt wasn't being properly cared for. Heath Armijo requests referral be called to [facility]. ISIAH called [facility] and spoke with Chani Villa who states that she will review pt information. Pt cont in ICU on vent at this time. Will cont to follow and develop discharge plan.

## 2020-12-11 NOTE — PROGRESS NOTES
Comprehensive Nutrition Assessment    Type and Reason for Visit:  Initial, Consult(consult for TF ordering and management)    Nutrition Recommendations/Plan:   1. Continue NPO status until patient is medically cleared to receive nutrition therapy. 2. TF recommendations - Vital High-Protein (\"low calorie, high-protein\" formula name in Epic) with a goal rate of 45 ml/hr x 20 hours. Start with 20 ml/hr and increase by 20 ml every 4-6 hours, as tolerated by patient, until goal rate can be achieved and maintained. No free water flushes. 3. Monitor vent status, sedation type/amount, and plan of care. 4. Monitor nutrition-related labs, bowel function, and weight trends. Nutrition Assessment:  patient is nutritionally compromised AEB PEA arrest in ED and need for intubation and she is at risk for further compromise d/t NPO status, hx of breast cancer, and stage 4 wound on sacrum; will continue NPO status and monitor for EN to be started    Malnutrition Assessment:  Malnutrition Status: At risk for malnutrition     Context:  Acute Illness     Findings of the 6 clinical characteristics of malnutrition:  Energy Intake:  Mild decrease in energy intake (Comment)  Weight Loss:  No significant weight loss     Body Fat Loss:  Unable to assess(COVID-19 +)     Muscle Mass Loss:  Unable to assess(COVID-19 +)    Fluid Accumulation:  No significant fluid accumulation     Strength:  Not Performed    Estimated Daily Nutrient Needs:  Energy (kcal):  920 - 1265 kcals based on 8-11 kcals/kg/CBW; Weight Used for Energy Requirements:  Current(using stated weight of 214#)     Protein (g):  110 - 127 g protein based on 2.0-2.3 g/kg/IBW;  Weight Used for Protein Requirements:  Ideal        Fluid (ml/day):  920 - 1265 ml; Method Used for Fluid Requirements:  1 ml/kcal      Nutrition Related Findings:  patient remains intubated and sedated on 20 mcg propofol at this time; patient suffered a PEA arrest in ED and was intubated; hx of breats cancer - she is currently taking Ibrance; + hypothermia protocol in place this am; patient responds to pain; bowel sounds are hypoactive; + anemia - patient received 2 units of PRBC; Wounds:  Pressure Injury, Stage IV(on sacrum)       Current Nutrition Therapies:    Diet NPO Effective Now    Anthropometric Measures:  · Height: 5' 4\" (162.6 cm)  · Current Body Weight: 254 lb 13.6 oz (115.6 kg)(obtained on 12/11/20)   · Admission Body Weight: 254 lb 13.6 oz (115.6 kg)(obtained on 12/11/20)    · Usual Body Weight: 217 lb 6.4 oz (98.6 kg)(actual weight; obtained on 11/7/20)     · Ideal Body Weight: 120 lbs; % Ideal Body Weight 212.4 %   · BMI: 43.7  · BMI Categories: Obese Class 3 (BMI 40.0 or greater)(43.75)       Nutrition Diagnosis:   · Inadequate oral intake related to inadequate protein-energy intake, impaired respiratory function as evidenced by NPO or clear liquid status due to medical condition, intubation      Nutrition Interventions:   Food and/or Nutrient Delivery:  Continue NPO, Start Tube Feeding  Nutrition Education/Counseling:  No recommendation at this time   Coordination of Nutrition Care:  Continue to monitor while inpatient, Interdisciplinary Rounds    Goals:  patient will tolerate Vital High-Protein at goal rate of 45 ml/hr x 20 hours without Gi distress, without s/s of aspiration, and without additional lab/fluid disturbances       Nutrition Monitoring and Evaluation:   Behavioral-Environmental Outcomes:  None Identified   Food/Nutrient Intake Outcomes:  Enteral Nutrition Intake/Tolerance  Physical Signs/Symptoms Outcomes:  Biochemical Data, Hemodynamic Status, Nutrition Focused Physical Findings, Skin, Weight     Discharge Planning:     Too soon to determine     Electronically signed by India Chawla RD, LD on 12/11/20 at 4:46 PM EST    Contact: [phone]

## 2020-12-11 NOTE — PROGRESS NOTES
Patient remains unstable on levophed, vasopressin and epinephrine. With improvement, goal will be to obtain STAT HCT, but only when stable enough to transport.

## 2020-12-11 NOTE — PROGRESS NOTES
Patient transported to and from CT with RN X2 and RT at bedside. Tolerated procedure well. Will monitor.

## 2020-12-11 NOTE — PROGRESS NOTES
Currently remains intubated and sedated post cardiac arrest/PEA last evening, maintaining body temp for hypothermia protocol. All ICU monitoring leads in place and checked. ETtube, OG and van tubes intact and secured. Maintaining body temp per bladder sensor in van. See flowsheets and eMAR for assessment data. RASS -4,  GCS 5 due to sedation and intubation. IV Levophed and Vasopressin infusing for BP support. IV Propofol for sedation. POC is to start rewarming phase at 1600.

## 2020-12-11 NOTE — PROGRESS NOTES
Patient care assumed, assessment completed as charted. Patient continues on ventilator, #7.5 at the 24 lip line. FiO2 40%, PEEP 5, , A/C 28. TTM protocol in place, no shivering noted at this time. Right IJ CVC patent, Vasopressin infusing at 0.04u/min, Levophed at 30mcg/min, Epinephrine at 10mcg/min, and Insulin drip per protocol. Patient opening eyes, overbreathing, and alarming ventilator. PRN sedation administered as charted. Mercedes catheter patent, OG in place, bilateral soft wrist restraints continue for patient safety. No further needs assessed at this time. Will monitor.

## 2020-12-11 NOTE — CONSULTS
Attempted to see patient. Spoke with staff RN. Pt on vent, +Covid, and on Hypothermia protocol. Has been at [facility]. Current treatment to coccyx injury is alginate ag daily. Pt unstable for assessment at this time. Stage 4 pressure injury to sacrum was documented on admission. ,  Pt had DTI to sacrum during admission one month ago. Prevention protocols place. Will follow up on Monday.

## 2020-12-11 NOTE — PROGRESS NOTES
Pulmonary & Critical Care Medicine ICU Progress Note    CC: Fatigue, anemia, PEA arrest in emergency department    Events of Last 24 hours:   PEA arrest with CPR x9 minutes, 3 minutes, 5 minutes, 1 minute  TTM    Invasive Lines: Right IJ CVC 12/10/2020    MV: 12/10/2020  Vent Mode: AC/VC Rate Set: 28 bmp/Vt Ordered: 340 mL/ /FiO2 : 30 %  Recent Labs     12/10/20  2325 20  0615   PHART 7.332* 7.427   [id number] 39.0 27.9*   PO2ART 74.9* 88.4       IV:   norepinephrine 5 mcg/min (20 0625)    EPINEPHrine infusion Stopped (20 020)    propofol 20 mcg/kg/min (20 035)    sodium chloride 100 mL/hr at 12/10/20 2124    DOPamine Stopped (12/10/20 164)    vasopressin (Septic Shock) infusion 0.04 Units/min (12/10/20 2141)    dextrose      insulin (HUMAN R) non-weight based infusion 1.35 Units/hr (20 06)    sodium chloride         Vitals:  Blood pressure 124/75, pulse 78, temperature (!) 90 °F (32.2 °C), temperature source Bladder, resp. rate 28, height 5' 4\" (1.626 m), weight 254 lb 13.6 oz (115.6 kg), SpO2 99 %, not currently breastfeeding. on 30%  Temp  Av °F (35 °C)  Min: 89.8 °F (32.1 °C)  Max: 101.8 °F (38.8 °C)    Intake/Output Summary (Last 24 hours) at 2020 0640  Last data filed at 2020 0615  Gross per 24 hour   Intake 5727.81 ml   Output 1636 ml   Net 4091.81 ml     EXAM:  General: intubated, ill appearing    ENT: Pharynx with ETT. Resp: No crackles. No wheezing. CV: S1, S2. +edema  GI: NT, ND, +BS  Skin: Warm and dry. Neuro: PERRL. Sedated, not following commands.  Patellar reflexes are symmetric     Scheduled Meds:   miconazole   Topical BID    sodium chloride  20 mL Intravenous Once    sodium chloride flush  10 mL Intravenous 2 times per day    carboxymethylcellulose PF  1 drop Both Eyes Q4H    And    artificial tears   Both Eyes Q4H    chlorhexidine  15 mL Mouth/Throat BID    cefepime  2 g Intravenous Q24H    pantoprazole  40 mg Intravenous BID    mupirocin   Nasal BID    dexamethasone  6 mg Intravenous Daily    vancomycin (VANCOCIN) intermittent dosing (placeholder)   Other RX Placeholder    insulin lispro  0-6 Units Subcutaneous Q4H     PRN Meds:  acetaminophen **OR** acetaminophen, sodium chloride flush, polyethylene glycol, promethazine **OR** ondansetron, fentanNYL, albuterol sulfate HFA **AND** ipratropium **AND** MDI Treatment, promethazine **OR** ondansetron, midazolam, ipratropium-albuterol, glucose, dextrose, glucagon (rDNA), dextrose    Results:  CBC:   Recent Labs     12/10/20  0935 12/10/20  1830 12/11/20 0210   WBC 12.8* 47.9* 31.6*   HGB 6.2* 9.1* 9.8*   HCT 19.8* 28.8* 30.1*   MCV 88.4 87.6 87.2    184 148     BMP:   Recent Labs     12/10/20  1410 12/10/20  2000 12/11/20  0210   * 123* 125*   K 4.2 4.7 4.0   CL 88* 90* 91*   CO2 21 21 21   PHOS 6.3* 7.5* 6.7*   BUN 42* 44* 45*   CREATININE 2.7* 2.5* 2.6*     LIVER PROFILE:   Recent Labs     12/10/20  0935 12/10/20  2000   AST 22 165*   ALT 19 93*   LIPASE  --  9.0*   BILIDIR  --  3.2*   BILITOT 1.2* 3.5*   ALKPHOS 115 133*       Cultures:  12/10/2020 SARS-CoV-2 positive  12/10/2020 blood sent    Films:  CXR 12/10/20 ETT too low, RIJ okay, bilateral infiltrates  Head CT 12/11/2020 no acute abnormality    ASSESSMENT:  · Cardiac Arrest/PEA: unclear etiology - total compression time 18 minutes over 4 separate episodes with intermittent ROSC  · Strep pyogenes bacteremia  · Septic shock  · Acute hypoxemic respiratory failure   · LLL pneumonia  · COVID-19 pneumonia   · Acute anemia s/p 2 U PRBC  · MIGUELANGEL  · Stage IV decubitus ulcer     PLAN:  COVID-19 isolation, droplet plus  Mechanical ventilation as per my orders. The ventilator was adjusted by me at the bedside for unstable, life threatening respiratory failure. Fentanyl PRN pain/shivering. Versed PRN agitation.    Attempt to avoid sedation as able  Titrating vasopressors for MAP 65  TTM 32 degrees after cardiac arrest  No Remdesevir given low GFR  No CCP as we cannot be certain transfusion reaction not part of current pathology   CTX 2 gram D#1, antibiotic D#2   Decadron 6 mg IV daily, D#2   Protonix BID   Prophylaxis: DVT - SCD; MRSA - mupirocin; GI - on protonix  · NOK is son - Holland [employer]ties - I called and spoke with him today, answered all questions. Total critical care time caring for this patient with life threatening, unstable organ failure, including direct patient contact, management of life support systems, review of data including imaging and labs, discussions with other team members and physicians at least 40 minutes so far today, excluding procedures.

## 2020-12-11 NOTE — PROGRESS NOTES
12/10/20 1909   Vent Information   $Ventilation $Initial Day   Vent Type 840   Vent Mode AC/VC   Vt Ordered 340 mL   Rate Set 28 bmp   FiO2  40 %   SpO2 100 %   SpO2/FiO2 ratio 250   Sensitivity 3   PEEP/CPAP 5   Humidification Source HME   Vent Patient Data   Peak Inspiratory Pressure 35 cmH2O   Mean Airway Pressure 14 cmH20   Rate Measured 28 br/min   Vt Exhaled 354 mL   Minute Volume 9.86 Liters   I:E Ratio 1:2.5   Plateau Pressure 27 [id number]   Static Compliance 16 mL/cmH2O   Dynamic Compliance 12 mL/cmH2O   Cough/Sputum   Sputum How Obtained Suctioned;Endotracheal   Cough Productive   Sputum Amount Small   Sputum Color Cloudy   Tenacity Thick   Spontaneous Breathing Trial (SBT) RT Doc   Pulse 62   Breath Sounds   Right Upper Lobe Diminished   Right Middle Lobe Diminished   Right Lower Lobe Diminished   Left Upper Lobe Diminished   Left Lower Lobe Diminished   Additional Respiratory  Assessments   Resp (!) 7   Position Semi-Mooney's   Oral Care Completed? Yes   Oral Care Mouth suctioned   Subglottic Suction Done? Yes   Cuff Pressure (cm H2O)   (MOV)   Alarm Settings   High Pressure Alarm 45 cmH2O   Low Minute Volume Alarm 4 L/min   Apnea (secs) 20 secs   High Respiratory Rate 40 br/min   Low Exhaled Vt  240 mL   Non-Surgical Airway Endo Tracheal Tube   Placement Date/Time: 12/10/20 1104   Timeout: Patient; Appropriate Equipment  Placed By: In ED  Inserted by: usman  Insertion attempts: 1  Airway Device: Endo Tracheal Tube  Size: 7.5   Secured at 24 cm   Measured From Lips   Secured Location Right   Secured By [employer] tube brothers   Site Condition Dry

## 2020-12-11 NOTE — PROGRESS NOTES
Patient report given to Venkata Mcdonald RN. Patient resting comfortably on ventilator at present, Propofol at 20mcg/kg/min. PERRL, weak gag present. Epinephrine drip weaned off overnight, and Levophed weaned to 2mcg/min. Vasopressin continues at 0.04u/min. Care transferred.

## 2020-12-11 NOTE — PLAN OF CARE
Problem: Airway Clearance - Ineffective  Goal: Achieve or maintain patent airway  Outcome: Ongoing     Problem: Gas Exchange - Impaired  Goal: Absence of hypoxia  Outcome: Ongoing  Goal: Promote optimal lung function  Outcome: Ongoing     Problem: Breathing Pattern - Ineffective  Goal: Ability to achieve and maintain a regular respiratory rate  Outcome: Ongoing     Problem: Body Temperature -  Risk of, Imbalanced  Goal: Ability to maintain a body temperature within defined limits  Outcome: Ongoing  Goal: Will regain or maintain usual level of consciousness  Outcome: Ongoing  Goal: Complications related to the disease process, condition or treatment will be avoided or minimized  Outcome: Ongoing     Problem: Isolation Precautions - Risk of Spread of Infection  Goal: Prevent transmission of infection  Outcome: Ongoing     Problem: Nutrition Deficits  Goal: Optimize nutrtional status  Outcome: Ongoing     Problem: Risk for Fluid Volume Deficit  Goal: Maintain normal heart rhythm  Outcome: Ongoing  Goal: Maintain absence of muscle cramping  Outcome: Ongoing  Goal: Maintain normal serum potassium, sodium, calcium, phosphorus, and pH  Outcome: Ongoing     Problem: Loneliness or Risk for Loneliness  Goal: Demonstrate positive use of time alone when socialization is not possible  Outcome: Ongoing     Problem: Fatigue  Goal: Verbalize increase energy and improved vitality  Outcome: Ongoing     Problem: Patient Education: Go to Patient Education Activity  Goal: Patient/Family Education  Outcome: Ongoing     Problem: Restraint Use - Nonviolent/Non-Self-Destructive Behavior:  Goal: Absence of restraint indications  Description: Absence of restraint indications  Outcome: Ongoing  Note: Bilateral soft wrist restraints remain in place for patient safety, and to protect all lines and airways.    Goal: Absence of restraint-related injury  Description: Absence of restraint-related injury  Outcome: Ongoing     Problem: Falls - Risk of:  Goal: Will remain free from falls  Description: Will remain free from falls  Outcome: Ongoing  Note: Fall precautions in place.    Goal: Absence of physical injury  Description: Absence of physical injury  Outcome: Ongoing     Problem: Skin Integrity:  Goal: Will show no infection signs and symptoms  Description: Will show no infection signs and symptoms  Outcome: Ongoing  Goal: Absence of new skin breakdown  Description: Absence of new skin breakdown  Outcome: Ongoing

## 2020-12-11 NOTE — PROGRESS NOTES
Admit: 12/10/2020    Name:  Keke Dodson  Room:  [facility]3007-  MRN:    [id number]    Critical Care Daily Progress Note for 2020     Admitted after cardiac arrest in ER    Interval History:           She is now on propofol and versed prn  On levo and vaso  Strep in blood culture  Continues to be on the vent      Scheduled Meds:   miconazole   Topical BID    sodium chloride  20 mL Intravenous Once    sodium chloride flush  10 mL Intravenous 2 times per day    carboxymethylcellulose PF  1 drop Both Eyes Q4H    And    artificial tears   Both Eyes Q4H    chlorhexidine  15 mL Mouth/Throat BID    cefepime  2 g Intravenous Q24H    pantoprazole  40 mg Intravenous BID    mupirocin   Nasal BID    dexamethasone  6 mg Intravenous Daily    vancomycin (VANCOCIN) intermittent dosing (placeholder)   Other RX Placeholder       Continuous Infusions:   norepinephrine 4 mcg/min (20 0904)    EPINEPHrine infusion Stopped (20 0208)    propofol 20 mcg/kg/min (20 0841)    sodium chloride 100 mL/hr at 20 0702    DOPamine Stopped (12/10/20 1645)    vasopressin (Septic Shock) infusion 0.04 Units/min (20 0650)    dextrose      insulin (HUMAN R) non-weight based infusion 0.5 Units/hr (20 0836)       PRN Meds:  perflutren lipid microspheres, acetaminophen **OR** acetaminophen, sodium chloride flush, polyethylene glycol, promethazine **OR** ondansetron, fentanNYL, albuterol sulfate HFA **AND** ipratropium **AND** MDI Treatment, promethazine **OR** ondansetron, midazolam, ipratropium-albuterol, glucose, dextrose, glucagon (rDNA), dextrose                  Objective:     Temp  Av.6 °F (34.8 °C)  Min: 89.8 °F (32.1 °C)  Max: 100.9 °F (38.3 °C)  Pulse  Av.8  Min: 52  Max: 136  BP  Min: 57/25  Max: 158/95  SpO2  Av.4 %  Min: 91 %  Max: 100 %  FiO2   Av.1 %  Min: 30 %  Max: 50 %  Patient Vitals for the past 4 hrs:   BP Temp Temp src Pulse Resp SpO2   20 1000 (!) 76/42 -- -- 63 28 100 %   12/11/20 0900 97/69 -- -- 70 28 100 %   12/11/20 0851 94/70 -- -- 69 28 100 %   12/11/20 0722 -- -- -- 72 28 99 %   12/11/20 0700 97/71 (!) 90.5 °F (32.5 °C) Bladder 66 28 99 %   12/11/20 0630 111/66 (!) 90.3 °F (32.4 °C) Bladder 69 28 98 %         Intake/Output Summary (Last 24 hours) at 12/11/2020 1017  Last data filed at 12/11/2020 0848  Gross per 24 hour   Intake 5737.81 ml   Output 1736 ml   Net 4001.81 ml       Physical Exam:  Patient seen through ICU glass doors. Discussed with patient's nurse. She is intubated on mechanical ventilation. Continues to be cooled status post cardiac arrest    Lab Data:  CBC:   Recent Labs     12/10/20  1830 12/11/20  0210 12/11/20  0830   WBC 47.9* 31.6* 13.9*   RBC 3.28* 3.45* 2.88*   HGB 9.1* 9.8* 8.1*   HCT 28.8* 30.1* 25.0*   MCV 87.6 87.2 86.8   RDW 19.5* 19.7* 18.9*    148 113*     BMP:   Recent Labs     12/10/20  2000 12/11/20  0210 12/11/20  0830   * 125* 124*   K 4.7 4.0 4.1   CL 90* 91* 91*   CO2 21 21 22   PHOS 7.5* 6.7* 6.9*   BUN 44* 45* 47*   CREATININE 2.5* 2.6* 2.5*     BNP: No results for input(s): BNP in the last 72 hours. PT/INR:   Recent Labs     12/10/20  1830   PROTIME 43.3*   INR 3.68*     APTT:  Recent Labs     12/11/20 0210   APTT 32.8     CARDIAC ENZYMES:   Recent Labs     12/10/20  2000 12/11/20  0830   TROPONINI <0.01 <0.01     FASTING LIPID PANEL:  Lab Results   Component Value Date    CHOL 125 11/02/2015    HDL 39 11/02/2015    TRIG 110 11/02/2015     LIVER PROFILE:   Recent Labs     12/10/20  0935 12/10/20  2000   AST 22 165*   ALT 19 93*   BILIDIR  --  3.2*   BILITOT 1.2* 3.5*   ALKPHOS 115 133*       Chest imaging was reviewed by me   Persistent multifocal bilateral airspace disease, slightly increased on the   left and slightly decreased on the right.  Overall very similar appearance   over the past 4 weeks. CT head   No acute intracranial abnormality.      Assessment & Plan:     Patient Active Problem List    Diagnosis Date Noted    Chest pain 01/08/2015     Priority: High    Cardiac arrest ([facility] 12/10/2020    Anemia requiring transfusions     Pneumonia due to COVID-19 virus     HCAP (healthcare-associated pneumonia)     Pneumonia due to organism 11/08/2020    PAF (paroxysmal atrial fibrillation) (HCC) 11/08/2020    Pleural effusion     Atrial fibrillation with RVR (HCC)     Acute respiratory failure with hypoxia (HCC) 11/07/2020    Diarrhea     Chemotherapy-induced neutropenia (HCC)     Acute renal injury ([facility] 07/27/2019    SOB (shortness of breath) 12/07/2016    Primary cancer of right breast with metastasis to other site [facility]) 12/07/2016    Anxiety 12/07/2016    DM2 (diabetes mellitus, type 2) ([facility] 12/07/2016    GERD (gastroesophageal reflux disease) 12/07/2016    Hypertension, uncontrolled 12/07/2016    [facility] to [facility] 12/07/2016    Acute lateral meniscus tear of left knee 02/11/2016    Left knee pain 02/11/2016    Cellulitis and abscess of leg 01/06/2012     1. Cardiac arrest of unclear etiology. Currently on multiple pressors including epi, levo and vaso. Dopamine being weaned and discontinued. Currently undergoing cooling. Will attempt to avoid sedation if possible. currently on vaso and levo.     2. Acute hypoxic respiratory failure. Intubated and started on mechanical ventilation. Critical care consult. Continue empiric cefepime and IV vancomycin. Antibiotics changed to Rocephin given strep and blood cultures.     3.  COVID-19 pneumonia. Not a candidate for remdesivir given elevated creatinine. No CCP as it is not  certain if the transfusion reaction  was not the cause of current condition. Start Decadron 6 mg day 2, IV daily.     4.  Acute anemia. Discontinue Eliquis status post transfusion of 2 units of packed cells. GI consult. Protonix twice daily. Follow H&H every 6 hours.     5. Hypertension.   Home medications were

## 2020-12-11 NOTE — CONSULTS
[facility]  Consult Note           Reason for Consult:  Acute kidney injury  Requesting Physician:  Dr. Edy Bonilla    Chief Complaint:  Shortness of breath  History Obtained From:  patient, electronic medical record    History of Present Ilness:    56year old female with DM, HTN, CHF, Afib admitted with sob. We have been asked to assist in further mgmt of her MIGUELANGEL. Recent discharge from [facility] for PNA last month. Developed PEA arrest requiring CPR, now on mechanical ventilation. Found to have Hb of 6.2, received blood. Hypotensive, better with IVF's, pressors, latter being weaned. CVP 12-13. +COVID 19 PNA c/b strep bacteremia - on steroids, antibiotics. SCr 2.7 on admission, trending down to 2.5, urine output of 1.6 liters over last 24 hours. SCr 1.0 from 2020. Past Medical History:        Diagnosis Date    AC (acromioclavicular) joint bone spurs     knees    Atrial fibrillation (HCC)     Cancer (HCC)     stage 4 breast    Cellulitis     CHF (congestive heart failure) ([facility]     COVID-19 12/10/2020    Depression     anxiety    Diabetes mellitus ([facility]     Hypertension        Past Surgical History:        Procedure Laterality Date     SECTION      CHOLECYSTECTOMY      COLONOSCOPY  2017    polyps    HYSTERECTOMY      HYSTERECTOMY      KNEE SURGERY Right     SIGMOIDOSCOPY  2019    SIGMOIDOSCOPY N/A 2019    SIGMOIDOSCOPY BIOPSY FLEXIBLE performed by Triston Rm DO at [facility] [address] Medications:    No current facility-administered medications on file prior to encounter.       Current Outpatient Medications on File Prior to Encounter   Medication Sig Dispense Refill    apixaban (ELIQUIS) 5 MG TABS tablet Take 1 tablet by mouth 2 times daily 60 tablet 2    dilTIAZem (CARDIZEM CD) 180 MG extended release capsule Take 1 capsule by mouth 2 times daily 30 capsule 3    omeprazole (PRILOSEC) 40 MG delayed release capsule Take 40 mg by mouth daily      hydrALAZINE (APRESOLINE) 50 MG tablet Take 1 tablet by mouth 3 times daily 90 tablet 0    furosemide (LASIX) 20 MG tablet Take 1 tablet by mouth as needed (edema) 60 tablet 3    ondansetron (ZOFRAN) 8 MG tablet Take 1 tablet by mouth every 8 hours as needed for Nausea 10 tablet 0    LANTUS SOLOSTAR 100 UNIT/ML injection pen Inject 10 Units as directed nightly Has not taken oit recently after loosing 50 lbs  5    calcium carbonate (OSCAL) 500 MG TABS tablet Take 500 mg by mouth daily      ferrous sulfate 325 (65 FE) MG EC tablet Take 325 mg by mouth 3 times daily (with meals)      palbociclib (IBRANCE) 100 MG capsule Take 100 mg by mouth daily Is off right now while she is sick      carvedilol (COREG) 25 MG tablet Take 25 mg by mouth 2 times daily (with meals)      citalopram (CELEXA) 20 MG tablet Take 40 mg by mouth daily.  Balsam [place]-Castor Oil (VENELEX) OINT ointment Apply topically 2 times daily 30 g 0    fulvestrant (FASLODEX) 250 MG/5ML SOLN IM injection Inject 500 mg into the muscle once         Allergies:  Sucralfate; Ampicillin;  Ampicillin-sulbactam sodium; Duloxetine hcl; and Sulfamethoxazole-trimethoprim    Social History:    Social History     Socioeconomic History    Marital status: Single     Spouse name: Not on file    Number of children: Not on file    Years of education: Not on file    Highest education level: Not on file   Occupational History    Not on file   Social Needs    Financial resource strain: Not on file    Food insecurity     Worry: Not on file     Inability: Not on file    Transportation needs     Medical: Not on file     Non-medical: Not on file   Tobacco Use    Smoking status: Never Smoker    Smokeless tobacco: Never Used   Substance and Sexual Activity    Alcohol use: No    Drug use: No    Sexual activity: Never   Lifestyle    Physical activity     Days per week: Not on file     Minutes per session: Not on file    Stress: Not on file   Relationships    Social connections     Talks on phone: Not on file     Gets together: Not on file     Attends Worship service: Not on file     Active member of club or organization: Not on file     Attends meetings of clubs or organizations: Not on file     Relationship status: Not on file    Intimate partner violence     Fear of current or ex partner: Not on file     Emotionally abused: Not on file     Physically abused: Not on file     Forced sexual activity: Not on file   Other Topics Concern    Not on file   Social History Narrative    Not on file       Family History:   Family History   Problem Relation Age of Onset    Heart Disease Maternal Grandmother     Kidney Disease Mother     Cancer Maternal Aunt        Review of Systems:   ROS deferred as means to protect PPE due to shortage & due to CORONAVIRUS risk. Physical exam:   Constitutional:  VITALS:  BP 98/69   Pulse 58   Temp 92.7 °F (33.7 °C) (Bladder)   Resp 28   Ht 5' 4\" (1.626 m)   Wt 254 lb 13.6 oz (115.6 kg)   SpO2 100%   BMI 43.75 kg/m²   Exam deferred as means to protect PPE due to shortage & due to CORONAVIRUS risk.     Data/  CBC:   Lab Results   Component Value Date    WBC 13.9 12/11/2020    RBC 2.88 12/11/2020    HGB 8.1 12/11/2020    HCT 25.0 12/11/2020    MCV 86.8 12/11/2020    MCH 28.3 12/11/2020    MCHC 32.6 12/11/2020    RDW 18.9 12/11/2020     12/11/2020    MPV 8.1 12/11/2020     BMP:    Lab Results   Component Value Date     12/11/2020    K 4.1 12/11/2020    K 4.7 12/10/2020    CL 91 12/11/2020    CO2 22 12/11/2020    BUN 47 12/11/2020    LABALBU 2.1 12/10/2020    CREATININE 2.5 12/11/2020    CALCIUM 8.1 12/11/2020    GFRAA 24 12/11/2020    GFRAA >60 01/07/2012    LABGLOM 20 12/11/2020    GLUCOSE 98 12/11/2020         Assessment/    - Acute kidney injury/ATN in setting of Cardiac arrest, septic shock   SCr 2.7 on admission, previously 1.0 from Nov 2020, non-oliguric    - Acute hypoxic respiratory failure/COVID19 PNA - on ventilator, steroids, antibiotics    - Septic shock/Strep pyogenes bacteremia - plans per Critical Care    - Acute blood loss anemia - prn prbc's    - Hyponatremia   SNa 126 on admission, as low as 121, last at 124, previously 142 from Nov 2020      Plan/    - Change IVF's to ~2% saline 60 ml/hour - goal sodium correction 6-8 meq/24 hours  - Continue pressors to maintain MAP of 65  - UA with microscopy, urine osmolality, urine electrolytes  - Monitor labs [id number], bp's, CVP's, urine output    Case d/w ICU Nursing  Critical care time spent 30 minutes    Thank you for the consultation. Please do not hesitate to call with questions.     [employer]

## 2020-12-11 NOTE — CONSULTS
[address] [facility]  [phone]        Reason for Consultation/Chief Complaint: intubated and sedated  Consulted for cardiac arrest     Due to the current efforts to prevent transmission of COVID-19 and also the need to preserve PPE for other caregivers, a face-to-face encounter with the patient was not performed. That being said, all relevant records and diagnostic tests were reviewed, including laboratory results and imaging. Please reference any relevant documentation elsewhere. Care will be coordinated with the primary service. History of Present Illness:  Rocio Estrada is a 56 y.o. patient who presented to [facility] 12/10/20 with c/o SOB. Follows with Dr Maida Mustafa for cardiology--LOV 2019.  She has PMH HTN, DM, recurrent L. pleural effusion, metastatic breast CA, depression, and PAF dx 2018 on eliquis. Most recent Lexiscan stress test 01/08/15 EF 63%; no stress induced ischemia.  Most recent ECHO 5/30/18 EF 60-65%. marked MAC; mild MR mild concentricLVH  Grade 2 DD. I saw as consult in Nov 2020. Admitted for respiratory failure and PNA requiring intubation. She had atrial arrhythmias during admit including SVT and afib with RVR treated with IV diltiazem gtt. She converted to NSR and recovered from PNA and sent to SNF. She was d/c'd on eliquis 5mg BID, coreg 25mg BID, diltiazem CD 180mg BID. Now presents with SOB and found severely anemic (H/H=6.2/19.8) and profoundly hypotensive (80-21UDJB systolic). Got rapid infusion of unmatched blood (2U PRBC) and found weak pulse and went PEA. Multiple rounds epinephrine and HCO3 with CPR total 18 mins with ROSC. Now on pressors and intubated. Found Covid-19+ also. Admit EKG Narrow QRS tachycardia; Unusual P axis, possible ectopic atrial tachycardia; Nonspecific ST and T wave abnormality. Second EKG NSR; Nonspecific ST abnormality. Haris <0.01, Pro-BNP 23,880 (13K Nov 2020), BUN/Cr 45/2.6 (28/1.0 Nov 2020).  CXR Persistent diffuse bilateral airspace nightly Has not taken oit recently after loosing 50 lbs 9/23/16  Yes Historical Provider, MD   calcium carbonate (OSCAL) 500 MG TABS tablet Take 500 mg by mouth daily   Yes Historical Provider, MD   ferrous sulfate 325 (65 FE) MG EC tablet Take 325 mg by mouth 3 times daily (with meals)   Yes Historical Provider, MD   palbociclib (IBRANCE) 100 MG capsule Take 100 mg by mouth daily Is off right now while she is sick   Yes Historical Provider, MD   carvedilol (COREG) 25 MG tablet Take 25 mg by mouth 2 times daily (with meals)   Yes Historical Provider, MD   citalopram (CELEXA) 20 MG tablet Take 40 mg by mouth daily. Yes Historical Provider, MD Marina Weaver Oil [facility]) OINT ointment Apply topically 2 times daily 11/17/20   Balwinder Salas MD   fulvestrant (FASLODEX) 250 MG/5ML SOLN IM injection Inject 500 mg into the muscle once    Historical Provider, MD        Allergies:  Sucralfate; Ampicillin; Ampicillin-sulbactam sodium; Duloxetine hcl; and Sulfamethoxazole-trimethoprim     Review of Systems: deferred    Due to the current efforts to prevent transmission of COVID-19 and also the need to preserve PPE for other caregivers, a face-to-face encounter with the patient was not performed. That being said, all relevant records and diagnostic tests were reviewed, including laboratory results and imaging. Please reference any relevant documentation elsewhere. Care will be coordinated with the primary service.       Physical Examination:    Vitals:    12/11/20 0700   BP: 97/71   Pulse: 66   Resp: 28   Temp: (!) 90.5 °F (32.5 °C)   SpO2: 99%    Weight: 254 lb 13.6 oz (115.6 kg)         Labs  CBC:   Lab Results   Component Value Date    WBC 31.6 12/11/2020    RBC 3.45 12/11/2020    HGB 9.8 12/11/2020    HCT 30.1 12/11/2020    MCV 87.2 12/11/2020    RDW 19.7 12/11/2020     12/11/2020     CMP:    Lab Results   Component Value Date     12/11/2020    K 4.0 12/11/2020    K 4.7 12/10/2020    CL 91 2020    CO2 21 2020    BUN 45 2020    CREATININE 2.6 2020    GFRAA 23 2020    GFRAA >60 2012    AGRATIO 0.6 12/10/2020    LABGLOM 19 2020    GLUCOSE 135 2020    PROT 5.9 12/10/2020    PROT 9.3 2012    CALCIUM 8.6 2020    BILITOT 3.5 12/10/2020    ALKPHOS 133 12/10/2020     12/10/2020    ALT 93 12/10/2020     PT/INR:  No results found for: PTINR  Lab Results   Component Value Date    YLVYQZM 277 (H) 2018    TROPONINI <0.01 12/10/2020     CT head 20   No acute intracranial abnormality.        EKG 12/10/20   Normal sinus rhythmNonspecific ST abnormalityNo previous ECGs availableConfirmed by SIRIA Chaudhry MD ([id number]) on 12/10/2020 5:54:19 PM       EKG 12/10/20   Narrow QRS tachycardiaUnusual P axis, possible ectopic atrial tachycardiaNonspecific ST and T wave abnormalityAbnormal ECGNo previous ECGs availableConfirmed by SIRIA Chaudhry MD [id number]) on 12/10/2020 11:31:58 AM       EKG 20  Supraventricular tachycardiaAbnormal ECGNo previous ECGs availableConfirmed by ARMIDA PINEDA MD [id number]) on 2020 4:44:34 PM      EKG 20  Atrial fibrillation with rapid ventricular responseST & T wave abnormality, consider inferolateral ischemia or digitalis effectAbnormal ECGNo previous ECGs availableConfirmed by [facility] Dinesh MCNALLY ([id number]) on 2020      EKG 20  Baseline artifactTechnically poor tracingpossible sinus rhythmConfirmed by [facility] Dinesh MCNALLY ([id number]) on 2020 12:05:43 PM      EKG 20  Supraventricular tachycardiaPossible sinus vs other including atrial flutterMarked ST abnormality, possible inferolateral subendocardial injuryAbnormal ECGConfirmed by Hazel Simon MD ([id number]) on 11/10/2020 1:04     EK20  I have reviewed EKG with the following interpretation:  Impression:  See HPI Atrial fibrillation with rapid ventricular responseST abnormality consider lateral ischemiaAbnormal ECGWhen compared with ECG of 09-NOV-2020 17:27,Atrial fibrillation has replaced Sinus rhythmST change more prominentConfirmed by Bing Lozoya MD, Joseph Waters ([id number]) on 11/16/2020 7:35:22 AM         CXR 11/13/20  FINDINGS: ETT and NG tube remain in satisfactory position.  The heart is enlarged with a stable appearance of pulmonary vasculature.  Multifocal bilateral airspace opacification persists, unchanged from prior. Balinda Calico is no pneumothorax or effusion.  RECOMMENDATION: Unchanged multifocal bilateral pneumonia.       Lexiscan myoview stress test 01/07/15  1. No scintigraphic evidence of stress-induced myocardial ischemia.  2. Mild left ventricular dilatation.  3. Normal LVEF of 63%. ECHO 5/30/18  Summary:  The left ventricular wall motion is normal.  Overall left ventricular ejection fraction is estimated to be 60-65%. There is marked mitral annular calcification present. Poor endocardial border visualization  There is mild mitral regurgitation. There is mild concentric left ventricular hypertrophy. The diastolic function is impaired and classified as Grade 2  (psuedonormalization). Assessment:  Jose Casillas is a 56 y.o. patient who presented to [facility] 12/10/20 with c/o SOB. Follows with Dr Savage Marquez for cardiology--LOV 2019.  She has PMH HTN, DM, recurrent L. pleural effusion, metastatic breast CA, depression, and PAF dx 2018 on eliquis. Most recent Lexiscan stress test 01/08/15 EF 63%; no stress induced ischemia.  Most recent ECHO 5/30/18 EF 60-65%. marked MAC; mild MR mild concentricLVH  Grade 2 DD. I saw as consult in Nov 2020. Admitted for respiratory failure and PNA requiring intubation. She had atrial arrhythmias during admit including SVT and afib with RVR treated with IV diltiazem gtt. She converted to NSR and recovered from PNA and sent to SNF. She was d/c'd on eliquis 5mg BID, coreg 25mg BID, diltiazem CD 180mg BID. Now presents with SOB and found severely anemic (H/H=6.2/19.8) and profoundly hypotensive (07-62LFOT systolic). Got rapid infusion of unmatched blood (2U PRBC) and found weak pulse and went PEA. Multiple rounds epinephrine and HCO3 with CPR total 18 mins with ROSC. Now on pressors and intubated. Found Covid-19+ also. Admit EKG Narrow QRS tachycardia; Unusual P axis, possible ectopic atrial tachycardia; Nonspecific ST and T wave abnormality. Second EKG NSR; Nonspecific ST abnormality. Haris <0.01, Pro-BNP 23,880 (13K Nov 2020), BUN/Cr 45/2.6 (28/1.0 Nov 2020); ALT=93; SBI=364; CXR Persistent diffuse bilateral airspace disease consistent with atypical viral pneumonia. CT head no acute intracranial abnormality. Diagnosis of cardiac arrest likely precipitated by combination of severe anemia, respiratory failure, and combined hypovolemic and septic shock possibly in older female with Covid-19 PNA and respiratory failure. .    Recs:  1. Initial EKG studies time of event do not have ischemic EKG changes. Low clinical suspicion for ACS. .  2. Initial Haris negative. Cycle full series. Likely to increase given hypotension, CPR, and Covid. 3. Pressors for hemodynamic stability. Ween as tolerated. 4. NO AC obviously given severe anemia requiring 2U PRBC. D/C'd eliquis. 5. Today's EKG with mild T inversion anterior leads; NSR  6.  recommend ECHO to evaluate LV function and size, wall motion, and valves for any structural abnormalities.        Patient Active Problem List   Diagnosis    Cellulitis and abscess of leg    Chest pain    Acute lateral meniscus tear of left knee    Left knee pain    SOB (shortness of breath)    Primary cancer of right breast with metastasis to other site ([address]    Anxiety    DM2 (diabetes mellitus, type 2) ([address]    GERD (gastroesophageal reflux disease)    Hypertension, uncontrolled    Morbid obesity due to excess calories ([address]    Acute renal injury ([address]    Chemotherapy-induced neutropenia ([address]    Diarrhea    Acute respiratory failure with hypoxia ([address]    Pneumonia due to organism    Paroxysmal atrial fibrillation (HCC)    Pleural effusion    Atrial fibrillation with RVR ([facility]    HCAP (healthcare-associated pneumonia)    Cardiac arrest ([facility]    Anemia requiring transfusions    Pneumonia due to COVID-19 virus       Thank you for allowing to us to participate in the nacho or Omer Nieto. Further evaluation will be based upon the patient's clinical course and testing results.

## 2020-12-12 NOTE — PROGRESS NOTES
Patient care assumed, assessment completed as charted. Patient continues on ventilator, #7.5 at the 24 lip line. FiO2 30%, PEEP 5, , A/C 28. Right IJ CVC in place with Vasopressin infusing at 0.04u/min, Levophed at 2mcg/min, Propofol at 15mcg/kg/min, Insulin as ordered, and 2% saline at 60mL/hr. OG in place to LIWS, Mercedes catheter patent, TTM in rewarming phase. No further needs assessed at this time. Will monitor.

## 2020-12-12 NOTE — PROGRESS NOTES
Admit: 12/10/2020    Name:  Bridgett Buck  Room:  [address] [place] [address]  MRN:    [id number]    Critical Care Daily Progress Note for 2020     Admitted after cardiac arrest in ER    Interval History:           She is now on propofol and versed prn  On levo and vaso  Strep in blood culture  Continues to be on the vent      Finished rewarming  Wakes up with asynchrony with the vent  On levo and vaso        Scheduled Meds:   cefTRIAXone (ROCEPHIN) 2 g IVPB in NS 50ml minibag  2 g Intravenous Q24H    miconazole   Topical BID    sodium chloride flush  10 mL Intravenous 2 times per day    carboxymethylcellulose PF  1 drop Both Eyes Q4H    And    artificial tears   Both Eyes Q4H    chlorhexidine  15 mL Mouth/Throat BID    pantoprazole  40 mg Intravenous BID    mupirocin   Nasal BID    dexamethasone  6 mg Intravenous Daily       Continuous Infusions:   IV infusion builder 12 mL/hr at 20 0903    norepinephrine 5 mcg/min (20 09)    IV infusion builder 60 mL/hr at 20 1507    propofol 20 mcg/kg/min (20 0900)    dextrose      insulin (HUMAN R) non-weight based infusion 0.97 Units/hr (20)       PRN Meds:  perflutren lipid microspheres, acetaminophen **OR** acetaminophen, sodium chloride flush, polyethylene glycol, promethazine **OR** ondansetron, fentanNYL, albuterol sulfate HFA **AND** ipratropium **AND** MDI Treatment, promethazine **OR** ondansetron, midazolam, ipratropium-albuterol, glucose, dextrose, glucagon (rDNA), dextrose                  Objective:     Temp  Av.5 °F (34.7 °C)  Min: 90.1 °F (32.3 °C)  Max: 98.1 °F (36.7 °C)  Pulse  Av  Min: 56  Max: 85  BP  Min: 84/59  Max: 115/69  SpO2  Av.8 %  Min: 88 %  Max: 100 %  FiO2   Av %  Min: 30 %  Max: 30 %  Patient Vitals for the past 4 hrs:   BP Temp Temp src Pulse Resp SpO2   20 0800 95/66   74 (!) 32 95 %   20 0715 87/64   76 26 95 %   20 0700 (!) 85/59   79 27 95 % 12/12/20 0645 (!) 107/93 98.1 °F (36.7 °C) Bladder 85 18 100 %   12/12/20 0630 107/80 97.9 °F (36.6 °C) Bladder 82 (!) 33 (!) 88 %   12/12/20 0615 108/78 97.7 °F (36.5 °C) Bladder 78 (!) 35 96 %         Intake/Output Summary (Last 24 hours) at 12/12/2020 1006  Last data filed at 12/12/2020 0600  Gross per 24 hour   Intake 2409 ml   Output 1737 ml   Net 672 ml       Physical Exam:  Patient seen through ICU glass doors. Discussed with patient's nurse. She is intubated on mechanical ventilation. Finished rewarming    Lab Data:  CBC:   Recent Labs     12/11/20 1500 12/11/20 2000 12/12/20  0200   WBC 10.8 11.1* 15.2*   RBC 2.90* 2.86* 2.93*   HGB 8.2* 8.1* 8.3*   HCT 24.7* 24.5* 25.3*   MCV 85.2 85.7 86.4   RDW 19.0* 19.3* 19.6*   * 107* 116*     BMP:   Recent Labs     12/11/20  1500 12/11/20  1500 12/11/20 2000 12/11/20 2000 12/12/20  0200 12/12/20  0400 12/12/20  0600 12/12/20  0855   *  --  120*  --  121*  --   --  125*   K 4.0   < > 4.0   < > 4.2 4.2 4.8 4.6   CL 88*  --  88*  --  89*  --   --  92*   CO2 20*  --  20*  --  21  --   --  21   PHOS 6.7*  --  7.2*  --   --   --   --  7.6*   BUN 49*  --  52*  --  52*  --   --  53*   CREATININE 2.3*  --  2.4*  --  2.4*  --   --  2.4*    < > = values in this interval not displayed. BNP: No results for input(s): BNP in the last 72 hours.   PT/INR:   Recent Labs     12/10/20  1830 12/11/20  1810 12/12/20  0600   PROTIME 43.3* 27.8* 24.6*   INR 3.68* 2.38* 2.10*     APTT:  Recent Labs     12/11/20  0210   APTT 32.8     CARDIAC ENZYMES:   Recent Labs     12/10/20  2000 12/11/20  0830 12/11/20  1500   TROPONINI <0.01 <0.01 <0.01     FASTING LIPID PANEL:  Lab Results   Component Value Date    CHOL 125 11/02/2015    HDL 39 11/02/2015    TRIG 110 11/02/2015     LIVER PROFILE:   Recent Labs     12/11/20  0830 12/11/20  1810 12/12/20  0200   * 123* 125*   ALT 91* 96* 106*   BILIDIR 1.7* 1.0* 0.7*   BILITOT 2.0* 1.1* 0.9   ALKPHOS 110 97 99 Chest imaging was reviewed by me   Persistent multifocal bilateral airspace disease, slightly increased on the   left and slightly decreased on the right.  Overall very similar appearance   over the past 4 weeks. CT head   No acute intracranial abnormality. Assessment & Plan:     Patient Active Problem List    Diagnosis Date Noted    Chest pain 01/08/2015     Priority: High    Shock ([facility]     Bacteremia     Cardiac arrest ([facility] 12/10/2020    Anemia requiring transfusions     Pneumonia due to COVID-19 virus     HCAP (healthcare-associated pneumonia)     Pneumonia due to organism 11/08/2020    PAF (paroxysmal atrial fibrillation) ([facility] 11/08/2020    Pleural effusion     Atrial fibrillation with RVR (HCC)     Acute hypoxemic respiratory failure (HCC) 11/07/2020    Diarrhea     Chemotherapy-induced neutropenia (HCC)     Acute renal injury ([facility] 07/27/2019    SOB (shortness of breath) 12/07/2016    Primary cancer of right breast with metastasis to other site [facility]) 12/07/2016    Anxiety 12/07/2016    DM2 (diabetes mellitus, type 2) ([facility] 12/07/2016    GERD (gastroesophageal reflux disease) 12/07/2016    Hypertension, uncontrolled 12/07/2016    Morbid obesity due to excess calories ([facility] 12/07/2016    Acute lateral meniscus tear of left knee 02/11/2016    Left knee pain 02/11/2016    Cellulitis and abscess of leg 01/06/2012     1. Cardiac arrest of unclear etiology. Currently on multiple pressors including epi, levo and vaso. Dopamine being weaned and discontinued. Currently undergoing cooling. Will attempt to avoid sedation if possible. currently on vaso and levo.will need ECHO     2. Acute hypoxic respiratory failure. Intubated and started on mechanical ventilation. Critical care consult. Continue empiric cefepime and IV vancomycin. Antibiotics changed to Rocephin given strep in  blood cultures. She has stage 4 wounds in sacrum.  Likely source     3. COVID-19 pneumonia. Not a candidate for remdesivir given elevated creatinine. No CCP as it is not  certain if the transfusion reaction  was not the cause of current condition. Start Decadron 6 mg day 3 , IV daily.     4.  Acute anemia. Discontinue Eliquis status post transfusion of 2 units of packed cells. GI consult. Protonix twice daily. Follow H&H every 6 hours.     5. Hypertension. Home medications are held .     6. Type 2 diabetes. Was on insulin drip. Now on SSI    7. Septic shock. Strep bacteremia. Abx changed to rocephin. Continue IV fluids and pressors. 8.MIGUELANGEL. 2 to hypotension and septic shock. was on  NS at 100 /hr.nephro consult. Now on 2 % NS     9. Hyponatremia. severe. nephro consult. Now on 2 % NS    10.hypocalcemia. replaced     11. H/o breast cancer on Ibrance      SCDs for DVT prophylaxis. Protonix for GI prophylaxis.       Chuy Maza

## 2020-12-12 NOTE — PROGRESS NOTES
CARDIOLOGY PROGRESS NOTE      Patient Name: Sony Wayne  Date of admission: 12/10/2020  9:18 AM  Admission Dx: Cardiac arrest [facility]) [I46.9]  Reason for Consult:  Dyspnea  Requesting Physician: Janina Dc MD  Primary Care physician: Malini Urrutia MD    Subjective:     Sony Wayne is a 56 y.o. patient who follows with Dr Tara Reinoso for cardiology. She presented to the hospital 12/10/20 with complaints of dyspnea. She has a prior medical history notable for congestive heart failure, hypertension, diabetes, metastatic breast cancer with recurrent pleural effusions, paroxysmal atrial fibrillation diagnosed in 2018 and on eliquis for anti-coagulation, SVT during prior admission for pneurmonia/respiratory failure 11/2020, normal LV function by 5/2018 echocardiogram.      The patient presented this admission with dyspnea, profound anemia (Hgb 6.2) receiving rapid transfusion protocol, and subsequent PEA arrest. Multiple rounds epinephrine and HCO3 with CPR total 18 mins with ROSC. Placed on pressor support and intubated. Found Covid-19+. Admit EKG showed narrow complex tachycardia with unusual P wave axis, possible ectopic atrial tachycardia with non-specific ST-T wave changes. Most recent EKG, sinus rhythm with anterior TWI. Acute kidney injury by labs (Scr 2.6 with baseline 1.0), Elevated pro-BNP 23,880, negative troponin. CXR with diffuse bilateral airspace disease consistent with atypical viral pneumonia. She was evaluated by Dr. Caleb Johnson yesterday. In the interim, serial cardiac enzymes negative. Hyponatremia improving. Renal function is stable. +4L this admission. carvedilol (COREG) 25 MG tablet Take 25 mg by mouth 2 times daily (with meals)   Yes Historical Provider, MD   citalopram (CELEXA) 20 MG tablet Take 40 mg by mouth daily.      Yes Historical Provider, MD [employer] [facility]) OINT ointment Apply topically 2 times daily 11/17/20   Rosio Damico MD   fulvestrant (FASLODEX) 250 MG/5ML SOLN IM injection Inject 500 mg into the muscle once    Historical Provider, MD        CURRENT Medications:      miconazole (MICOTIN) 2 % powder, BID      perflutren lipid microspheres (DEFINITY) injection 1.65 mg, ONCE PRN      cefTRIAXone (ROCEPHIN) 2 g IVPB in D5W 50ml minibag, Q24H      sodium chloride 154 mEq in sodium chloride 0.9 % 1,000 mL infusion, Continuous      norepinephrine (LEVOPHED) 16 mg in dextrose 5 % 250 mL infusion, Continuous      propofol injection, Titrated      acetaminophen (TYLENOL) tablet 650 mg, Q6H PRN    Or      acetaminophen (TYLENOL) suppository 650 mg, Q6H PRN      sodium chloride flush 0.9 % injection 10 mL, 2 times per day      sodium chloride flush 0.9 % injection 10 mL, PRN      polyethylene glycol (GLYCOLAX) packet 17 g, Daily PRN      promethazine (PHENERGAN) tablet 12.5 mg, Q6H PRN    Or      ondansetron (ZOFRAN) injection 4 mg, Q6H PRN      vasopressin 20 Units in dextrose 5 % 100 mL infusion, Continuous      fentaNYL (SUBLIMAZE) injection 50 mcg, Q1H PRN      albuterol sulfate  (90 Base) MCG/ACT inhaler 4 puff, Q4H PRN    And      ipratropium (ATROVENT HFA) 17 MCG/ACT inhaler 4 puff, Q4H PRN      carboxymethylcellulose PF (THERATEARS) 1 % ophthalmic gel 1 drop, Q4H    And      lubrifresh P.M. (artificial tears) ophthalmic ointment, Q4H      promethazine (PHENERGAN) tablet 12.5 mg, Q6H PRN    Or      ondansetron (ZOFRAN) injection 4 mg, Q6H PRN      chlorhexidine (PERIDEX) 0.12 % solution 15 mL, BID      midazolam (VERSED) injection 2 mg, Q1H PRN   ipratropium-albuterol (DUONEB) nebulizer solution 1 ampule, Q4H PRN      pantoprazole (PROTONIX) injection 40 mg, BID      mupirocin (BACTROBAN) 2 % ointment, BID      dexamethasone (PF) (DECADRON) injection 6 mg, Daily      glucose (GLUTOSE) 40 % oral gel 15 g, PRN      dextrose 50 % IV solution, PRN      glucagon (rDNA) injection 1 mg, PRN      dextrose 5 % solution, PRN      insulin regular (HUMULIN R;NOVOLIN R) 100 Units in sodium chloride 0.9 % 100 mL infusion, Continuous        Allergies:  Sucralfate, Ampicillin, Ampicillin-sulbactam sodium, Duloxetine hcl, and Sulfamethoxazole-trimethoprim     Review of Systems:   A 14 point review of symptoms completed. Pertinent positives identified in the HPI, all other review of symptoms negative. Objective:     Vitals:    12/12/20 0615   BP: 108/78   Pulse: 78   Resp: (!) 35   Temp: 97.7 °F (36.5 °C)   SpO2: 96%    Weight: 254 lb 13.6 oz (115.6 kg)       PHYSICAL EXAM:    Due to the current efforts to prevent transmission of COVID-19 and also the need to preserve PPE for other caregivers, a face-to-face encounter with the patient was not performed. That being said, all relevant records and diagnostic tests were reviewed, including laboratory results and imaging. Please reference any relevant documentation elsewhere. Care will be coordinated with the primary service. Visualized throughout window  Vented/sedated  On pressors  EET midline  No spontaneous movement in setting of weaned sedation  Normal coloration. Further exam is limited.         Labs:   CBC:   Lab Results   Component Value Date    WBC 15.2 12/12/2020    RBC 2.93 12/12/2020    HGB 8.3 12/12/2020    HCT 25.3 12/12/2020    MCV 86.4 12/12/2020    RDW 19.6 12/12/2020     12/12/2020     CMP:  Lab Results   Component Value Date     12/12/2020    K 4.2 12/12/2020    K 4.7 12/10/2020    CL 89 12/12/2020    CO2 21 12/12/2020    BUN 52 12/12/2020    CREATININE 2.4 12/12/2020 GFRAA 25 12/12/2020    GFRAA >60 01/07/2012    AGRATIO 0.6 12/10/2020    LABGLOM 20 12/12/2020    GLUCOSE 149 12/12/2020    PROT 5.6 12/12/2020    PROT 9.3 01/05/2012    CALCIUM 8.2 12/12/2020    BILITOT 0.9 12/12/2020    ALKPHOS 99 12/12/2020     12/12/2020     12/12/2020     PT/INR:  No results found for: PTINR  HgBA1c:  Lab Results   Component Value Date    LABA1C 4.7 11/08/2020     Lab Results   Component Value Date    CKTOTAL 203 (H) 06/06/2018    TROPONINI <0.01 12/11/2020         Interval Testing/Data:     EKG 12/10/20   Normal sinus rhythmNonspecific ST abnormalityNo previous ECGs availableConfirmed by SIRIA Vasquez MD ([id number]) on 12/10/2020 5:54:19 PM         EKG 12/10/20   Narrow QRS tachycardiaUnusual P axis, possible ectopic atrial tachycardiaNonspecific ST and T wave abnormalityAbnormal ECGNo previous ECGs availableConfirmed by SIRIA Vasquez MD ([id number]) on 12/10/2020 11:31:58 AM         EKG 11/07/20  Supraventricular tachycardiaAbnormal ECGNo previous ECGs availableConfirmed by ARMIDA PINEDA MD ([id number]) on 11/7/2020 4:44:34 PM     Lexiscan myoview stress test 01/07/15  1. No scintigraphic evidence of stress-induced myocardial ischemia.  2. Mild left ventricular dilatation.  3. Normal LVEF of 63%.     ECHO 5/30/18  Summary:  The left ventricular wall motion is normal.  Overall left ventricular ejection fraction is estimated to be 60-65%. There is marked mitral annular calcification present. Poor endocardial border visualization  There is mild mitral regurgitation. There is mild concentric left ventricular hypertrophy.   The diastolic function is impaired and classified as Grade 2  (psuedonormalization).         Impression and Plan Roberto Tsai is a 56 y.o. patient who follows with Dr Hernandez Members for cardiology. She presented to the hospital 12/10/20 with complaints of dyspnea. She has a prior medical history notable for congestive heart failure, hypertension, diabetes, metastatic breast cancer with recurrent pleural effusions, paroxysmal atrial fibrillation diagnosed in 2018 and on eliquis for anti-coagulation, SVT during prior admission for pneurmonia/respiratory failure 11/2020, normal LV function by 5/2018 echocardiogram.       1. PEA arrest   2. Severe anemia   3. Acute hypoxic respiratory failure  4. Hypovolemic/septic Shock complicated with multi-organ failure  5. COVID-19 Pneumonia  6. History of atrial fibrillation - on eliquis  7. Supraventricular/atrial tachycardia  8. Acute renal injury    9. Severe hyponatremia  10. Elevated liver enzymes/acute liver injury, INR 2.4  11. Normal LV function 5/2018    Patient Active Problem List   Diagnosis    Cellulitis and abscess of leg    Chest pain    Acute lateral meniscus tear of left knee    Left knee pain    SOB (shortness of breath)    Primary cancer of right breast with metastasis to other site ([address]    Anxiety    DM2 (diabetes mellitus, type 2) (HCC)    GERD (gastroesophageal reflux disease)    Hypertension, uncontrolled    Morbid obesity due to excess calories (HCC)    Acute renal injury ([address]    Chemotherapy-induced neutropenia ([address]    Diarrhea    Acute hypoxemic respiratory failure ([address]    Pneumonia due to organism    PAF (paroxysmal atrial fibrillation) (HCC)    Pleural effusion    Atrial fibrillation with RVR ([address]    HCAP (healthcare-associated pneumonia)    Cardiac arrest ([address]    Anemia requiring transfusions    Pneumonia due to COVID-19 virus    Shock ([address]    Bacteremia       PLAN:  1. Repeat EKG to assess for evolving changes  2. Continue holding eliquis   3.  Echo upon recovery from COVID-19 pneumonia 4. Should she have recurrence of SVT, recommend adenosine to attempt to break rhythm. Should she have persistent SVT refractory to this, consider Amiodarone bolus and gtt may be initiated for onset hemodynamically unstable/persistent atrial arrhythmia  5. Wean pressors as able  6. COVID-19 care per internal medicine/pulmonary. I remain on call day/night through the weekend. Please call with questions/concerns regarding this patient's cardiac care. I will address the patient's cardiac risk factors and adjusted pharmacologic treatment as needed. In addition, I have reinforced the need for patient directed risk factor modification. All questions and concerns were addressed to the patient/family. Alternatives to my treatment were discussed. Thank you for allowing us to participate in the care of Elida Marquez. Please call me with any questions [phone].     Leydi Lainez MD   Cardiovascular Disease  [facility]  [phone] [facility]  [phone] [address] [place] [address]  12/12/2020 6:30 AM

## 2020-12-12 NOTE — PROGRESS NOTES
Patient repositioned up in bed using lift. Now continuously peak-pressuring ventilator and de-satting to 87%. PRN sedation administered as charted, Propofol drip increased, issues continue. RT called to assess. Will monitor.

## 2020-12-12 NOTE — PROGRESS NOTES
12/12/20 0305   Vent Information   Equipment Changed HME   Vent Type 840   Vent Mode AC/VC   Vt Ordered 340 mL   Rate Set 28 bmp   Peak Flow 60 L/min   FiO2  30 %   SpO2 95 %   SpO2/FiO2 ratio 316.67   Sensitivity 3   PEEP/CPAP 5   Humidification Source HME   Vent Patient Data   Peak Inspiratory Pressure 31 cmH2O   Mean Airway Pressure 13 cmH20   Rate Measured 28 br/min   Vt Exhaled 354 mL   Minute Volume 9.92 Liters   I:E Ratio 1:2.5   Plateau Pressure 24 [id number]   Cough/Sputum   Sputum How Obtained Suctioned;Endotracheal   Cough Non-productive   Spontaneous Breathing Trial (SBT) RT Doc   Pulse 72   Additional Respiratory  Assessments   Resp 28   Position Semi-Mooney's   Oral Care Completed? Yes   Oral Care Mouth suctioned   Alarm Settings   High Pressure Alarm 45 cmH2O   Low Minute Volume Alarm 4 L/min   Apnea (secs) 20 secs   High Respiratory Rate 40 br/min   Low Exhaled Vt  250 mL   ETT (adult)   Placement Date/Time: 12/10/20 1400   Timeout: Patient  Preoxygenation: Yes  Mask Ventilation: Ventilated by mask (1)  Technique: Direct laryngoscopy  Type: Cuffed  Tube Size: 7.5 mm  Location: Oral  Insertion attempts: 1  Placement Verified By[de-identified] Chest...    Secured at 24 cm   Measured From Lips   ET Placement Right   Secured By [employer] tube brothers   Site Condition Dry

## 2020-12-12 NOTE — PROGRESS NOTES
12/11/20 2322   Vent Information   Vent Type 840   Vent Mode AC/VC   Vt Ordered 340 mL   Rate Set 28 bmp   Peak Flow 60 L/min   FiO2  30 %   SpO2 96 %   SpO2/FiO2 ratio 320   Sensitivity 3   PEEP/CPAP 5   Humidification Source HME   Vent Patient Data   Peak Inspiratory Pressure 30 cmH2O   Mean Airway Pressure 12 cmH20   Rate Measured 28 br/min   Vt Exhaled 355 mL   Minute Volume 9.9 Liters   I:E Ratio 1:2.5   Plateau Pressure 23 [id number]   Cough/Sputum   Sputum How Obtained Suctioned;Endotracheal   Cough Productive   Spontaneous Breathing Trial (SBT) RT Doc   Pulse 65   Additional Respiratory  Assessments   Resp 28   Position Semi-Mooney's   Oral Care Completed? Yes   Oral Care Mouth suctioned   Alarm Settings   High Pressure Alarm 45 cmH2O   Low Minute Volume Alarm 4 L/min   Apnea (secs) 20 secs   High Respiratory Rate 40 br/min   Low Exhaled Vt  250 mL   ETT (adult)   Placement Date/Time: 12/10/20 1400   Timeout: Patient  Preoxygenation: Yes  Mask Ventilation: Ventilated by mask (1)  Technique: Direct laryngoscopy  Type: Cuffed  Tube Size: 7.5 mm  Location: Oral  Insertion attempts: 1  Placement Verified By[de-identified] Chest...    Secured at 24 cm   Measured From Lips   ET Placement Right   Secured By [employer] tube brothers   Site Condition Dry

## 2020-12-12 NOTE — PROGRESS NOTES
Department of Internal Medicine  Nephrology Progress Note        SUBJECTIVE:    We are following this patient for MIGUELANGEL. Chart and pertinent findings were reviewed. There were no acute events noted overnight. ROS: No fever or chills. Social: No family at bedside. Physical Exam:    VITALS:  BP (!) 97/57   Pulse 63   Temp 98.1 °F (36.7 °C) (Bladder)   Resp 30   Ht 5' 4\" (1.626 m)   Wt 254 lb 13.6 oz (115.6 kg)   SpO2 96%   BMI 43.75 kg/m²     The patient is COVID-19 positive; examination was deferred to avoid unnecessary exposure.     DATA:    CBC:   Lab Results   Component Value Date    WBC 15.2 12/12/2020    RBC 2.93 12/12/2020    HGB 8.3 12/12/2020    HCT 25.3 12/12/2020    MCV 86.4 12/12/2020    MCH 28.2 12/12/2020    MCHC 32.7 12/12/2020    RDW 19.6 12/12/2020     12/12/2020    MPV 8.3 12/12/2020     BMP:    Lab Results   Component Value Date     12/12/2020    K 4.6 12/12/2020    K 4.7 12/10/2020    CL 92 12/12/2020    CO2 21 12/12/2020    BUN 53 12/12/2020    LABALBU 1.9 12/12/2020    LABALBU 1.9 12/12/2020    CREATININE 2.4 12/12/2020    CALCIUM 7.9 12/12/2020    GFRAA 25 12/12/2020    GFRAA >60 01/07/2012    LABGLOM 20 12/12/2020    GLUCOSE 135 12/12/2020       Assessment/     - Acute kidney injury/ATN in setting of Cardiac arrest, septic shock              SCr 2.7 on admission, previously 1.0 from Nov 2020, non-oliguric     - Acute hypoxic respiratory failure/COVID19 PNA - on ventilator, steroids, antibiotics     - Septic shock/Strep pyogenes bacteremia - plans per Critical Care     - Acute blood loss anemia - prn prbc's     - Hyponatremia              SNa 126 on admission, as low as 121, last at 124, previously 142 from Nov 2020        Plan/     - Continue current IVF  - Continue pressors to maintain MAP of 65  - Monitor labs, bp's and urine output

## 2020-12-13 NOTE — PROGRESS NOTES
Patient updates given to Marcus Hanson, [facility]. Patient agitated following dressing change this morning, requiring PRN sedation and increase in Propofol. Patient resting comfortably at present. Propofol at 30mcg/kg/min, Levophed at 3mcg/min, and Vasopressin at 0.04u/min. Care transferred.

## 2020-12-13 NOTE — PROGRESS NOTES
Pulmonary & Critical Care Medicine ICU Progress Note    CC: Fatigue, anemia, PEA arrest in emergency department    Events of Last 24 hours:   Rewarmed  Seen by cardiology and nephrology  Concern for decorticate posturing yesterday, none today but she is sedated    Invasive Lines: Right IJ CVC 12/10/2020    MV: 12/10/2020  Vent Mode: AC/VC Rate Set: 30 bmp/Vt Ordered: 340 mL/ /FiO2 : 30 %  Recent Labs     20  1115 20  0400   PHART 7.353 7. 393   [id number] 36.6 32.3*   PO2ART 77.1 95.1       IV:   IV infusion builder 12 mL/hr at 20 0200    norepinephrine 3 mcg/min (20 0005)    IV infusion builder Stopped (20 0346)    propofol 30 mcg/kg/min (20 0551)    dextrose         Vitals:  Blood pressure 105/77, pulse 56, temperature 98.4 °F (36.9 °C), temperature source Bladder, resp. rate 15, height 5' 4\" (1.626 m), weight 254 lb 13.6 oz (115.6 kg), SpO2 99 %, not currently breastfeeding. on 30%  Temp  Av.5 °F (36.9 °C)  Min: 98.3 °F (36.8 °C)  Max: 98.7 °F (37.1 °C)    Intake/Output Summary (Last 24 hours) at 2020 0656  Last data filed at 2020 0551  Gross per 24 hour   Intake 2296 ml   Output 875 ml   Net 1421 ml     EXAM:  General: intubated, ill appearing    ENT: Pharynx with ETT. Resp: No crackles. No wheezing. CV: S1, S2. +edema  GI: NT, ND, +BS  Skin: Warm and dry. Neuro: PERRL. No response to painful stimulus supraorbital notch or nailbed pressure.    Normal oculocephalic, +gag, +spontaneous breathing    Scheduled Meds:   vancomycin (VANCOCIN) intermittent dosing (placeholder)   Other RX Placeholder    cefTRIAXone (ROCEPHIN) 2 g IVPB in NS 50ml minibag  2 g Intravenous Q24H    insulin lispro  0-6 Units Subcutaneous Q4H    insulin lispro  0-3 Units Subcutaneous Nightly    miconazole   Topical BID    sodium chloride flush  10 mL Intravenous 2 times per day    carboxymethylcellulose PF  1 drop Both Eyes Q4H    And    artificial tears   Both Eyes Q4H  chlorhexidine  15 mL Mouth/Throat BID    pantoprazole  40 mg Intravenous BID    mupirocin   Nasal BID    dexamethasone  6 mg Intravenous Daily     PRN Meds:  perflutren lipid microspheres, acetaminophen **OR** acetaminophen, sodium chloride flush, polyethylene glycol, promethazine **OR** ondansetron, fentanNYL, albuterol sulfate HFA **AND** ipratropium **AND** MDI Treatment, promethazine **OR** ondansetron, midazolam, ipratropium-albuterol, glucose, dextrose, glucagon (rDNA), dextrose    Results:  CBC:   Recent Labs     12/12/20  1430 12/12/20 2009 12/13/20  0200   WBC 16.6* 18.2* 14.4*   HGB 8.2* 8.3* 7.9*   HCT 25.7* 25.9* 24.8*   MCV 87.6 86.9 88.5   * 126* 114*     BMP:   Recent Labs     12/12/20  1430 12/12/20 2009 12/13/20  0200   * 125* 131*   K 4.6 4.6 4.6   CL 91* 93* 100   CO2 20* 20* 20*   PHOS 7.7* 7.6* 7.0*   BUN 55* 54* 57*   CREATININE 2.6* 2.4* 2.3*     LIVER PROFILE:   Recent Labs     12/10/20  2000 12/10/20  2000 12/11/20  1810 12/12/20  0200 12/12/20  0855   *   < > 123* 125* 133*   ALT 93*   < > 96* 106* 112*   LIPASE 9.0*  --   --   --   --    BILIDIR 3.2*   < > 1.0* 0.7* 0.6*   BILITOT 3.5*   < > 1.1* 0.9 0.8   ALKPHOS 133*   < > 97 99 105    < > = values in this interval not displayed.        Cultures:  12/10/2020 SARS-CoV-2 positive  12/10/2020 blood strep pyogenes  12/10/2020 blood gram-positive rods    Films:  CXR 12/12/2020 stable lines and tubes    Head CT 12/11/2020 no acute abnormality    ASSESSMENT:  · Cardiac Arrest/PEA: unclear etiology - total compression time 18 minutes over 4 separate episodes with intermittent ROSC  · Strep pyogenes bacteremia  · GPR in blood - favor contaminant  · Septic shock  · Acute hypoxemic respiratory failure   · LLL pneumonia  · COVID-19 pneumonia   · Acute anemia s/p 2 U PRBC  · MIGUELANGEL  · Hypo-natremia has worsened  · Stage IV decubitus ulcer     PLAN:  COVID-19 isolation, droplet plus Mechanical ventilation as per my orders. The ventilator was adjusted by me at the bedside for unstable, life threatening respiratory failure. Fentanyl PRN pain; Versed PRN agitation. Attempt to avoid sedation as able  Titrating vasopressors for MAP 65  S/P TTM, started rewarming 12/11/20 at 1600  No Remdesevir given low GFR  No CCP as we cannot be certain transfusion reaction not part of current pathology   Antibiotic D#4; CTX D#3, Vanc was added 12/13/20 by nocturnist for GPR in blood  Decadron 6 mg IV daily, D#4  2% saline per nephrology, avoid free water    Echo per cardiology  Start TF   Prophylaxis: DVT - SCD; MRSA - mupirocin; GI - on protonix BID  · NOK is son - Corine Farnsworth. I called and discussed the patient's care with her son today. We discussed that neurologically she has not shown significant improvement and that she is not responding appropriately to painful stimuli or to commands. I indicated to him that we will continue to evaluate her over the next 24 to 48 hours to see if we notice any improvement. Total critical care time caring for this patient with life threatening, unstable organ failure, including direct patient contact, management of life support systems, review of data including imaging and labs, discussions with other team members and physicians at least 39 minutes so far today, excluding procedures.

## 2020-12-13 NOTE — PROGRESS NOTES
12/12/20 2318   Vent Information   Vent Type 840   Vent Mode AC/VC   Vt Ordered 340 mL   Rate Set 30 bmp   Peak Flow 80 L/min   FiO2  30 %   SpO2 96 %   SpO2/FiO2 ratio 320   Sensitivity 3   PEEP/CPAP 5   Vent Patient Data   Peak Inspiratory Pressure 36 cmH2O   Mean Airway Pressure 12 cmH20   Rate Measured 30 br/min   Vt Exhaled 355 mL   Minute Volume 10.7 Liters   I:E Ratio 1:3.3   Plateau Pressure 24 [id number]   Cough/Sputum   Sputum How Obtained Suctioned;Endotracheal   Cough Non-productive   Spontaneous Breathing Trial (SBT) RT Doc   Pulse 59   Breath Sounds   Right Upper Lobe Diminished   Right Middle Lobe Diminished   Right Lower Lobe Diminished   Left Upper Lobe Diminished   Left Lower Lobe Diminished   Additional Respiratory  Assessments   Resp (!) 59   Position Semi-Mooney's   Oral Care Completed? Yes   Oral Care Mouth suctioned   Subglottic Suction Done? Yes   Alarm Settings   High Pressure Alarm 45 cmH2O   Low Minute Volume Alarm 4 L/min   Apnea (secs) 20 secs   High Respiratory Rate 40 br/min   Low Exhaled Vt  250 mL   ETT (adult)   Placement Date/Time: 12/10/20 1400   Timeout: Patient  Preoxygenation: Yes  Mask Ventilation: Ventilated by mask (1)  Technique: Direct laryngoscopy  Type: Cuffed  Tube Size: 7.5 mm  Location: Oral  Insertion attempts: 1  Placement Verified By[de-identified] Chest...    Secured at 24 cm   Measured From [address] [place] [address],[address] By [employer]   Site Condition Dry

## 2020-12-13 NOTE — PROGRESS NOTES
Dr Carlota Aguilera on unit to round. Earlier neuro assessment findings reviewed along with sedation and ventilation events. Will maintain Propofol IV drip for sedation, currently on Propofol IV drip aat 10 mcg/kg/min. Opens eyes intermittently, turned head slightly to L side earlier, not in response to stimuli;   small amount of movement noted in Right toes. Continuing to monitor closely for neuro status changes.

## 2020-12-13 NOTE — PROGRESS NOTES
Tele reviewed. No events. EKG stable. PEA arrest in setting COVID-19. Rewarming with plan to reassess neurologic status on weaning sedation. Cardiology will sign off. Should she make recovery, can consider starting eliquis back if no ongoing blood loss/underlying process of anemia precluding restart. Would require follow up for AF/SVT here at [facility]. Heart institute. Should SVT recur, see recommendations from 12/12/20 documentation and contact cardiology for re-evaluation.      Denise Somers MD   Cardiovascular Disease  [facility]  [phone] [facility]  [phone] [address] [place] [address]

## 2020-12-13 NOTE — PROGRESS NOTES
Status and neuro changes reviewed with Dr Riya Sánchez on unit. Handoff report to RONAK ORO RN for transfer of care.

## 2020-12-13 NOTE — PROGRESS NOTES
1115- Propofol IV sedation stopped for neuro reassessment. 1125-Became agitated, with high peak airway pressures and coughing, asynchronous with the vent. TV increased to 420, PAP alarm to 55 per [place] RT. Suctioned for moderate cloudy white secretions per ETT. Continued to be agitated RASS +3.    Neuro:  Patient only responds to tactile stimulation, does not follow commands. Eyes open with  equal downward ocular drift bilaterally and then equal upward ocular drift bilaterally. Eyes do not track movement. No startle reaction could be produced. PERRLA 3+/3+. No posturing movement assessed. Vasopressin IV drip stopped MAP > 65. HR 80s. 1145- Propofol restarted at 10 mcg/kg/min at bedside due to continuous vent alarming for high peak airway pressuring alarm with RASS +3 due to coughing. Will review events and sedation/vent changes with Dr Beltran Dupont.

## 2020-12-13 NOTE — PROGRESS NOTES
On vent support, no purposeful movement. Responds to tactile stimuli only. PERRLA  Has downward and upward ocular drift bilaterally equal. Negative startle response. Pupillary response intact. Continuing to require vent support, propofol IV drip decreased for SAT assessment. RT here, no voluntary respirations noted on brief SBT attempt. All ICU monitoring leads in place, artic sun pads in place for normothermic therapy.

## 2020-12-13 NOTE — PROGRESS NOTES
Patient care assumed, assessment competed as charted. Patient continues on ventilator, #7.5 at the 24 lip line. A/C 30, , PEEP 5, FiO2 30%. Right IJ CVC in place with Levophed infusing at 5mcg/min, Vasopressin at 0.04u/min, Propofol at 25mcg/kg/min, and 2% Saline at 60mL/hr. Mercedes catheter patent, OG in place to LIWS. No further needs assessed at this time. Will monitor.

## 2020-12-13 NOTE — PROGRESS NOTES
Admit: 12/10/2020    Name:  Kiana Sawyer  Room:  [facility]-  MRN:    [id number]    Critical Care Daily Progress Note for 2020     Admitted after cardiac arrest in ER    Interval History:           She is now on propofol and versed prn  On levo and vaso  Strep in blood culture  Continues to be on the vent      Finished rewarming  Wakes up with asynchrony with the vent  On levo and vaso      Rewarmed   Concern for decorticate posturing  Off levo  Weaning vaso    Scheduled Meds:   vancomycin (VANCOCIN) intermittent dosing (placeholder)   Other RX Placeholder    cefTRIAXone (ROCEPHIN) 2 g IVPB in NS 50ml minibag  2 g Intravenous Q24H    insulin lispro  0-6 Units Subcutaneous Q4H    insulin lispro  0-3 Units Subcutaneous Nightly    miconazole   Topical BID    sodium chloride flush  10 mL Intravenous 2 times per day    carboxymethylcellulose PF  1 drop Both Eyes Q4H    And    artificial tears   Both Eyes Q4H    chlorhexidine  15 mL Mouth/Throat BID    pantoprazole  40 mg Intravenous BID    mupirocin   Nasal BID    dexamethasone  6 mg Intravenous Daily       Continuous Infusions:   IV infusion builder 12 mL/hr at 20 0200    norepinephrine Stopped (20 0800)    IV infusion builder Stopped (20 0346)    propofol 10 mcg/kg/min (20 0945)    dextrose         PRN Meds:  perflutren lipid microspheres, acetaminophen **OR** acetaminophen, sodium chloride flush, polyethylene glycol, promethazine **OR** ondansetron, fentanNYL, albuterol sulfate HFA **AND** ipratropium **AND** MDI Treatment, promethazine **OR** ondansetron, midazolam, ipratropium-albuterol, glucose, dextrose, glucagon (rDNA), dextrose                  Objective:     Temp  Av.6 °F (37 °C)  Min: 98.4 °F (36.9 °C)  Max: 98.7 °F (37.1 °C)  Pulse  Av.2  Min: 49  Max: 71  BP  Min: 97/57  Max: 144/77  SpO2  Av.4 %  Min: 95 %  Max: 99 %  FiO2   Av %  Min: 30 %  Max: 30 % Patient Vitals for the past 4 hrs:   BP Pulse Resp SpO2   12/13/20 0900 124/70 (!) 49 30 97 %   12/13/20 0800 (!) 144/77 53 30 97 %   12/13/20 0700 138/77 52 30 97 %   12/13/20 0600 105/77 56 15 99 %         Intake/Output Summary (Last 24 hours) at 12/13/2020 3117  Last data filed at 12/13/2020 0757  Gross per 24 hour   Intake 2326 ml   Output 825 ml   Net 1501 ml       Physical Exam:  Gen: Intubated  Eyes: Pupils are fixed and dilated. No sclera icterus. No conjunctival injection. ENT: ETT  Neck: Trachea midline. Normal thyroid. Resp: No accessory muscle use. No crackles. No wheezes. No rhonchi. No dullness on percussion. CV: Regular rate. Regular rhythm. No murmur or rub. No edema. GI: Non-tender. Non-distended. No masses. No organomegaly. Normal bowel sounds. No hernia. Skin: Warm and dry. No nodule on exposed extremities. No rash on exposed extremities. Lymph: No cervical LAD. No supraclavicular LAD. M/S: No cyanosis. No joint deformity. No clubbing. Neuro: Unresponsive. has eyes open,blinks,does not track however, dolls eye present,not following commands,moves extremities per nurse    Lab Data:  CBC:   Recent Labs     12/12/20 2009 12/13/20 0200 12/13/20  0810   WBC 18.2* 14.4* 12.9*   RBC 2.98* 2.81* 2.89*   HGB 8.3* 7.9* 8.0*   HCT 25.9* 24.8* 25.3*   MCV 86.9 88.5 87.6   RDW 19.9* 19.9* 19.7*   * 114* 111*     BMP:   Recent Labs     12/12/20 2009 12/13/20 0200 12/13/20  0810   * 131* 132*   K 4.6 4.6 4.6   CL 93* 100 100   CO2 20* 20* 20*   PHOS 7.6* 7.0* 7.0*   BUN 54* 57* 60*   CREATININE 2.4* 2.3* 2.2*     BNP: No results for input(s): BNP in the last 72 hours.   PT/INR:   Recent Labs     12/11/20  1810 12/12/20  0600 12/12/20  1758   PROTIME 27.8* 24.6* 19.3*   INR 2.38* 2.10* 1.66*     APTT:  Recent Labs     12/11/20  0210   APTT 32.8     CARDIAC ENZYMES:   Recent Labs     12/10/20  2000 12/11/20  0830 12/11/20  1500   TROPONINI <0.01 <0.01 <0.01     FASTING LIPID PANEL: Lab Results   Component Value Date    CHOL 125 11/02/2015    HDL 39 11/02/2015    TRIG 110 11/02/2015     LIVER PROFILE:   Recent Labs     12/11/20  1810 12/12/20  0200 12/12/20  0855   * 125* 133*   ALT 96* 106* 112*   BILIDIR 1.0* 0.7* 0.6*   BILITOT 1.1* 0.9 0.8   ALKPHOS 97 99 105       Chest imaging was reviewed by me   Persistent multifocal bilateral airspace disease, slightly increased on the   left and slightly decreased on the right.  Overall very similar appearance   over the past 4 weeks. CT head   No acute intracranial abnormality.      Assessment & Plan:     Patient Active Problem List    Diagnosis Date Noted    Chest pain 01/08/2015     Priority: High    Shock ([address]     Bacteremia     Cardiac arrest ([address] 12/10/2020    Anemia requiring transfusions     Pneumonia due to COVID-19 virus     HCAP (healthcare-associated pneumonia)     Pneumonia due to organism 11/08/2020    PAF (paroxysmal atrial fibrillation) ([address] 11/08/2020    Pleural effusion     Atrial fibrillation with RVR (HCC)     Acute hypoxemic respiratory failure (HCC) 11/07/2020    Diarrhea     Chemotherapy-induced neutropenia (HCC)     Acute renal injury ([address] 07/27/2019    SOB (shortness of breath) 12/07/2016    Primary cancer of right breast with metastasis to other site [facility]) 12/07/2016    Anxiety 12/07/2016    DM2 (diabetes mellitus, type 2) ([address] 12/07/2016    GERD (gastroesophageal reflux disease) 12/07/2016    Hypertension, uncontrolled 12/07/2016    Morbid obesity due to excess calories ([address] 12/07/2016    Acute lateral meniscus tear of left knee 02/11/2016    Left knee pain 02/11/2016    Cellulitis and abscess of leg 01/06/2012

## 2020-12-13 NOTE — PROGRESS NOTES
12/12/20 1922   Vent Information   $Ventilation $Subsequent Day   Vent Type 840   Vent Mode AC/VC   Vt Ordered 340 mL   Rate Set 30 bmp   Peak Flow 80 L/min   FiO2  30 %   SpO2 96 %   SpO2/FiO2 ratio 320   Sensitivity 3   PEEP/CPAP 5   Vent Patient Data   Peak Inspiratory Pressure 37 cmH2O   Mean Airway Pressure 12 cmH20   Rate Measured 30 br/min   Vt Exhaled 356 mL   Minute Volume 10.7 Liters   I:E Ratio 1:3.3   Plateau Pressure 22 [id number]   Static Compliance 21 mL/cmH2O   Dynamic Compliance 11 mL/cmH2O   Cough/Sputum   Sputum How Obtained Suctioned;Endotracheal   Cough Non-productive   Spontaneous Breathing Trial (SBT) RT Doc   Pulse 61   Additional Respiratory  Assessments   Resp (!) 34   Position Semi-Mooney's   Oral Care Completed? Yes   Oral Care Mouth suctioned   Subglottic Suction Done? Yes   Alarm Settings   High Pressure Alarm 45 cmH2O   Low Minute Volume Alarm 4 L/min   Apnea (secs) 20 secs   High Respiratory Rate 40 br/min   Low Exhaled Vt  250 mL   ETT (adult)   Placement Date/Time: 12/10/20 1400   Timeout: Patient  Preoxygenation: Yes  Mask Ventilation: Ventilated by mask (1)  Technique: Direct laryngoscopy  Type: Cuffed  Tube Size: 7.5 mm  Location: Oral  Insertion attempts: 1  Placement Verified By[de-identified] Chest...    Secured at 24 cm   Measured From Lips   ET Placement Right   Secured By [employer] tube [employer]   Site Condition Dry

## 2020-12-13 NOTE — PROGRESS NOTES
Estimated Creatinine Clearance: 30 mL/min (A) (based on SCr of 2.4 mg/dL (H)). Goal Trough Level: 15-18 mcg/mL    Assessment/Plan:  Will initiate Vancomycin with a one time loading dose of 1250mg mg x1, followed by pulse dosing per levels. Thank you for the consult. Will continue to follow. Elisabeth Johnson Pharm. D. 12/13/2020 2:09 AM

## 2020-12-13 NOTE — PROGRESS NOTES
Department of Internal Medicine  Nephrology Progress Note        SUBJECTIVE:    We are following this patient for MIGUELANGEL. Chart and pertinent findings were reviewed. There were no acute events noted overnight. ROS: No fever or chills. Social: No family at bedside. Physical Exam:    VITALS:  /65   Pulse 57   Temp 98.4 °F (36.9 °C) (Bladder)   Resp (!) 34   Ht 5' 4\" (1.626 m)   Wt 254 lb 13.6 oz (115.6 kg)   SpO2 97%   BMI 43.75 kg/m²     The patient is COVID-19 positive; examination was deferred to avoid unnecessary exposure.     DATA:    CBC:   Lab Results   Component Value Date    WBC 12.9 12/13/2020    RBC 2.89 12/13/2020    HGB 8.0 12/13/2020    HCT 25.3 12/13/2020    MCV 87.6 12/13/2020    MCH 27.9 12/13/2020    MCHC 31.8 12/13/2020    RDW 19.7 12/13/2020     12/13/2020    MPV 8.2 12/13/2020     BMP:    Lab Results   Component Value Date     12/13/2020    K 4.6 12/13/2020    K 4.7 12/10/2020     12/13/2020    CO2 20 12/13/2020    BUN 60 12/13/2020    LABALBU 2.1 12/13/2020    CREATININE 2.2 12/13/2020    CALCIUM 8.2 12/13/2020    GFRAA 27 12/13/2020    GFRAA >60 01/07/2012    LABGLOM 23 12/13/2020    GLUCOSE 143 12/13/2020       Assessment/     - Acute kidney injury/ATN in setting of Cardiac arrest, septic shock              SCr 2.7 on admission, previously 1.0 from Nov 2020, non-oliguric     - Acute hypoxic respiratory failure/COVID19 PNA - on ventilator, steroids, antibiotics     - Septic shock/Strep pyogenes bacteremia - plans per Critical Care     - Acute blood loss anemia - prn prbc's     - Hyponatremia              SNa 126 on admission, as low as 121, last at 124, previously 142 from Nov 2020        Plan/     - Discontinue IVF  - Continue pressors to maintain MAP of 65  - Monitor labs, bp's and urine output

## 2020-12-14 NOTE — PROGRESS NOTES
Admit: 12/10/2020    Name:  Randy Amaya  Room:  [id number]  MRN:    [id number]    Critical Care Daily Progress Note for 12/14/2020     Admitted after cardiac arrest in ER    Interval History:     12/11  She is now on propofol and versed prn  On levo and vaso  Strep in blood culture  Continues to be on the vent    12/12  Finished rewarming  Wakes up with asynchrony with the vent  On levo and vaso    12/13  Rewarmed  Concern for decorticate posturing  Off levo  Weaning vaso    12/14  Patient on mechanical ventilation, s/p TTM, off of pressors.    renal function improved, continued on IV fluids   currently on vent settings FiO2 30% PEEP of 5   SBT today    Scheduled Meds:   Sodium Hypochlorite   Irrigation BID    vancomycin (VANCOCIN) intermittent dosing (placeholder)   Other RX Placeholder    cefTRIAXone (ROCEPHIN) 2 g IVPB in NS 50ml minibag  2 g Intravenous Q24H    insulin lispro  0-6 Units Subcutaneous Q4H    insulin lispro  0-3 Units Subcutaneous Nightly    miconazole   Topical BID    sodium chloride flush  10 mL Intravenous 2 times per day    carboxymethylcellulose PF  1 drop Both Eyes Q4H    And    artificial tears   Both Eyes Q4H    chlorhexidine  15 mL Mouth/Throat BID    pantoprazole  40 mg Intravenous BID    mupirocin   Nasal BID    dexamethasone  6 mg Intravenous Daily       Continuous Infusions:   sodium chloride 10 mL/hr at 12/13/20 1208    IV infusion builder Stopped (12/13/20 1115)    norepinephrine Stopped (12/13/20 0800)    propofol 40 mcg/kg/min (12/14/20 1503)    dextrose         PRN Meds:  perflutren lipid microspheres, acetaminophen **OR** acetaminophen, sodium chloride flush, polyethylene glycol, promethazine **OR** ondansetron, fentanNYL, albuterol sulfate HFA **AND** ipratropium **AND** MDI Treatment, promethazine **OR** ondansetron, midazolam, ipratropium-albuterol, glucose, dextrose, glucagon (rDNA), dextrose          Objective: * 125* 133*   ALT 96* 106* 112*   BILIDIR 1.0* 0.7* 0.6*   BILITOT 1.1* 0.9 0.8   ALKPHOS 97 99 105        Radiology  XR CHEST PORTABLE   Final Result   Perihilar and lower zone ground-glass opacities relatively stable. XR CHEST PORTABLE   Final Result   No significant change in appearance of bilateral multifocal parenchymal   disease when compared to the study of 12/12/2020 as described above. XR CHEST PORTABLE   Final Result   Low volume study with persistent diffuse bilateral airspace disease, slightly   increased on the right as compared to prior. Persistent small loculated left   pleural effusion. XR CHEST PORTABLE   Final Result   Stable multifocal pneumonia and small loculated left pleural effusion         CT HEAD WO CONTRAST   Final Result   No acute intracranial abnormality. XR CHEST PORTABLE   Final Result   The endotracheal tube has been retracted with tip now at the level the   clavicular heads 5 cm above the [place]. Persistent diffuse bilateral airspace disease consistent with atypical viral   pneumonia. XR CHEST PORTABLE   Final Result   Endotracheal tube is noted at the level of the [place]. Recommend retracting   the tube 2-3 cm more proximally. Enteric tube and right IJ CVC appear to be in satisfactory position. No significant interval change in the bilateral airspace disease. XR CHEST PORTABLE   Final Result   Persistent multifocal bilateral airspace disease, slightly increased on the   left and slightly decreased on the right. Overall very similar appearance   over the past 4 weeks.          XR CHEST PORTABLE    (Results Pending)   XR CHEST PORTABLE    (Results Pending)         Assessment & Plan:     Patient Active Problem List    Diagnosis Date Noted    Chest pain 01/08/2015     Priority: High    Shock ([address]     Bacteremia     Cardiac arrest ([address] 12/10/2020    Anemia requiring transfusions  Pneumonia due to COVID-19 virus     HCAP (healthcare-associated pneumonia)     Pneumonia due to organism 11/08/2020    PAF (paroxysmal atrial fibrillation) (HCC) 11/08/2020    Pleural effusion     Atrial fibrillation with RVR (HCC)     Acute hypoxemic respiratory failure (HCC) 11/07/2020    Diarrhea     Chemotherapy-induced neutropenia (HCC)     [facility] renal injury ([facility] 07/27/2019    SOB (shortness of breath) 12/07/2016    Primary cancer of right breast with metastasis to other site [facility]) 12/07/2016    Anxiety 12/07/2016    DM2 (diabetes mellitus, type 2) ([facility] 12/07/2016    GERD (gastroesophageal reflux disease) 12/07/2016    Hypertension, uncontrolled 12/07/2016    [facility] 12/07/2016    Acute lateral meniscus tear of left knee 02/11/2016    Left knee pain 02/11/2016    Cellulitis and abscess of leg 01/06/2012       # Cardiac arrest of unclear etiology. - Currently on multiple pressors including epi, levo and vaso. --> Weaned off of pressors now. -S/p targeted temperature management. - will need ECHO. COVID +ve     #Acute hypoxic respiratory failure. -  Intubated and started on mechanical ventilation.    -Critical care consulted. -S/p targeted temperature management , rewarming completed . -Spontaneous breathing trials today    # COVID-19 pneumonia. Not a candidate for remdesivir given elevated creatinine. No CCP as it is not certain if the transfusion reaction was the cause of current condition - cardiac arrest in ER. Started Decadron 6 mg  IV daily, cont day 4     #Septic shock   Strep bacteremia  -Due to pneumonia , versus infected sacral decubitus ulcers   -continue empiric cefepime and IV vancomycin. Antibiotics changed to Rocephin given strep in  blood cultures.  -Weaned off of pressors   -Blood cultures positive for Streptococcus and diphtheroids .  -Continue vancomycin and Rocephin.     #Sacral decubitus ulcers -she has stage 4 wounds in sacrum likely source sepsis. -Seen by wound care   General surgery consulted for debridement     # Acute anemia.    -Discontinue Eliquis  - status post transfusion of 2 units of packed cells. -  GI consult. Protonix twice daily.    -Follow H&H every 6 hours     # Hypertension.   - Home medications are held .     #Type 2 diabetes. -Was on insulin drip. Now on SSI    # MIGUELANGEL. -2 to hypotension and septic shock. -nephro consult. -MIGUELANGEL resolved with IV fluids. # Hyponatremia. severe. nephro consult. S/P  2 % NS. Now off IVF     # hypocalcemia. - replaced     #H/o breast cancer  - on Ibrance      SCDs for DVT prophylaxis. Protonix for GI prophylaxis.       Dia Vallejo MD  12/14/2020

## 2020-12-14 NOTE — PROGRESS NOTES
Pharmacy Vancomycin Consult     Vancomycin Day: 2  Current Dosing: Pulse dosing per levels    Temp max:  98.7    Recent Labs     12/13/20  0810 12/14/20  0400   BUN 60* 60*       Recent Labs     12/13/20  0810 12/14/20  0400   CREATININE 2.2* 2.2*       Recent Labs     12/13/20  0810 12/14/20  0400   WBC 12.9* 8.7         Intake/Output Summary (Last 24 hours) at 12/14/2020 0552  Last data filed at 12/14/2020 0400  Gross per 24 hour   Intake 507 ml   Output 1375 ml   Net -868 ml         Ht Readings from Last 1 Encounters:   12/10/20 5' 4\" (1.626 m)        Wt Readings from Last 1 Encounters:   12/11/20 254 lb 13.6 oz (115.6 kg)         Body mass index is 43.75 kg/m². Estimated Creatinine Clearance: 33 mL/min (A) (based on SCr of 2.2 mg/dL (H)). Trough: 17    Assessment/Plan:  Give vancomycin 500 mg IVPB today and recheck vancomycin level with AM labs 12/15/20. Saman Marquez, Pharm. D. 12/14/2020 5:53 AM

## 2020-12-14 NOTE — CONSULTS
[facility] Nurse  Consult Note       NAME:  Jennifer Parr  MEDICAL RECORD NUMBER:  [id number]  AGE: 56 y.o. GENDER: female  : 1960  TODAY'S DATE:  2020    Subjective Pt sedated, intubated in ICU setting   Reason for WOCN Evaluation and Assessment: stage 4 pressure injury, present on admission      Jennifer Parr is a 56 y.o. female referred by:   [x] Physician  [x] Nursing  [] Other:     Wound Identification:  Wound Type: pressure  Contributing Factors: chronic pressure, decreased mobility and shear force    Pt seen for wound care to the sacrum. Pt has present on admission Stage 4 pressure injury. Pt admitted from [facility].  +COVID.       Patient Goal of Care:  [] Wound Healing  [] Odor Control  [] Palliative Care  [] Pain Control   [] Other:         PAST MEDICAL HISTORY        Diagnosis Date    AC (acromioclavicular) joint bone spurs     knees    Atrial fibrillation (HCC)     Cancer (HCC)     stage 4 breast    Cellulitis     [facility]ive [facility]     COVID-19 12/10/2020    Depression     anxiety    Diabetes mellitus ([facility]     Hypertension        PAST SURGICAL HISTORY    Past Surgical History:   Procedure Laterality Date     SECTION      CHOLECYSTECTOMY      COLONOSCOPY  2017    polyps    HYSTERECTOMY      HYSTERECTOMY      KNEE SURGERY Right     SIGMOIDOSCOPY  2019    SIGMOIDOSCOPY N/A 2019    SIGMOIDOSCOPY BIOPSY FLEXIBLE performed by Elle Dewey DO at [address]    Family History   Problem Relation Age of Onset    Heart Disease Maternal Grandmother     Kidney Disease Mother     Cancer Maternal Aunt        SOCIAL HISTORY    Social History     Tobacco Use    Smoking status: Never Smoker    Smokeless tobacco: Never Used   Substance Use Topics    Alcohol use: No    Drug use: No       ALLERGIES    Allergies   Allergen Reactions    Sucralfate Hives and Nausea Only Other reaction(s): Irritates stomach    Ampicillin Rash    Ampicillin-Sulbactam Sodium Rash    Duloxetine Hcl Rash    Sulfamethoxazole-Trimethoprim Rash       MEDICATIONS    No current facility-administered medications on file prior to encounter. Current Outpatient Medications on File Prior to Encounter   Medication Sig Dispense Refill    apixaban (ELIQUIS) 5 MG TABS tablet Take 1 tablet by mouth 2 times daily 60 tablet 2    dilTIAZem (CARDIZEM CD) 180 MG extended release capsule Take 1 capsule by mouth 2 times daily 30 capsule 3    omeprazole (PRILOSEC) 40 MG delayed release capsule Take 40 mg by mouth daily      hydrALAZINE (APRESOLINE) 50 MG tablet Take 1 tablet by mouth 3 times daily 90 tablet 0    furosemide (LASIX) 20 MG tablet Take 1 tablet by mouth as needed (edema) 60 tablet 3    ondansetron (ZOFRAN) 8 MG tablet Take 1 tablet by mouth every 8 hours as needed for Nausea 10 tablet 0    LANTUS SOLOSTAR 100 UNIT/ML injection pen Inject 10 Units as directed nightly Has not taken oit recently after loosing 50 lbs  5    calcium carbonate (OSCAL) 500 MG TABS tablet Take 500 mg by mouth daily      ferrous sulfate 325 (65 FE) MG EC tablet Take 325 mg by mouth 3 times daily (with meals)      palbociclib (IBRANCE) 100 MG capsule Take 100 mg by mouth daily Is off right now while she is sick      carvedilol (COREG) 25 MG tablet Take 25 mg by mouth 2 times daily (with meals)      citalopram (CELEXA) 20 MG tablet Take 40 mg by mouth daily.         Balsam [place]-Castor Oil (VENELEX) OINT ointment Apply topically 2 times daily 30 g 0    fulvestrant (FASLODEX) 250 MG/5ML SOLN IM injection Inject 500 mg into the muscle once         Objective    /77   Pulse 73   Temp 98.7 °F (37.1 °C) (Bladder)   Resp 28   Ht 5' 4\" (1.626 m)   Wt 255 lb 4.7 oz (115.8 kg)   SpO2 99%   BMI 43.82 kg/m²     LABS:  WBC:    Lab Results   Component Value Date    WBC 8.7 12/14/2020     H/H:    Lab Results Component Value Date    HGB 7.5 12/14/2020    HCT 22.9 12/14/2020     PTT:    Lab Results   Component Value Date    APTT 32.8 12/11/2020   [APTT}  PT/INR:    Lab Results   Component Value Date    PROTIME 19.3 12/12/2020    INR 1.66 12/12/2020     HgBA1c:    Lab Results   Component Value Date    LABA1C 4.7 11/08/2020       Assessment   Rigoberto Risk Score: Rigoberto Scale Score: 11    Patient Active Problem List   Diagnosis Code    Cellulitis and abscess of leg L03.119, L02.419    Chest pain R07.9    Acute lateral meniscus tear of left knee S83.282A    Left knee pain M25.562    SOB (shortness of breath) R06.02    Primary cancer of right breast with metastasis to other site ([facility] C50.911    Anxiety F41.9    DM2 (diabetes mellitus, type 2) ([facility]) E11.9    GERD (gastroesophageal reflux disease) K21.9    Hypertension, uncontrolled I10    Morbid obesity due to excess calories ([facility]) E66.01    Acute renal injury ([facility] N17.9    Chemotherapy-induced neutropenia ([facility]) D70.1, T45.1X5A    Diarrhea R19.7    Acute hypoxemic respiratory failure ([facility]) J96.01    Pneumonia due to organism J18.9    PAF (paroxysmal atrial fibrillation) ([facility]) I48.0    Pleural effusion J90    Atrial fibrillation with RVR ([facility]) I48.91    HCAP (healthcare-associated pneumonia) J18.9    Cardiac arrest ([facility]) I46.9    Anemia requiring transfusions D64.9    Pneumonia due to COVID-19 virus U07.1, J12.89    Shock ([facility]) R57.9    Bacteremia R78.81     sacrum:  6x2. 9x2.3cm, 6 o'clock=3cm, +odor, 100% black/gray nonviable tissue. Bone palpated in base. Surrounding skin with full thickness skin loss on left buttock.         Measurements:  Wound 07/27/19 Abdomen (Active)   Number of days: 506       Wound 07/27/19 Abdomen (Active)   Number of days: 506       Wound 11/12/20 Buttocks Left (Active)   Wound Image   11/17/20 1334   Wound Etiology Deep tissue/Injury 11/15/20 2051   Dressing Status Intact 11/18/20 0901 Wound Cleansed Cleansed with saline 11/15/20 2051   Dressing/Treatment Foam 11/18/20 0901   Wound Assessment Pink/red;Purple/maroon 11/16/20 2200   Drainage Amount None 11/16/20 2200   Number of days: 31       Wound 12/10/20 Sacrum Stage 4 wound (Active)   Dressing Status Intact 12/14/20 0800   Wound Cleansed Cleansed with saline;Irrigated with saline 12/13/20 0551   Dressing/Treatment Alginate with Ag;Foam;Packing 12/13/20 0551   Wound Length (cm) 4.4 cm 12/10/20 1749   Wound Width (cm) 3.2 cm 12/10/20 1749   Wound Depth (cm) 4.5 cm 12/10/20 1749   Wound Surface Area (cm^2) 14.08 cm^2 12/10/20 1749   Wound Volume (cm^3) 63.36 cm^3 12/10/20 1749   Wound Assessment Denuded;Eschar moist;Pink/red;Bleeding 12/13/20 0551   Odor Malodorous/putrid 12/13/20 0551   Christina-wound Assessment Ecchymosis;Fragile; Hyperpigmented 12/13/20 0551   Number of days: 3         Plan  General surgery consult-discussed with Dr Cantu. Sacrum:  Cleanse wound with 1/4 strength Dakin's solution, pat dry. Fill depth and undermining of wound with 1/4 strength Dakins soaked roll gauze and then cover with dry gauze, secure with soft cloth tape. Change twice daily. Plan of Care: Wound 12/10/20 Sacrum Stage 4 wound-Dressing/Treatment: Alginate with Ag, Foam, Packing    Specialty Bed Required : Yes   [x] Low Air Loss   [] Pressure Redistribution  [] Fluid Immersion  [] Bariatric  [] Total Pressure Relief  [] Other:     Current Diet: DIET TUBE FEED CONTINUOUS/CYCLIC NPO; Low Calorie High Protein (Vital High-Protein);  Orogastric; Continuous; 15; 15; 20  Dietician consult:  Yes    Discharge Plan:  Placement for patient upon discharge: skilled nursing    Patient appropriate for Outpatient [address] [facility]: Yes    Referrals:  [x]   [] [facility]  [] Supplies  [] Other    Patient/Caregiver Teaching:  Level of patient/caregiver understanding able to:   [] Indicates understanding       [] Needs reinforcement [] Unsuccessful      [] Verbal Understanding  [] Demonstrated understanding       [] No evidence of learning  [] Refused teaching         [x] N/A       Electronically signed by Praveen Guillen RN, Gauri Levi on 12/14/2020 at 3:58 PM

## 2020-12-14 NOTE — CARE COORDINATION
Pt in ICU and remains on Ventilator. C-19 positive. Pt from [facility] and per son would like EGS at d/c. Referral sent to [facility] with [facility] on 12/11/20 and CM left  for Gaby r/t referral. CM following.

## 2020-12-14 NOTE — PROGRESS NOTES
Pulmonary & Critical Care Medicine ICU Progress Note    CC: Fatigue, anemia, PEA arrest in emergency department    Events of Last 24 hours:   Breathes over went;  her feet  Propofol 10    Invasive Lines: Right IJ CVC 12/10/2020    MV: 12/10/2020  Vent Mode: AC/VC Rate Set: 30 bmp/Vt Ordered: 340 mL/ /FiO2 : 30 %  Recent Labs     20  0400 20  0400   PHART 7.393 7.450   TFL5LEH 32.3* 29.7*   PO2ART 95.1 84.8       IV:   sodium chloride 10 mL/hr at 20 1208    IV infusion builder Stopped (20 1115)    norepinephrine Stopped (20 0800)    propofol 10 mcg/kg/min (20 2310)    dextrose         Vitals:  Blood pressure 135/67, pulse 54, temperature 98.7 °F (37.1 °C), temperature source Bladder, resp. rate 30, height 5' 4\" (1.626 m), weight 254 lb 13.6 oz (115.6 kg), SpO2 96 %, not currently breastfeeding. on 30%  Temp  Av.7 °F (37.1 °C)  Min: 98.6 °F (37 °C)  Max: 98.7 °F (37.1 °C)    Intake/Output Summary (Last 24 hours) at 2020 0727  Last data filed at 2020 0550  Gross per 24 hour   Intake 528 ml   Output 1550 ml   Net -1022 ml     EXAM:  General: intubated, ill appearing    ENT: Pharynx with ETT. Resp: No crackles. No wheezing. CV: S1, S2. +edema  GI: NT, ND, +BS  Skin: Warm and dry. Neuro: PERRL. No response to painful stimulus supraorbital notch or nailbed pressure.    Normal oculocephalic, +gag, +spontaneous breathing    Scheduled Meds:   vancomycin (VANCOCIN) 500 mg in 100 mL NS IVPB (mini-bag)  500 mg Intravenous Once    vancomycin (VANCOCIN) intermittent dosing (placeholder)   Other RX Placeholder    cefTRIAXone (ROCEPHIN) 2 g IVPB in NS 50ml minibag  2 g Intravenous Q24H    insulin lispro  0-6 Units Subcutaneous Q4H    insulin lispro  0-3 Units Subcutaneous Nightly    miconazole   Topical BID    sodium chloride flush  10 mL Intravenous 2 times per day    carboxymethylcellulose PF  1 drop Both Eyes Q4H    And  artificial tears   Both Eyes Q4H    chlorhexidine  15 mL Mouth/Throat BID    pantoprazole  40 mg Intravenous BID    mupirocin   Nasal BID    dexamethasone  6 mg Intravenous Daily     PRN Meds:  perflutren lipid microspheres, acetaminophen **OR** acetaminophen, sodium chloride flush, polyethylene glycol, promethazine **OR** ondansetron, fentanNYL, albuterol sulfate HFA **AND** ipratropium **AND** MDI Treatment, promethazine **OR** ondansetron, midazolam, ipratropium-albuterol, glucose, dextrose, glucagon (rDNA), dextrose    Results:  CBC:   Recent Labs     12/13/20  0200 12/13/20  0810 12/14/20  0400   WBC 14.4* 12.9* 8.7   HGB 7.9* 8.0* 7.5*   HCT 24.8* 25.3* 22.9*   MCV 88.5 87.6 88.4   * 111* 85*     BMP:   Recent Labs     12/13/20  0200 12/13/20  0810 12/14/20  0400   * 132* 130*   K 4.6 4.6 4.0    100 99   CO2 20* 20* 22   PHOS 7.0* 7.0* 5.7*   BUN 57* 60* 60*   CREATININE 2.3* 2.2* 2.2*     LIVER PROFILE:   Recent Labs     12/11/20  1810 12/12/20  0200 12/12/20  0855   * 125* 133*   ALT 96* 106* 112*   BILIDIR 1.0* 0.7* 0.6*   BILITOT 1.1* 0.9 0.8   ALKPHOS 97 99 105       Cultures:  12/10/2020 SARS-CoV-2 positive  12/10/2020 blood strep pyogenes  12/10/2020 blood gram-positive rods    Films:  CXR 12/12/2020 stable lines and tubes    Head CT 12/11/2020 no acute abnormality    ASSESSMENT:  · Cardiac Arrest/PEA: unclear etiology - total compression time 18 minutes over 4 separate episodes with intermittent ROSC  · GPR in blood - favor contaminant  · Septic shock  · Acute hypoxemic respiratory failure   · Acute encephalopathy  · LLL pneumonia  · COVID-19 pneumonia   · Acute anemia s/p 2 U PRBC  · MIGUELANGEL  · Stage IV decubitus ulcer     PLAN:  COVID-19 isolation, droplet plus  Mechanical ventilation as per my orders. The ventilator was adjusted by me at the bedside for unstable, life threatening respiratory failure.   Propofol gtt

## 2020-12-14 NOTE — PROGRESS NOTES
12/13/20 1911   Vent Information   $Ventilation $Subsequent Day   Vent Type 840   Vent Mode AC/VC   Vt Ordered 340 mL   Rate Set 30 bmp   Peak Flow 57 L/min   FiO2  30 %   SpO2 96 %   SpO2/FiO2 ratio 320   Sensitivity 3   PEEP/CPAP 5   Humidification Source HME   Vent Patient Data   Peak Inspiratory Pressure 30 cmH2O   Mean Airway Pressure 13 cmH20   Rate Measured 30 br/min   Vt Exhaled 353 mL   Minute Volume 10.6 Liters   I:E Ratio 1:2   Plateau Pressure 23 [id number]   Static Compliance 20 mL/cmH2O   Dynamic Compliance 14 mL/cmH2O   Cough/Sputum   Sputum How Obtained Suctioned;Endotracheal   Cough Productive   Sputum Amount Moderate   Sputum Color Creamy   Tenacity Thick   Spontaneous Breathing Trial (SBT) RT Doc   Pulse 61   Breath Sounds   Right Upper Lobe Diminished   Right Middle Lobe Diminished   Right Lower Lobe Diminished   Left Upper Lobe Diminished   Left Lower Lobe Diminished   Additional Respiratory  Assessments   Resp 30   Position Semi-Mooney's   Oral Care Completed? Yes   Oral Care Mouth suctioned   Subglottic Suction Done? Yes   Cuff Pressure (cm H2O)   (MOV)   Alarm Settings   High Pressure Alarm 45 cmH2O   Low Minute Volume Alarm 4 L/min   Apnea (secs) 20 secs   High Respiratory Rate 40 br/min   Low Exhaled Vt  250 mL   ETT (adult)   Placement Date/Time: 12/10/20 1400   Timeout: Patient  Preoxygenation: Yes  Mask Ventilation: Ventilated by mask (1)  Technique: Direct laryngoscopy  Type: Cuffed  Tube Size: 7.5 mm  Location: Oral  Insertion attempts: 1  Placement Verified By[de-identified] Chest...    Secured at 24 cm   Measured From Lips   ET Placement Right   Secured By [employer] tube brothers   Site Condition Dry

## 2020-12-14 NOTE — PLAN OF CARE
Problem: Airway Clearance - Ineffective  Goal: Achieve or maintain patent airway  Outcome: Ongoing     Problem: Gas Exchange - Impaired  Goal: Absence of hypoxia  Outcome: Ongoing  Goal: Promote optimal lung function  Outcome: Ongoing     Problem: Breathing Pattern - Ineffective  Goal: Ability to achieve and maintain a regular respiratory rate  Outcome: Ongoing     Problem: Body Temperature -  Risk of, Imbalanced  Goal: Ability to maintain a body temperature within defined limits  Outcome: Ongoing  Goal: Will regain or maintain usual level of consciousness  Outcome: Ongoing  Goal: Complications related to the disease process, condition or treatment will be avoided or minimized  Outcome: Ongoing     Problem: Isolation Precautions - Risk of Spread of Infection  Goal: Prevent transmission of infection  Outcome: Ongoing     Problem: Nutrition Deficits  Goal: Optimize nutrtional status  Outcome: Ongoing     Problem: Risk for Fluid Volume Deficit  Goal: Maintain normal heart rhythm  Outcome: Ongoing  Goal: Maintain absence of muscle cramping  Outcome: Ongoing  Goal: Maintain normal serum potassium, sodium, calcium, phosphorus, and pH  Outcome: Ongoing     Problem: Loneliness or Risk for Loneliness  Goal: Demonstrate positive use of time alone when socialization is not possible  Outcome: Ongoing     Problem: Fatigue  Goal: Verbalize increase energy and improved vitality  Outcome: Ongoing     Problem: Patient Education: Go to Patient Education Activity  Goal: Patient/Family Education  Outcome: Ongoing     Problem: Falls - Risk of:  Goal: Will remain free from falls  Description: Will remain free from falls  Outcome: Ongoing  Note: Fall precautions in place.    Goal: Absence of physical injury  Description: Absence of physical injury  Outcome: Ongoing     Problem: Skin Integrity:  Goal: Will show no infection signs and symptoms  Description: Will show no infection signs and symptoms  Outcome: Ongoing Note: Patient turned/repositioned every 2 hours and as needed to maintain and improve skin integrity.     Goal: Absence of new skin breakdown  Description: Absence of new skin breakdown  Outcome: Ongoing     Problem: Nutrition  Goal: Optimal nutrition therapy  Outcome: Ongoing  Goal: Understanding of nutritional guidelines  Outcome: Ongoing

## 2020-12-14 NOTE — PROGRESS NOTES
12/14/20 0310   Vent Information   Vent Type 840   Vent Mode AC/VC   Vt Ordered 340 mL   Rate Set 30 bmp   Peak Flow 57 L/min   FiO2  30 %   SpO2 96 %   SpO2/FiO2 ratio 320   Sensitivity 3   PEEP/CPAP 5   Vent Patient Data   Peak Inspiratory Pressure 31 cmH2O   Mean Airway Pressure 13 cmH20   Rate Measured 30 br/min   Vt Exhaled 357 mL   Minute Volume 10.7 Liters   I:E Ratio 1:2.0   Plateau Pressure 23 [id number]   Cough/Sputum   Sputum How Obtained Suctioned;Endotracheal   Cough Productive   Sputum Amount Moderate   Sputum Color Creamy   Tenacity Thick   Spontaneous Breathing Trial (SBT) RT Doc   Pulse 56   Breath Sounds   Right Upper Lobe Diminished   Right Middle Lobe Diminished   Right Lower Lobe Diminished   Left Upper Lobe Diminished   Left Lower Lobe Diminished   Additional Respiratory  Assessments   Resp 30   Position Semi-Mooney's   Oral Care Completed? Yes   Oral Care Mouth suctioned   Subglottic Suction Done? Yes   Alarm Settings   High Pressure Alarm 45 cmH2O   Low Minute Volume Alarm 4 L/min   Apnea (secs) 20 secs   High Respiratory Rate 40 br/min   Low Exhaled Vt  250 mL   ETT (adult)   Placement Date/Time: 12/10/20 1400   Timeout: Patient  Preoxygenation: Yes  Mask Ventilation: Ventilated by mask (1)  Technique: Direct laryngoscopy  Type: Cuffed  Tube Size: 7.5 mm  Location: Oral  Insertion attempts: 1  Placement Verified By[de-identified] Chest...    Secured at 24 cm   Measured From Lips   ET Placement Left   Secured By Commercial tube brothers   Site Condition Dry

## 2020-12-14 NOTE — PROGRESS NOTES
Patient care assumed, assessment competed as charted. Patient continues on ventilator, #7.5 at the 24 lip line. A/C 30, , PEEP 5, FiO2 30%. Right IJ CVC in place with Propofol at 10mcg/kg/min. Mercedes catheter patent, OG in place to LIWS. Patient opens eyes intermittently, does not track staff, small movements noted to all extremities. No further needs assessed at this time. Will monitor.

## 2020-12-14 NOTE — PROGRESS NOTES
12/13/20 2328   Vent Information   Vent Type 840   Vent Mode AC/VC   Vt Ordered 340 mL   Rate Set 30 bmp   Peak Flow 57 L/min   FiO2  30 %   SpO2 96 %   SpO2/FiO2 ratio 320   Sensitivity 3   PEEP/CPAP 5   Humidification Source HME   Vent Patient Data   Peak Inspiratory Pressure 30 cmH2O   Mean Airway Pressure 13 cmH20   Rate Measured 30 br/min   Vt Exhaled 354 mL   Minute Volume 10.6 Liters   I:E Ratio 1:2.0   Plateau Pressure 28 [id number]   Cough/Sputum   Sputum How Obtained Suctioned;Endotracheal   Cough Non-productive   Spontaneous Breathing Trial (SBT) RT Doc   Pulse 54   Breath Sounds   Right Upper Lobe Diminished   Right Middle Lobe Diminished   Right Lower Lobe Diminished   Left Upper Lobe Diminished   Left Lower Lobe Diminished   Additional Respiratory  Assessments   Resp 26   Position Semi-Mooney's   Oral Care Completed? Yes   Oral Care Mouth suctioned   Subglottic Suction Done? Yes   Alarm Settings   High Pressure Alarm 45 cmH2O   Low Minute Volume Alarm 4 L/min   Apnea (secs) 20 secs   High Respiratory Rate 40 br/min   Low Exhaled Vt  250 mL   ETT (adult)   Placement Date/Time: 12/10/20 1400   Timeout: Patient  Preoxygenation: Yes  Mask Ventilation: Ventilated by mask (1)  Technique: Direct laryngoscopy  Type: Cuffed  Tube Size: 7.5 mm  Location: Oral  Insertion attempts: 1  Placement Verified By[de-identified] Chest...    Secured at 24 cm   Measured From [address] [place] [address],[address] By [employer] tube brothers   Site Condition Dry

## 2020-12-14 NOTE — PROGRESS NOTES
[facility]       Progress Note           Subjective/   56y.o. year old female who we are seeing in consultation for MIGUELANGEL. HPI:  Renal function remains steady with SCr ~2, urine output of 1.5 liters over last 24 hours. Bp's better, off pressors. ROS:  Remains on ventilator, no fevers. Objective/   GEN:  Chronically ill, BP (!) 152/61   Pulse 66   Temp 98.7 °F (37.1 °C) (Bladder)   Resp 30   Ht 5' 4\" (1.626 m)   Wt 254 lb 13.6 oz (115.6 kg)   SpO2 96%   BMI 43.75 kg/m²   Exam deferred as means to protect PPE due to shortage & due to CORONAVIRUS risk. Data/  Recent Labs     12/13/20  0200 12/13/20  0810 12/14/20  0400   WBC 14.4* 12.9* 8.7   HGB 7.9* 8.0* 7.5*   HCT 24.8* 25.3* 22.9*   MCV 88.5 87.6 88.4   * 111* 85*     Recent Labs     12/11/20  1500 12/11/20  1500 12/11/20 2000 12/11/20 2000 12/13/20  0200 12/13/20  0810 12/14/20  0400   *  --  120*   < > 131* 132* 130*   K 4.0   < > 4.0   < > 4.6 4.6 4.0   CL 88*  --  88*   < > 100 100 99   CO2 20*  --  20*   < > 20* 20* 22   GLUCOSE 140*  --  153*   < > 147* 143* 102*   PHOS 6.7*  --  7.2*   < > 7.0* 7.0* 5.7*   MG 2.10  --  2.10  --   --   --   --    BUN 49*  --  52*   < > 57* 60* 60*   CREATININE 2.3*  --  2.4*   < > 2.3* 2.2* 2.2*   LABGLOM 21*  --  20*   < > 21* 23* 23*   GFRAA 26*  --  25*   < > 26* 27* 27*    < > = values in this interval not displayed.        Assessment/     - Acute kidney injury/ATN in setting of Cardiac arrest, septic shock              SCr 2.7 on admission, previously 1.0 from Nov 2020, non-oliguric   UA bland, Urine sodium 85, Urine chloride 87     - Acute hypoxic respiratory failure/COVID19 PNA - on ventilator, steroids, antibiotics     - Septic shock/Strep pyogenes bacteremia - plans per Critical Care     - Acute blood loss anemia - prn prbc's     - Hyponatremia              SNa 126 on admission, as low as 121, last at 130, previously 142 from Nov 2020 Better with course of concentrated saline       Plan/     - Monitoring off of IVF's  - Monitor labs, bp's, & urine output    Case d/w ICU Nursing

## 2020-12-15 NOTE — PROGRESS NOTES
12/14/20 1938   Vent Information   Vent Mode AC/VC   Vt Ordered 340 mL   Rate Set 28 bmp   Peak Flow 60 L/min   FiO2  40 %   SpO2 99 %   Sensitivity 3   PEEP/CPAP 5   Vent Patient Data   Peak Inspiratory Pressure 29 cmH2O   Mean Airway Pressure 12 cmH20   Rate Measured 28 br/min   Vt Exhaled 355 mL   Minute Volume 9.93 Liters   I:E Ratio 1:2.5   Plateau Pressure 20 [id number]   Static Compliance 24 mL/cmH2O   Dynamic Compliance 15 mL/cmH2O   Cough/Sputum   Cough Productive   Sputum Amount Small   Sputum Color Creamy   Tenacity Thick

## 2020-12-15 NOTE — FLOWSHEET NOTE
12/15/20 1600   Vital Signs   Temp 98.2 °F (36.8 °C)   Temp Source Oral   Pulse 61   Heart Rate Source Monitor   Resp 26   /73   MAP (mmHg) 91   Level of Consciousness Responds to Pain (2)   MEWS Score 2   Oxygen Therapy   SpO2 95 %   O2 Device Ventilator   FiO2  30 %   Pt reassessed, see doc flow. SB 57 on ICU bedside monitor. #7.5 ETT @ 24LL. Vent: AC RR:28 Vt: 340 FIO2: 30% PEEP 5 SPO2 96% with CSPO2 monitoring. RASS-4, PERRL. RIJ CVC WDL PIV x 1 WDL. Propofol gtt @ 55 mcg/kg/min. OG w/ TF @ 30 mcg/hr per MD order. Mercedes secured draining clear yellow urine. Pt repositioned for comfort.  Will continue to closely monitor

## 2020-12-15 NOTE — PROGRESS NOTES
Comprehensive Nutrition Assessment    Type and Reason for Visit:  Reassess    Nutrition Recommendations/Plan:   1. Start TF - Vital High-Protein with a low goal rate of 15 ml/hr x 20 hours + no free water flushes with TF. 2. Monitor when TF is started, rate, intake, and tolerance + when appropriate to increase goal rate. 3. Monitor vent status, sedation type/amount, and plan of care. 4. Monitor nutrition-related labs, bowel function, and weight trends + wound debridement. Nutrition Assessment:  patient remains unchanged from a nutritional standpoint since last RD assessment except that TF was ordered on 12/14/20 but unsure whether it was started and it is not infusing at this time because patient will have wound debridement today; she remains at risk for further compromise d/t stage IV decubitus ulcer, inadequate EN infusion, and altered nutrition-related labs; will continue Vital High-Protein at low rate of 15 ml/hr x 20 hours + no free water flushes    Malnutrition Assessment:  Malnutrition Status: At risk for malnutrition     Context:  Acute Illness     Findings of the 6 clinical characteristics of malnutrition:  Energy Intake:  7 - 50% or less of estimated energy requirements for 5 or more days  Weight Loss:  No significant weight loss     Body Fat Loss:  Unable to assess(COVID-19 +)     Muscle Mass Loss:  Unable to assess(COVID-19 +)    Fluid Accumulation:  No significant fluid accumulation     Strength:  Not Performed    Estimated Daily Nutrient Needs:  Energy (kcal):  928 - 1276 kcals based on 8-11 kcals/kg/CBW; Weight Used for Energy Requirements:  Current     Protein (g):  138 - 149 g protein based on 2.5-2.7 g/kg/IBW;  Weight Used for Protein Requirements:  Ideal        Fluid (ml/day):  928 - 1276 ml; Method Used for Fluid Requirements:  1 ml/kcal EMERGENCY DEPARTMENT HISTORY AND PHYSICAL EXAM 
     
 
Date: 11/5/2018 Patient Name: Krissy Gaines History of Presenting Illness Chief Complaint Patient presents with  Abscess  
  over left eyebrow with associated headache and left eye pain History Provided By: Patient HPI: Krissy Gaines is a 26 y.o. female, pmhx cystic acne, facial abscesses, MS, GERD, who presents ambulatory to the ED c/o swollen red area of skin that is  Tender to touch on the medial aspect of left eyebrow x 2-4 weeks. She was waiting for it to come to a head, but it hasn't. Pt states that it has been giving her a mild headache. LMP was last week. She specifically denies any recent fevers, chills, nausea, vomiting, diarrhea, abd pain, CP, urinary sxs, changes in BM, or headache. She denies history of diabetes, kidney disease, liver disease, thyroid disease PCP: Juan Pablo Scruggs MD 
 
There are no other complaints, changes, or physical findings at this time. Past History Past Medical History: 
Past Medical History:  
Diagnosis Date  Asthma  Asthma  Gastrointestinal disorder   
 reflux  Multiple sclerosis ([address] Past Surgical History: 
Past Surgical History:  
Procedure Laterality Date  HX GYN  10/2013  
 c section  HX HEENT    
 tonsils, adenoids, PE tubes Family History: 
Family History Problem Relation Age of Onset  Heart Disease Mother  Hypertension Mother  Diabetes Mother  Seizures Maternal Grandmother Social History: 
Social History Tobacco Use  Smoking status: Never Smoker  Smokeless tobacco: Never Used Substance Use Topics  Alcohol use: Yes Comment: Rare  Drug use: Not on file Allergies: Allergies Allergen Reactions  Sulfa (Sulfonamide Antibiotics) Hives Review of Systems Review of Systems Constitutional: Negative for activity change, appetite change, fatigue and fever. HENT: Negative for congestion, dental problem, ear pain, rhinorrhea and sinus pressure. Eyes: Negative for photophobia, pain, discharge, redness, itching and visual disturbance. Respiratory: Negative for cough, chest tightness, shortness of breath, wheezing and stridor. Cardiovascular: Negative for chest pain and leg swelling. Gastrointestinal: Negative for abdominal distention, abdominal pain and blood in stool. Genitourinary: Negative for difficulty urinating, dysuria, flank pain, frequency, vaginal bleeding, vaginal discharge and vaginal pain. Musculoskeletal: Negative for arthralgias, back pain, gait problem, joint swelling and neck pain. Skin: Positive for color change. Negative for rash and wound. Neurological: Positive for headaches. Negative for dizziness, syncope, weakness and numbness. Psychiatric/Behavioral: Negative for behavioral problems. The patient is not nervous/anxious. Physical Exam  
Physical Exam  
Constitutional: She is oriented to person, place, and time. She appears well-developed and well-nourished. No distress. HENT:  
Head: Normocephalic and atraumatic. Right Ear: External ear normal.  
Left Ear: External ear normal.  
Nose: Nose normal.  
Eyes: Conjunctivae and EOM are normal. Pupils are equal, round, and reactive to light. Right eye exhibits no discharge. Left eye exhibits no discharge. No scleral icterus. Neck: Normal range of motion. Neck supple. No tracheal deviation present. Cardiovascular: Normal rate, regular rhythm, normal heart sounds and intact distal pulses. Exam reveals no gallop and no friction rub. No murmur heard. Pulmonary/Chest: Effort normal and breath sounds normal. No respiratory distress. She has no wheezes. She has no rales. She exhibits no tenderness. Musculoskeletal: She exhibits no edema or tenderness. Lymphadenopathy:  
  She has no cervical adenopathy. Nutrition Related Findings:  patient remains intubated and was sedated on 30 mcg propofol earlier this am; + wound debridement scheduled for today; TF was ordered on 12/14/20 but unsure whether TF was started; patient is receiving decadron, low-dose SSI, protonix, levo, and NS at 10 ml/hr (Mary Lentz); BUN/Cr and Phos are elevated; GFR = 29; per progress notes, patient could wiggle her toes and blink when asked to do so by RN on 12/14/20; bowel sounds are hypoactive and no documented BM for this admission      Wounds:  Pressure Injury, Stage IV(on sacrum)       Current Nutrition Therapies:    Current Tube Feeding (TF) Orders:  · Feeding Route: Orogastric  · Formula: Low Calorie, High Protein  · Schedule: Continuous  · Additives/Modulars: (none)  · Water Flushes: no water flushes  · Current TF & Flush Orders Provides: unsure whether TF was started when ordered on 12/14/20 - TF should be started after wound debridement today, per notes  · Goal TF & Flush Orders Provides: Vital High-Protein with a goal rate of 45 ml/hr x 20 hours = 900 ml TV, 900 kcals, 79 g protein, and 752 ml free water + no water flushes      Anthropometric Measures:  · Height: 5' 4\" (162.6 cm)  · Current Body Weight: 254 lb 4.7 oz (115.3 kg)(obtained on 12/14/20)   · Admission Body Weight: 24 lb 13.6 oz (11.3 kg)(obtained on 12/11/20)    · Usual Body Weight: 217 lb 6.4 oz (98.6 kg)(obtained on 11/7/20; actual weight)     · Ideal Body Weight: 120 lbs; % Ideal Body Weight 211.9 %   · BMI: 43.6  · BMI Categories: Obese Class 3 (BMI 40.0 or greater)(43.82)       Nutrition Diagnosis:   · Inadequate oral intake related to inadequate protein-energy intake, impaired respiratory function as evidenced by NPO or clear liquid status due to medical condition, intubation      Nutrition Interventions:   Food and/or Nutrient Delivery:  Continue NPO, Continue Current Tube Feeding  Nutrition Education/Counseling:  No recommendation at this time Neurological: She is alert and oriented to person, place, and time. No cranial nerve deficit. Skin: Skin is warm and dry. No rash noted. There is erythema. 1cm area of raised indurated skin that has TTP medial aspect of left eyebrow. Psychiatric: She has a normal mood and affect. Her behavior is normal.  
Nursing note and vitals reviewed. Diagnostic Study Results Labs - No results found for this or any previous visit (from the past 12 hour(s)). Radiologic Studies - No orders to display CT Results  (Last 48 hours) None CXR Results  (Last 48 hours) None Medical Decision Making I am the first provider for this patient. I reviewed the vital signs, available nursing notes, past medical history, past surgical history, family history and social history. Vital Signs-Reviewed the patient's vital signs. Patient Vitals for the past 12 hrs: 
 Temp Pulse Resp BP SpO2  
11/05/18 1653 98.2 °F (36.8 °C) 92 18 106/66 100 % Records Reviewed: Nursing Notes and Old Medical Records Provider Notes (Medical Decision Making): DDx: sebaceous cyst, abscess. ED Course:  
Initial assessment performed. The patients presenting problems have been discussed, and they are in agreement with the care plan formulated and outlined with them. I have encouraged them to ask questions as they arise throughout their visit. PROGRESS NOTE: 
  
Procedure Note - Incision and Drainage:  
7:17 PM 
Performed by: Anjana Maier PA-C Complexity: complicated due to location Skin prepped with Chlorprep. Sterile field established. Anesthesia achieved with 0.25 mLs of Lidocaine 2% with epinephrine using a local infiltration. Abscess to face was incised with 18gauge needle - 0.5ml of thin pus aspirated then # 11 blade, and 1mLs of thick purulent drainage was expressed. Wound probed and irrigated. Sterile dressing applied. Estimated blood loss: 2mL The procedure took 1-15 minutes, and pt tolerated well. 
 
 
7:19 PM 
Pt noted to be feeling better, ready for discharge. Will follow up as instructed. All questions have been answered, pt voiced understanding and agreement with plan. Offered pain medicine, but pt deferred. She will use OTC meds. Abx were prescribed, pt advised that diarrhea and rash are possible side effects of the medications. Specific return precautions provided as well as instructions to return to the ED should sx worsen at any time. Vital signs stable for discharge. RENE Pa Disposition: 
 
DISCHARGE NOTE: 
7:20 PM 
The patient's results have been reviewed with family and/or caregiver. They verbally convey their understanding and agreement of the patient's signs, symptoms, diagnosis, treatment, and prognosis. They additionally agree to follow up as recommended in the discharge instructions or to return to the Emergency Room should the patient's condition change prior to their follow-up appointment. The family and/or caregiver verbally agrees with the care-plan and all of their questions have been answered. The discharge instructions have also been provided to the them along with educational information regarding the patient's diagnosis and a list of reasons why the patient would want to return to the ER prior to their follow-up appointment should their condition change. RENE Pa PLAN: 
1. Current Discharge Medication List  
  
START taking these medications Details  
doxycycline (MONODOX) 100 mg capsule Take 1 Cap by mouth two (2) times a day for 10 days. Qty: 20 Cap, Refills: 0  
  
  
 
2. Follow-up Information Follow up With Specialties Details Why Contact Info Moriah Rawls MD Internal Medicine In 2 days For wound re-check [address] [facility] [address] [facility] 
[phone] [facility] EMERGENCY DEPT Emergency Medicine  If symptoms worsen [address] [facility] [address] [facility] 
[phone] Return to ED if worse Diagnosis Clinical Impression: 1. Abscess of face This note will not be viewable in [address]

## 2020-12-15 NOTE — PROGRESS NOTES
perflutren lipid microspheres, acetaminophen **OR** acetaminophen, sodium chloride flush, polyethylene glycol, promethazine **OR** ondansetron, fentanNYL, albuterol sulfate HFA **AND** ipratropium **AND** MDI Treatment, promethazine **OR** ondansetron, midazolam, ipratropium-albuterol, glucose, dextrose, glucagon (rDNA), dextrose          Objective:     Temp  Av.3 °F (36.8 °C)  Min: 96.7 °F (35.9 °C)  Max: 98.8 °F (37.1 °C)  Pulse  Av.4  Min: 54  Max: 84  BP  Min: 114/76  Max: 141/71  SpO2  Av.1 %  Min: 95 %  Max: 100 %  FiO2   Av.8 %  Min: 30 %  Max: 40 %  Patient Vitals for the past 4 hrs:   BP Temp Temp src Pulse Resp SpO2   12/15/20 1800 (!) 141/71   55 28 97 %   12/15/20 1700 139/76   55 28 97 %   12/15/20 1616    60     12/15/20 1600 136/73 98.2 °F (36.8 °C) Oral 61 26 95 %   12/15/20 1541    69 22 98 %   12/15/20 1500 136/84   59 22 97 %         Intake/Output Summary (Last 24 hours) at 12/15/2020 1841  Last data filed at 12/15/2020 1831  Gross per 24 hour   Intake 1083 ml   Output 1075 ml   Net 8 ml       Physical Exam:  Patient seen and examined . She is in droplet plus precautions  Gen: Intubated, sedated, on mechanical ventilation  D/W ICU  RN . She does respond a little bit to painful stimuli when sedation decreased  See wound care notes, patient has stage IV decubitus ulcer-media pictures reviewed  Eyes: Pupils are reacting, equal. No sclera icterus. No conjunctival injection. ENT: ETT  Neck: Trachea midline. Normal thyroid. Resp: No accessory muscle use. No crackles. No wheezes. No rhonchi. No dullness on percussion. CV: Regular rate. Regular rhythm. No murmur or rub. No edema. GI: Non-tender. Non-distended. No masses. No organomegaly. Normal bowel sounds. No hernia. Skin: Warm and dry. No nodule on exposed extremities. No rash on exposed extremities. Sacral decubitus ulcer  Lymph: No cervical LAD. No supraclavicular LAD. M/S: No cyanosis. No joint deformity. No clubbing. Neuro: Sedated now . moves extremities per nurse, responds a little to painful stimuli when sedation decreased      Lab Data:  CBC:   Recent Labs     12/13/20  0810 12/14/20  0400 12/15/20  0500   WBC 12.9* 8.7 8.2   RBC 2.89* 2.59* 2.81*   HGB 8.0* 7.5* 7.9*   HCT 25.3* 22.9* 25.0*   MCV 87.6 88.4 88.9   RDW 19.7* 19.0* 19.2*   * 85* 81*     BMP:   Recent Labs     12/13/20  0810 12/14/20  0400 12/15/20  0500   * 130* 138   K 4.6 4.0 4.5    99 106   CO2 20* 22 21   PHOS 7.0* 5.7* 5.4*   BUN 60* 60* 55*   CREATININE 2.2* 2.2* 1.8*     BNP: No results for input(s): BNP in the last 72 hours. PT/INR:   No results for input(s): PROTIME, INR in the last 72 hours. APTT:  No results for input(s): APTT in the last 72 hours. CARDIAC ENZYMES:   No results for input(s): CKMB, CKMBINDEX, TROPONINI in the last 72 hours. Invalid input(s): CKTOTAL;3  FASTING LIPID PANEL:  Lab Results   Component Value Date    CHOL 125 11/02/2015    HDL 39 11/02/2015    TRIG 110 11/02/2015     LIVER PROFILE:   No results for input(s): AST, ALT, ALB, BILIDIR, BILITOT, ALKPHOS in the last 72 hours. Radiology  XR CHEST PORTABLE   Final Result   Slight improving ground-glass opacification on the right with persistent   ground-glass changes on the left. XR CHEST PORTABLE   Final Result   Perihilar and lower zone ground-glass opacities relatively stable. XR CHEST PORTABLE   Final Result   No significant change in appearance of bilateral multifocal parenchymal   disease when compared to the study of 12/12/2020 as described above. XR CHEST PORTABLE   Final Result   Low volume study with persistent diffuse bilateral airspace disease, slightly   increased on the right as compared to prior. Persistent small loculated left   pleural effusion.          XR CHEST PORTABLE   Final Result Stable multifocal pneumonia and small loculated left pleural effusion         CT HEAD WO CONTRAST   Final Result   No acute intracranial abnormality. XR CHEST PORTABLE   Final Result   The endotracheal tube has been retracted with tip now at the level the   clavicular heads 5 cm above the [place]. Persistent diffuse bilateral airspace disease consistent with atypical viral   pneumonia. XR CHEST PORTABLE   Final Result   Endotracheal tube is noted at the level of the [place]. Recommend retracting   the tube 2-3 cm more proximally. Enteric tube and right IJ CVC appear to be in satisfactory position. No significant interval change in the bilateral airspace disease. XR CHEST PORTABLE   Final Result   Persistent multifocal bilateral airspace disease, slightly increased on the   left and slightly decreased on the right. Overall very similar appearance   over the past 4 weeks. XR CHEST PORTABLE    (Results Pending)         Assessment & Plan:       # Cardiac arrest of unclear etiology. - Currently on multiple pressors including epi, levo and vaso. --> Weaned off of pressors now. -S/p targeted temperature management. - will need ECHO. COVID +ve     #Acute hypoxic respiratory failure. -  Intubated and placed  on mechanical ventilation.    -Critical care consulted. -S/p targeted temperature management , rewarming completed . -Spontaneous breathing trials today  Likely extubation in a.m., oxygen saturation stable on FiO2 30%    # COVID-19 pneumonia. Not a candidate for remdesivir given elevated creatinine. No CCP as it is not certain if the transfusion reaction was the cause of current condition - cardiac arrest in ER.     Started Decadron 6 mg  IV daily, cont day 5     #Septic shock   Strep bacteremia  -Likely due to infected sacral decubitus ulcers, possible pneumonia -Was on empiric cefepime and IV vancomycin. Antibiotics changed to Rocephin given strep in  blood cultures. Resume vancomycin and Flagyl for infected sacral decubitus ulcer  -Weaned off of pressors   -Blood cultures positive for Streptococcus and diphtheroids .  -Leukocytosis improving    #Sacral decubitus ulcers  -she has large extensive stage 4 wounds in sacrum likely source sepsis. -Seen by General surgery today ,  S/P debridement 12/15. Continue dressing changes, follow-up on wound cultures.  -Resume antibiotics vancomycin and Flagyl     # Acute anemia.    -Discontinue Eliquis  - status post transfusion of 2 units of packed cells. - GI consult. Protonix twice daily.    -Follow H&H every 6 hours     # Hypertension.   - Home medications are held .     #Type 2 diabetes. -Was on insulin drip. Now on SSI    # MIGUELANGEL. -2 to hypotension and septic shock. -nephro consult. -MIGUELANGEL resolved with IV fluids. # Hyponatremia. severe. nephro consult. S/P  2 % NS. Now off IVF     # Hypocalcemia. - replaced     #H/o breast cancer  - on Ibrance      SCDs for DVT prophylaxis. Protonix for GI prophylaxis. DIET TUBE FEED CONTINUOUS/CYCLIC NPO; Low Calorie High Protein (Vital High-Protein);  Orogastric; Continuous; 15; 15; 20  Full Code      Johnny Sánchez MD  12/15/2020

## 2020-12-15 NOTE — PROGRESS NOTES
Patient resting in bed, assessment competed as charted. Patient continues on ventilator, #7.5 at the 24 lip line. A/C 20, , PEEP 5, FiO2 40%. Right IJ CVC in place with Propofol at 30 mcg/kg/min. Mercedes catheter leaking-new replaced, OG in place, clamped. Pt only responding to painful stimuli. Pt tolerated full bath and bed change. Pt has multiple new skin breakdown due to cooling pads- pads removed. Insulin held. Left lung inspiratory wheezing; Right lung rhonchi. Will monitor.

## 2020-12-15 NOTE — PLAN OF CARE
Nutrition Problem #1: Inadequate oral intake  Intervention: Food and/or Nutrient Delivery: Continue NPO, Continue Current Tube Feeding  Nutritional Goals: patient will tolerate Vital High-Protein at 15 ml/hr x 20 hours without GI distress, without s/s of aspiration, and without additional lab/fluid dfisturbances

## 2020-12-15 NOTE — PROGRESS NOTES
Patient resting in bed, assessment competed as charted. Patient continues on ventilator, #7.5 at the 24 lip line. A/C 28, , PEEP 5, FiO2 40%. Right IJ CVC in place with Propofol at 35 mcg/kg/min. Mercedes catheter patent, OG in place, clamped. Pt only responding to painful stimuli. - Insulin held. Left lung inspiratory wheezing; Right lung rhonchi. Will monitor.

## 2020-12-15 NOTE — PROGRESS NOTES
Patient resting in bed, assessment competed as charted. Patient continues on ventilator, #7.5 at the 24 lip line. A/C 28, , PEEP 5, FiO2 40%. Right IJ CVC in place with Propofol at 40 mcg/kg/min. Mercedes catheter patent, OG in place, clamped. Pt only responding to painful stimuli. - Insulin held. Left lung inspiratory wheezing; Right lung rhonchi. Will monitor.

## 2020-12-15 NOTE — PROGRESS NOTES
[facility]       Progress Note           Subjective/   56y.o. year old female who we are seeing in consultation for MIGUELANGEL. HPI:  Renal function slowly improving, SCr under 2 now, urine output of 1 liter over last 24 hours. Bp's better, off pressors. ROS:  Remains on ventilator, no fevers. Objective/   GEN:  Chronically ill, /69   Pulse 62   Temp 98.8 °F (37.1 °C) (Bladder)   Resp 28   Ht 5' 4\" (1.626 m)   Wt 255 lb 4.7 oz (115.8 kg)   SpO2 99%   BMI 43.82 kg/m²   Exam deferred as means to protect PPE due to shortage & due to CORONAVIRUS risk.     Data/  Recent Labs     12/13/20  0810 12/14/20  0400 12/15/20  0500   WBC 12.9* 8.7 8.2   HGB 8.0* 7.5* 7.9*   HCT 25.3* 22.9* 25.0*   MCV 87.6 88.4 88.9   * 85* 81*     Recent Labs     12/13/20  0810 12/14/20  0400 12/15/20  0500   * 130* 138   K 4.6 4.0 4.5    99 106   CO2 20* 22 21   GLUCOSE 143* 102* 102*   PHOS 7.0* 5.7* 5.4*   BUN 60* 60* 55*   CREATININE 2.2* 2.2* 1.8*   LABGLOM 23* 23* 29*   GFRAA 27* 27* 34*       Assessment/     - Acute kidney injury/ATN in setting of Cardiac arrest, septic shock              SCr 2.7 on admission, previously 1.0 from Nov 2020, non-oliguric   UA bland, Urine sodium 85, Urine chloride 87     - Acute hypoxic respiratory failure/COVID19 PNA - on ventilator, steroids, antibiotics     - Septic shock/Strep pyogenes bacteremia - plans per Critical Care    - Cardiac arrest/PEA arrest     - Acute blood loss anemia - prn prbc's     - Hyponatremia              SNa 126 on admission, as low as 121, last at 130, previously 142 from Nov 2020   Better with course of concentrated saline    - Stage 4 decubitus ulcer       Plan/     - Monitoring off of IVF's  - Monitor labs, bp's, & urine output    Case d/w ICU Nursing

## 2020-12-15 NOTE — PROGRESS NOTES
Bedside debridement by Cecille Davis NP, phone consent from pt haris Vo [phone] witnessed by Cecille Davis NP and this RN placed in pt chart. Pt tolerated well. Pt given prn versed and fentanyl post s/t tachypnea RR now WDL. See NP note for wound documentation. Chyna updated on Dr. Ryan Grace request to manage antibiotics which end tomorrow if surgery would till like antibiotic coverage, NP to place orders.

## 2020-12-15 NOTE — PROGRESS NOTES
Dr. Michelle Stack for interdisciplinary rounds. All labs lines assessment POC and tentative plan for debridement today and RN unable to lighten sedation to goal RASS s/t tachypnea reviewed. Per MD pt to resume TF post debridement, pt to have one more day of antibiotics unless surgery would like to continue course. See new orders.

## 2020-12-15 NOTE — FLOWSHEET NOTE
12/15/20 0900   Vital Signs   Temp 98 °F (36.7 °C)   Temp Source Oral   Pulse 59   Heart Rate Source Monitor   Resp 22   /70   MAP (mmHg) 86   Level of Consciousness Responds to Pain (2)   MEWS Score 2   Oxygen Therapy   SpO2 99 %   O2 Device Ventilator   FiO2  30 %   Pt resting in bed, vitals per doc flow. Assessment complete see doc flow. SB 58 on ICU bedside monitor. #7.5 ETT @ 24LL. Vent: AC  RR:28  Vt: 340 FIO2: 30% PEEP 5 SPO2 98% with CSPO2 monitoring. RASS-3 to -4 however when RN attempted to lighten sedation to goal RASS RR 40's and pt double stacking . PERRL. RIJ CVC WDL  PIV x 1 WDL. Propofol gtt @ 35 mcg/kg/min. OG clamped  Mercedes secured draining clear yellow urine. Pt repositioned for comfort. Will continue to closely monitor.

## 2020-12-15 NOTE — PROGRESS NOTES
Pulmonary & Critical Care Medicine ICU Progress Note    CC: Fatigue, anemia, PEA arrest in emergency department    Events of Last 24 hours: responds to painful stim, tachy and hypoxic on SBT  Breathes over went; moves her feet  Propofol 30    Invasive Lines: Right IJ CVC 12/10/2020    MV: 12/10/2020  Vent Mode: AC/VC Rate Set: 28 bmp/Vt Ordered: 340 mL/ /FiO2 : 30 %  Recent Labs     20  0400 12/15/20  0500   PHART 7.450 7.419   [id number] 29.7* 33.4*   PO2ART 84.8 142.0*       IV:   sodium chloride      sodium chloride 10 mL/hr at 20 1208    IV infusion builder Stopped (20 1115)    norepinephrine Stopped (20 0800)    propofol 30 mcg/kg/min (12/15/20 0517)    dextrose         Vitals:  Blood pressure 120/69, pulse 62, temperature 98.8 °F (37.1 °C), temperature source Bladder, resp. rate 28, height 5' 4\" (1.626 m), weight 255 lb 4.7 oz (115.8 kg), SpO2 99 %, not currently breastfeeding. on 30%  Temp  Av.7 °F (37.1 °C)  Min: 98.2 °F (36.8 °C)  Max: 98.8 °F (37.1 °C)    Intake/Output Summary (Last 24 hours) at 12/15/2020 0811  Last data filed at 2020 2000  Gross per 24 hour   Intake 360 ml   Output 1025 ml   Net -665 ml     EXAM:  General: intubated, ill appearing    ENT: Pharynx with ETT. Resp: No crackles. No wheezing. CV: S1, S2. +edema  GI: NT, ND, +BS  Skin: Warm and dry. Neuro: PERRL. No response to painful stimulus supraorbital notch or nailbed pressure.    Normal oculocephalic, +gag, +spontaneous breathing    Scheduled Meds:   vancomycin  1,000 mg Intravenous Once    Sodium Hypochlorite   Irrigation BID    vancomycin (VANCOCIN) intermittent dosing (placeholder)   Other RX Placeholder    cefTRIAXone (ROCEPHIN) 2 g IVPB in NS 50ml minibag  2 g Intravenous Q24H    insulin lispro  0-6 Units Subcutaneous Q4H    insulin lispro  0-3 Units Subcutaneous Nightly    miconazole   Topical BID    sodium chloride flush  10 mL Intravenous 2 times per day  carboxymethylcellulose PF  1 drop Both Eyes Q4H    And    artificial tears   Both Eyes Q4H    chlorhexidine  15 mL Mouth/Throat BID    pantoprazole  40 mg Intravenous BID    mupirocin   Nasal BID    dexamethasone  6 mg Intravenous Daily     PRN Meds:  perflutren lipid microspheres, acetaminophen **OR** acetaminophen, sodium chloride flush, polyethylene glycol, promethazine **OR** ondansetron, fentanNYL, albuterol sulfate HFA **AND** ipratropium **AND** MDI Treatment, promethazine **OR** ondansetron, midazolam, ipratropium-albuterol, glucose, dextrose, glucagon (rDNA), dextrose    Results:  CBC:   Recent Labs     12/13/20  0810 12/14/20  0400 12/15/20  0500   WBC 12.9* 8.7 8.2   HGB 8.0* 7.5* 7.9*   HCT 25.3* 22.9* 25.0*   MCV 87.6 88.4 88.9   * 85* 81*     BMP:   Recent Labs     12/13/20  0810 12/14/20  0400 12/15/20  0500   * 130* 138   K 4.6 4.0 4.5    99 106   CO2 20* 22 21   PHOS 7.0* 5.7* 5.4*   BUN 60* 60* 55*   CREATININE 2.2* 2.2* 1.8*     LIVER PROFILE:   Recent Labs     12/12/20  0855   *   *   BILIDIR 0.6*   BILITOT 0.8   ALKPHOS 105       Cultures:  12/10/2020 SARS-CoV-2 positive  12/10/2020 blood strep pyogenes  12/10/2020 blood gram-positive rods    Films:  CXR 12/12/2020 stable lines and tubes    Head CT 12/11/2020 no acute abnormality    ASSESSMENT:  · Cardiac Arrest/PEA: unclear etiology - total compression time 18 minutes over 4 separate episodes with intermittent ROSC  · Strep pyogenes bacteremia - possibly wound source  · Septic shock  · Acute hypoxemic respiratory failure   · Acute encephalopathy  · LLL pneumonia  · COVID-19 pneumonia   · Acute anemia s/p 2 U PRBC  · MIGUELANGEL  · Stage IV decubitus ulcer     PLAN:  COVID-19 isolation, droplet plus  Mechanical ventilation as per my orders. The ventilator was adjusted by me at the bedside for unstable, life threatening respiratory failure.   Propofol gtt Fentanyl PRN pain; Versed PRN agitation. Attempt to avoid sedation as able  S/P TTM, started rewarming 12/11/20 at 1600  No Remdesevir given low GFR  Antibiotic D#6; CTX D#5  Decadron 6 mg IV daily, D#6 - start to taper  Nephro following  TF    Surgery consult for sacral wound - debridement planned today  Prophylaxis: DVT - SCD; MRSA - mupirocin; GI - on protonix BID  · NOK is son - [employer]. · Total critical care time caring for this patient with life threatening, unstable organ failure, including direct patient contact, management of life support systems, review of data including imaging and labs, discussions with other team members and physicians at least 33 minutes so far today, excluding procedures.

## 2020-12-15 NOTE — FLOWSHEET NOTE
12/15/20 1100   Vital Signs   Temp 98.3 °F (36.8 °C)   Temp Source Oral   Pulse 62   Resp 24   /76   MAP (mmHg) 92   Level of Consciousness Responds to Pain (2)   MEWS Score 2   Oxygen Therapy   SpO2 98 %   Pt reassessed, see doc flow. NSR 60 on ICU bedside monitor. #7.5 ETT @ 24LL. Vent: AC  RR:28  Vt: 340 FIO2: 30% PEEP 5 SPO2 98% with CSPO2 monitoring. RASS-3 to -4 , of note pt will move toes only to command, MD aware. PERRL. RIJ CVC WDL  PIV x 1 WDL. Propofol gtt @ 35 mcg/kg/min. OG clamped  Mercedes secured draining clear yellow urine. Pt repositioned for comfort. Will continue to closely monitor.

## 2020-12-15 NOTE — PROGRESS NOTES
Vancomycin Day: 3  Vanc random 15.2mcg/ml, Srcr 1.8  Vanc 1gm today, random in am  [place] DUMONT 12/15/21148:00 AM  .

## 2020-12-15 NOTE — CONSULTS
Surgery Consult Note     Chyna Hendricks, [facility]  Pt Name: Martha Duron  MRN: [id number]  YOB: 1960  Date of evaluation: 12/15/2020  Primary Care Physician: Usha Givens MD  Patient evaluated at the request of  Dr. Reese Bolton  Reason for evaluation: coccyx wound  IMPRESSIONS:   1. Coccyx wound 2 cm x 2.5 cm x 6.5 cm tunneling toward the rectum at 6 o'clock and a stage 4 with a thin layer of pale pink tissue over bone, foul odor, + thin brown drainage noted from tunnel, and slough in wound base nonviable tissue base as far as I can visualize, Surrounding skin is stage II to unstageable with measurement of 8 cm x 7 cm. 2. Leuks normal  3. ARF improvin.8  4. Ptls 81   5. VS: afebrile  6. PT is sedated on vent  7. + COVID  8. does not have any pertinent problems on file. PLANS:   1. On Rocephin has PCN allergy/sulf allergy  2. Dakins solution to packing daily. 3. Pt will need offloading q 2 hours from side to side. 4. Bedside debridement of eschar and non-viable tissue. All the risks, benefits and alternatives were discussed with the patient's son Cecilia Andrea over the phone and he agreed to proceed with the procedure. See procedure noted. SUBJECTIVE:   History of Chief Complaint:  History is from chart review. Pt is sedated on vent and son lives in [address] [place] [address] Street is a 56 y.o. female who presented from [facility] with reports of shortness of breath x 1 day. The pateint arrested int  ER. She was intubated. She was diagnosed with PNA 2/2 Covid-19. While in the ICU, the patient was noted to have a stage 4 coccyx wound. This was evaluated by the wound care RN who recommended surgical evaluation. Past Medical History   has a past medical history of AC (acromioclavicular) joint bone spurs, Atrial fibrillation ([address] Cancer ([address] Cellulitis, CHF (congestive heart failure) ([address] COVID-19, Depression, Diabetes mellitus ([address] and Hypertension. Past Surgical History   has a past surgical history that includes Hysterectomy; Cholecystectomy; knee surgery (Right); Hysterectomy;  section; Colonoscopy (2017); sigmoidoscopy (2019); and sigmoidoscopy (N/A, 2019). Medications  Prior to Admission medications    Medication Sig Start Date End Date Taking? Authorizing Provider   apixaban (ELIQUIS) 5 MG TABS tablet Take 1 tablet by mouth 2 times daily 20 Yes Israel Alberts MD   dilTIAZem (CARDIZEM CD) 180 MG extended release capsule Take 1 capsule by mouth 2 times daily 20  Yes Israel Alberts MD   omeprazole (PRILOSEC) 40 MG delayed release capsule Take 40 mg by mouth daily   Yes Historical Provider, MD   hydrALAZINE (APRESOLINE) 50 MG tablet Take 1 tablet by mouth 3 times daily 19  Yes Marcelina Solis MD   furosemide (LASIX) 20 MG tablet Take 1 tablet by mouth as needed (edema) 19  Yes Marcelina Solis MD   ondansetron (ZOFRAN) 8 MG tablet Take 1 tablet by mouth every 8 hours as needed for Nausea 19  Yes Manish Ho MD   LANCAROLEUS SOLOSTAR 100 UNIT/ML injection pen Inject 10 Units as directed nightly Has not taken oit recently after loosing 50 lbs 16  Yes Historical Provider, MD   calcium carbonate (OSCAL) 500 MG TABS tablet Take 500 mg by mouth daily   Yes Historical Provider, MD   ferrous sulfate 325 (65 FE) MG EC tablet Take 325 mg by mouth 3 times daily (with meals)   Yes Historical Provider, MD   palbociclib (IBRANCE) 100 MG capsule Take 100 mg by mouth daily Is off right now while she is sick   Yes Historical Provider, MD   carvedilol (COREG) 25 MG tablet Take 25 mg by mouth 2 times daily (with meals)   Yes Historical Provider, MD   citalopram (CELEXA) 20 MG tablet Take 40 mg by mouth daily.      Yes Historical Provider, MD [facility]) OINT ointment Apply topically 2 times daily 20   Israel Alberts MD VITALS:  height is 5' 4\" (1.626 m) and weight is 255 lb 4.7 oz (115.8 kg). Her oral temperature is 98.3 °F (36.8 °C). Her blood pressure is 134/75 and her pulse is 56. Her respiration is 28 and oxygen saturation is 97%. CONSTITUTIONAL: sedated on vent  SKIN: Skin color, texture, turgor normal. No rashes or lesions. , except coccysxas above  HEENT: Head is normocephalic, atraumatic. EOMI, PERRLA. NECK: supple, symmetrical, trachea midline. CHEST/LUNGS: chest symmetric with normal A/P diameter, normal respiratory rate and rhythm. No accessory muscle use. On Vent   CARDIOVASCULAR: Heart regular rate and rhythm  ABDOMEN: obese, soft, non-tender  RECTAL: deferred, not clinically indicated  NEUROLOGIC: sedated on vent. EXTREMITIES: no cyanosis and no clubbing. LABS:     Recent Labs     12/12/20  1758 12/12/20  1758 12/13/20  0810 12/14/20  0400 12/15/20  0500   WBC  --    < > 12.9* 8.7 8.2   HGB  --    < > 8.0* 7.5* 7.9*   HCT  --    < > 25.3* 22.9* 25.0*   PLT  --    < > 111* 85* 81*   NA  --    < > 132* 130* 138   K  --    < > 4.6 4.0 4.5   CL  --    < > 100 99 106   CO2  --    < > 20* 22 21   BUN  --    < > 60* 60* 55*   CREATININE  --    < > 2.2* 2.2* 1.8*   PHOS  --    < > 7.0* 5.7* 5.4*   CALCIUM  --    < > 8.2* 8.6 8.6   INR 1.66*  --   --   --   --     < > = values in this interval not displayed.      Recent Labs     12/15/20  0500   LABALBU 2.3*     CBC with Differential:    Lab Results   Component Value Date    WBC 8.2 12/15/2020    RBC 2.81 12/15/2020    HGB 7.9 12/15/2020    HCT 25.0 12/15/2020    PLT 81 12/15/2020    MCV 88.9 12/15/2020    MCH 28.2 12/15/2020    MCHC 31.7 12/15/2020    RDW 19.2 12/15/2020    SEGSPCT 66.7 01/07/2012    BANDSPCT 8 12/11/2020    LYMPHOPCT 1.4 12/15/2020    MONOPCT 2.7 12/15/2020    EOSPCT 2.1 01/07/2012    BASOPCT 1.1 12/15/2020    MONOSABS 0.2 12/15/2020    LYMPHSABS 0.1 12/15/2020    EOSABS 0.0 12/15/2020    BASOSABS 0.1 12/15/2020    DIFFTYPE Auto 01/07/2012

## 2020-12-15 NOTE — PROGRESS NOTES
[facility]. Care transferred at this time.

## 2020-12-15 NOTE — PROCEDURES
Procedure Note: bedside debridement of coccyx wound    Indication: Eschar, foul odor and slough in coccyx wound: see wound care note for picture. Wound in need of sharp excisional debridement    Time Out: A time-out was completed verifying consent was obtained, correct patient, procedure, site, positioning, and correct supplies were present. Procedure: . A number 15 blade scalpel was used to perform sharp excisional debridement of black eschar and non-viable tissue from the wound surface and wound base, there was a tunnel of 8 cm towards the rectum. A forcep and my finger were used to break up any potential collections. A moderate amount of foul smelling thin brown purulence was evacuated   A forcep and manual pressure was used to remove the remaining contents of the wound. The wound debrided was 8^cm. Following debridement, the wound was packed with gauze/dakins solution. The patient tolerated the procedure well. I will plan on changing the dressing tomorrow. Electronically signed by JASEN Hernández CNP on 12/15/2020 at 2:58 PM     Patient seen and examined. I agree with the assessment and plan from DEVIN Ramirez. Wound debrided. Continue local wound care.     [address] [facility]

## 2020-12-16 NOTE — PROGRESS NOTES
Pulmonary & Critical Care Medicine ICU Progress Note    CC: Fatigue, anemia, PEA arrest in emergency department    Events of Last 24 hours: New Afib RVR overnight  responds to painful stim, tachy and hypoxic on SBT  Breathes over went; moves her feet    Dilt gtt  Propofol 30    Invasive Lines: Right IJ CVC 12/10/2020    MV: 12/10/2020  Vent Mode: AC/VC Rate Set: 28 bmp/Vt Ordered: 340 mL/ /FiO2 : 30 %  Recent Labs     12/15/20  0500 20  0559   PHART 7.419 7. 411   VQG0FOY 33.4* 36.1   PO2ART 142.0* 82.2       IV:   midazolam 4 mg/hr (20 0805)    dilTIAZem (CARDIZEM) 125 mg in dextrose 5% 125 mL infusion 7 mg/hr (20 0802)    sodium chloride 10 mL/hr at 20 1208    IV infusion builder Stopped (20 1115)    norepinephrine Stopped (20 0800)    propofol 50 mcg/kg/min (20 0848)    dextrose         Vitals:  Blood pressure (!) 151/80, pulse 118, temperature 98.5 °F (36.9 °C), temperature source Axillary, resp. rate 20, height 5' 4\" (1.626 m), weight 255 lb 4.7 oz (115.8 kg), SpO2 90 %, not currently breastfeeding. on 30%  Temp  Av.4 °F (36.9 °C)  Min: 98.2 °F (36.8 °C)  Max: 98.5 °F (36.9 °C)    Intake/Output Summary (Last 24 hours) at 2020 0930  Last data filed at 2020 0600  Gross per 24 hour   Intake 1911 ml   Output 1200 ml   Net 711 ml     EXAM:  General: intubated, ill appearing    ENT: Pharynx with ETT. Resp: No crackles. No wheezing. CV: S1, S2. +edema  GI: NT, ND, +BS  Skin: Warm and dry. Neuro: PERRL. No response to painful stimulus supraorbital notch or nailbed pressure.    Normal oculocephalic, +gag, +spontaneous breathing    Scheduled Meds:   metroNIDAZOLE  500 mg Intravenous Q8H    vancomycin (VANCOCIN) intermittent dosing (placeholder)   Other RX Placeholder    dilTIAZem  30 mg Per NG tube 4 times per day    Sodium Hypochlorite   Irrigation BID    cefTRIAXone (ROCEPHIN) 2 g IVPB in NS 50ml minibag  2 g Intravenous Q24H  insulin lispro  0-6 Units Subcutaneous Q4H    insulin lispro  0-3 Units Subcutaneous Nightly    miconazole   Topical BID    sodium chloride flush  10 mL Intravenous 2 times per day    carboxymethylcellulose PF  1 drop Both Eyes Q4H    And    artificial tears   Both Eyes Q4H    chlorhexidine  15 mL Mouth/Throat BID    pantoprazole  40 mg Intravenous BID    dexamethasone  6 mg Intravenous Daily     PRN Meds:  perflutren lipid microspheres, acetaminophen **OR** acetaminophen, sodium chloride flush, polyethylene glycol, promethazine **OR** ondansetron, fentanNYL, albuterol sulfate HFA **AND** ipratropium **AND** MDI Treatment, promethazine **OR** ondansetron, midazolam, ipratropium-albuterol, glucose, dextrose, glucagon (rDNA), dextrose    Results:  CBC:   Recent Labs     12/14/20  0400 12/15/20  0500 12/16/20  0414   WBC 8.7 8.2 6.8   HGB 7.5* 7.9* 7.9*   HCT 22.9* 25.0* 25.0*   MCV 88.4 88.9 89.5   PLT 85* 81* 87*     BMP:   Recent Labs     12/14/20  0400 12/15/20  0500 12/16/20  0414   * 138 141   K 4.0 4.5 4.2   CL 99 106 109   CO2 22 21 21   PHOS 5.7* 5.4* 4.8   BUN 60* 55* 47*   CREATININE 2.2* 1.8* 1.4*     LIVER PROFILE:   No results for input(s): AST, ALT, LIPASE, BILIDIR, BILITOT, ALKPHOS in the last 72 hours. Invalid input(s):   AMYLASE,  ALB    Cultures:  12/10/2020 SARS-CoV-2 positive  12/10/2020 blood strep pyogenes  12/10/2020 blood gram-positive rods    Films:  CXR 12/16/2020 unchanged multifocal pneumonia    Head CT 12/11/2020 no acute abnormality    ASSESSMENT:  · Cardiac Arrest/PEA: unclear etiology - total compression time 18 minutes over 4 separate episodes with intermittent ROSC  · Strep pyogenes bacteremia - possibly wound source  · Septic shock  · Acute hypoxemic respiratory failure   · Acute encephalopathy  · COVID-19 pneumonia   · Acute anemia s/p 2 U PRBC - no sign of active bleeding  · MIGUELANGEL  · Stage IV decubitus ulcer   · Afib RVR  · TMP    PLAN: COVID-19 isolation, droplet plus  Mechanical ventilation as per my orders. The ventilator was adjusted by me at the bedside for unstable, life threatening respiratory failure. Propofol gtt  Fentanyl PRN pain; Versed PRN agitation. Attempt to avoid sedation as able  S/P TTM, started rewarming 12/11/20 at 1600  No Remdesevir given low GFR  Increase PO Dilt to 60mg PO q6  Dilt gtt -wean as able  Antibiotics per IM for sacral wound. On vanc and CTX and flagyl  Decadron 6 mg IV daily, D#6 - start to taper  Nephro following  TF    Surgery following - debridement 12/15  Prophylaxis: Lovenox; MRSA - mupirocin; GI - on protonix   · NOK is haris - Katherine Zarco. · Total critical care time caring for this patient with life threatening, unstable organ failure, including direct patient contact, management of life support systems, review of data including imaging and labs, discussions with other team members and physicians at least 33 minutes so far today, excluding procedures.

## 2020-12-16 NOTE — CARE COORDINATION
INTERDISCIPLINARY PLAN OF CARE CONFERENCE    Date/Time: 12/16/2020 10:17 AM  Completed by: Pura Bain Case Management      Patient Name:  Lata Proctor  YOB: 1960  Admitting Diagnosis: Cardiac arrest [facility]) [I46.9]     Admit Date/Time:  12/10/2020  9:18 AM    Chart reviewed. Interdisciplinary team contacted or reviewed plan related to patient progress and discharge plans. Disciplines included Case Management, Nursing, and Dietitian. Current Status: IP 12/10/2020  PT/OT recommendation for discharge plan of care:  Need order when appropriate (STR)    Expected D/C Disposition:  Skilled nursing facility  Confirmed plan   Discharge Plan Comments: Chart reviewed. ICU/VENT/ Sedation. Pt from STR at [address] but does not plan to return. Referral made to [facility] at request of son. [facility] is following and plan to take pt for STR once she is stable. C19 + on 12/10.  Per Gaby no C19 re-testing required prior to DC to SNF    Home O2 in place on admit: No

## 2020-12-16 NOTE — PROGRESS NOTES
Cardizem titrated down to 5 mg/hr, pt flipped back to NSR and HR currently 74. Versed titrated down to 2 mg/hr, pt much more compliant with vent and double stacking much less frequently. RASS -3.

## 2020-12-16 NOTE — PROGRESS NOTES
Pt HR shot up to the 130s. STAT EKG obtained and reads AFIB RVR. HR peaked at 140 and is now maintaining in the 110s-120s. Pt does have a history of AFIB but has been NSR/SB this admission. MD notified, awaiting response.

## 2020-12-16 NOTE — PROGRESS NOTES
Pharmacy Vancomycin Consult     Vancomycin Day:   Current Dosing: pulse dosing    Temp max:  98.4    Recent Labs     12/15/20  0500 12/16/20  0414   BUN 55* 47*       Recent Labs     12/15/20  0500 12/16/20  0414   CREATININE 1.8* 1.4*       Recent Labs     12/15/20  0500 12/16/20  0414   WBC 8.2 6.8         Intake/Output Summary (Last 24 hours) at 12/16/2020 0781  Last data filed at 12/16/2020 0600  Gross per 24 hour   Intake 1941 ml   Output 1325 ml   Net 616 ml       Culture Date      Source                       Results  Blood:Strep    Ht Readings from Last 1 Encounters:   12/15/20 5' 4\" (1.626 m)        Wt Readings from Last 1 Encounters:   12/14/20 255 lb 4.7 oz (115.8 kg)         Body mass index is 43.82 kg/m². Estimated Creatinine Clearance: 52 mL/min (A) (based on SCr of 1.4 mg/dL (H)). Trough: 18.2    Assessment/Plan:  Anjelica Pod and flagyl restarted last night, level 18.2.  Redose with 1gm vanc today  Jesse Nagel Pharm D 12/16/20208:22 AM  .

## 2020-12-16 NOTE — CONSULTS
[facility] Nurse  Follow-up Progress Note       NAME:  Elida Marquez  MEDICAL RECORD NUMBER:  [id number]  AGE:  56 y.o. GENDER:  female  :  1960  TODAY'S DATE:  2020    Subjective: pt is sedated, intubated in ICU setting   Wound Identification:  Wound Type: pressure  Contributing Factors: chronic pressure, decreased mobility and shear force        Patient Goal of Care:  [x] Wound Healing  [] Odor Control  [] Palliative Care  [] Pain Control   [] Other:     Objective:    /65   Pulse 121   Temp 98.8 °F (37.1 °C)   Resp (!) 38   Ht 5' 4\" (1.626 m)   Wt 255 lb 4.7 oz (115.8 kg)   SpO2 98%   BMI 43.82 kg/m²   Rigoberto Risk Score: Rigoberto Scale Score: 12  Assessment:   Measurements:  Wound 19 Abdomen (Active)   Number of days: 508       Wound 19 Abdomen (Active)   Number of days: 508       Wound 20 Buttocks Left (Active)   Wound Image   20 1334   Wound Etiology Deep tissue/Injury 11/15/20 2051   Dressing Status Intact 20   Wound Cleansed Cleansed with saline 11/15/20 2051   Dressing/Treatment Foam 20   Wound Assessment Pink/red;Purple/maroon 20   Drainage Amount None 20   Number of days: 33       Wound 12/10/20 Sacrum Stage 4 wound (Active)   Wound Etiology Pressure Stage  4 12/15/20 2000   Dressing Status Clean;Dry; Intact 20   Wound Cleansed Other (Comment) 12/15/20 2000   Dressing/Treatment Pharmaceutical agent (see MAR); Foam 20   Dressing Change Due 20   Wound Length (cm) 2 cm 12/15/20 1300   Wound Width (cm) 2.5 cm 12/15/20 1300   Wound Depth (cm) 6.5 cm 12/15/20 1300   Wound Surface Area (cm^2) 5 cm^2 12/15/20 1300   Change in Wound Size % (l*w) 64.49 12/15/20 1300   Wound Volume (cm^3) 32.5 cm^3 12/15/20 1300   Wound Healing % 49 12/15/20 1300   Tunneling Position ___ O'Clock 6 o clock 12/15/20 1300 Wound Assessment Denuded;Eschar moist;Pink/red;Bleeding 12/16/20 0900   Drainage Amount Large 12/16/20 0900   Drainage Description Purulent;Brown 12/16/20 0900   Odor Malodorous/putrid 12/16/20 0900   Christina-wound Assessment Other (Comment) 12/16/20 0900   Number of days: 5     Sacrum:  4x3. 5x3.4cm, 6 o'clock=7.5cm. Gray/black, moist necrotic edges, bone exposed. Base 100% dusky, gray of what is visible. Periwound skin with partial thickness skin loss. Stage 4 pressure injury, present on admission    Pt seen for follow up with NP Chyna from surgery. Orders received to place NPWT with 1/4 strength Dakins solution to manage odor and necrotic tissue. Periwound skin prepped using skin prep and vac drape. One piece of gray Cleanse foam used in wound and bridged to right hip. Good seal obtained, settings below. Next VAC dressing change on Friday. Response to treatment:  Well tolerated by patient. Plan:  Veraflo NPWT with 50ml's 1/4 strength dakins irrigation every 3 hours, dwell time 15 minutes, pressure 125mm hg continuous   Plan of Care: Wound 12/10/20 Sacrum Stage 4 wound-Dressing/Treatment: Pharmaceutical agent (see MAR), Foam    Specialty Bed Required : Yes   [] Low Air Loss   [] Pressure Redistribution  [] Fluid Immersion  [] Bariatric  [] Total Pressure Relief  [] Other:     Current Diet: DIET TUBE FEED CONTINUOUS/CYCLIC NPO; Low Calorie High Protein (Vital High-Protein);  Orogastric; Continuous; 15; 15; 20  Dietician consult:  Yes    Discharge Plan:  Placement for patient upon discharge: skilled nursing   Patient appropriate for Outpatient [address]: Yes    Referrals:  [x]   [x] [address]  [] Supplies  [] Other    Patient/Caregiver Teaching:  Level of patient/caregiver understanding able to:   [] Indicates understanding       [] Needs reinforcement  [] Unsuccessful      [] Verbal Understanding  [] Demonstrated understanding       [] No evidence of learning [] Refused teaching         [x] N/A       Electronically signed by Katy Siemens, RN, Cherise Hernandez on 12/16/2020 at 12:49 PM

## 2020-12-16 NOTE — PROGRESS NOTES
Reassessment complete, see flow sheet. PO cardizem 30 mg q6h ordered for AFIB/increased HR. Administered per order. PRN versed also administered to see if increased HR is anxiety related. Current HR sitting 110s-120s. No other changes at this time, will continue to monitor.

## 2020-12-16 NOTE — PROGRESS NOTES
Progress Note    HISTORY     CC:  Respiratory Failure          We are following for MIGUELANGEL      Subjective/   HPI:  Kidney function improving. Remains on the vent. FiO2 of 30%. Good urine output. ROS:  Constitutional:  No fevers, No Chills  Cardiovascular:  + edema  Respiratory: On vent   :  Mercedes placed, non-oliguric     Social Hx:  No family at bedside     Past Medical and Surgical History:  - Reviewed, no changes     EXAM       Objective/     Vitals:    12/16/20 0800 12/16/20 0900 12/16/20 1000 12/16/20 1100   BP: (!) 151/80 111/72 112/78 116/65   Pulse: 118 118 109 121   Resp: 20 23 25 29   Temp:    98.8 °F (37.1 °C)   TempSrc:       SpO2: 90% 95% 96% 97%   Weight:       Height:         24HR INTAKE/OUTPUT:      Intake/Output Summary (Last 24 hours) at 12/16/2020 1146  Last data filed at 12/16/2020 1118  Gross per 24 hour   Intake 2156 ml   Output 1275 ml   Net 881 ml     Constitutional:  Critically ill   Eyes:  Pupils reactive, sclera clear   Neck:  Normal thyroid, no masses   Cardiovascular:  Regular, no rub  Respiratory:   On vent , no wheezing  Psychiatry:  Sedated  Abdomen: +bs, soft, nt, no masses   Musculoskeletal: + LE edema, no clubbing   Lymphatics:  No LAD in neck, no supraclavicular nodes   :  Mercedes in place       MEDICAL DECISION MAKING       Data/  Recent Labs     12/14/20  0400 12/15/20  0500 12/16/20  0414   WBC 8.7 8.2 6.8   HGB 7.5* 7.9* 7.9*   HCT 22.9* 25.0* 25.0*   MCV 88.4 88.9 89.5   PLT 85* 81* 87*     Recent Labs     12/14/20  0400 12/15/20  0500 12/16/20  0414   * 138 141   K 4.0 4.5 4.2   CL 99 106 109   CO2 22 21 21   GLUCOSE 102* 102* 95   PHOS 5.7* 5.4* 4.8   BUN 60* 55* 47*   CREATININE 2.2* 1.8* 1.4*   LABGLOM 23* 29* 38*   GFRAA 27* 34* 46*       Assessment/    Acute kidney injury/ATN in setting of Cardiac arrest, septic shock              SCr 2.7 on admission, previously 1.0 from Nov 2020, non-oliguric UA bland, Urine sodium 85, Urine chloride 87   Kidney function continues to improve - now at 1.4 with no significant electrolyte issues      - Acute hypoxic respiratory failure/COVID19 PNA - on ventilator, steroids, antibiotics     - Septic shock/Strep pyogenes bacteremia - plans per Critical Care     - Cardiac arrest/PEA arrest     - Acute blood loss anemia - prn prbc's     - Hyponatremia              SNa 126 on admission, as low as 121, last at 130, previously 142 from Nov 2020              Resolved      - Stage 4 decubitus ulcer      Plan/     Off IV fluids, mild swelling but stable vent setting  Follow labs  No need for dialysis, should fully recover     -----------------------------  Lorenzo Boss M.D.   [facility]

## 2020-12-16 NOTE — PROGRESS NOTES
Reassessment completed. Versed gtt ordered for increased noncompliance with the vent and elevated HR and peak pressures. Currently infusing at 3 mg/hr, double stacking decreased greatly but still occurring. Versed did not help HR at all. Cardizem gtt ordered for increasing and irregular HR, peaking in the 140s. Started at 5 mg/hr and will titrate per order, HR still in the 140s at this time. Will continue to monitor.

## 2020-12-16 NOTE — PROGRESS NOTES
General Surgery [place], [place]  Daily Progress Note    Pt Name: Paul Quiñonez  Medical Record Number: [id number]  Date of Birth 1960   Today's Date: 12/16/2020    ASSESSMENT  1. Pt is s/p bedside debridement yesterday of coccyx wound  2. Dressing change of coccyx wound: no odor, wound base still with thin layer of gray/pink tissue over bone, tunnel 7.5 cm at 6 o'clock, wound measures 4 x 3.5x 2.4 cm with 7.5 cm tunnel at 6 o'clock. Minimal pink. brown thin drainage noted  3. ARF improving  4. Anemia stable: 7.9/25  5. PT remains sedated on vent    PLAN  1. On Rocephin and Vanc  2. Wound RN to place VeraFlo vac with Dakins today  3. Will closely monitor wound: will cari further debridement of wound edges at some point. SUBJECTIVE  Anai Case is unchanged from yesterday. Pain is well controlled. She has no vomiting. She has had a bowel movement. She is NPO. Current activity is up with assistance    OBJECTIVE  VITALS:  height is 5' 4\" (1.626 m) and weight is 255 lb 4.7 oz (115.8 kg). Her temperature is 98.8 °F (37.1 °C). Her blood pressure is 124/79 and her pulse is 113. Her respiration is 21 and oxygen saturation is 100%. VITALS:  /84   Pulse 104   Temp 98.8 °F (37.1 °C)   Resp 20   Ht 5' 4\" (1.626 m)   Wt 255 lb 4.7 oz (115.8 kg)   SpO2 96%   BMI 43.82 kg/m²   INTAKE/OUTPUT:    Intake/Output Summary (Last 24 hours) at 12/16/2020 1509  Last data filed at 12/16/2020 1118  Gross per 24 hour   Intake 2156 ml   Output 1275 ml   Net 881 ml     URINARY CATHETER OUTPUT (Mercedes):     GENERAL: sedated on vent  I/O last 3 completed shifts:   In: 1941 [I.V.:1651; NG/GT:290]  Out: 1325 [Urine:1325]  I/O this shift:  In: 245 [I.V.:200; NG/GT:45]  Out: 225 [Urine:225]    LABS  Recent Labs     12/16/20  0414   WBC 6.8   HGB 7.9*   HCT 25.0*   PLT 87*      K 4.2      CO2 21   BUN 47*   CREATININE 1.4*   PHOS 4.8   CALCIUM 8.5     CBC with Differential:    Lab Results Component Value Date    WBC 6.8 12/16/2020    RBC 2.79 12/16/2020    HGB 7.9 12/16/2020    HCT 25.0 12/16/2020    PLT 87 12/16/2020    MCV 89.5 12/16/2020    MCH 28.2 12/16/2020    MCHC 31.5 12/16/2020    RDW 19.6 12/16/2020    SEGSPCT 66.7 01/07/2012    BANDSPCT 8 12/11/2020    LYMPHOPCT 1.5 12/16/2020    MONOPCT 2.0 12/16/2020    EOSPCT 2.1 01/07/2012    BASOPCT 0.2 12/16/2020    MONOSABS 0.1 12/16/2020    LYMPHSABS 0.1 12/16/2020    EOSABS 0.0 12/16/2020    BASOSABS 0.0 12/16/2020    DIFFTYPE Auto 01/07/2012     CMP:    Lab Results   Component Value Date     12/16/2020    K 4.2 12/16/2020    K 4.7 12/10/2020     12/16/2020    CO2 21 12/16/2020    BUN 47 12/16/2020    CREATININE 1.4 12/16/2020    GFRAA 46 12/16/2020    GFRAA >60 01/07/2012    AGRATIO 0.6 12/10/2020    LABGLOM 38 12/16/2020    GLUCOSE 95 12/16/2020    PROT 5.9 12/12/2020    PROT 9.3 01/05/2012    LABALBU 2.5 12/16/2020    CALCIUM 8.5 12/16/2020    BILITOT 0.8 12/12/2020    ALKPHOS 105 12/12/2020     12/12/2020     12/12/2020         Chyna Natividad [employer]  Electronically signed 12/16/2020 at 3:00 PM     Patient seen and examined. I agree with the assessment and plan from DEVIN Ramirez. Continue local wound care.     [address]

## 2020-12-16 NOTE — CONSULTS
Pharmacy to dose IV Vanco:  Resume Vanco dosing per consult request.  Patient received 1g this AM for a Random Vanco of 15mcg/mL. Will check a Random with AM labs 12/16/20 and continue to pulse dose.   Linn Rudolph PharmD 12/15/2020 7:03 PM

## 2020-12-16 NOTE — PLAN OF CARE
Problem: Airway Clearance - Ineffective  Goal: Achieve or maintain patent airway  Outcome: Ongoing     Problem: Gas Exchange - Impaired  Goal: Absence of hypoxia  Outcome: Ongoing  Goal: Promote optimal lung function  Outcome: Ongoing     Problem: Breathing Pattern - Ineffective  Goal: Ability to achieve and maintain a regular respiratory rate  Outcome: Ongoing     Problem:  Body Temperature -  Risk of, Imbalanced  Goal: Ability to maintain a body temperature within defined limits  Outcome: Ongoing  Goal: Will regain or maintain usual level of consciousness  Outcome: Ongoing  Goal: Complications related to the disease process, condition or treatment will be avoided or minimized  Outcome: Ongoing     Problem: Isolation Precautions - Risk of Spread of Infection  Goal: Prevent transmission of infection  Outcome: Ongoing     Problem: Nutrition Deficits  Goal: Optimize nutrtional status  12/15/2020 1951 by Ángela Merlos RN  Outcome: Ongoing  12/15/2020 1139 by Ayde Troncoso RD, LD  Outcome: Not Met This Shift     Problem: Risk for Fluid Volume Deficit  Goal: Maintain normal heart rhythm  Outcome: Ongoing  Goal: Maintain absence of muscle cramping  Outcome: Ongoing  Goal: Maintain normal serum potassium, sodium, calcium, phosphorus, and pH  Outcome: Ongoing     Problem: Loneliness or Risk for Loneliness  Goal: Demonstrate positive use of time alone when socialization is not possible  Outcome: Ongoing     Problem: Fatigue  Goal: Verbalize increase energy and improved vitality  Outcome: Ongoing     Problem: Patient Education: Go to Patient Education Activity  Goal: Patient/Family Education  Outcome: Ongoing     Problem: Falls - Risk of:  Goal: Will remain free from falls  Description: Will remain free from falls  Outcome: Ongoing  Goal: Absence of physical injury  Description: Absence of physical injury  Outcome: Ongoing     Problem: Skin Integrity:  Goal: Will show no infection signs and symptoms Description: Will show no infection signs and symptoms  Outcome: Ongoing  Goal: Absence of new skin breakdown  Description: Absence of new skin breakdown  Outcome: Ongoing     Problem: Nutrition  Goal: Optimal nutrition therapy  12/15/2020 1951 by Diane Suh RN  Outcome: Ongoing  12/15/2020 1139 by Alyssa Dickerson RD, D[place]  Outcome: Not Met This Shift  Goal: Understanding of nutritional guidelines  12/15/2020 1951 by Diane Suh RN  Outcome: Ongoing  12/15/2020 1139 by Alyssa Dickerson RD, [place]  Outcome: Not Met This Shift

## 2020-12-16 NOTE — PROGRESS NOTES
12/15/20 1955   Vent Information   Vent Mode AC/VC   Vt Ordered 340 mL   Rate Set 28 bmp   Peak Flow 60 L/min   FiO2  30 %   SpO2 97 %   Sensitivity 3   PEEP/CPAP 5   Vent Patient Data   Peak Inspiratory Pressure 34 cmH2O   Mean Airway Pressure 12 cmH20   Rate Measured 28 br/min   Vt Exhaled 350 mL   Minute Volume 9.79 Liters   I:E Ratio 1:2.5   Plateau Pressure 19 [id number]   Static Compliance 25 mL/cmH2O   Dynamic Compliance 12 mL/cmH2O   Cough/Sputum   Cough Productive   Sputum Amount Small   Sputum Color Red;Creamy   Tenacity Mucous plugs

## 2020-12-17 NOTE — PROGRESS NOTES
Progress Note    HISTORY     CC:  Respiratory Failure          We are following for MIGUELANGEL      Subjective/   HPI:  Kidney function improving. Still on vent. BP stable. Some edema but good urine output     ROS:  Constitutional:  No fevers, No Chills  Cardiovascular:  + edema  Respiratory: On vent   :  Mercedes placed, non-oliguric     Social Hx:  No family at bedside     Past Medical and Surgical History:  - Reviewed, no changes     EXAM       Objective/     Vitals:    12/17/20 0818 12/17/20 0900 12/17/20 1000 12/17/20 1100   BP:  (!) 158/93 127/65 127/80   Pulse: 98 128 103 90   Resp: 22 22 21 23   Temp:       TempSrc:       SpO2: 94% 92%  100%   Weight:       Height:         24HR INTAKE/OUTPUT:      Intake/Output Summary (Last 24 hours) at 12/17/2020 1138  Last data filed at 12/17/2020 0901  Gross per 24 hour   Intake 1215 ml   Output 1455 ml   Net -240 ml     Constitutional:  Critically ill   Eyes:  Pupils reactive, sclera clear   Neck:  Normal thyroid, no masses   Cardiovascular:  Regular, no rub  Respiratory:   On vent , no wheezing  Psychiatry:  Sedated  Abdomen: +bs, soft, nt, no masses   Musculoskeletal: + LE edema, no clubbing   Lymphatics:  No LAD in neck, no supraclavicular nodes   :  Mercedes in place       MEDICAL DECISION MAKING       Data/  Recent Labs     12/15/20  0500 12/16/20  0414 12/17/20  0416   WBC 8.2 6.8 4.1   HGB 7.9* 7.9* 7.4*   HCT 25.0* 25.0* 23.6*   MCV 88.9 89.5 91.3   PLT 81* 87* 87*     Recent Labs     12/15/20  0500 12/16/20  0414 12/17/20  0416    141 137   K 4.5 4.2 3.7    109 105   CO2 21 21 22   GLUCOSE 102* 95 128*   PHOS 5.4* 4.8 4.3   BUN 55* 47* 38*   CREATININE 1.8* 1.4* 1.1   LABGLOM 29* 38* 50*   GFRAA 34* 46* >60       Assessment/    Acute kidney injury/ATN in setting of Cardiac arrest, septic shock              SCr 2.7 on admission, previously 1.0 from Nov 2020, non-oliguric              UA bland, Urine sodium 85, Urine chloride 87 Kidney function continues to improve - now at 1.1 with no significant electrolyte issues      - Acute hypoxic respiratory failure/COVID19 PNA - on ventilator, steroids, antibiotics     - Septic shock/Strep pyogenes bacteremia - plans per Critical Care     - Cardiac arrest/PEA arrest     - Acute blood loss anemia - prn prbc's     - Hyponatremia              SNa 126 on admission, as low as 121, last at 130, previously 142 from Nov 2020              Resolved      - Stage 4 decubitus ulcer      Plan/     Off IV fluids, mild swelling but stable vent settings.     IV lasix  Follow labs and electrolytes     -----------------------------  Esteban Onofre M.D.   [facility]

## 2020-12-17 NOTE — PROGRESS NOTES
Hospitalist Progress Note      PCP: Albert Maxwell MD    Date of Admission: 12/10/2020    Subjective:     Medications:  Reviewed    Infusion Medications    dilTIAZem (CARDIZEM) 125 mg in dextrose 5% 125 mL infusion Stopped (12/16/20 2033)    sodium hypochlorite      sodium chloride 10 mL/hr at 12/13/20 1208    propofol 50 mcg/kg/min (12/16/20 1739)    dextrose       Scheduled Medications    dilTIAZem  60 mg Per NG tube 4 times per day    enoxaparin  40 mg Subcutaneous Daily    pantoprazole  40 mg Intravenous Daily    metroNIDAZOLE  500 mg Intravenous Q8H    vancomycin (VANCOCIN) intermittent dosing (placeholder)   Other RX Placeholder    cefTRIAXone (ROCEPHIN) 2 g IVPB in NS 50ml minibag  2 g Intravenous Q24H    insulin lispro  0-6 Units Subcutaneous Q4H    insulin lispro  0-3 Units Subcutaneous Nightly    miconazole   Topical BID    sodium chloride flush  10 mL Intravenous 2 times per day    carboxymethylcellulose PF  1 drop Both Eyes Q4H    And    artificial tears   Both Eyes Q4H    chlorhexidine  15 mL Mouth/Throat BID    dexamethasone  6 mg Intravenous Daily     PRN Meds: sodium hypochlorite, perflutren lipid microspheres, acetaminophen **OR** acetaminophen, sodium chloride flush, polyethylene glycol, promethazine **OR** ondansetron, fentanNYL, albuterol sulfate HFA **AND** ipratropium **AND** MDI Treatment, promethazine **OR** ondansetron, midazolam, ipratropium-albuterol, glucose, dextrose, glucagon (rDNA), dextrose      Intake/Output Summary (Last 24 hours) at 12/17/2020 0157  Last data filed at 12/16/2020 2242  Gross per 24 hour   Intake 1318 ml   Output 1330 ml   Net -12 ml       Physical Exam Performed:    /80   Pulse 101   Temp 99.1 °F (37.3 °C) (Axillary)   Resp 22   Ht 5' 4\" (1.626 m)   Wt 255 lb 4.7 oz (115.8 kg)   SpO2 97%   BMI 43.82 kg/m²     Patient seen and examined .   She is in droplet plus precautions  Gen: Intubated, sedated, on mechanical ventilation D/W ICU  RN . She does respond a little bit to painful stimuli when sedation decreased  See wound care notes, patient has stage IV decubitus ulcer-media pictures reviewed  Eyes: Pupils are reacting, equal. No sclera icterus. No conjunctival injection. ENT: ETT  Neck: Trachea midline. Normal thyroid. Resp: No accessory muscle use. No crackles. No wheezes. No rhonchi. CV: irregular rate/rhythm. No murmur or rub. No edema. GI: Non-tender. Non-distended. No masses. No organomegaly. Normal bowel sounds. No hernia. Skin: Warm and dry. No nodule on exposed extremities. No rash on exposed extremities. Sacral decubitus ulcer  Lymph: No cervical LAD. No supraclavicular LAD. M/S: No cyanosis. No joint deformity. No clubbing. Neuro: Sedated now . moves extremities per nurse, responds a little to painful stimuli when sedation decreased       Labs:   Recent Labs     12/14/20  0400 12/15/20  0500 12/16/20  0414   WBC 8.7 8.2 6.8   HGB 7.5* 7.9* 7.9*   HCT 22.9* 25.0* 25.0*   PLT 85* 81* 87*     Recent Labs     12/14/20  0400 12/15/20  0500 12/16/20  0414   * 138 141   K 4.0 4.5 4.2   CL 99 106 109   CO2 22 21 21   BUN 60* 55* 47*   CREATININE 2.2* 1.8* 1.4*   CALCIUM 8.6 8.6 8.5   PHOS 5.7* 5.4* 4.8     No results for input(s): AST, ALT, BILIDIR, BILITOT, ALKPHOS in the last 72 hours. No results for input(s): INR in the last 72 hours. Recent Labs     12/16/20  0414 12/16/20  0850 12/16/20  1200   TROPONINI 0.01 <0.01 <0.01       Urinalysis:      Lab Results   Component Value Date    NITRU Negative 12/11/2020    WBCUA 3-5 12/11/2020    BACTERIA 1+ 12/11/2020    RBCUA None seen 12/11/2020    BLOODU TRACE-INTACT 12/11/2020    SPECGRAV 1.020 12/11/2020    GLUCOSEU Negative 12/11/2020       Radiology:  XR CHEST PORTABLE   Final Result   Multifocal bilateral pneumonia, unchanged.          XR CHEST PORTABLE   Final Result   Slight improving ground-glass opacification on the right with persistent ground-glass changes on the left. XR CHEST PORTABLE   Final Result   Perihilar and lower zone ground-glass opacities relatively stable. XR CHEST PORTABLE   Final Result   No significant change in appearance of bilateral multifocal parenchymal   disease when compared to the study of 12/12/2020 as described above. XR CHEST PORTABLE   Final Result   Low volume study with persistent diffuse bilateral airspace disease, slightly   increased on the right as compared to prior. Persistent small loculated left   pleural effusion. XR CHEST PORTABLE   Final Result   Stable multifocal pneumonia and small loculated left pleural effusion         CT HEAD WO CONTRAST   Final Result   No acute intracranial abnormality. XR CHEST PORTABLE   Final Result   The endotracheal tube has been retracted with tip now at the level the   clavicular heads 5 cm above the [place]. Persistent diffuse bilateral airspace disease consistent with atypical viral   pneumonia. XR CHEST PORTABLE   Final Result   Endotracheal tube is noted at the level of the [place]. Recommend retracting   the tube 2-3 cm more proximally. Enteric tube and right IJ CVC appear to be in satisfactory position. No significant interval change in the bilateral airspace disease. XR CHEST PORTABLE   Final Result   Persistent multifocal bilateral airspace disease, slightly increased on the   left and slightly decreased on the right. Overall very similar appearance   over the past 4 weeks.          XR CHEST PORTABLE    (Results Pending)           Assessment/Plan:    Active Hospital Problems    Diagnosis    Shock ([address] [R57.9]    Bacteremia [R78.81]    Cardiac arrest ([address] [I46.9]    Anemia requiring transfusions [D64.9]    Pneumonia due to COVID-19 virus [U07.1, J12.89]    PAF (paroxysmal atrial fibrillation) ([address] [I48.0]    Acute respiratory failure with hypoxia ([address] [J96.01] # Cardiac arrest of unclear etiology.   - Currently on multiple pressors including epi, levo and vaso. --> Weaned off of pressors now. -S/p targeted temperature management. - will need ECHO. COVID +ve     #Acute hypoxic respiratory failure. -  Intubated and placed  on mechanical ventilation.    -Critical care consulted.    -S/p targeted temperature management , rewarming completed . -Spontaneous breathing trials today  Likely extubation in a.m., oxygen saturation stable on FiO2 30%    #A fib with RVR - on cardizem gtt, started PO cardizem and wean off gtt     # COVID-19 pneumonia.    Not a candidate for remdesivir given elevated creatinine.    No CCP as it is not certain if the transfusion reaction was the cause of current condition - cardiac arrest in ER.    Started Decadron 6 mg  IV daily, cont day 6     #Septic shock   Strep bacteremia  -Likely due to infected sacral decubitus ulcers, possible pneumonia  -Was on empiric cefepime and IV vancomycin. Antibiotics changed to Rocephin given strep in  blood cultures. Resume vancomycin and Flagyl for infected sacral decubitus ulcer  -Weaned off of pressors   -Blood cultures positive for Streptococcus and diphtheroids .  -Leukocytosis improving     #Sacral decubitus ulcers  -she has large extensive stage 4 wounds in sacrum likely source sepsis. -Seen by General surgery today ,  S/P debridement 12/15. Continue dressing changes, follow-up on wound cultures.  -Resume antibiotics vancomycin and Flagyl     # Acute anemia.    -Discontinue Eliquis  - status post transfusion of 2 units of packed cells. - GI consult.  Protonix twice daily.    -Follow H&H      # Hypertension.   - Home medications are held .     #Type 2 diabetes. -Was on insulin drip. Now on SSI     # MIGUELANGEL. -2 to hypotension and septic shock. -nephro consult. -MIGUELANGEL resolved with IV fluids.     # Hyponatremia. severe. nephro consult. S/P  2 % NS. Now off IVF      # Hypocalcemia.   - replaced     #H/o breast cancer  - on Ibrance      Diet: DIET TUBE FEED CONTINUOUS/CYCLIC NPO; Low Calorie High Protein (Vital High-Protein);  Orogastric; Continuous; 15; 15; 20  Code Status: Full Code    PT/OT Eval Status: not indicated    Kesha Bernstein MD

## 2020-12-17 NOTE — PROGRESS NOTES
This note also relates to the following rows which could not be included:  Pulse - Cannot attach notes to unvalidated device data  Resp - Cannot attach notes to unvalidated device data       12/17/20 0818   Vent Information   Vent Type 840   Vent Mode AC/VC   Vt Ordered 380 mL   Rate Set 24 bmp   FiO2  30 %   SpO2 94 %   SpO2/FiO2 ratio 313.33   Sensitivity 3   PEEP/CPAP 5   Humidification Source HME   Vent Patient Data   Peak Inspiratory Pressure 28 cmH2O   Mean Airway Pressure 12 cmH20   Rate Measured 27 br/min   Vt Exhaled 420 mL   Minute Volume 8.23 Liters   I:E Ratio 1:1.9   Plateau Pressure 19 IUM13   Static Compliance 13 mL/cmH2O   Cough/Sputum   Sputum How Obtained Endotracheal;Suctioned   Cough Productive   Sputum Amount Moderate   Sputum Color Dark Yellow;Creamy   Tenacity Thick   Breath Sounds   Right Upper Lobe Diminished   Right Middle Lobe Diminished   Right Lower Lobe Diminished   Left Upper Lobe Diminished   Left Lower Lobe Diminished   Additional Respiratory  Assessments   Position Semi-Mooney's   Alarm Settings   High Pressure Alarm 50 cmH2O   Low Minute Volume Alarm 4 L/min   Apnea (secs) 20 secs   High Respiratory Rate 50 br/min   Low Exhaled Vt  210 mL   ETT (adult)   Placement Date/Time: 12/10/20 1400   Timeout: Patient  Preoxygenation: Yes  Mask Ventilation: Ventilated by mask (1)  Technique: Direct laryngoscopy  Type: Cuffed  Tube Size: 7.5 mm  Location: Oral  Insertion attempts: 1  Placement Verified By[de-identified] Chest...    Secured at 23 cm   Measured From Lips   ET Placement Right   Secured By [employer] tube brothers   Site Condition Dry

## 2020-12-17 NOTE — PROGRESS NOTES
This note also relates to the following rows which could not be included:  Pulse - Cannot attach notes to unvalidated device data       12/16/20 1936   Vent Information   $Ventilation $Subsequent Day   Vent Type 840   Vent Mode AC/VC   Vt Ordered 380 mL   Rate Set 24 bmp   Peak Flow 50 L/min   FiO2  30 %   SpO2 96 %   SpO2/FiO2 ratio 320   Sensitivity 3   PEEP/CPAP 5   Humidification Source HME   Vent Patient Data   Peak Inspiratory Pressure 36 cmH2O   Mean Airway Pressure 17 cmH20   Rate Measured 24 br/min   Vt Exhaled 442 mL   Minute Volume 10.8 Liters   I:E Ratio 1:2.0   Plateau Pressure 26 [id number]   Static Compliance 21 mL/cmH2O   Dynamic Compliance 14 mL/cmH2O   Cough/Sputum   Sputum How Obtained Endotracheal;Suctioned   Cough Productive   Sputum Amount Small   Sputum Color Creamy   Tenacity Thick   Breath Sounds   Right Upper Lobe Diminished   Right Middle Lobe Diminished   Right Lower Lobe Diminished   Left Upper Lobe Diminished   Left Lower Lobe Diminished   Additional Respiratory  Assessments   Resp 19   Position Semi-Mooney's   Alarm Settings   High Pressure Alarm 50 cmH2O   Low Minute Volume Alarm 4 L/min   Apnea (secs) 20 secs   High Respiratory Rate 50 br/min   Low Exhaled Vt  250 mL   ETT (adult)   Placement Date/Time: 12/10/20 1400   Timeout: Patient  Preoxygenation: Yes  Mask Ventilation: Ventilated by mask (1)  Technique: Direct laryngoscopy  Type: Cuffed  Tube Size: 7.5 mm  Location: Oral  Insertion attempts: 1  Placement Verified By[de-identified] Chest...    Secured at 24 cm   Measured From Lips   ET Placement Right  (moved to right)   Secured By [employer] tube brothers   Site Condition Dry

## 2020-12-17 NOTE — PROGRESS NOTES
SBT stopped at this time d/t HR of 137, O2 sat of 82%. Thick secretions noted upon suction. Pt awake, not following commands. PRN fentanyl administered.   Jose Luis Mejía RN

## 2020-12-17 NOTE — PROGRESS NOTES
Pulmonary & Critical Care Medicine ICU Progress Note    CC: Fatigue, anemia, PEA arrest in emergency department    Events of Last 24 hours: failed SBT with agitation and tachycardia    Dilt gtt -off  Propofol 50    Invasive Lines: Right IJ CVC 12/10/2020    MV: 12/10/2020  Vent Mode: AC/VC Rate Set: 24 bmp/Vt Ordered: 380 mL/ /FiO2 : 30 %  Recent Labs     12/16/20  1515 12/17/20  0416   PHART 7.402 7.410   UOA8IDU 35.6 33.7*   PO2ART 81.4 86.4       IV:   sodium chloride      dilTIAZem (CARDIZEM) 125 mg in dextrose 5% 125 mL infusion Stopped (12/16/20 2033)    sodium hypochlorite      sodium chloride 10 mL/hr at 12/13/20 1208    propofol 50 mcg/kg/min (12/17/20 0901)    dextrose         Vitals:  Blood pressure 127/65, pulse 103, temperature 99.1 °F (37.3 °C), temperature source Axillary, resp. rate 21, height 5' 4\" (1.626 m), weight 249 lb 1.9 oz (113 kg), SpO2 92 %, not currently breastfeeding. on 30%    Intake/Output Summary (Last 24 hours) at 12/17/2020 1019  Last data filed at 12/17/2020 0901  Gross per 24 hour   Intake 1460 ml   Output 1680 ml   Net -220 ml     EXAM:  General: intubated, ill appearing    ENT: Pharynx with ETT. Resp: No crackles. No wheezing. CV: S1, S2. +edema  GI: NT, ND, +BS  Skin: Warm and dry. Neuro: PERRL. No response to painful stimulus supraorbital notch or nailbed pressure.    Normal oculocephalic, +gag, +spontaneous breathing    Scheduled Meds:   dilTIAZem  60 mg Per NG tube 4 times per day    enoxaparin  40 mg Subcutaneous Daily    pantoprazole  40 mg Intravenous Daily    metroNIDAZOLE  500 mg Intravenous Q8H    vancomycin (VANCOCIN) intermittent dosing (placeholder)   Other RX Placeholder    cefTRIAXone (ROCEPHIN) 2 g IVPB in NS 50ml minibag  2 g Intravenous Q24H    insulin lispro  0-6 Units Subcutaneous Q4H    insulin lispro  0-3 Units Subcutaneous Nightly    miconazole   Topical BID    sodium chloride flush  10 mL Intravenous 2 times per day Fentanyl PRN pain; Versed PRN agitation. Attempt to avoid sedation as able  S/P TTM, started rewarming 12/11/20 at 1600  No Remdesevir given low GFR  PO Dilt to 60mg PO q6  Dilt gtt -wean as able  Antibiotics per IM for sacral wound. On vanc and CTX and flagyl  Decadron 6 mg IV daily, D#6 - start to taper  Nephro following  TF    Surgery following - debridement 12/15  Prophylaxis: Lovenox; MRSA - mupirocin; GI - on protonix   · NOK is son - Carlota Star. · Anticipate a need for tracheostomy next week if the family continues to want aggressive care  · Total critical care time caring for this patient with life threatening, unstable organ failure, including direct patient contact, management of life support systems, review of data including imaging and labs, discussions with other team members and physicians at least 33 minutes so far today, excluding procedures.

## 2020-12-17 NOTE — PROGRESS NOTES
Pharmacy Vancomycin Consult     Vancomycin Day: 8  Current Dosing: Pulse dose    Temp max:      Recent Labs     12/16/20  0414 12/17/20  0416   BUN 47* 38*       Recent Labs     12/16/20  0414 12/17/20  0416   CREATININE 1.4* 1.1       Recent Labs     12/16/20  0414 12/17/20  0416   WBC 6.8 4.1         Intake/Output Summary (Last 24 hours) at 12/17/2020 1234  Last data filed at 12/17/2020 0901  Gross per 24 hour   Intake 1215 ml   Output 1455 ml   Net -240 ml       Culture Date      Source                       Results  Blood: Strep    Ht Readings from Last 1 Encounters:   12/15/20 5' 4\" (1.626 m)        Wt Readings from Last 1 Encounters:   12/17/20 249 lb 1.9 oz (113 kg)         Body mass index is 42.76 kg/m². Estimated Creatinine Clearance: 65 mL/min (based on SCr of 1.1 mg/dL).     Trough: 19.4    Assessment/Plan:  Vanc 1gm times one, random in am, consider discontinuing  Ramonia Field DUMONT 12/17/202012:41 PM  .

## 2020-12-17 NOTE — PROGRESS NOTES
Admit: 12/10/2020    Name:  Kane Lim  Room:  3007/3007-01  MRN:    [id number]    Critical Care Daily Progress Note for 12/17/2020     Admitted after cardiac arrest in ER    Interval History:     12/11  She is now on propofol and versed prn  On levo and vaso  Strep in blood culture  Continues to be on the vent    12/12  Finished rewarming  Wakes up with asynchrony with the vent  On levo and vaso    12/13  Rewarmed  Concern for decorticate posturing  Off levo  Weaning vaso    12/14  Patient on mechanical ventilation, s/p TTM, off of pressors. renal function improved, continued on IV fluids   currently on vent settings FiO2 30% PEEP of 5   SBT today    12/15  Patient remains on mechanical ventilation. Remains off pressors FiO2 30%, PEEP of 5. S/p extensive debridement of stage IV large sacral decubitus ulcer today. Responds a little to painful stimuli.   On tube feedings    12/16  Unchanged remains on mechanical ventilation    12/17  Remains on mechanical ventilation,  failed SBT FiO2 40% PEEP of 5    Scheduled Meds:   vancomycin  1,000 mg Intravenous Once    dilTIAZem  60 mg Per NG tube 4 times per day    enoxaparin  40 mg Subcutaneous Daily    pantoprazole  40 mg Intravenous Daily    metroNIDAZOLE  500 mg Intravenous Q8H    vancomycin (VANCOCIN) intermittent dosing (placeholder)   Other RX Placeholder    cefTRIAXone (ROCEPHIN) 2 g IVPB in NS 50ml minibag  2 g Intravenous Q24H    insulin lispro  0-6 Units Subcutaneous Q4H    insulin lispro  0-3 Units Subcutaneous Nightly    miconazole   Topical BID    sodium chloride flush  10 mL Intravenous 2 times per day    carboxymethylcellulose PF  1 drop Both Eyes Q4H    And    artificial tears   Both Eyes Q4H    chlorhexidine  15 mL Mouth/Throat BID    dexamethasone  6 mg Intravenous Daily       Continuous Infusions:   dexmedetomidine (PRECEDEX) IV infusion 0.4 mcg/kg/hr (12/17/20 6400)  dilTIAZem (CARDIZEM) 125 mg in dextrose 5% 125 mL infusion Stopped (20)    sodium hypochlorite      sodium chloride 10 mL/hr at 20 1208    propofol 50 mcg/kg/min (20 1452)    dextrose         PRN Meds:  sodium hypochlorite, perflutren lipid microspheres, acetaminophen **OR** acetaminophen, sodium chloride flush, polyethylene glycol, promethazine **OR** ondansetron, fentanNYL, albuterol sulfate HFA **AND** ipratropium **AND** MDI Treatment, promethazine **OR** ondansetron, midazolam, ipratropium-albuterol, glucose, dextrose, glucagon (rDNA), dextrose          Objective:     Temp  Av.6 °F (37 °C)  Min: 96.5 °F (35.8 °C)  Max: 99.1 °F (37.3 °C)  Pulse  Av  Min: 82  Max: 128  BP  Min: 109/65  Max: 158/93  SpO2  Av %  Min: 90 %  Max: 100 %  FiO2   Av.1 %  Min: 30 %  Max: 40 %  Patient Vitals for the past 4 hrs:   BP Pulse Resp SpO2   20 1613  100 24 100 %   20 1600 130/78 92 24 100 %   20 1500 128/75 107 24 99 %   20 1400 (!) 141/76 91 24 98 %   20 1301  89 27 95 %   20 1300 112/81            Intake/Output Summary (Last 24 hours) at 2020 1642  Last data filed at 2020 1246  Gross per 24 hour   Intake 513 ml   Output 1480 ml   Net -967 ml       Physical Exam:  Patient seen and examined . She is in droplet plus precautions  Gen: Intubated, sedated, on mechanical ventilation  D/W ICU  RN . She does respond a little bit to painful stimuli when sedation decreased  See wound care notes, patient has stage IV decubitus ulcer-media pictures reviewed  Eyes: Pupils are reacting, equal. No sclera icterus. No conjunctival injection. ENT: ETT  Neck: Trachea midline. Normal thyroid. Resp: No accessory muscle use. No crackles. No wheezes. No rhonchi. No dullness on percussion. CV: Regular rate. Regular rhythm. No murmur or rub. No edema. GI: Non-tender. Non-distended. No masses. No organomegaly. Normal bowel sounds. No hernia. Skin: Warm and dry. No nodule on exposed extremities. No rash on exposed extremities. Sacral decubitus ulcer  Lymph: No cervical LAD. No supraclavicular LAD. M/S: No cyanosis. No joint deformity. No clubbing. Neuro: Sedated now . moves extremities per nurse, responds a little to painful stimuli when sedation decreased      Lab Data:  CBC:   Recent Labs     12/15/20  0500 12/16/20  0414 12/17/20  0416   WBC 8.2 6.8 4.1   RBC 2.81* 2.79* 2.59*   HGB 7.9* 7.9* 7.4*   HCT 25.0* 25.0* 23.6*   MCV 88.9 89.5 91.3   RDW 19.2* 19.6* 20.1*   PLT 81* 87* 87*     BMP:   Recent Labs     12/15/20  0500 12/16/20 0414 12/17/20 0416    141 137   K 4.5 4.2 3.7    109 105   CO2 21 21 22   PHOS 5.4* 4.8 4.3   BUN 55* 47* 38*   CREATININE 1.8* 1.4* 1.1     BNP: No results for input(s): BNP in the last 72 hours. PT/INR:   No results for input(s): PROTIME, INR in the last 72 hours. APTT:  No results for input(s): APTT in the last 72 hours. CARDIAC ENZYMES:   Recent Labs     12/16/20  0414 12/16/20  0850 12/16/20  1200   TROPONINI 0.01 <0.01 <0.01     FASTING LIPID PANEL:  Lab Results   Component Value Date    CHOL 125 11/02/2015    HDL 39 11/02/2015    TRIG 190 12/16/2020     LIVER PROFILE:   No results for input(s): AST, ALT, ALB, BILIDIR, BILITOT, ALKPHOS in the last 72 hours. Radiology  XR CHEST PORTABLE   Preliminary Result   1. Stable appropriate positions of support apparatus. 2. Stable small left pleural effusion. 3. Slight interval improvement in appearance of multifocal pneumonia. XR CHEST PORTABLE   Final Result   Multifocal bilateral pneumonia, unchanged. XR CHEST PORTABLE   Final Result   Slight improving ground-glass opacification on the right with persistent   ground-glass changes on the left.          XR CHEST PORTABLE   Final Result Perihilar and lower zone ground-glass opacities relatively stable. XR CHEST PORTABLE   Final Result   No significant change in appearance of bilateral multifocal parenchymal   disease when compared to the study of 12/12/2020 as described above. XR CHEST PORTABLE   Final Result   Low volume study with persistent diffuse bilateral airspace disease, slightly   increased on the right as compared to prior. Persistent small loculated left   pleural effusion. XR CHEST PORTABLE   Final Result   Stable multifocal pneumonia and small loculated left pleural effusion         CT HEAD WO CONTRAST   Final Result   No acute intracranial abnormality. XR CHEST PORTABLE   Final Result   The endotracheal tube has been retracted with tip now at the level the   clavicular heads 5 cm above the [place]. Persistent diffuse bilateral airspace disease consistent with atypical viral   pneumonia. XR CHEST PORTABLE   Final Result   Endotracheal tube is noted at the level of the [place]. Recommend retracting   the tube 2-3 cm more proximally. Enteric tube and right IJ CVC appear to be in satisfactory position. No significant interval change in the bilateral airspace disease. XR CHEST PORTABLE   Final Result   Persistent multifocal bilateral airspace disease, slightly increased on the   left and slightly decreased on the right. Overall very similar appearance   over the past 4 weeks. Assessment & Plan:       # Cardiac arrest   - of unclear etiology. Possibly related to sepsis  -Required multiple pressors on admission including epi, levo and vaso. --> Weaned off of pressors now. -S/p targeted temperature management. - will need ECHO. COVID +ve, patient on droplet plus precautions     #Acute hypoxic respiratory failure. -  Intubated and placed  on mechanical ventilation.    -Critical care consulted. -S/p targeted temperature management , rewarming completed . -Failed SBT today , continued on mechanical ventilation . FiO2 30%-> 40 %     #A fib with RVR   - on cardizem gtt, started  cardizem per Ng.   and weaned off gtt     # COVID-19 pneumonia.   - Not a candidate for remdesivir given elevated creatinine.   - No CCP as it is not certain if the transfusion reaction was the cause of current condition - cardiac arrest in ER.    - Started Decadron 6 mg  IV daily, cont day 8     #Septic shock   Strep bacteremia  -Likely due to infected sacral decubitus ulcers, possible pneumonia  -Was on empiric cefepime and IV vancomycin. Antibiotics changed to Rocephin given strep in  blood cultures. Resume vancomycin and Flagyl for infected sacral decubitus ulcer  -Weaned off of pressors   -Blood cultures positive for Streptococcus and diphtheroids .  -Leukocytosis improving    #Sacral decubitus ulcers  -she has large extensive stage 4 wounds in sacrum likely source sepsis. -Seen by General surgery today ,  S/P debridement 12/15. Continue dressing changes, follow-up on wound cultures.  -Resume antibiotics vancomycin and Flagyl  -Surgeon to reevaluate tomorrow to see if she needs further debridement, likely will have wound VAC placed     # Acute anemia.    -Discontinue Eliquis  - status post transfusion of 2 units of packed cells. - GI consult. Protonix twice daily.    -Follow H&H every 6 hours     # Hypertension.   - Home medications are held .     #Type 2 diabetes. -Was on insulin drip. Now on SSI    # MIGUELANGEL. -2 to hypotension and septic shock. -nephro consult. -MIGUELANGEL resolved with IV fluids. # Hyponatremia. severe. nephro consult. S/P  2 % NS. Now off IVF     # Hypocalcemia. - replaced     #H/o breast cancer  - on Ibrance      SCDs for DVT prophylaxis. Protonix for GI prophylaxis. DIET TUBE FEED CONTINUOUS/CYCLIC NPO; Low Calorie High Protein (Vital High-Protein);  Orogastric; Continuous; 15; 15; 20  Full Code      Krista Espinal MD  12/17/2020

## 2020-12-17 NOTE — PROGRESS NOTES
This note also relates to the following rows which could not be included:  SpO2 - Cannot attach notes to unvalidated device data  Pulse - Cannot attach notes to unvalidated device data  Resp - Cannot attach notes to unvalidated device data       12/17/20 1613   Vent Information   Vent Type 840   Vent Mode AC/VC   Vt Ordered 380 mL   Rate Set 24 bmp   FiO2  40 %   Sensitivity 3   PEEP/CPAP 5   Humidification Source HME   Vent Patient Data   Peak Inspiratory Pressure 32 cmH2O   Mean Airway Pressure 14 cmH20   Rate Measured 24 br/min   Vt Exhaled 397 mL   Minute Volume 9.48 Liters   I:E Ratio 1:2   Cough/Sputum   Sputum How Obtained Endotracheal;Suctioned   Cough Productive   Sputum Amount Small   Sputum Color Dark Yellow;Creamy   Tenacity Thick   Breath Sounds   Right Upper Lobe Diminished; Expiratory Wheezes   Right Middle Lobe Diminished   Right Lower Lobe Diminished   Left Upper Lobe Diminished; Expiratory Wheezes   Left Lower Lobe Diminished   Alarm Settings   High Pressure Alarm 50 cmH2O   Low Minute Volume Alarm 4 L/min   Apnea (secs) 20 secs   High Respiratory Rate 50 br/min   Low Exhaled Vt  210 mL   ETT (adult)   Placement Date/Time: 12/10/20 1400   Timeout: Patient  Preoxygenation: Yes  Mask Ventilation: Ventilated by mask (1)  Technique: Direct laryngoscopy  Type: Cuffed  Tube Size: 7.5 mm  Location: Oral  Insertion attempts: 1  Placement Verified By[de-identified] Chest...    Secured at 23 cm   Measured From [address] [place] [address],[address] By [employer]   Site Condition Dry

## 2020-12-18 PROBLEM — E43 SEVERE PROTEIN-CALORIE MALNUTRITION (HCC): Status: ACTIVE | Noted: 2020-01-01

## 2020-12-18 NOTE — PROGRESS NOTES
Shift assessment completed, see flow sheet. Pt not following commands, only responding to some painful stimulus, RASS -4. Intubated and sedated on AC # 7.5 ETT, at 23 LL. RR 24 /  / FiO2 40% / PEEP 5.      AFIB on monitor, HR 96, /84 (99), SpO2 100%. Respirations are easy, even, and unlabored. Bilateral lung sounds diminished with expiratory wheezes. OG in place at 50, with TF at 15 mL/hr. No residual.  CVC and PIV in place, WNL with propofol infusing at 45 mcg/kg/min and precedex at 0.5 mcg/kg/hr. Precedex titrated up in order to titrate propofol down. PRN fentanyl given for increased HR, indicative of pain.     Mercedes in place and patent with STAT lock, draining clear gabriela urine. Bilateral soft wrist restraints in place for pt safety.      Bed in lowest position, bed alarm on, will continue to monitor.

## 2020-12-18 NOTE — PROGRESS NOTES
CBC with Differential:    Lab Results   Component Value Date    WBC 3.6 12/18/2020    RBC 2.88 12/18/2020    HGB 8.2 12/18/2020    HCT 25.9 12/18/2020    PLT 92 12/18/2020    MCV 89.9 12/18/2020    MCH 28.5 12/18/2020    MCHC 31.7 12/18/2020    RDW 19.9 12/18/2020    SEGSPCT 66.7 01/07/2012    BANDSPCT 8 12/11/2020    LYMPHOPCT 1.6 12/18/2020    MONOPCT 1.8 12/18/2020    EOSPCT 2.1 01/07/2012    BASOPCT 0.2 12/18/2020    MONOSABS 0.1 12/18/2020    LYMPHSABS 0.1 12/18/2020    EOSABS 0.0 12/18/2020    BASOSABS 0.0 12/18/2020    DIFFTYPE Auto 01/07/2012     CMP:    Lab Results   Component Value Date     12/18/2020    K 3.7 12/18/2020    K 4.7 12/10/2020     12/18/2020    CO2 22 12/18/2020    BUN 36 12/18/2020    CREATININE 0.9 12/18/2020    GFRAA >60 12/18/2020    GFRAA >60 01/07/2012    AGRATIO 0.6 12/10/2020    LABGLOM >60 12/18/2020    GLUCOSE 154 12/18/2020    PROT 5.9 12/12/2020    PROT 9.3 01/05/2012    LABALBU 2.5 12/18/2020    CALCIUM 8.4 12/18/2020    BILITOT 0.8 12/12/2020    ALKPHOS 105 12/12/2020     12/12/2020     12/12/2020         Chyna Natividad [employer]  Electronically signed 12/18/2020 at 12:48 PM

## 2020-12-18 NOTE — PROGRESS NOTES
Reassessment complete, see flow sheet. Propofol 25 mcg/kg/min  Precedex 0.5 mcg/kg/hr    Pt responding to pain and biting tube when agitated. PRN versed given at this time. AM labs drawn per PICC, no complications.

## 2020-12-18 NOTE — PROGRESS NOTES
This note also relates to the following rows which could not be included:  Pulse - Cannot attach notes to unvalidated device data  Resp - Cannot attach notes to unvalidated device data       12/17/20 2018   Vent Information   $Ventilation $Subsequent Day   Vent Type 840   Vent Mode AC/VC   Vt Ordered 380 mL   Rate Set 24 bmp   Peak Flow 50 L/min   FiO2  40 %   SpO2 99 %   SpO2/FiO2 ratio 247.5   Sensitivity 3   PEEP/CPAP 5   Humidification Source HME   Vent Patient Data   Peak Inspiratory Pressure 35 cmH2O   Mean Airway Pressure 14 cmH20   Rate Measured 25 br/min   Vt Exhaled 407 mL   Minute Volume 10 Liters   I:E Ratio 1:2.1   Plateau Pressure 24 XDT24   Static Compliance 21 mL/cmH2O   Dynamic Compliance 14 mL/cmH2O   Cough/Sputum   Sputum How Obtained Endotracheal;Suctioned   Cough Productive   Sputum Amount Small   Sputum Color Creamy   Tenacity Thick   Breath Sounds   Right Upper Lobe Diminished; Expiratory Wheezes   Right Middle Lobe Diminished; Expiratory Wheezes   Right Lower Lobe Diminished   Left Upper Lobe Diminished; Expiratory Wheezes   Left Lower Lobe Diminished   Additional Respiratory  Assessments   Position Semi-Mooney's   Alarm Settings   High Pressure Alarm 50 cmH2O   Low Minute Volume Alarm 4 L/min   Apnea (secs) 20 secs   High Respiratory Rate 50 br/min   Low Exhaled Vt  250 mL   ETT (adult)   Placement Date/Time: 12/10/20 1400   Timeout: Patient  Preoxygenation: Yes  Mask Ventilation: Ventilated by mask (1)  Technique: Direct laryngoscopy  Type: Cuffed  Tube Size: 7.5 mm  Location: Oral  Insertion attempts: 1  Placement Verified By[de-identified] Chest...    Secured at 23 cm   Measured From [address] [place] [address],[address] By [employer]   Site Condition Dry

## 2020-12-18 NOTE — PROGRESS NOTES
Reassessment complete, see flow sheet. Continuing to titrate down propofol as able. Currently at 30 mcg/kg/min. Precedex continues at 0.5 mcg/kg/hr. Pt hypothermic at 96.9 F, warming blanket initiated. Pt had brown loose BM, CHG bath provided. CVC dressing changed. Mercedes and oral care completed. No other changes, will continue to monitor.

## 2020-12-18 NOTE — PROGRESS NOTES
Pt placed on SBT 5/5, FiO2 45%    RR 23    SpO2 91%  RSBI 50           12/18/20 0756   Vent Information   Vent Mode PS   Pressure Support 5 cmH20   FiO2  45 %   SpO2 91 %   SpO2/FiO2 ratio 202.22   Sensitivity 2   PEEP/CPAP 5   Vent Patient Data   Rate Measured 23 br/min   Spontaneous  mL   Minute Volume 11.1 Liters   Spontaneous Breathing Trial (SBT) RT Doc   Pulse 98   RSBI Calculated 47.33   Additional Respiratory  Assessments   Resp 25

## 2020-12-18 NOTE — CONSULTS
12-18-20 (0509) vancomycin trough 19.7. Please give vancomycin 750 mg ivpb x1 dose at 1000 on 12-18-20. Please recheck vancomycin trough 12-19-20 at 0600. [facility] [place]. .  12/18/2020 6:39 AM

## 2020-12-18 NOTE — PROGRESS NOTES
Pulmonary & Critical Care Medicine ICU Progress Note    CC: Fatigue, anemia, PEA arrest in emergency department    Events of Last 24 hours: failed SBT with agitation and tachycardia    Dilt gtt   Propofol 50  Fentanyl 100    Invasive Lines: Right IJ CVC 12/10/2020    MV: 12/10/2020  Vent Mode: AC/VC Rate Set: 24 bmp/Vt Ordered: 380 mL/ /FiO2 : 70 %  Recent Labs     12/17/20  0416 12/18/20  0552   PHART 7.410 7.412   [id number] 33.7* 35.3   PO2ART 86.4 69.1*       IV:   fentaNYL (SUBLIMAZE) infusion      dexmedetomidine (PRECEDEX) IV infusion 0.6 mcg/kg/hr (12/18/20 0907)    dilTIAZem (CARDIZEM) 125 mg in dextrose 5% 125 mL infusion 15 mg/hr (12/18/20 0744)    sodium hypochlorite      sodium chloride 10 mL/hr at 12/13/20 1208    propofol 40 mcg/kg/min (12/18/20 0908)    dextrose         Vitals:  Blood pressure (!) 187/90, pulse 129, temperature 98 °F (36.7 °C), temperature source Oral, resp. rate 27, height 5' 4\" (1.626 m), weight 249 lb 1.9 oz (113 kg), SpO2 90 %, not currently breastfeeding. on 30%    Intake/Output Summary (Last 24 hours) at 12/18/2020 0940  Last data filed at 12/18/2020 0600  Gross per 24 hour   Intake 2124 ml   Output 2125 ml   Net -1 ml     EXAM:  General: intubated, ill appearing    ENT: Pharynx with ETT. Resp: No crackles. No wheezing. CV: S1, S2. +edema  GI: NT, ND, +BS  Skin: Warm and dry. Neuro: PERRL. No response to painful stimulus supraorbital notch or nailbed pressure.    Normal oculocephalic, +gag, +spontaneous breathing    Scheduled Meds:   vancomycin  750 mg Intravenous Once    fentanNYL  100 mcg Intravenous Once    dilTIAZem  60 mg Per NG tube 4 times per day    enoxaparin  40 mg Subcutaneous Daily    pantoprazole  40 mg Intravenous Daily    metroNIDAZOLE  500 mg Intravenous Q8H    vancomycin (VANCOCIN) intermittent dosing (placeholder)   Other RX Placeholder    cefTRIAXone (ROCEPHIN) 2 g IVPB in NS 50ml minibag  2 g Intravenous Q24H Mechanical ventilation as per my orders. The ventilator was adjusted by me at the bedside for unstable, life threatening respiratory failure. Propofol gtt  Start Fent gtt  S/P TTM, started rewarming 12/11/20 at 1600  No Remdesevir given low GFR  PO Dilt to 60mg PO q6  Dilt gtt -wean as able  Lasix 40mg IV x 1  Antibiotics per IM for sacral wound. On vanc and CTX and flagyl  Decadron 6 mg IV daily, D#7   Nephro following  TF    Surgery following - debridement 12/15  Prophylaxis: Lovenox; MRSA - mupirocin; GI - on protonix   · NOK is haris Rios. · Total critical care time caring for this patient with life threatening, unstable organ failure, including direct patient contact, management of life support systems, review of data including imaging and labs, discussions with other team members and physicians at least 33 minutes so far today, excluding procedures.

## 2020-12-18 NOTE — PLAN OF CARE
Nutrition Problem #1: Inadequate oral intake  Intervention: Food and/or Nutrient Delivery: Continue NPO, Continue Current Tube Feeding  Nutritional Goals: patient will continue to tolerate Vital High-Protein at 15 ml/hr x 20 hours without GI distress, without s/s of aspiration, and without additional lab/fluid disturbances

## 2020-12-18 NOTE — PROGRESS NOTES
FiO2 increased to 70%, Peep increased to 10cwp         12/18/20 0917   Vent Information   Vent Type 840   Vent Mode AC/VC   FiO2  70 %   SpO2 90 %   SpO2/FiO2 ratio 128.57   PEEP/CPAP 10   Spontaneous Breathing Trial (SBT) RT Doc   Pulse 129   Additional Respiratory  Assessments   Resp 27

## 2020-12-18 NOTE — PROGRESS NOTES
Fluid (ml/day):  928 - 1276 ml; Method Used for Fluid Requirements:  1 ml/kcal      Nutrition Related Findings:  patient remains intubated and sedated on 50 mcg propofol x 24 hours; she failed SBT this am; abdomen is round, soft, and bowel sounds are hypoactive; + BM on 12/17/20; patient had a bedside debridement of wound on coccyx on 12/15/20; patient is receiving decadron, lasix, low-dose SSI, protonix, K Bicarb, diltiazem in D5, fentanyl, and NS at 10 ml/hr; BUN elevated today      Wounds:  Pressure Injury, Stage IV(on sacrum)       Current Nutrition Therapies:    Current Tube Feeding (TF) Orders:  · Feeding Route: Orogastric  · Formula: Low Calorie, High Protein  · Schedule: Continuous  · Additives/Modulars: (none)  · Water Flushes: no water flushes  · Current TF & Flush Orders Provides: Vital High-Protein with a low goal rate of 15 ml/hr x 20 hours = 300 ml TV, 300 kcals, 26 g protein, and 251 ml free water + no free water flushes  · Goal TF & Flush Orders Provides: Vital High-Protein with a goal rate of 45 ml/hr x 20 hours = 900 ml TV, 900 kcals, 79 g protein, and 752 ml free water + no water flushes      Anthropometric Measures:  · Height: 5' 4\" (162.6 cm)  · Current Body Weight: 249 lb 1.9 oz (113 kg)(obtained on 12/17/20)   · Admission Body Weight: 254 lb 13.6 oz (115.6 kg)(obtained on 12/11/20; actual weight)    · Usual Body Weight: 254 lb 13.6 oz (115.6 kg)(obtained on 12/11/20; actual weight)     · Ideal Body Weight: 120 lbs; % Ideal Body Weight 207.6 %   · BMI: 42.7  · BMI Categories: Obese Class 3 (BMI 40.0 or greater)(42.76)       Nutrition Diagnosis:   · Inadequate oral intake related to inadequate protein-energy intake, impaired respiratory function as evidenced by NPO or clear liquid status due to medical condition, intubation      Nutrition Interventions:   Food and/or Nutrient Delivery:  Continue NPO, Continue Current Tube Feeding  Nutrition Education/Counseling:  No recommendation at this time Coordination of Nutrition Care:  Continue to monitor while inpatient, Interdisciplinary Rounds    Goals:  patient will continue to tolerate Vital High-Protein at 15 ml/hr x 20 hours without GI distress, without s/s of aspiration, and without additional lab/fluid disturbances       Nutrition Monitoring and Evaluation:   Behavioral-Environmental Outcomes:  None Identified   Food/Nutrient Intake Outcomes:  Enteral Nutrition Intake/Tolerance, IVF Intake  Physical Signs/Symptoms Outcomes:  Biochemical Data, Hemodynamic Status, Nutrition Focused Physical Findings, Skin, Weight     Discharge Planning:     Too soon to determine     Electronically signed by Bell Mendez RD, LD on 12/18/20 at 3:06 PM EST    Contact: [phone]

## 2020-12-18 NOTE — PROGRESS NOTES
Admit: 12/10/2020    Name:  Kolton Azevedo  Room:  [facility]3007-  MRN:    [id number]    Critical Care Daily Progress Note for 12/18/2020     Admitted after cardiac arrest in ER    Interval History:     12/11  She is now on propofol and versed prn  On levo and vaso  Strep in blood culture  Continues to be on the vent    12/12  Finished rewarming  Wakes up with asynchrony with the vent  On levo and vaso    12/13  Rewarmed  Concern for decorticate posturing  Off levo  Weaning vaso    12/14  Patient on mechanical ventilation, s/p TTM, off of pressors. renal function improved, continued on IV fluids   currently on vent settings FiO2 30% PEEP of 5   SBT today    12/15  Patient remains on mechanical ventilation. Remains off pressors FiO2 30%, PEEP of 5. S/p extensive debridement of stage IV large sacral decubitus ulcer today. Responds a little to painful stimuli.   On tube feedings    12/16  Unchanged remains on mechanical ventilation    12/17  Remains on mechanical ventilation,  failed SBT FiO2 40% PEEP of 5    12/18  Doing the same; remains on mechanical ventilation  Failed SBT  On Cardizem drip    Scheduled Meds:   furosemide  40 mg Intravenous BID    dilTIAZem  60 mg Per NG tube 4 times per day    enoxaparin  40 mg Subcutaneous Daily    pantoprazole  40 mg Intravenous Daily    metroNIDAZOLE  500 mg Intravenous Q8H    vancomycin (VANCOCIN) intermittent dosing (placeholder)   Other RX Placeholder    cefTRIAXone (ROCEPHIN) 2 g IVPB in NS 50ml minibag  2 g Intravenous Q24H    insulin lispro  0-6 Units Subcutaneous Q4H    insulin lispro  0-3 Units Subcutaneous Nightly    miconazole   Topical BID    sodium chloride flush  10 mL Intravenous 2 times per day    carboxymethylcellulose PF  1 drop Both Eyes Q4H    And    artificial tears   Both Eyes Q4H    chlorhexidine  15 mL Mouth/Throat BID    dexamethasone  6 mg Intravenous Daily       Continuous Infusions:  fentaNYL (SUBLIMAZE) infusion 150 mcg/hr (20 1635)    midazolam 2 mg/hr (20 1730)    dilTIAZem (CARDIZEM) 125 mg in dextrose 5% 125 mL infusion Stopped (20 1635)    sodium hypochlorite      sodium chloride 10 mL/hr at 20 1208    propofol 50 mcg/kg/min (20 1515)    dextrose         PRN Meds:  sodium hypochlorite, perflutren lipid microspheres, acetaminophen **OR** acetaminophen, sodium chloride flush, polyethylene glycol, promethazine **OR** ondansetron, fentanNYL, promethazine **OR** ondansetron, midazolam, ipratropium-albuterol, glucose, dextrose, glucagon (rDNA), dextrose          Objective:     Temp  Av.4 °F (36.9 °C)  Min: 96.9 °F (36.1 °C)  Max: 99.6 °F (37.6 °C)  Pulse  Av.6  Min: 74  Max: 137  BP  Min: 91/63  Max: 187/90  SpO2  Av.3 %  Min: 85 %  Max: 100 %  FiO2   Av.6 %  Min: 40 %  Max: 90 %  Patient Vitals for the past 4 hrs:   BP Temp Temp src Pulse Resp SpO2   20 1800 98/69   108 20 97 %   20 1700 (!) 92/55 99.6 °F (37.6 °C) Axillary 97 18 93 %   20 1635 91/63        20 1600 (!) 97/59   106 19 95 %   20 1536    102 25 96 %   20 1500 119/76   124 28 97 %         Intake/Output Summary (Last 24 hours) at 2020 1834  Last data filed at 2020 1317  Gross per 24 hour   Intake 885 ml   Output 1400 ml   Net -515 ml       Physical Exam:  Patient seen thro ICU glass doors. She is in droplet plus precautions  Gen: Intubated, sedated, on mechanical ventilation  D/W ICU  RN   wound care notes, media tab reviewed.    patient has stage IV decubitus ulcer-media pictures reviewed    Tele - afib, RVR    Lab Data:  CBC:   Recent Labs     20  0414 20  0416 20  0509   WBC 6.8 4.1 3.6*   RBC 2.79* 2.59* 2.88*   HGB 7.9* 7.4* 8.2*   HCT 25.0* 23.6* 25.9*   MCV 89.5 91.3 89.9   RDW 19.6* 20.1* 19.9*   PLT 87* 87* 92*     BMP:   Recent Labs     20  0414 20  0416 20  0509  137 139   K 4.2 3.7 3.7    105 108   CO2 21 22 22   PHOS 4.8 4.3 4.8   BUN 47* 38* 36*   CREATININE 1.4* 1.1 0.9     BNP: No results for input(s): BNP in the last 72 hours. PT/INR:   No results for input(s): PROTIME, INR in the last 72 hours. APTT:  No results for input(s): APTT in the last 72 hours. CARDIAC ENZYMES:   Recent Labs     12/16/20  0414 12/16/20  0850 12/16/20  1200   TROPONINI 0.01 <0.01 <0.01     FASTING LIPID PANEL:  Lab Results   Component Value Date    CHOL 125 11/02/2015    HDL 39 11/02/2015    TRIG 97 12/18/2020     LIVER PROFILE:   No results for input(s): AST, ALT, ALB, BILIDIR, BILITOT, ALKPHOS in the last 72 hours. Radiology  XR CHEST PORTABLE   Final Result   Worsening multifocal airspace opacities. XR CHEST PORTABLE   Final Result   1. Stable appropriate positions of support apparatus. 2. Stable small left pleural effusion. 3. Slight interval improvement in appearance of multifocal pneumonia. XR CHEST PORTABLE   Final Result   Multifocal bilateral pneumonia, unchanged. XR CHEST PORTABLE   Final Result   Slight improving ground-glass opacification on the right with persistent   ground-glass changes on the left. XR CHEST PORTABLE   Final Result   Perihilar and lower zone ground-glass opacities relatively stable. XR CHEST PORTABLE   Final Result   No significant change in appearance of bilateral multifocal parenchymal   disease when compared to the study of 12/12/2020 as described above. XR CHEST PORTABLE   Final Result   Low volume study with persistent diffuse bilateral airspace disease, slightly   increased on the right as compared to prior. Persistent small loculated left   pleural effusion. XR CHEST PORTABLE   Final Result   Stable multifocal pneumonia and small loculated left pleural effusion         CT HEAD WO CONTRAST   Final Result   No acute intracranial abnormality.          XR CHEST PORTABLE Final Result   The endotracheal tube has been retracted with tip now at the level the   clavicular heads 5 cm above the [place]. Persistent diffuse bilateral airspace disease consistent with atypical viral   pneumonia. XR CHEST PORTABLE   Final Result   Endotracheal tube is noted at the level of the [place]. Recommend retracting   the tube 2-3 cm more proximally. Enteric tube and right IJ CVC appear to be in satisfactory position. No significant interval change in the bilateral airspace disease. XR CHEST PORTABLE   Final Result   Persistent multifocal bilateral airspace disease, slightly increased on the   left and slightly decreased on the right. Overall very similar appearance   over the past 4 weeks. XR CHEST PORTABLE    (Results Pending)         Assessment & Plan:       # Cardiac arrest   - of unclear etiology. Possibly related to sepsis  -Required multiple pressors on admission including epi, levo and vaso. --> Weaned off of pressors now. -S/p targeted temperature management. - will need ECHO. COVID +ve, patient on droplet plus precautions     #Acute hypoxic respiratory failure. - Intubated and placed  on mechanical ventilation.    -Critical care consulted. -S/p targeted temperature management , rewarming completed. -Failed SBT , continued on mechanical ventilation . FiO2 30%-> 40 %     #A fib with RVR   - back on on cardizem gtt.     # COVID-19 pneumonia.   - Not a candidate for remdesivir given elevated creatinine.   - No CCP as it is not certain if the transfusion reaction was the cause of current condition - cardiac arrest in ER.    - Started Decadron 6 mg  IV daily, cont day 9     #Septic shock   Strep bacteremia  -Likely due to infected sacral decubitus ulcers, possible pneumonia  -Was on empiric cefepime and IV vancomycin. Antibiotics changed to Rocephin given strep in  blood cultures.   Resume vancomycin and Flagyl for infected sacral decubitus ulcer -Weaned off of pressors   -Blood cultures positive for Streptococcus and diphtheroids .  -Leukocytosis improving    #Sacral decubitus ulcers  -she has large extensive stage 4 wounds in sacrum likely source sepsis. -Seen by General surgery today ,  S/P debridement 12/15. Continue dressing changes, follow-up on wound cultures.  -Resumed antibiotics vancomycin and Flagyl  -Surgeon to reevaluate on Monday to see if she needs further debridement/Wound Vac placement-      # Acute anemia.    -Discontinue Eliquis  - status post transfusion of 2 units of packed cells. - GI consult. Protonix twice daily.    -Follow H&H every 6 hours     # Hypertension.   - Home medications are held .     #Type 2 diabetes. -Was on insulin drip. Now on SSI    # MIGUELANGEL. -2 to hypotension and septic shock. -nephro consult. -MIGUELANGEL resolved with IV fluids. # Hyponatremia. severe. nephro consult. S/P  2 % NS. Now off IVF     # Hypocalcemia. - replaced     #H/o breast cancer  - on Ibrance      SCDs for DVT prophylaxis. Protonix for GI prophylaxis. DIET TUBE FEED CONTINUOUS/CYCLIC NPO; Low Calorie High Protein (Vital High-Protein);  Orogastric; Continuous; 15; 15; 20  Full Code      Cole Nichole MD  12/18/2020

## 2020-12-18 NOTE — PROGRESS NOTES
PO cardizem held and cardizem gtt started in preparation for SBT to control heart rate better while off sedation. Will continue to monitor.

## 2020-12-18 NOTE — CARE COORDINATION
INTERDISCIPLINARY PLAN OF CARE CONFERENCE    Date/Time: 12/18/2020 1:15 PM  Completed by: Alexandrea Huynh Case Management      Patient Name:  Juan Manuel Thomas  YOB: 1960  Admitting Diagnosis: Cardiac arrest [facility]) [I46.9]     Admit Date/Time:  12/10/2020  9:18 AM    Chart reviewed. Interdisciplinary team contacted or reviewed plan related to patient progress and discharge plans. Disciplines included Case Management, Nursing, and Dietitian. Current Status:ICU, vent, COVID-19 Isolatione  PT/OT recommendation for discharge plan of care: TBD    Expected D/C Disposition:  Skilled nursing facility    Discharge Plan Comments: Pt cont in ICU on vent. Plan cont for EGS at discharge. Will cont to follow.     Home O2 in place on admit: No

## 2020-12-18 NOTE — CONSULTS
VeraFlo Dressing placed on 12/16:  VeraFlo in place, no leak, drainage is cannister is clear with yellow tint (dakins solution),minimal drainage. Unable to change VAC dressing, pt deemed too unstable for VAC change at this time by CC RN and pulmonary. Plan to change on Monday unless pt stabilzes later this afternoon.

## 2020-12-18 NOTE — PROGRESS NOTES
Report given to Fili Roman RN at bedside for transfer of care. Pt denies needs at this time, call light within reach.    Dwaine Mejía RN

## 2020-12-18 NOTE — PROGRESS NOTES
12/18/20 0344   Vent Information   Vent Type 840   Vent Mode AC/VC   Vt Ordered 380 mL   Rate Set 24 bmp   Peak Flow 50 L/min   FiO2  40 %   SpO2 97 %   SpO2/FiO2 ratio 242.5   Sensitivity 3   PEEP/CPAP 5   Humidification Source HME   Vent Patient Data   Peak Inspiratory Pressure 40 cmH2O   Mean Airway Pressure 15 cmH20   Rate Measured 26 br/min   Vt Exhaled 394 mL   Minute Volume 10.1 Liters   I:E Ratio 1:2.0   Cough/Sputum   Sputum How Obtained Endotracheal;Suctioned   Cough Productive   Sputum Amount Small   Sputum Color Creamy   Tenacity Thick   Spontaneous Breathing Trial (SBT) RT Doc   Pulse 90   Breath Sounds   Right Upper Lobe Expiratory Wheezes   Right Middle Lobe Expiratory Wheezes   Right Lower Lobe Diminished   Left Upper Lobe Expiratory Wheezes   Left Lower Lobe Diminished   Additional Respiratory  Assessments   Resp 26   Position Semi-Mooney's   Alarm Settings   High Pressure Alarm 50 cmH2O   Low Minute Volume Alarm 4 L/min   Apnea (secs) 20 secs   High Respiratory Rate 50 br/min   Low Exhaled Vt  250 mL   Patient Observation   Observations ETT SIZE 7.5, SECURED AT 23 LIP LINE. AMBU BAG AT HEAD OF BED. ETT (adult)   Placement Date/Time: 12/10/20 1400   Timeout: Patient  Preoxygenation: Yes  Mask Ventilation: Ventilated by mask (1)  Technique: Direct laryngoscopy  Type: Cuffed  Tube Size: 7.5 mm  Location: Oral  Insertion attempts: 1  Placement Verified By[de-identified] Chest...    Secured at 23 cm   Measured From Lips   ET Placement Right   Secured By [employer] tube brothers   Site Condition Dry

## 2020-12-18 NOTE — PROGRESS NOTES
This note also relates to the following rows which could not be included:  Pulse - Cannot attach notes to unvalidated device data  Resp - Cannot attach notes to unvalidated device data       12/18/20 0027   Vent Information   Vent Type 840   Vent Mode AC/VC   Vt Ordered 380 mL   Rate Set 24 bmp   Peak Flow 50 L/min   FiO2  40 %   SpO2 97 %   SpO2/FiO2 ratio 242.5   Sensitivity 3   PEEP/CPAP 5   Humidification Source HME   Vent Patient Data   Peak Inspiratory Pressure 40 cmH2O   Mean Airway Pressure 14 cmH20   Rate Measured 24 br/min   Vt Exhaled 391 mL   Minute Volume 9.4 Liters   I:E Ratio 1:2.0   Cough/Sputum   Sputum How Obtained Endotracheal;Suctioned   Cough Productive   Sputum Amount Small   Sputum Color Creamy   Tenacity Thick   Breath Sounds   Right Upper Lobe Diminished   Right Middle Lobe Diminished   Right Lower Lobe Diminished   Left Upper Lobe Diminished   Left Lower Lobe Diminished   Additional Respiratory  Assessments   Position Semi-Mooney's   Alarm Settings   High Pressure Alarm 50 cmH2O   Low Minute Volume Alarm 4 L/min   Apnea (secs) 20 secs   High Respiratory Rate 50 br/min   Low Exhaled Vt  250 mL   ETT (adult)   Placement Date/Time: 12/10/20 1400   Timeout: Patient  Preoxygenation: Yes  Mask Ventilation: Ventilated by mask (1)  Technique: Direct laryngoscopy  Type: Cuffed  Tube Size: 7.5 mm  Location: Oral  Insertion attempts: 1  Placement Verified By[de-identified] Chest...    Secured at 23 cm   ET Placement Center   Secured By [employer] tube brothers   Site Condition Dry

## 2020-12-18 NOTE — PROGRESS NOTES
Progress Note    HISTORY     CC:  Respiratory Failure          We are following for MIGUELANGEL      Subjective/   HPI:  She is deteriorating. FiO2 up to 90%. Kidney function stable. On lasix with good urine volume. ROS:  Constitutional:  No fevers, No Chills  Cardiovascular:  + edema  Respiratory: On vent   :  Mercedes placed, non-oliguric     Social Hx:  No family at bedside     Past Medical and Surgical History:  - Reviewed, no changes     EXAM       Objective/     Vitals:    12/18/20 1200 12/18/20 1259 12/18/20 1300 12/18/20 1400   BP: 107/77  95/62 111/66   Pulse: 101  108 109   Resp: 26  21 26   Temp: 99.6 °F (37.6 °C)      TempSrc: Axillary      SpO2: 93%  97% 96%   Weight:       Height:  5' 4\" (1.626 m)       24HR INTAKE/OUTPUT:      Intake/Output Summary (Last 24 hours) at 12/18/2020 1418  Last data filed at 12/18/2020 1317  Gross per 24 hour   Intake 2124 ml   Output 2075 ml   Net 49 ml     Constitutional:  Critically ill   Eyes:  Pupils reactive, sclera clear   Neck:  Normal thyroid, no masses   Cardiovascular:  Regular, no rub  Respiratory:   On vent , no wheezing  Psychiatry:  Sedated  Abdomen: +bs, soft, nt, no masses   Musculoskeletal: + LE edema, no clubbing   Lymphatics:  No LAD in neck, no supraclavicular nodes   :  Mercedes in place       MEDICAL DECISION MAKING       Data/  Recent Labs     12/16/20  0414 12/17/20  0416 12/18/20  0509   WBC 6.8 4.1 3.6*   HGB 7.9* 7.4* 8.2*   HCT 25.0* 23.6* 25.9*   MCV 89.5 91.3 89.9   PLT 87* 87* 92*     Recent Labs     12/16/20  0414 12/17/20  0416 12/18/20  0509    137 139   K 4.2 3.7 3.7    105 108   CO2 21 22 22   GLUCOSE 95 128* 154*   PHOS 4.8 4.3 4.8   BUN 47* 38* 36*   CREATININE 1.4* 1.1 0.9   LABGLOM 38* 50* >60   GFRAA 46* >60 >60       Assessment/    Acute kidney injury/ATN in setting of Cardiac arrest, septic shock              SCr 2.7 on admission, previously 1.0 from Nov 2020, non-oliguric UA bland, Urine sodium 85, Urine chloride 87   Kidney failure resolved / back to normal      - Acute hypoxic respiratory failure/COVID19 PNA - on ventilator, steroids, antibiotics   Worsening.   FiO2 of 90%     - Septic shock/Strep pyogenes bacteremia - plans per Critical Care     - Cardiac arrest/PEA arrest     - Acute blood loss anemia - prn prbc's     - Hyponatremia              SNa 126 on admission, as low as 121, last at 130, previously 142 from Nov 2020              Resolved      - Stage 4 decubitus ulcer               S/p bedside debridement by surgery on 12/15               On Antibiotics     Plan/     IV lasix to dry out as much as kidneys will tolerate   Follow labs  Cautious with vancomycin and higher dose diuretics as GFR many fluctuate, monitor levels    -----------------------------  Federico Guevara M.D.   [facility]

## 2020-12-18 NOTE — PLAN OF CARE
Goal: Optimal nutrition therapy  Outcome: Ongoing  Goal: Understanding of nutritional guidelines  Outcome: Ongoing

## 2020-12-19 NOTE — PROGRESS NOTES
Pulmonary & Critical Care Medicine ICU Progress Note    CC: Fatigue, anemia, PEA arrest in emergency department    Events of Last 24 hours: hypoxia and tachy improved with increased sedation. Agitation with any stim at all    Versed 2  Propofol 50  Fentanyl 150    Invasive Lines: Right IJ CVC 12/10/2020    MV: 12/10/2020  Vent Mode: AC/VC Rate Set: 24 bmp/Vt Ordered: 380 mL/ /FiO2 : 50 %  Recent Labs     12/18/20  0552 12/19/20  0406   PHART 7.412 7.357   ANO7UHD 35.3 40.5   PO2ART 69.1* 60.8*       IV:   fentaNYL (SUBLIMAZE) infusion 150 mcg/hr (12/19/20 0030)    midazolam 2 mg/hr (12/18/20 1730)    dilTIAZem (CARDIZEM) 125 mg in dextrose 5% 125 mL infusion Stopped (12/18/20 1635)    sodium hypochlorite      sodium chloride 10 mL/hr at 12/13/20 1208    propofol 50 mcg/kg/min (12/19/20 0609)    dextrose         Vitals:  Blood pressure (!) 80/54, pulse 90, temperature 99 °F (37.2 °C), temperature source Oral, resp. rate 16, height 5' 4\" (1.626 m), weight 249 lb 1.9 oz (113 kg), SpO2 91 %, not currently breastfeeding. on 50%    Intake/Output Summary (Last 24 hours) at 12/19/2020 0745  Last data filed at 12/19/2020 0600  Gross per 24 hour   Intake 2032 ml   Output 1510 ml   Net 522 ml     EXAM:  General: intubated, ill appearing    ENT: Pharynx with ETT. Resp: No crackles. No wheezing. CV: S1, S2. +edema  GI: NT, ND, +BS  Skin: Warm and dry. Neuro: PERRL. Responds to painful stim and moves limbs spontaneously.    Normal oculocephalic, +gag, +spontaneous breathing    Scheduled Meds:   furosemide  40 mg Intravenous BID    dilTIAZem  60 mg Per NG tube 4 times per day    enoxaparin  40 mg Subcutaneous Daily    pantoprazole  40 mg Intravenous Daily    metroNIDAZOLE  500 mg Intravenous Q8H    vancomycin (VANCOCIN) intermittent dosing (placeholder)   Other RX Placeholder    cefTRIAXone (ROCEPHIN) 2 g IVPB in NS 50ml minibag  2 g Intravenous Q24H    insulin lispro  0-6 Units Subcutaneous Q4H  insulin lispro  0-3 Units Subcutaneous Nightly    miconazole   Topical BID    sodium chloride flush  10 mL Intravenous 2 times per day    carboxymethylcellulose PF  1 drop Both Eyes Q4H    And    artificial tears   Both Eyes Q4H    chlorhexidine  15 mL Mouth/Throat BID    dexamethasone  6 mg Intravenous Daily     PRN Meds:  sodium hypochlorite, perflutren lipid microspheres, acetaminophen **OR** acetaminophen, sodium chloride flush, polyethylene glycol, promethazine **OR** ondansetron, fentanNYL, promethazine **OR** ondansetron, midazolam, ipratropium-albuterol, glucose, dextrose, glucagon (rDNA), dextrose    Results:  CBC:   Recent Labs     12/17/20  0416 12/18/20  0509 12/19/20  0406   WBC 4.1 3.6* 5.5   HGB 7.4* 8.2* 7.4*   HCT 23.6* 25.9* 23.8*   MCV 91.3 89.9 90.9   PLT 87* 92* 99*     BMP:   Recent Labs     12/17/20  0416 12/18/20  0509 12/19/20  0406    139 138   K 3.7 3.7 3.7    108 105   CO2 22 22 23   PHOS 4.3 4.8 5.0*   BUN 38* 36* 42*   CREATININE 1.1 0.9 1.1     LIVER PROFILE:   No results for input(s): AST, ALT, LIPASE, BILIDIR, BILITOT, ALKPHOS in the last 72 hours. Invalid input(s):   AMYLASE,  ALB    Cultures:  12/10/2020 SARS-CoV-2 positive  12/10/2020 blood strep pyogenes  12/10/2020 blood gram-positive rods    Films:  CXR 12/19/2020 ETT in place, stable cathi ASD    Head CT 12/11/2020 no acute abnormality    ASSESSMENT:  · Cardiac Arrest/PEA: unclear etiology - total compression time 18 minutes over 4 separate episodes with intermittent ROSC  · Strep pyogenes bacteremia - possibly wound source  · Septic shock  · Acute hypoxemic respiratory failure   · Acute encephalopathy  · COVID-19 pneumonia   · Acute anemia s/p 2 U PRBC - no sign of active bleeding  · MIGUELANGEL  · Stage IV decubitus ulcer  S/p surgical debridement  · Afib RVR  · TMP    PLAN:  COVID-19 isolation, droplet plus Mechanical ventilation as per my orders. The ventilator was adjusted by me at the bedside for unstable, life threatening respiratory failure. Propofol gtt,  Fent gtt, Versed gtt  S/P TTM, started rewarming 12/11/20 at 1600  No Remdesevir given low GFR  PO Dilt to 60mg PO q6  Dilt gtt -wean as able  Antibiotics per IM for sacral wound. On vanc and CTX and flagyl  Decadron 6 mg IV daily, D#8  Nephro following  TF    Surgery following - debridement 12/15, wound vac  Prophylaxis: Lovenox; MRSA - mupirocin; GI - on protonix   · NOK is haris Rico. · Total critical care time caring for this patient with life threatening, unstable organ failure, including direct patient contact, management of life support systems, review of data including imaging and labs, discussions with other team members and physicians at least 32 minutes so far today, excluding procedures.

## 2020-12-19 NOTE — PROGRESS NOTES
Admit: 12/10/2020    Name:  Taras Hodges  Room:  [address] [place] [address]  MRN:    [id number]    Critical Care Daily Progress Note for 12/19/2020     Admitted after cardiac arrest in ER    Interval History:     12/11  She is now on propofol and versed prn  On levo and vaso  Strep in blood culture  Continues to be on the vent    12/12  Finished rewarming  Wakes up with asynchrony with the vent  On levo and vaso    12/13  Rewarmed  Concern for decorticate posturing  Off levo  Weaning vaso    12/14  Patient on mechanical ventilation, s/p TTM, off of pressors. renal function improved, continued on IV fluids   currently on vent settings FiO2 30% PEEP of 5   SBT today    12/15  Patient remains on mechanical ventilation. Remains off pressors FiO2 30%, PEEP of 5. S/p extensive debridement of stage IV large sacral decubitus ulcer today. Responds a little to painful stimuli. On tube feedings    12/16  Unchanged remains on mechanical ventilation    12/17  Remains on mechanical ventilation,  failed SBT FiO2 40% PEEP of 5    12/18  Doing the same; remains on mechanical ventilation  Failed SBT  On Cardizem drip    12/19  Failed SBT . She  remains on mechanical ventilation. Worsening hypoxemia . FiO2 is up to 70%, PEEP is up to 14.    Off of Cardizem drip    Scheduled Meds:   potassium bicarb-citric acid  20 mEq Oral Once    furosemide  40 mg Intravenous BID    dilTIAZem  60 mg Per NG tube 4 times per day    enoxaparin  40 mg Subcutaneous Daily    pantoprazole  40 mg Intravenous Daily    metroNIDAZOLE  500 mg Intravenous Q8H    vancomycin (VANCOCIN) intermittent dosing (placeholder)   Other RX Placeholder    cefTRIAXone (ROCEPHIN) 2 g IVPB in NS 50ml minibag  2 g Intravenous Q24H    insulin lispro  0-6 Units Subcutaneous Q4H    insulin lispro  0-3 Units Subcutaneous Nightly    miconazole   Topical BID    sodium chloride flush  10 mL Intravenous 2 times per day    carboxymethylcellulose PF  1 drop Both Eyes Q4H And    artificial tears   Both Eyes Q4H    chlorhexidine  15 mL Mouth/Throat BID    dexamethasone  6 mg Intravenous Daily       Continuous Infusions:   propofol 40 mcg/kg/min (20 1308)    fentaNYL (SUBLIMAZE) infusion 150 mcg/hr (20 1305)    midazolam 2 mg/hr (20 1305)    dilTIAZem (CARDIZEM) 125 mg in dextrose 5% 125 mL infusion Stopped (20 1635)    sodium hypochlorite      sodium chloride 10 mL/hr at 20 1208    dextrose         PRN Meds:  sodium hypochlorite, perflutren lipid microspheres, acetaminophen **OR** acetaminophen, sodium chloride flush, polyethylene glycol, promethazine **OR** ondansetron, fentanNYL, promethazine **OR** ondansetron, midazolam, ipratropium-albuterol, glucose, dextrose, glucagon (rDNA), dextrose          Objective:     Temp  Av.7 °F (37.1 °C)  Min: 98.1 °F (36.7 °C)  Max: 99.6 °F (37.6 °C)  Pulse  Av.7  Min: 51  Max: 119  BP  Min: 80/54  Max: 98/69  SpO2  Av.2 %  Min: 90 %  Max: 98 %  FiO2   Av.8 %  Min: 50 %  Max: 80 %  Patient Vitals for the past 4 hrs:   BP Pulse Resp SpO2   20 1500  82 19 92 %   20 1400 89/61 99 18 92 %         Intake/Output Summary (Last 24 hours) at 2020 1632  Last data filed at 2020 1145  Gross per 24 hour   Intake 2032 ml   Output 1635 ml   Net 397 ml       Physical Exam:  Patient seen and examined . She is in droplet plus precautions  Gen: Intubated, sedated, on mechanical ventilation  D/W ICU  RN . She does respond a little bit to painful stimuli when sedation decreased  See wound care notes, patient has stage IV decubitus ulcer-media pictures reviewed  Eyes: Pupils are reacting, equal. No sclera icterus. No conjunctival injection. ENT: ETT  Neck: Trachea midline. Normal thyroid. Resp: No accessory muscle use. No crackles. No wheezes. No rhonchi. No dullness on percussion. CV: Regular rate. Regular rhythm. No murmur or rub. No edema. GI: Non-tender. Non-distended. No masses. No organomegaly. Normal bowel sounds. No hernia. Skin: Warm and dry. No nodule on exposed extremities. No rash on exposed extremities. Sacral decubitus ulcer  Lymph: No cervical LAD. No supraclavicular LAD. M/S: No cyanosis. No joint deformity. No clubbing. Neuro: Sedated now . moves extremities per nurse. Very minimal response when sedation decreased. Lab Data:  CBC:   Recent Labs     12/17/20 0416 12/18/20  0509 12/19/20  0406   WBC 4.1 3.6* 5.5   RBC 2.59* 2.88* 2.62*   HGB 7.4* 8.2* 7.4*   HCT 23.6* 25.9* 23.8*   MCV 91.3 89.9 90.9   RDW 20.1* 19.9* 20.3*   PLT 87* 92* 99*     BMP:   Recent Labs     12/17/20 0416 12/18/20 0509 12/19/20 0406    139 138   K 3.7 3.7 3.7    108 105   CO2 22 22 23   PHOS 4.3 4.8 5.0*   BUN 38* 36* 42*   CREATININE 1.1 0.9 1.1     BNP: No results for input(s): BNP in the last 72 hours. PT/INR:   No results for input(s): PROTIME, INR in the last 72 hours. APTT:  No results for input(s): APTT in the last 72 hours. CARDIAC ENZYMES:   No results for input(s): CKMB, CKMBINDEX, TROPONINI in the last 72 hours. Invalid input(s): CKTOTAL;3  FASTING LIPID PANEL:  Lab Results   Component Value Date    CHOL 125 11/02/2015    HDL 39 11/02/2015    TRIG 97 12/18/2020     LIVER PROFILE:   No results for input(s): AST, ALT, ALB, BILIDIR, BILITOT, ALKPHOS in the last 72 hours. Radiology  XR CHEST PORTABLE   Final Result   Stable bilateral pneumonia. XR CHEST PORTABLE   Final Result   Worsening multifocal airspace opacities. XR CHEST PORTABLE   Final Result   1. Stable appropriate positions of support apparatus. 2. Stable small left pleural effusion. 3. Slight interval improvement in appearance of multifocal pneumonia. XR CHEST PORTABLE   Final Result   Multifocal bilateral pneumonia, unchanged.          XR CHEST PORTABLE   Final Result Slight improving ground-glass opacification on the right with persistent   ground-glass changes on the left. XR CHEST PORTABLE   Final Result   Perihilar and lower zone ground-glass opacities relatively stable. XR CHEST PORTABLE   Final Result   No significant change in appearance of bilateral multifocal parenchymal   disease when compared to the study of 12/12/2020 as described above. XR CHEST PORTABLE   Final Result   Low volume study with persistent diffuse bilateral airspace disease, slightly   increased on the right as compared to prior. Persistent small loculated left   pleural effusion. XR CHEST PORTABLE   Final Result   Stable multifocal pneumonia and small loculated left pleural effusion         CT HEAD WO CONTRAST   Final Result   No acute intracranial abnormality. XR CHEST PORTABLE   Final Result   The endotracheal tube has been retracted with tip now at the level the   clavicular heads 5 cm above the [place]. Persistent diffuse bilateral airspace disease consistent with atypical viral   pneumonia. XR CHEST PORTABLE   Final Result   Endotracheal tube is noted at the level of the [place]. Recommend retracting   the tube 2-3 cm more proximally. Enteric tube and right IJ CVC appear to be in satisfactory position. No significant interval change in the bilateral airspace disease. XR CHEST PORTABLE   Final Result   Persistent multifocal bilateral airspace disease, slightly increased on the   left and slightly decreased on the right. Overall very similar appearance   over the past 4 weeks. XR CHEST PORTABLE    (Results Pending)         Assessment & Plan:       # Cardiac arrest   - of unclear etiology. Possibly related to sepsis  -Required multiple pressors on admission including epi, levo and vaso. --> Weaned off of pressors now. -S/p targeted temperature management. - will need ECHO. COVID +ve, patient on droplet plus precautions     #Acute hypoxic respiratory failure. - Intubated and placed  on mechanical ventilation.    -Critical care consulted. -S/p targeted temperature management , rewarming completed. -Failed SBT , continued on mechanical ventilation . FiO2 30%-> 40 %--> 70%     #A fib with RVR   - S/P cardizem gtt.     # COVID-19 pneumonia.   - Not a candidate for remdesivir given elevated creatinine.   - No CCP as it is not certain if the transfusion reaction was the cause of current condition - cardiac arrest in ER.    - Started Decadron 6 mg  IV daily, cont day 10     #Septic shock   Strep bacteremia  -Likely due to infected sacral decubitus ulcers, possible pneumonia  -Was on empiric cefepime and IV vancomycin. Antibiotics changed to Rocephin given strep in  blood cultures. Resume vancomycin and Flagyl for infected sacral decubitus ulcer  -Weaned off of pressors   -Blood cultures positive for Streptococcus and diphtheroids .  -Leukocytosis improving    #Sacral decubitus ulcers  -she has large extensive stage 4 wounds in sacrum likely source sepsis. -Seen by General surgery. -> S/P debridement 12/15. Continue dressing changes, follow-up on wound cultures.  -Resumed antibiotics vancomycin and Flagyl  -Surgeon to reevaluate on Monday to see if she needs further debridement/Wound Vac placement     # Acute anemia.    -Discontinue Eliquis  - status post transfusion of 2 units of packed cells. - GI consult. Protonix twice daily.    -Follow H&H every 6 hours     # Hypertension.   - Home medications are held .     #Type 2 diabetes. -Was on insulin drip. Now on SSI    # MIGUELANGEL. -2 to hypotension and septic shock. -nephro consult. -MIGUEALNGEL resolved with IV fluids. # Hyponatremia. severe. nephro consult. S/P  2 % NS. Now off IVF     # Hypocalcemia. - replaced     #H/o breast cancer  - on Ibrance      SCDs for DVT prophylaxis. Protonix for GI prophylaxis. DIET TUBE FEED CONTINUOUS/CYCLIC NPO; Low Calorie High Protein (Vital High-Protein);  Orogastric; Continuous; 15; 15; 20  Full Code      Melba Castro MD  12/19/2020

## 2020-12-19 NOTE — PROGRESS NOTES
Shift assessment completed, see flow sheet. Pt not following commands, only responding to some painful stimulus, RASS -4. Intubated and sedated on AC # 7.5 ETT, at 23 LL. RR 24 /  / FiO2 60% / PEEP 14.      AFIB on monitor, , BP 89/61 (71), SpO2 94%. Respirations are easy, even, and unlabored. Bilateral lung sounds diminished. OG in place at 50, with TF at 15 mL/hr. No residual.    CVC and PIV in place, WNL with the following infusions:  Propofol infusing at 50 mcg/kg/min  Versed infusing at 2 mg/hr  Fentanyl infusing at 150 mcg/hr    PRN versed given for increased HR.     Mercedes in place and patent with STAT lock, draining clear gabriela urine. Bilateral soft wrist restraints in place for pt safety.      Bed in lowest position, bed alarm on, will continue to monitor.

## 2020-12-19 NOTE — CONSULTS
12-19-20 (0406) vancomycin trough 14.4. Due to low urine output and increasing bun/Cr will hold off on dose today. Expect to be able to give vancomycin tomorrow if level continues to decrease. [address] Ph.  12/19/2020 6:01 AM

## 2020-12-19 NOTE — PLAN OF CARE
Problem: Airway Clearance - Ineffective  Goal: Achieve or maintain patent airway  Outcome: Ongoing     Problem: Gas Exchange - Impaired  Goal: Absence of hypoxia  Outcome: Ongoing  Goal: Promote optimal lung function  Outcome: Ongoing     Problem: Breathing Pattern - Ineffective  Goal: Ability to achieve and maintain a regular respiratory rate  Outcome: Ongoing     Problem:  Body Temperature -  Risk of, Imbalanced  Goal: Ability to maintain a body temperature within defined limits  Outcome: Ongoing  Goal: Will regain or maintain usual level of consciousness  Outcome: Ongoing  Goal: Complications related to the disease process, condition or treatment will be avoided or minimized  Outcome: Ongoing     Problem: Isolation Precautions - Risk of Spread of Infection  Goal: Prevent transmission of infection  Outcome: Ongoing     Problem: Nutrition Deficits  Goal: Optimize nutrtional status  12/18/2020 2123 by Sharee Pack RN  Outcome: Ongoing  12/18/2020 1505 by Tyson Fernando RD, LD  Outcome: Ongoing     Problem: Risk for Fluid Volume Deficit  Goal: Maintain normal heart rhythm  Outcome: Ongoing  Goal: Maintain absence of muscle cramping  Outcome: Ongoing  Goal: Maintain normal serum potassium, sodium, calcium, phosphorus, and pH  Outcome: Ongoing     Problem: Loneliness or Risk for Loneliness  Goal: Demonstrate positive use of time alone when socialization is not possible  Outcome: Ongoing     Problem: Fatigue  Goal: Verbalize increase energy and improved vitality  Outcome: Ongoing     Problem: Patient Education: Go to Patient Education Activity  Goal: Patient/Family Education  Outcome: Ongoing     Problem: Falls - Risk of:  Goal: Will remain free from falls  Description: Will remain free from falls  Outcome: Ongoing  Goal: Absence of physical injury  Description: Absence of physical injury  Outcome: Ongoing     Problem: Skin Integrity:  Goal: Will show no infection signs and symptoms

## 2020-12-19 NOTE — PROGRESS NOTES
Progress Note    HISTORY     CC:  Respiratory Failure          We are following for MIGUELANGEL        Subjective/   Remains in ICU  Intubated and sedated  On pressors    ROS:  UTO    Social Hx:  No family at bedside     Past Medical and Surgical History:  - Reviewed, no changes     EXAM       Objective/     Vitals:    12/19/20 1100 12/19/20 1200 12/19/20 1400 12/19/20 1500   BP: 91/60 89/60 89/61    Pulse: 104 95 99 82   Resp: 15 19 18 19   Temp:       TempSrc:       SpO2: 92% 92% 92% 92%   Weight:       Height:         24HR INTAKE/OUTPUT:      Intake/Output Summary (Last 24 hours) at 12/19/2020 1648  Last data filed at 12/19/2020 1145  Gross per 24 hour   Intake 2032 ml   Output 1635 ml   Net 397 ml       Gen: intubated and sedated    Head: NC , AT  ENT: no visible discharge or lesion  Neck: supple, trachea in midline, ET tube in place seems to be in good position  CV: S1, S2 heard ,no peripheral edema    Lungs: intubated on vent, +air entry bilaterally, + transmitted vent sounds,no  crackles  Abd: soft, NT , ND , +obese  Neuro: intubated and sedated  Ext: no cyanosis        MEDICAL DECISION MAKING       Data/  Recent Labs     12/17/20  0416 12/18/20  0509 12/19/20  0406   WBC 4.1 3.6* 5.5   HGB 7.4* 8.2* 7.4*   HCT 23.6* 25.9* 23.8*   MCV 91.3 89.9 90.9   PLT 87* 92* 99*     Recent Labs     12/17/20  0416 12/18/20  0509 12/19/20  0406    139 138   K 3.7 3.7 3.7    108 105   CO2 22 22 23   GLUCOSE 128* 154* 124*   PHOS 4.3 4.8 5.0*   BUN 38* 36* 42*   CREATININE 1.1 0.9 1.1   LABGLOM 50* >60 50*   GFRAA >60 >60 >60       Assessment/    Acute kidney injury/ATN in setting of Cardiac arrest, septic shock: better              SCr 2.7 on admission, previously 1.0 from Nov 2020, non-oliguric              UA bland, Urine sodium 85, Urine chloride 87        - Acute hypoxic respiratory failure/COVID19 PNA - on ventilator, steroids, antibiotics      - Septic shock/Strep pyogenes bacteremia - plans per Critical Care     - Cardiac arrest/PEA arrest     - Acute blood loss anemia - prn prbc's     - Hyponatremia              SNa 126 on admission, as low as 121, last at 130, previously 142 from Nov 2020              Resolved      - Stage 4 decubitus ulcer               S/p bedside debridement by surgery on 12/15               On Antibiotics     Plan/     C/w IV lasix to dry out as much as kidneys will tolerate   Follow labs  Cautious with vancomycin and higher dose diuretics as GFR many fluctuate, monitor levels  UOP acceptable , vent slightly better

## 2020-12-19 NOTE — PROGRESS NOTES
Reassessment completed, see flow sheet. Sedation continues at same rates. FiO2 weaned down to 50% per RT. Pt tolerating well, SpO2 94%. Pt HR still ranging from 90s to 120s. Will continue to monitor.

## 2020-12-20 NOTE — PROGRESS NOTES
Pulmonary & Critical Care Medicine ICU Progress Note    CC: Fatigue, anemia, PEA arrest in emergency department    Events of Last 24 hours:   Versed 3 mg/hr   Propofol 40 mcg/kg/min   Fentanyl 175 mcg/hr   PEEP 18  FiO2 80%  Plateau pressure 36-> 32 post vent adjustment by me today   PaO2/FiO2 113  Low grade fever     Invasive Lines: Right IJ CVC 12/10/2020    MV: 12/10/2020  Vent Mode: AC/VC Rate Set: 24 bmp/Vt Ordered: 380 mL/ /FiO2 : 80 %  Recent Labs     12/19/20  0406 12/20/20  0628   PHART 7.357 7.348*   [id number] 40.5 43.5   PO2ART 60.8* 55.1*       IV:   propofol 40 mcg/kg/min (12/20/20 0411)    fentaNYL (SUBLIMAZE) infusion 175 mcg/hr (12/20/20 0452)    midazolam 3 mg/hr (12/20/20 0615)    dilTIAZem (CARDIZEM) 125 mg in dextrose 5% 125 mL infusion Stopped (12/18/20 1635)    sodium hypochlorite      sodium chloride 10 mL/hr at 12/13/20 1208    dextrose         Vitals:  Blood pressure 95/60, pulse 120, temperature 99.9 °F (37.7 °C), temperature source Oral, resp. rate 21, height 5' 4\" (1.626 m), weight 249 lb 1.9 oz (113 kg), SpO2 92 %, not currently breastfeeding. on 24/380/18 80%     Intake/Output Summary (Last 24 hours) at 12/20/2020 0726  Last data filed at 12/20/2020 0600  Gross per 24 hour   Intake 2027 ml   Output 955 ml   Net 1072 ml     EXAM:  EXAM:  General: ill appearing. Intubated sedated. Eyes: PERRL. No sclera icterus. No conjunctival injection. ENT: No discharge. Pharynx clear. ET tube in place  Neck: Trachea midline. Normal thyroid. Resp: No accessory muscle use. No crackles. No wheezing. No rhonchi. No dullness on percussion. CV: Regular rate. irregular rhythm. No mumur or rub. 1+ LE edema. GI: Non-tender. Non-distended. No masses. No organomegaly. Normal bowel sounds. No hernia. Skin: Warm and dry. No nodule on exposed extremities. No rash on exposed extremities. Lymph: No cervical LAD. No supraclavicular LAD. M/S: No cyanosis. No joint deformity. No clubbing. Neuro: Intubated sedated. Responsive to painful stimuli. Followed commands. .  Psych: Noncommunicative unable to obtain      Scheduled Meds:   vancomycin  500 mg Intravenous Once    furosemide  40 mg Intravenous BID    dilTIAZem  60 mg Per NG tube 4 times per day    enoxaparin  40 mg Subcutaneous Daily    pantoprazole  40 mg Intravenous Daily    metroNIDAZOLE  500 mg Intravenous Q8H    vancomycin (VANCOCIN) intermittent dosing (placeholder)   Other RX Placeholder    cefTRIAXone (ROCEPHIN) 2 g IVPB in NS 50ml minibag  2 g Intravenous Q24H    insulin lispro  0-6 Units Subcutaneous Q4H    insulin lispro  0-3 Units Subcutaneous Nightly    miconazole   Topical BID    sodium chloride flush  10 mL Intravenous 2 times per day    carboxymethylcellulose PF  1 drop Both Eyes Q4H    And    artificial tears   Both Eyes Q4H    chlorhexidine  15 mL Mouth/Throat BID    dexamethasone  6 mg Intravenous Daily     PRN Meds:  sodium hypochlorite, perflutren lipid microspheres, acetaminophen **OR** acetaminophen, sodium chloride flush, polyethylene glycol, promethazine **OR** ondansetron, fentanNYL, promethazine **OR** ondansetron, midazolam, ipratropium-albuterol, glucose, dextrose, glucagon (rDNA), dextrose    Results:  CBC:   Recent Labs     12/18/20  0509 12/19/20  0406 12/20/20  0506   WBC 3.6* 5.5 7.4   HGB 8.2* 7.4* 7.8*   HCT 25.9* 23.8* 25.4*   MCV 89.9 90.9 92.1   PLT 92* 99* 105*     BMP:   Recent Labs     12/18/20  0509 12/19/20  0406 12/20/20  0506    138 139   K 3.7 3.7 3.9    105 105   CO2 22 23 24   PHOS 4.8 5.0* 5.2*   BUN 36* 42* 54*   CREATININE 0.9 1.1 1.3*     LIVER PROFILE:   No results for input(s): AST, ALT, LIPASE, BILIDIR, BILITOT, ALKPHOS in the last 72 hours. Invalid input(s):   AMYLASE,  ALB    Cultures:  12/10/2020 SARS-CoV-2 positive  12/10/2020 blood strep pyogenes  12/10/2020 blood gram-positive rods  12/16 BC NGTD     Films: CXR 12/20/2020  images reviewed by me and showed:   Satisfactory ETT position  Satisfactory CVC position   Diffuse bilateral ASD     Head CT 12/11/2020 no acute abnormality    ASSESSMENT:  · Acute hypoxemic respiratory failure  · Cardiac Arrest/PEA: unclear etiology - total compression time 18 minutes over 4 separate episodes with intermittent ROSC  · Strep pyogenes bacteremia - possibly wound source  · Acute encephalopathy/Metabolic encephalopathy   · COVID-19 pneumonia   · Acute anemia s/p 2 U PRBC - no sign of active bleeding  · MIGUELANGEL  · Stage IV decubitus ulcer  S/p surgical debridement  · Afib RVR  · Thrombocytopenia       PLAN:  Mechanical ventilation as per my orders. The ventilator was adjusted by me at the bedside for unstable, life threatening respiratory failure. Change AC 30/340 - ABG in 2 hrs   Trach aspirate today   COVID-19 isolation, droplet plus  Propofol gtt,  Fent gtt, Versed gtt  S/P TTM, started rewarming 12/11/20 at 1600  No Remdesevir given low GFR  PO Dilt to 60mg PO q6  Antibiotics per IM for sacral wound. On vanc and CTX and flagyl  Decadron 6 mg IV daily, D#9  Nephro following  TF  At goal   Surgery following - debridement 12/15, wound vac  Prophylaxis: Lovenox; MRSA - mupirocin; GI - on protonix  NOK is son - [employer]                 Total critical care time caring for this patient with life threatening, unstable organ failure, including direct patient contact, management of life support systems, review of data including imaging and labs, discussions with other team members and physicians is 33 minutes, excluding procedures.

## 2020-12-20 NOTE — PROGRESS NOTES
Progress Note    HISTORY     CC:  Respiratory Failure          We are following for MIGUELANGEL        Subjective/   Remains in ICU  Intubated and sedated  Not on pressors  Worse vent setting      ROS:  UTO    Social Hx:  No family at bedside     Past Medical and Surgical History:  - Reviewed, no changes     EXAM       Objective/     Vitals:    12/20/20 1200 12/20/20 1300 12/20/20 1400 12/20/20 1500   BP: 98/67 (!) 97/57 (!) 94/57 (!) 89/59   Pulse: 128 122 140 116   Resp: 25 22 26 24   Temp:       TempSrc:       SpO2: 91% 91% 93% 93%   Weight:       Height:         24HR INTAKE/OUTPUT:      Intake/Output Summary (Last 24 hours) at 12/20/2020 1525  Last data filed at 12/20/2020 5518  Gross per 24 hour   Intake 2027 ml   Output 615 ml   Net 1412 ml       EXAM: limited(due to COVID-19 to limit exposure, observed via video monitor)  Gen: intubated and sedated    Head: NC , AT  ENT: + ET tube in place  Lungs: intubated on vent, breathing in synch with vent   Neuro: intubated and sedated, no myoclonic jerks, not agitated  Skin: no rash/lesions over exposed skin over face      MEDICAL DECISION MAKING       Data/  Recent Labs     12/18/20  0509 12/19/20  0406 12/20/20  0506   WBC 3.6* 5.5 7.4   HGB 8.2* 7.4* 7.8*   HCT 25.9* 23.8* 25.4*   MCV 89.9 90.9 92.1   PLT 92* 99* 105*     Recent Labs     12/18/20  0509 12/19/20  0406 12/20/20  0506    138 139   K 3.7 3.7 3.9    105 105   CO2 22 23 24   GLUCOSE 154* 124* 116*   PHOS 4.8 5.0* 5.2*   BUN 36* 42* 54*   CREATININE 0.9 1.1 1.3*   LABGLOM >60 50* 41*   GFRAA >60 >60 50*       Scheduled Meds:   furosemide  80 mg Intravenous BID    chlorothiazide  500 mg Intravenous BID    dilTIAZem  60 mg Per NG tube 4 times per day    enoxaparin  40 mg Subcutaneous Daily    pantoprazole  40 mg Intravenous Daily    metroNIDAZOLE  500 mg Intravenous Q8H    vancomycin (VANCOCIN) intermittent dosing (placeholder)   Other RX Placeholder

## 2020-12-20 NOTE — PROGRESS NOTES
12/20/20 9207   Additional Respiratory  Assessments   SpO2 (!) 86 %     I increased PEEP from 16 to 18 and FiO2 from 80% to 90%

## 2020-12-20 NOTE — PROGRESS NOTES
12/19/20 1908   Vent Information   Vent Mode AC/VC   Vt Ordered 380 mL   Rate Set 24 bmp   Peak Flow 60 L/min   FiO2  70 %   SpO2 91 %   Sensitivity 3   PEEP/CPAP 14   Vent Patient Data   Peak Inspiratory Pressure 39 cmH2O   Mean Airway Pressure 22 cmH20   Rate Measured 26 br/min   Vt Exhaled 411 mL   Minute Volume 11.3 Liters   I:E Ratio 1:2.6   Plateau Pressure 33 [id number]   Static Compliance 22 mL/cmH2O   Dynamic Compliance 16 mL/cmH2O   Cough/Sputum   Cough Productive   Sputum Amount Small   Sputum Color Yellow   Tenacity Thick

## 2020-12-20 NOTE — PROGRESS NOTES
Shift assessment completed, see flow sheet. Pt not following commands, only responding to some painful stimulus, RASS -4. Intubated and sedated on AC # 7.5 ETT, at 23 LL. RR 24 /  / FiO2 70% / PEEP 16.      AFIB on monitor, , BP 100/63 (74), SpO2 91%. Respirations are easy, even, and unlabored. Bilateral lung sounds diminished. OG in place at 50, with TF at 15 mL/hr. No residual.     CVC and PIV in place, WNL with the following infusions:  Propofol infusing at 40 mcg/kg/min  Versed infusing at 2 mg/hr  Fentanyl infusing at 150 mcg/hr     PRN versed and fentanyl given for increased double stacking and pt SpO2 dropping to 87%. RT came and increased PEEP from 14 to 16. Currently satting 90%.    Mercedes in place and patent with STAT lock, draining clear gabriela urine.   Bilateral soft wrist restraints in place for pt safety.      Bed in lowest position, bed alarm on, will continue to monitor

## 2020-12-20 NOTE — PLAN OF CARE
Description: Absence of new skin breakdown  Outcome: Ongoing     Problem: Nutrition  Goal: Optimal nutrition therapy  Outcome: Ongoing  Goal: Understanding of nutritional guidelines  Outcome: Ongoing     Problem: Pain:  Goal: Pain level will decrease  Description: Pain level will decrease  Outcome: Ongoing  Goal: Control of acute pain  Description: Control of acute pain  Outcome: Ongoing  Goal: Control of chronic pain  Description: Control of chronic pain  Outcome: Ongoing

## 2020-12-20 NOTE — PROGRESS NOTES
SpO2 88% I increased PEEP from 14 to 16. SpO2 is still below 90% I increased FiO2 from 70% to 75%.  SpO2 is now 91%

## 2020-12-20 NOTE — PROGRESS NOTES
Admit: 12/10/2020    Name:  Bill Kehr  Room:  3007/3007-01  MRN:    [id number]    Critical Care Daily Progress Note for 12/20/2020     Admitted after cardiac arrest in ER    Interval History:     12/11  She is now on propofol and versed prn  On levo and vaso  Strep in blood culture  Continues to be on the vent    12/12  Finished rewarming  Wakes up with asynchrony with the vent  On levo and vaso    12/13  Rewarmed  Concern for decorticate posturing  Off levo  Weaning vaso    12/14  Patient on mechanical ventilation, s/p TTM, off of pressors. renal function improved, continued on IV fluids   currently on vent settings FiO2 30% PEEP of 5   SBT today    12/15  Patient remains on mechanical ventilation. Remains off pressors FiO2 30%, PEEP of 5. S/p extensive debridement of stage IV large sacral decubitus ulcer today. Responds a little to painful stimuli. On tube feedings    12/16  Unchanged remains on mechanical ventilation    12/17  Remains on mechanical ventilation,  failed SBT FiO2 40% PEEP of 5    12/18  Doing the same; remains on mechanical ventilation  Failed SBT  On Cardizem drip    12/19  Failed SBT . She  remains on mechanical ventilation. Worsening hypoxemia . FiO2 is up to 70%, PEEP is up to 14. Off of Cardizem drip    12/20  Worsening hypoxia, fi O2 80 %.    increased agitation, requiring sedation     Scheduled Meds:   furosemide  80 mg Intravenous BID    chlorothiazide  500 mg Intravenous BID    dilTIAZem  60 mg Per NG tube 4 times per day    enoxaparin  40 mg Subcutaneous Daily    pantoprazole  40 mg Intravenous Daily    metroNIDAZOLE  500 mg Intravenous Q8H    vancomycin (VANCOCIN) intermittent dosing (placeholder)   Other RX Placeholder    cefTRIAXone (ROCEPHIN) 2 g IVPB in NS 50ml minibag  2 g Intravenous Q24H    insulin lispro  0-6 Units Subcutaneous Q4H    insulin lispro  0-3 Units Subcutaneous Nightly    miconazole   Topical BID  sodium chloride flush  10 mL Intravenous 2 times per day    carboxymethylcellulose PF  1 drop Both Eyes Q4H    And    artificial tears   Both Eyes Q4H    chlorhexidine  15 mL Mouth/Throat BID    dexamethasone  6 mg Intravenous Daily       Continuous Infusions:   propofol 40 mcg/kg/min (20 1218)    fentaNYL (SUBLIMAZE) infusion 175 mcg/hr (20 1648)    midazolam 3 mg/hr (20 0615)    dilTIAZem (CARDIZEM) 125 mg in dextrose 5% 125 mL infusion Stopped (20 1635)    sodium hypochlorite      sodium chloride 10 mL/hr at 20 1208    dextrose         PRN Meds:  sodium hypochlorite, perflutren lipid microspheres, acetaminophen **OR** acetaminophen, sodium chloride flush, polyethylene glycol, promethazine **OR** ondansetron, fentanNYL, promethazine **OR** ondansetron, midazolam, ipratropium-albuterol, glucose, dextrose, glucagon (rDNA), dextrose          Objective:     Temp  Av.2 °F (37.3 °C)  Min: 97.9 °F (36.6 °C)  Max: 100.1 °F (37.8 °C)  Pulse  Av.4  Min: 89  Max: 140  BP  Min: 87/56  Max: 109/62  SpO2  Av %  Min: 86 %  Max: 95 %  FiO2   Av.9 %  Min: 70 %  Max: 80 %  Patient Vitals for the past 4 hrs:   BP Temp Temp src Pulse Resp SpO2   20 1600 (!) 95/52 97.9 °F (36.6 °C) Axillary 106 30 92 %   20 1531    112 23 92 %   20 1500 (!) 89/59   116 24 93 %   20 1400 (!) 94/57   140 26 93 %   20 1300 (!) 97/57   122 22 91 %         Intake/Output Summary (Last 24 hours) at 2020 1659  Last data filed at 2020 0824  Gross per 24 hour   Intake 2027 ml   Output 615 ml   Net 1412 ml       Physical Exam:  Patient seen and examined . She is in droplet plus precautions  Gen: Intubated, sedated, on mechanical ventilation  D/W ICU RN . See wound care notes, patient has stage IV decubitus ulcer-media pictures reviewed  Eyes: Pupils are reacting, equal. No sclera icterus. No conjunctival injection.    ENT: ETT Neck: Trachea midline. Normal thyroid. Resp: No accessory muscle use. No crackles. No wheezes. No rhonchi. No dullness on percussion. CV: Regular rate. Regular rhythm. No murmur or rub. No edema. GI: Non-tender. Non-distended. No masses. No organomegaly. Normal bowel sounds. No hernia. Skin: Warm and dry. No nodule on exposed extremities. No rash on exposed extremities. Sacral decubitus ulcer  Lymph: No cervical LAD. No supraclavicular LAD. M/S: No cyanosis. No joint deformity. No clubbing. Neuro: Sedated now . Lab Data:  CBC:   Recent Labs     12/18/20  0509 12/19/20  0406 12/20/20  0506   WBC 3.6* 5.5 7.4   RBC 2.88* 2.62* 2.76*   HGB 8.2* 7.4* 7.8*   HCT 25.9* 23.8* 25.4*   MCV 89.9 90.9 92.1   RDW 19.9* 20.3* 20.9*   PLT 92* 99* 105*     BMP:   Recent Labs     12/18/20  0509 12/19/20  0406 12/20/20  0506    138 139   K 3.7 3.7 3.9    105 105   CO2 22 23 24   PHOS 4.8 5.0* 5.2*   BUN 36* 42* 54*   CREATININE 0.9 1.1 1.3*     BNP: No results for input(s): BNP in the last 72 hours. PT/INR:   No results for input(s): PROTIME, INR in the last 72 hours. APTT:  No results for input(s): APTT in the last 72 hours. CARDIAC ENZYMES:   No results for input(s): CKMB, CKMBINDEX, TROPONINI in the last 72 hours. Invalid input(s): CKTOTAL;3  FASTING LIPID PANEL:  Lab Results   Component Value Date    CHOL 125 11/02/2015    HDL 39 11/02/2015    TRIG 97 12/18/2020     LIVER PROFILE:   No results for input(s): AST, ALT, ALB, BILIDIR, BILITOT, ALKPHOS in the last 72 hours. Radiology  XR CHEST PORTABLE   Final Result   Supportive tubing is stable. Bilateral airspace disease, with perhaps mild improvement in aeration of the   right base. XR CHEST PORTABLE   Final Result   Stable bilateral pneumonia. XR CHEST PORTABLE   Final Result   Worsening multifocal airspace opacities. XR CHEST PORTABLE   Final Result   1. Stable appropriate positions of support apparatus. 2. Stable small left pleural effusion. 3. Slight interval improvement in appearance of multifocal pneumonia. XR CHEST PORTABLE   Final Result   Multifocal bilateral pneumonia, unchanged. XR CHEST PORTABLE   Final Result   Slight improving ground-glass opacification on the right with persistent   ground-glass changes on the left. XR CHEST PORTABLE   Final Result   Perihilar and lower zone ground-glass opacities relatively stable. XR CHEST PORTABLE   Final Result   No significant change in appearance of bilateral multifocal parenchymal   disease when compared to the study of 12/12/2020 as described above. XR CHEST PORTABLE   Final Result   Low volume study with persistent diffuse bilateral airspace disease, slightly   increased on the right as compared to prior. Persistent small loculated left   pleural effusion. XR CHEST PORTABLE   Final Result   Stable multifocal pneumonia and small loculated left pleural effusion         CT HEAD WO CONTRAST   Final Result   No acute intracranial abnormality. XR CHEST PORTABLE   Final Result   The endotracheal tube has been retracted with tip now at the level the   clavicular heads 5 cm above the [place]. Persistent diffuse bilateral airspace disease consistent with atypical viral   pneumonia. XR CHEST PORTABLE   Final Result   Endotracheal tube is noted at the level of the [place]. Recommend retracting   the tube 2-3 cm more proximally. Enteric tube and right IJ CVC appear to be in satisfactory position. No significant interval change in the bilateral airspace disease. XR CHEST PORTABLE   Final Result   Persistent multifocal bilateral airspace disease, slightly increased on the   left and slightly decreased on the right. Overall very similar appearance   over the past 4 weeks.          XR CHEST PORTABLE    (Results Pending)         Assessment & Plan:       # Cardiac arrest - of unclear etiology. Possibly related to COVID infection and  sepsis  -Required multiple pressors on admission including epi, levo and vaso. --> Weaned off of pressors now. -S/p targeted temperature management. - will need ECHO. COVID +ve, patient on droplet plus precautions     #Acute hypoxic respiratory failure. - Intubated and placed  on mechanical ventilation.    -Critical care consulted. -S/p targeted temperature management , rewarming completed. -Failed SBT , continued on mechanical ventilation . Worsening hypoxia . FiO2 30%-> 40 %--> 70%--> 80 %   Likely needs to be proned. Lasix IV given      #A fib with RVR   - S/P cardizem gtt. On PO dilt q 6      # COVID-19 pneumonia.   - Not a candidate for remdesivir given elevated creatinine.   - No CCP as it is not certain if the transfusion reaction was the cause of current condition - cardiac arrest in ER.    - Started Decadron 6 mg  IV daily, cont day 10     #Septic shock   Strep bacteremia  -Likely due to infected sacral decubitus ulcers, possible pneumonia  -Was on empiric cefepime and IV vancomycin. Antibiotics changed to Rocephin given strep in  blood cultures. Resumed vancomycin and Flagyl for infected sacral decubitus ulcer  Day 9 of rocephin, Day 9 of vanc , day 5 of flagyl  -Weaned off of pressors   -Blood cultures positive for Streptococcus and diphtheroids .  -Leukocytosis improving    #Sacral decubitus ulcers  -she has large extensive stage 4 wounds in sacrum likely source sepsis. -Seen by General surgery. -> S/P debridement 12/15. Continue dressing changes, follow-up on wound cultures.  -Resumed antibiotics vancomycin and Flagyl  -Surgeon to reevaluate on Monday to see if she needs further debridement/Wound Vac placement  - can likely deescalate abx tomorrow after wound eval .       # Acute anemia.    -Discontinue Eliquis  - status post transfusion of 2 units of packed cells. - GI consult.   Protonix twice daily.    -Follow H&H     # Hypertension.   - Home medications are held .     #Type 2 diabetes. -Was on insulin drip. Now on SSI    # MIGUELANGEL. -2 to hypotension and septic shock. -nephro consult. -MIGUELANGEL resolved with IV fluids. # Hyponatremia. severe. nephro consult. S/P  2 % NS. Now off IVF     # Hypocalcemia. - replaced     #H/o breast cancer  - on Ibrance      SCDs for DVT prophylaxis. Protonix for GI prophylaxis. DIET TUBE FEED CONTINUOUS/CYCLIC NPO; Low Calorie High Protein (Vital High-Protein);  Orogastric; Continuous; 15; 15; 20  Full Code      José Miguel Lima MD  12/20/2020

## 2020-12-20 NOTE — CONSULTS
12-20-20 (0406) vancomycin trough 13.1. Please give vancomycin 500 mg ivpb x1 dose at 1000 on 12-20-20. Please check vancomycin trough 12-21-20 at 0600 and pulse dose vancomycin per pharmacy protocol. [address] Ph.  12/20/2020 6:09 AM

## 2020-12-20 NOTE — PROGRESS NOTES
Reassessment completed, see flow sheet. Pt double stacking and desatting after turning down to 85%. PRN versed and fentanyl administered. No other critical changes at this time, will continue to monitor.

## 2020-12-20 NOTE — PROGRESS NOTES
Reassessment completed, see flow sheet. Fentanyl increased to 175  Versed increased to 3  PRN versed and fentanyl administered    Pt continuously double stacking and desatting to 85-89%.     FiO2 up to 80% and PEEP up to 18 per RT

## 2020-12-21 NOTE — PROGRESS NOTES
Pulmonary & Critical Care Medicine ICU Progress Note    CC: Fatigue, anemia, PEA arrest in emergency department    Events of Last 24 hours:   Versed 5 mg/hr   Propofol 20 mcg/kg/min   Fentanyl 125 mcg/hr   Nimbex 3.5 mcg/kg/min  PEEP 18  FiO2 90%  Plateau pressure 36  PaO2/FiO2 107        Invasive Lines: Right IJ CVC 12/10/2020    MV: 12/10/2020  Vent Mode: AC/VC Rate Set: 30 bmp/Vt Ordered: 340 mL/ /FiO2 : 90 %  Recent Labs     12/20/20 0628 12/21/20  0405   PHART 7.348* 7.236*   NJP1SBC 43.5 56.4*   PO2ART 55.1* 96.3       IV:   cisatracurium (NIMBEX) infusion 3.5 mcg/kg/min (12/21/20 0630)    propofol 20 mcg/kg/min (12/21/20 0300)    fentaNYL (SUBLIMAZE) infusion 125 mcg/hr (12/21/20 0406)    midazolam 5 mg/hr (12/21/20 0207)    dilTIAZem (CARDIZEM) 125 mg in dextrose 5% 125 mL infusion Stopped (12/18/20 1635)    sodium hypochlorite      sodium chloride 10 mL/hr at 12/13/20 1208    dextrose         Vitals:  Blood pressure 114/62, pulse 118, temperature 98.2 °F (36.8 °C), temperature source Axillary, resp. rate 30, height 5' 4\" (1.626 m), weight 249 lb 1.9 oz (113 kg), SpO2 91 %, not currently breastfeeding. on 30/340/18 90%     Intake/Output Summary (Last 24 hours) at 12/21/2020 0723  Last data filed at 12/21/2020 0551  Gross per 24 hour   Intake 1935 ml   Output 1145 ml   Net 790 ml     EXAM:  General: ill appearing. Intubated sedated. Eyes: PERRL. No sclera icterus. No conjunctival injection. ENT: No discharge. Pharynx clear. ET tube in place  Neck: Trachea midline. Normal thyroid. Resp: No accessory muscle use. Few crackles. No wheezing. No rhonchi. No dullness on percussion. CV: Regular rate. irregular rhythm. No mumur or rub. 1+ LE edema. GI: Non-tender. Non-distended. No masses. No organomegaly. Normal bowel sounds. No hernia. Skin: Warm and dry. No nodule on exposed extremities. No rash on exposed extremities. Lymph: No cervical LAD. No supraclavicular LAD. M/S: No cyanosis. No joint deformity. No clubbing. Neuro: Intubated sedated. Paralyze   Psych: Noncommunicative unable to obtain      Scheduled Meds:   vancomycin  500 mg Intravenous Once    furosemide  80 mg Intravenous BID    chlorothiazide  500 mg Intravenous BID    dilTIAZem  60 mg Per NG tube 4 times per day    enoxaparin  40 mg Subcutaneous Daily    pantoprazole  40 mg Intravenous Daily    metroNIDAZOLE  500 mg Intravenous Q8H    vancomycin (VANCOCIN) intermittent dosing (placeholder)   Other RX Placeholder    cefTRIAXone (ROCEPHIN) 2 g IVPB in NS 50ml minibag  2 g Intravenous Q24H    insulin lispro  0-6 Units Subcutaneous Q4H    insulin lispro  0-3 Units Subcutaneous Nightly    miconazole   Topical BID    sodium chloride flush  10 mL Intravenous 2 times per day    carboxymethylcellulose PF  1 drop Both Eyes Q4H    And    artificial tears   Both Eyes Q4H    chlorhexidine  15 mL Mouth/Throat BID    dexamethasone  6 mg Intravenous Daily     PRN Meds:  sodium hypochlorite, perflutren lipid microspheres, acetaminophen **OR** acetaminophen, sodium chloride flush, polyethylene glycol, promethazine **OR** ondansetron, fentanNYL, promethazine **OR** ondansetron, midazolam, ipratropium-albuterol, glucose, dextrose, glucagon (rDNA), dextrose    Results:  CBC:   Recent Labs     12/19/20  0406 12/20/20  0506   WBC 5.5 7.4   HGB 7.4* 7.8*   HCT 23.8* 25.4*   MCV 90.9 92.1   PLT 99* 105*     BMP:   Recent Labs     12/19/20  0406 12/20/20  0506 12/21/20  0400    139 141   K 3.7 3.9 3.9    105 106   CO2 23 24 23   PHOS 5.0* 5.2* 6.7*   BUN 42* 54* 67*   CREATININE 1.1 1.3* 1.6*     LIVER PROFILE:   No results for input(s): AST, ALT, LIPASE, BILIDIR, BILITOT, ALKPHOS in the last 72 hours. Invalid input(s):   AMYLASE,  ALB    Cultures:  12/10/2020 SARS-CoV-2 positive  12/10/2020 blood strep pyogenes  12/10/2020 blood gram-positive rods  12/16 BC NGTD   12/20 Resp       Films: CXR 12/21/2020  images reviewed by me and showed:   High ETT position  Satisfactory CVC position   Diffuse bilateral ASD     Head CT 12/11/2020 no acute abnormality      ASSESSMENT:  · Acute hypoxemic respiratory failure  · Cardiac Arrest/PEA: unclear etiology - total compression time 18 minutes over 4 separate episodes with intermittent ROSC  · Strep pyogenes bacteremia - possibly wound source  · Acute encephalopathy/Metabolic encephalopathy   · COVID-19 pneumonia   · Acute anemia s/p 2 U PRBC - no sign of active bleeding  · MIGUELANGEL  · Stage IV decubitus ulcer  S/p surgical debridement  · Afib RVR  · Thrombocytopenia - better       PLAN:  Mechanical ventilation as per my orders. The ventilator was adjusted by me at the bedside for unstable, life threatening respiratory failure. Increase RR 34 and ABG in 2 hrs   Plan for prone ventilation today   COVID-19 isolation, droplet plus  Propofol gtt,  Fent gtt, Versed gtt  S/P TTM, started rewarming 12/11/20 at 1600  No Remdesevir given low GFR  ? D/C Dilt due to hypotension   Antibiotics per IM for sacral wound. On vanc D#12 and CTX  D#10 and flagyl D#7. D/C Vanc given renal failure   Decadron 6 mg IV daily, D#10- change to 4 mg tomorrow   Nephro following  Hold TF for now   Follow up Cx   Follow TG   Surgery following - debridement 12/15, wound vac  Prophylaxis: Lovenox; MRSA - mupirocin; GI - on protonix  D/W VANKATHI is haris - [facility] [phone] updated him and multiple good question were answered. Patient is DNR comfort care arrest with no chest compression or shocking. Total critical care time caring for this patient with life threatening, unstable organ failure, including direct patient contact, management of life support systems, review of data including imaging and labs, discussions with other team members and physicians is 32 minutes, excluding procedures.

## 2020-12-21 NOTE — PROGRESS NOTES
CARDIOLOGY PROGRESS NOTE      Patient Name: Lane Wise  Date of admission: 12/10/2020  9:18 AM  Admission Dx: Cardiac arrest [facility]) [I46.9]  Reason for Consult:  Dyspnea  Requesting Physician: Tanner Lubin MD  Primary Care physician: Maria Luisa Darling MD    Subjective:     Lane Wise is a 56 y.o. patient who follows with Dr Ping Leos for cardiology. She presented to the hospital 12/10/20 with complaints of dyspnea. She has a prior medical history notable for congestive heart failure, hypertension, diabetes, metastatic breast cancer with recurrent pleural effusions, paroxysmal atrial fibrillation diagnosed in 2018 and on eliquis for anti-coagulation, SVT during prior admission for pneurmonia/respiratory failure 11/2020, normal LV function by 5/2018 echocardiogram.       The patient presented this admission with dyspnea, profound anemia (Hgb 6.2) receiving rapid transfusion protocol, and subsequent PEA arrest. Multiple rounds epinephrine and HCO3 with CPR total 18 mins with ROSC. Placed on pressor support and intubated. Found Covid-19+. Admit EKG showed narrow complex tachycardia with unusual P wave axis, possible ectopic atrial tachycardia with non-specific ST-T wave changes. EKGs also notable for anterior TWI. Course complicated with acute hypoxic respiratory failure requiring mechanical ventilation, concern for anoxic brain injury status post cooling/rewarmed, acute kidney injury (baseline 1.0) yet to recover, volume overload refractory to diuretics, atrial arrhythmia with occasional SVT and more recently atrial fibrillation most managed with diltiazem per OG until worsening hypotension noted today. Low BPs occurring in the setting of restarting Nimbex for ventilator toleration, worsening acidosis, as well as recommendations made by nephrology for increasing diuretic therapy with doubling Lasix and adding Diuril 12/20 and 12/21. I last evaluated this patient 12/12/20. In discussion with nurse today, blood pressures 80s over 60s. Remains vented/sedated. FiO2 90%. PH 7.1. They have readded Nimbex as above to enable the patient to tolerate the ventilator better. Levophed is to be added and titrated for goal maps. There is discussion regarding CVVH if the patient does not respond to increasing diuretic measures. However at the present time, low BPs prohibit start of Lasix drip. Oral diltiazem was held today due to low pressures. Heart rates 110-115 bpm on avg by tele. Home Medications:   Were reviewed and are listed in nursing record and/or below  Prior to Admission medications    Medication Sig Start Date End Date Taking?  Authorizing Provider   apixaban (ELIQUIS) 5 MG TABS tablet Take 1 tablet by mouth 2 times daily 11/18/20 2/16/21 Yes Rosio Damico MD   dilTIAZem (CARDIZEM CD) 180 MG extended release capsule Take 1 capsule by mouth 2 times daily 11/17/20  Yes Rosio Damico MD   omeprazole (PRILOSEC) 40 MG delayed release capsule Take 40 mg by mouth daily   Yes Historical Provider, MD   hydrALAZINE (APRESOLINE) 50 MG tablet Take 1 tablet by mouth 3 times daily 8/1/19  Yes Braden Onofre MD   furosemide (LASIX) 20 MG tablet Take 1 tablet by mouth as needed (edema) 8/1/19  Yes Braden Onofre MD   ondansetron (ZOFRAN) 8 MG tablet Take 1 tablet by mouth every 8 hours as needed for Nausea 7/24/19  Yes Cecilia Long MD   LANTUS SOLOSTAR 100 UNIT/ML injection pen Inject 10 Units as directed nightly Has not taken oit recently after loosing 50 lbs 9/23/16  Yes Historical Provider, MD   calcium carbonate (OSCAL) 500 MG TABS tablet Take 500 mg by mouth daily   Yes Historical Provider, MD   ferrous sulfate 325 (65 FE) MG EC tablet Take 325 mg by mouth 3 times daily (with meals)   Yes Historical Provider, MD   acetaminophen (TYLENOL) tablet 650 mg, Q6H PRN    Or      acetaminophen (TYLENOL) suppository 650 mg, Q6H PRN      sodium chloride flush 0.9 % injection 10 mL, 2 times per day      sodium chloride flush 0.9 % injection 10 mL, PRN      polyethylene glycol (GLYCOLAX) packet 17 g, Daily PRN      promethazine (PHENERGAN) tablet 12.5 mg, Q6H PRN    Or      ondansetron (ZOFRAN) injection 4 mg, Q6H PRN      fentaNYL (SUBLIMAZE) injection 50 mcg, Q1H PRN      carboxymethylcellulose PF (THERATEARS) 1 % ophthalmic gel 1 drop, Q4H    And      lubrifresh P.M. (artificial tears) ophthalmic ointment, Q4H      promethazine (PHENERGAN) tablet 12.5 mg, Q6H PRN    Or      ondansetron (ZOFRAN) injection 4 mg, Q6H PRN      chlorhexidine (PERIDEX) 0.12 % solution 15 mL, BID      midazolam (VERSED) injection 2 mg, Q1H PRN      ipratropium-albuterol (DUONEB) nebulizer solution 1 ampule, Q4H PRN      glucose (GLUTOSE) 40 % oral gel 15 g, PRN      dextrose 50 % IV solution, PRN      glucagon (rDNA) injection 1 mg, PRN      dextrose 5 % solution, PRN        Allergies:  Sucralfate, Ampicillin, Ampicillin-sulbactam sodium, Duloxetine hcl, and Sulfamethoxazole-trimethoprim     Review of Systems:   A 14 point review of symptoms completed. Pertinent positives identified in the HPI, all other review of symptoms negative.        Objective:     Vitals:    12/21/20 0804 12/21/20 0900 12/21/20 1000 12/21/20 1100   BP:  (!) 85/59 93/63 (!) 87/57   Pulse:  104 110 117   Resp:  (!) 31 (!) 34 (!) 34   Temp:  96.5 °F (35.8 °C)  96.9 °F (36.1 °C)   TempSrc:  Axillary  Axillary   SpO2: 92% 92% 92% 93%   Weight:       Height:          Weight: 249 lb 1.9 oz (113 kg)       PHYSICAL EXAM: Due to the current efforts to prevent transmission of COVID-19 and also the need to preserve PPE for other caregivers, a face-to-face encounter with the patient was not performed. That being said, all relevant records and diagnostic tests were reviewed, including laboratory results and imaging. Please reference any relevant documentation elsewhere. Care will be coordinated with the primary service. Visualized through window  Vented/sedated  Drips noted. Levophed hanging. EET midline  No spontaneous movement in setting of weaned sedation  Normal coloration. Further exam is limited. Labs:   CBC:   Lab Results   Component Value Date    WBC 12.8 12/21/2020    RBC 3.12 12/21/2020    HGB 9.0 12/21/2020    HCT 29.4 12/21/2020    MCV 94.2 12/21/2020    RDW 20.6 12/21/2020     12/21/2020     CMP:  Lab Results   Component Value Date     12/21/2020    K 3.9 12/21/2020    K 4.7 12/10/2020     12/21/2020    CO2 23 12/21/2020    BUN 67 12/21/2020    CREATININE 1.6 12/21/2020    GFRAA 40 12/21/2020    GFRAA >60 01/07/2012    AGRATIO 0.6 12/10/2020    LABGLOM 33 12/21/2020    GLUCOSE 123 12/21/2020    PROT 5.9 12/12/2020    PROT 9.3 01/05/2012    CALCIUM 8.6 12/21/2020    BILITOT 0.8 12/12/2020    ALKPHOS 105 12/12/2020     12/12/2020     12/12/2020     PT/INR:  No results found for: PTINR  HgBA1c:  Lab Results   Component Value Date    LABA1C 4.7 11/08/2020     Lab Results   Component Value Date    CKTOTAL 203 (H) 06/06/2018    TROPONINI <0.01 12/16/2020         Interval Testing/Data:     Telemetry personally reviewed. EKG 12/15/2020: Atrial fibrillation with heart rate 127 bpm, ST and T wave abnormality anterolateral leads, consider ischemia. Prolonged QTC.     EKG 12/12/2020: Normal sinus rhythm with heart rate 64 bpm, T wave abnormality, prolonged  ms EKG 12/11/2020: Normal sinus rhythm with heart rate 65 bpm, ST and T wave abnormality in anterolateral leads, consider ischemia, prolonged  ms. EKG 12/10/20:   Normal sinus rhythmNonspecific ST abnormalityNo previous ECGs availableConfirmed by SIRIA Malin MD ([id number]) on 12/10/2020 5:54:19 PM      EKG 12/10/20:  Narrow QRS tachycardiaUnusual P axis, possible ectopic atrial tachycardiaNonspecific ST and T wave abnormalityAbnormal ECGNo previous ECGs availableConfirmed by SIRIA DE LA O MD ([id number]) on 12/10/2020 11:31:58 AM       Lexiscan myoview stress test 01/07/15  1. No scintigraphic evidence of stress-induced myocardial ischemia.  2. Mild left ventricular dilatation.  3. Normal LVEF of 63%.     ECHO 5/30/18  Summary:  The left ventricular wall motion is normal.  Overall left ventricular ejection fraction is estimated to be 60-65%. There is marked mitral annular calcification present. Poor endocardial border visualization  There is mild mitral regurgitation. There is mild concentric left ventricular hypertrophy. The diastolic function is impaired and classified as Grade 2  (psuedonormalization).         Impression and Plan   Omer Nieto is a 56 y.o. patient who follows with Dr Jojo Adam for cardiology. She presented to the hospital 12/10/20 with complaints of dyspnea. She has a prior medical history notable for congestive heart failure, hypertension, diabetes, metastatic breast cancer with recurrent pleural effusions, paroxysmal atrial fibrillation diagnosed in 2018 and on eliquis for anti-coagulation, SVT during prior admission for pneurmonia/respiratory failure 11/2020, normal LV function by 5/2018 echocardiogram.       1. Atrial fibrillation with rapid ventricular response   2. PEA arrest-status post cooled/rewarmed, concern for anoxic brain injury. 3. Acute hypoxic respiratory failure  4. Hypovolemic/septic Shock complicated with multi-organ failure  5.  COVID-19 Pneumonia 6. Severe anemia, status post transfusion, now stable. 7. Supraventricular/atrial tachycardia  8. Acute renal injury    9. Prolonged QTC  10. Elevated liver enzymes/acute liver injury, INR 1.3 today  11. Normal LV function 5/2018    Patient Active Problem List   Diagnosis    Cellulitis and abscess of leg    Chest pain    Acute lateral meniscus tear of left knee    Left knee pain    SOB (shortness of breath)    Primary cancer of right breast with metastasis to other site ([address]    Anxiety    DM2 (diabetes mellitus, type 2) (HCC)    GERD (gastroesophageal reflux disease)    Hypertension, uncontrolled    Morbid obesity due to excess calories (HCC)    Acute renal injury ([address]    Chemotherapy-induced neutropenia ([address]    Diarrhea    Acute respiratory failure with hypoxia (HCC)    Pneumonia due to organism    PAF (paroxysmal atrial fibrillation) (HCC)    Pleural effusion    Atrial fibrillation with RVR ([address]    HCAP (healthcare-associated pneumonia)    Cardiac arrest ([address]    Anemia requiring transfusions    Pneumonia due to COVID-19 virus    Shock ([address]    Bacteremia    Severe protein-calorie malnutrition (HCC)    Pressure injury of sacral region, stage 4 (HCC)    Acute kidney injury ([address]    Acute encephalopathy    Thrombocytopenia (HCC)    Acute anemia       PLAN:  1. Management of Atrial fibrillation with RVR complicated due to multiple factors. AV yaw blockers prohibited due to hypotension requiring pressor therapy. Digoxin in setting of poor renal function and possible plans for CVVH ill-advised and likely unsuccessful in isolation. Amiodarone in the absence of anticoagulation and in the presence of mild liver injury is not optimal. She also has prolonged QTc by EKG's. Walker Span may be used for rate control in the absence of other available options, understanding risk for stroke if pharmacologically converted is theoretically possible.  -hold diltiazem. -start amiodarone IV gtt for rates persistently > 120 or rates thought to be contributing to hemodynamic compromise.   -limit pressors as able. MAP presently 63 on 2mcg Norepi. 2. Obtain EKG today and daily if amiodarone therapy is started for ongoing QT monitoring. 3. Keep electrolytes repleted, K>4, Mg>2  4. Continue holding eliquis given severe anemia and transfusion requirement this admission. 5. Echo pending recovery, but prognosis appears poor. Please call with questions/concerns regarding this patient's cardiac care. I will address the patient's cardiac risk factors and adjusted pharmacologic treatment as needed. In addition, I have reinforced the need for patient directed risk factor modification. All questions and concerns were addressed to the patient/family. Alternatives to my treatment were discussed. Thank you for allowing us to participate in the care of Lei Becker. Please call me with any questions [phone].     Ector Loza MD   Cardiovascular Disease  [facility]  [phone] [facility]  [phone] [address] [place] [address]  12/21/2020 1:54 PM

## 2020-12-21 NOTE — PROGRESS NOTES
Patient currently intubated with ETT at 24LL. Vent settings are 30 RR, 340 VT, 18 of PEEP and 80% FiO2. Blood pressure 94/69, pulse 108, temperature 98 °F (36.7 °C), temperature source Axillary, resp. rate 21, height 5' 4\" (1.626 m), weight 249 lb 1.9 oz (113 kg), SpO2 94 %, not currently breastfeeding. Versed infusing at 4 mg/h. Fentanyl infusing at 200 mcg//h.      Electronically signed by Anel Radford RN on 12/21/2020 at 12:08 AM

## 2020-12-21 NOTE — PROGRESS NOTES
 metroNIDAZOLE  500 mg Intravenous Q8H    cefTRIAXone (ROCEPHIN) 2 g IVPB in NS 50ml minibag  2 g Intravenous Q24H    insulin lispro  0-6 Units Subcutaneous Q4H    insulin lispro  0-3 Units Subcutaneous Nightly    miconazole   Topical BID    sodium chloride flush  10 mL Intravenous 2 times per day    carboxymethylcellulose PF  1 drop Both Eyes Q4H    And    artificial tears   Both Eyes Q4H    chlorhexidine  15 mL Mouth/Throat BID    dexamethasone  6 mg Intravenous Daily     Continuous Infusions:   cisatracurium (NIMBEX) infusion 3.5 mcg/kg/min (12/21/20 0630)    furosemide (LASIX) infusion      propofol 20 mcg/kg/min (12/21/20 0916)    fentaNYL (SUBLIMAZE) infusion 125 mcg/hr (12/21/20 0406)    midazolam 5 mg/hr (12/21/20 0922)    dilTIAZem (CARDIZEM) 125 mg in dextrose 5% 125 mL infusion Stopped (12/18/20 1635)    sodium hypochlorite      sodium chloride 10 mL/hr at 12/13/20 1208    dextrose       PRN Meds:.sodium hypochlorite, perflutren lipid microspheres, acetaminophen **OR** acetaminophen, sodium chloride flush, polyethylene glycol, promethazine **OR** ondansetron, fentanNYL, promethazine **OR** ondansetron, midazolam, ipratropium-albuterol, glucose, dextrose, glucagon (rDNA), dextrose      Assessment/    Acute kidney injury: worse again with not great UOP  ATN in setting of Cardiac arrest              SCr 2.7 on admission, previously 1.0 from Nov 2020, non-oliguric              UA bland, Urine sodium 85, Urine chloride 87        - Acute hypoxic respiratory failure/COVID19 PNA - on ventilator, steroids, antibiotics       - Septic shock/Strep pyogenes bacteremia - plans per Critical Care     - Cardiac arrest/PEA arrest     - Acute blood loss anemia - prn prbc's     - Stage 4 decubitus ulcer               S/p bedside debridement by surgery on 12/15               On Antibiotics     Plan/     Made some urine with lasix but not optimal response Will try to challenge with lasix bolus 100 mg IV then start lasix drip at 10 mg/hr with albumin and diuril IV  Vent setting still high, will require proning  May require CRRT   GOC discussion by primary team ongoing   D/w nurse and primary team at bedside    CC time:+30 mins

## 2020-12-21 NOTE — PROGRESS NOTES
Recovering well with 100 % 02 breaths per vent after attempted sacral wound dressing change. Chyna Frame NP in room, will place dakins soln soaked supersponge roll when patient able to tolerate prolonged turn. Dr. Juliane diallo, no new orders, status reviewed. Mercedes replaced for Urine sample per protocol. Tolerated well, remains sedated on vent, with Nimbex IV paralytic infusing for vent compliance. TOF 4/4, compliant with vent.

## 2020-12-21 NOTE — PROGRESS NOTES
Patient de sating to low 70's, double stacking vent. Informed MD. Started Propofol at this time for vent compliance.     3:02 AM  Propofol infusing at 20 mcg/kg/min. Fentanyl infusing at 125 mcg//h. Versed infusing at 5 mg/h.      Electronically signed by Hung Gonzalez RN on 12/21/2020 at 3:03 AM

## 2020-12-21 NOTE — PROGRESS NOTES
12/21/20 0248   Oxygen Therapy/Pulse Ox   SpO2 (!) 83 %     FiO2 was increased to 100%. PEEP remains at 18.

## 2020-12-21 NOTE — PROGRESS NOTES
Dr Emeli Santiago rounding- POC, meds, status reviewed- will prone patient today, rotoprone bed ordered per [facility]. Will call Cardiology regarding ongoing AFIB and diltiazem.

## 2020-12-21 NOTE — CARE COORDINATION
INTERDISCIPLINARY PLAN OF CARE CONFERENCE    Date/Time: 12/21/2020 10:58 AM  Completed by: Swetha Qiu Case Management      Patient Name:  Wai Barraza  YOB: 1960  Admitting Diagnosis: Cardiac arrest [facility]) [I46.9]     Admit Date/Time:  12/10/2020  9:18 AM    Chart reviewed. Interdisciplinary team contacted or reviewed plan related to patient progress and discharge plans. Disciplines included Case Management, Nursing, and Dietitian. Current Status:inpt  PT/OT recommendation for discharge plan of care: tbd    Expected D/C Disposition:  tbd    Discharge Plan Comments: Reviewed chart. Pt in ICU on Ventilator, plan to prone pt today. C-19 positive. Plan EGS at discharge.  CM following    Home O2 in place on admit: No

## 2020-12-21 NOTE — PROGRESS NOTES
A/C rate increased to 38         12/21/20 1405   Vent Information   Vent Type 840   Vent Mode AC/VC   Vt Ordered 340 mL   Rate Set 38 bmp   Peak Flow 70 L/min   FiO2  90 %   SpO2 91 %   SpO2/FiO2 ratio 101.11   Sensitivity 3   PEEP/CPAP 18   Humidification Source HME   Vent Patient Data   Peak Inspiratory Pressure 38 cmH2O   Mean Airway Pressure 25 cmH20   Rate Measured 34 br/min   Vt Exhaled 351 mL   Minute Volume 13.3 Liters   I:E Ratio 1:2   Cough/Sputum   Sputum How Obtained None   Spontaneous Breathing Trial (SBT) RT Doc   Pulse 111   Breath Sounds   Right Upper Lobe Diminished   Right Middle Lobe Diminished   Right Lower Lobe Diminished   Left Upper Lobe Diminished   Left Lower Lobe Diminished   Additional Respiratory  Assessments   Resp (!) 38   Position Semi-Mooney's   Alarm Settings   High Pressure Alarm 50 cmH2O   Low Minute Volume Alarm 4 L/min   Apnea (secs) 20 secs   High Respiratory Rate 50 br/min   Low Exhaled Vt  250 mL   ETT (adult)   Placement Date/Time: 12/10/20 1400   Timeout: Patient  Preoxygenation: Yes  Mask Ventilation: Ventilated by mask (1)  Technique: Direct laryngoscopy  Type: Cuffed  Tube Size: 7.5 mm  Location: Oral  Insertion attempts: 1  Placement Verified By[de-identified] Chest...    Secured at 25 cm

## 2020-12-21 NOTE — PROGRESS NOTES
SBP 73. Patient placed in modified Trendelenberg position. Lasix bolus and infusion held at this time. 1305-Dr Haque paged re low BP and lasix order. SonHafsa, called and updated on current status and POC per phone. 1310-Dr Haque reached per phone. hyotensive VS , recent panic ABG results and communication with Hafsa carballo, reviewed. ATBX orders and Lasix orders per Nephrology reviewed. Verbal orders received. See orders for repeat ABG after vent rate increase to 38, Blood cultures , urine and resp cultures ordered. Will discontinue rocephine and have pharmacy dose cefepime. Levophed order for BP support. Pronation of patient cancelled due to current unstable status. 1322-YANDEL called, rotoprone bed cancelled. 1323-Chyna, NP for surgery service, called with update. Will plan to change woundvac if patient becomes more stable or remove and replace with wet to dry dressing. 1400 [facility] Nephrology, Dr. Clover Sanchez, paged. 80- Dr Clover Sanchez reached, Lasix IV bolus cancelled, telephone order to start Lasix IV drip when patient's BP stabilizes for 2 hours.

## 2020-12-21 NOTE — PROGRESS NOTES
Sedated, paralyzed, still requiring full vent support. TOF 2/4 on Nimbex 3.5 mcg/kg/min. Physical assessment completed, all ICU monitoring lines in place. Lungfields diminished T.O. OG patent- TF off due to   ETT, OG, Mercedes tubing intact, secured. Mercedes care provided.

## 2020-12-21 NOTE — CONSULTS
12-21-20 (0400) vancomycin trough 13.9. Patient's cr has increased from 1.3 to 1.6 in 24 hours. Please give vancomycin 500 mg ivpb x1 dose at 1000 on 12-21-20. Please check vancomycin trough 12-22-20 at 0600 and pulse dose vancomycin per pharmacy protocol. [address] Ph.  12/21/2020 6:56 AM

## 2020-12-21 NOTE — PROGRESS NOTES
General Surgery [place], [place]  Daily Progress Note    Pt Name: Hermelinda Rice  Medical Record Number: [id number]  Date of Birth 1960   Today's Date: 12/21/2020    ASSESSMENT  1. PT is S/P bedside debridement of stage IV coccyx wound  2. Pt is sedated and paralyzed on vent and levophed  3. Coccyx wound: vac dressing removed: eschar noted along wound edges, tunnel persists towards the rectum, no foul odor, no purulent drainage  4. PT with O2 sat to 70% on 100%FiO2 an dHR up to 146 bpm with turning for VAC dressing change. Dressing change was aborted at this time   5. ARF  6. COVID PNA with acute hypoxic respiratory failure  7. S/P PEA/Cardiac arrest    PLAN  1. Defer to Nursing for local wound care: pack wound with gauze dampened with Dakins daily and as needed for stool contamination. 2. Pt appears critically ill, she most likely will not survive this admission. Coccyx wound looks OK s/p debridement for pt who is too unstable to turn, without nutrition (2/2 paralytic), morbidly obese and on pressors. No further surgical plans. 3. Specialty bed  4. Agree with DNR CCA    SUBJECTIVE  Lenora Rubin has worsened from yesterday. Pain appears well controlled pt is paralyzed. She has no vomiting. She had a little stool with VAC dressing change it was green/brown formed. She is NPO. . Current activity is turn pt once stable     OBJECTIVE  VITALS:  height is 5' 4\" (1.626 m) and weight is 249 lb 1.9 oz (113 kg). Her axillary temperature is 96.9 °F (36.1 °C). Her blood pressure is 97/59 (abnormal) and her pulse is 105. Her respiration is 38 (abnormal) and oxygen saturation is 72% (abnormal).    VITALS:  BP (!) 97/59   Pulse 105   Temp 96.9 °F (36.1 °C) (Axillary)   Resp (!) 38   Ht 5' 4\" (1.626 m)   Wt 249 lb 1.9 oz (113 kg)   SpO2 (!) 72%   BMI 42.76 kg/m²   INTAKE/OUTPUT:    Intake/Output Summary (Last 24 hours) at 12/21/2020 1625  Last data filed at 12/21/2020 0916  Gross per 24 hour Intake 1965 ml   Output 1105 ml   Net 860 ml     URINARY CATHETER OUTPUT (Mercedes):     GENERAL: paralyzed on vent  I/O last 3 completed shifts: In: 1965 [I.V.:1350; NG/GT:591; IV Piggyback:24]  Out: 1105 [Urine:1105]  No intake/output data recorded.     LABS  Recent Labs     12/21/20  0400 12/21/20  1231   WBC  --  12.8*   HGB  --  9.0*   HCT  --  29.4*   PLT  --  112*     --    K 3.9  --      --    CO2 23  --    BUN 67*  --    CREATININE 1.6*  --    PHOS 6.7*  --    CALCIUM 8.6  --    INR 1.31*  --      CBC with Differential:    Lab Results   Component Value Date    WBC 12.8 12/21/2020    RBC 3.12 12/21/2020    HGB 9.0 12/21/2020    HCT 29.4 12/21/2020     12/21/2020    MCV 94.2 12/21/2020    MCH 28.7 12/21/2020    MCHC 30.5 12/21/2020    RDW 20.6 12/21/2020    SEGSPCT 66.7 01/07/2012    BANDSPCT 8 12/11/2020    LYMPHOPCT 1.6 12/18/2020    MONOPCT 1.8 12/18/2020    EOSPCT 2.1 01/07/2012    BASOPCT 0.2 12/18/2020    MONOSABS 0.1 12/18/2020    LYMPHSABS 0.1 12/18/2020    EOSABS 0.0 12/18/2020    BASOSABS 0.0 12/18/2020    DIFFTYPE Auto 01/07/2012         Chyna Henson [employer]  Electronically signed 12/21/2020 at 4:14 PM

## 2020-12-21 NOTE — PROGRESS NOTES
Lorna Mireles, Cardiology RN, contacted per phone for Dr. Rosibel Hatch. Request to review patient status for discontinuing diltiazem due to hypotension. Patient currently in AFIB HR 110s. 12- Dr. Nafisa Truong, Nephrology, rounding- see orders.

## 2020-12-21 NOTE — PROGRESS NOTES
Admit: 12/10/2020    Name:  Randy Amaya  Room:  3007/3007-01  MRN:    [id number]    Critical Care Daily Progress Note for 12/21/2020     Admitted after cardiac arrest in ER    Interval History:     12/11  She is now on propofol and versed prn  On levo and vaso  Strep in blood culture  Continues to be on the vent    12/12  Finished rewarming  Wakes up with asynchrony with the vent  On levo and vaso    12/13  Rewarmed  Concern for decorticate posturing  Off levo  Weaning vaso    12/14  Patient on mechanical ventilation, s/p TTM, off of pressors. renal function improved, continued on IV fluids   currently on vent settings FiO2 30% PEEP of 5   SBT today    12/15  Patient remains on mechanical ventilation. Remains off pressors FiO2 30%, PEEP of 5. S/p extensive debridement of stage IV large sacral decubitus ulcer today. Responds a little to painful stimuli. On tube feedings    12/16  Unchanged remains on mechanical ventilation    12/17  Remains on mechanical ventilation,  failed SBT FiO2 40% PEEP of 5    12/18  Doing the same; remains on mechanical ventilation  Failed SBT  On Cardizem drip    12/19  Failed SBT . She  remains on mechanical ventilation. Worsening hypoxemia . FiO2 is up to 70%, PEEP is up to 14. Off of Cardizem drip    12/20  Worsening hypoxia, fi O2 80 %. increased agitation, requiring sedation     12/21-oxygen demands were increased. FiO2 is up to 90%. PEEP is 18. Initial plan was for proning but Dr Rosibel Hatch talked to son. Patient now a DNR-CCA. Plan to start Lasix drip.      Scheduled Meds:   chlorothiazide  500 mg Intravenous BID    albumin human  25 g Intravenous Q8H    [START ON 12/22/2020] dexamethasone  4 mg Intravenous Daily    cefepime  2 g Intravenous Q12H    dilTIAZem  60 mg Per NG tube 4 times per day    enoxaparin  40 mg Subcutaneous Daily    pantoprazole  40 mg Intravenous Daily    metroNIDAZOLE  500 mg Intravenous Q8H    insulin lispro  0-6 Units Subcutaneous Q4H  insulin lispro  0-3 Units Subcutaneous Nightly    miconazole   Topical BID    sodium chloride flush  10 mL Intravenous 2 times per day    carboxymethylcellulose PF  1 drop Both Eyes Q4H    And    artificial tears   Both Eyes Q4H    chlorhexidine  15 mL Mouth/Throat BID       Continuous Infusions:   cisatracurium (NIMBEX) infusion 3.5 mcg/kg/min (20 0630)    furosemide (LASIX) infusion      norepinephrine 2 mcg/min (20 1416)    propofol 20 mcg/kg/min (20 0916)    fentaNYL (SUBLIMAZE) infusion 125 mcg/hr (20 1212)    midazolam 5 mg/hr (20 0922)    dilTIAZem (CARDIZEM) 125 mg in dextrose 5% 125 mL infusion Stopped (20 1635)    sodium hypochlorite      sodium chloride 10 mL/hr at 20 1208    dextrose         PRN Meds:  sodium hypochlorite, perflutren lipid microspheres, acetaminophen **OR** acetaminophen, sodium chloride flush, polyethylene glycol, promethazine **OR** ondansetron, fentanNYL, promethazine **OR** ondansetron, midazolam, ipratropium-albuterol, glucose, dextrose, glucagon (rDNA), dextrose          Objective:     Temp  Av.5 °F (36.4 °C)  Min: 96.5 °F (35.8 °C)  Max: 98.3 °F (36.8 °C)  Pulse  Av.2  Min: 92  Max: 134  BP  Min: 85/59  Max: 114/62  SpO2  Av.7 %  Min: 83 %  Max: 95 %  FiO2   Av.8 %  Min: 80 %  Max: 100 %  Patient Vitals for the past 4 hrs:   BP Temp Temp src Pulse Resp SpO2   20 1405    111 (!) 38 91 %   20 1100 (!) 87/57 96.9 °F (36.1 °C) Axillary 117 (!) 34 93 %         Intake/Output Summary (Last 24 hours) at 2020 1417  Last data filed at 2020 0916  Gross per 24 hour   Intake 1965 ml   Output 1105 ml   Net 860 ml       Physical Exam:  Patient seen and examined. She is in droplet plus precautions she is ill-appearing.   Gen: Intubated, sedated, on mechanical ventilation  See wound care notes, patient has stage IV decubitus ulcer-media pictures reviewed XR CHEST PORTABLE   Final Result   Worsening multifocal airspace opacities. XR CHEST PORTABLE   Final Result   1. Stable appropriate positions of support apparatus. 2. Stable small left pleural effusion. 3. Slight interval improvement in appearance of multifocal pneumonia. XR CHEST PORTABLE   Final Result   Multifocal bilateral pneumonia, unchanged. XR CHEST PORTABLE   Final Result   Slight improving ground-glass opacification on the right with persistent   ground-glass changes on the left. XR CHEST PORTABLE   Final Result   Perihilar and lower zone ground-glass opacities relatively stable. XR CHEST PORTABLE   Final Result   No significant change in appearance of bilateral multifocal parenchymal   disease when compared to the study of 12/12/2020 as described above. XR CHEST PORTABLE   Final Result   Low volume study with persistent diffuse bilateral airspace disease, slightly   increased on the right as compared to prior. Persistent small loculated left   pleural effusion. XR CHEST PORTABLE   Final Result   Stable multifocal pneumonia and small loculated left pleural effusion         CT HEAD WO CONTRAST   Final Result   No acute intracranial abnormality. XR CHEST PORTABLE   Final Result   The endotracheal tube has been retracted with tip now at the level the   clavicular heads 5 cm above the [place]. Persistent diffuse bilateral airspace disease consistent with atypical viral   pneumonia. XR CHEST PORTABLE   Final Result   Endotracheal tube is noted at the level of the [place]. Recommend retracting   the tube 2-3 cm more proximally. Enteric tube and right IJ CVC appear to be in satisfactory position. No significant interval change in the bilateral airspace disease.          XR CHEST PORTABLE   Final Result   Persistent multifocal bilateral airspace disease, slightly increased on the left and slightly decreased on the right. Overall very similar appearance   over the past 4 weeks. XR CHEST PORTABLE    (Results Pending)         Assessment & Plan: Active Problems:    Acute respiratory failure with hypoxia (HCC)    PAF (paroxysmal atrial fibrillation) (HCC)    Cardiac arrest (HCC)    Anemia requiring transfusions    Pneumonia due to COVID-19 virus    Shock ([facility] [address]    Bacteremia    Severe protein-calorie malnutrition (HCC)    Pressure injury of sacral region, stage 4 (HCC)    Acute kidney injury ([facility] [address]    Acute encephalopathy    Thrombocytopenia (HCC)    Acute anemia  Resolved Problems:    * No resolved hospital problems. *       # Cardiac arrest   - of unclear etiology. Possibly related to COVID infection and  sepsis  -Required multiple pressors on admission including epi, levo and vaso. --> Weaned off of pressors now. -S/p targeted temperature management. - will need ECHO. COVID +ve, patient on droplet plus precautions     #Acute hypoxic respiratory failure. - Intubated and placed  on mechanical ventilation.    -Critical care consulted. -S/p targeted temperature management , rewarming completed. -Failed SBT , continued on mechanical ventilation . Worsening hypoxia . FiO2 30%-> 40 %--> 70%--> 90 %  With increasing oxygen demands pulmonologist was going to prone the patient. but after speaking to son-DNR-CCA. Hold off proning. Lasix drip ordered.     #A fib with RVR   - S/P cardizem gtt. On PO dilt q 6.  May have to stop as BP is low.      # COVID-19 pneumonia.   - Not a candidate for remdesivir given elevated creatinine.   - No CCP as it is not certain if the transfusion reaction was the cause of current condition - cardiac arrest in ER.    - Started Decadron 6 mg  IV daily day#10/ taper to 4 mg.     #Septic shock   Strep bacteremia  -Likely due to infected sacral decubitus ulcers, possible pneumonia -Was on empiric cefepime and IV vancomycin. Antibiotics changed to Rocephin given strep in  blood cultures. Resumed vancomycin and Flagyl for infected sacral decubitus ulcer  Day 10 of rocephin, day#12 of Vancomycin that was stopped due to renal issues, day 5 of flagyl  -Weaned off of pressors   -Blood cultures positive for Streptococcus and diphtheroids .  -Leukocytosis improving    #Sacral decubitus ulcers  -she has large extensive stage 4 wounds in sacrum likely source sepsis. -Seen by General surgery. -> S/P debridement 12/15. Continue dressing changes, follow-up on wound cultures.  -Resumed antibiotics vancomycin and Flagyl  -Surgeon to reevaluate on Monday to see if she needs further debridement/Wound Vac placement  - can likely deescalate abx tomorrow after wound eval .       # Acute anemia.    -Discontinue Eliquis  - status post transfusion of 2 units of packed cells. - GI consult. Protonix twice daily.    -Follow H&H      # Hypertension.   - Home medications are held .     #Type 2 diabetes. -Was on insulin drip. Now on SSI    # MIGUELANGEL. -2 to hypotension and septic shock. -nephro consult. -MIGUELANGEL resolved with IV fluids. # Hyponatremia. severe. nephro consult. S/P  2 % NS. Now off IVF     # Hypocalcemia. - replaced     #H/o breast cancer  - on Ibrance      SCDs for DVT prophylaxis. Protonix for GI prophylaxis. DIET TUBE FEED CONTINUOUS/CYCLIC NPO; Low Calorie High Protein (Vital High-Protein);  Orogastric; Continuous; 15; 15; 20  DNR-CCA      Janak Hooper MD  12/21/2020

## 2020-12-21 NOTE — PROGRESS NOTES
Assessment complete. Blood pressure 105/64, pulse 112, temperature 98.3 °F (36.8 °C), temperature source Axillary, resp. rate 25, height 5' 4\" (1.626 m), weight 249 lb 1.9 oz (113 kg), SpO2 93 %, not currently breastfeeding. Patient currently intubated with ETT at 24LL. Vent settings are 30 RR, 340 VT, 18 of PEEP and 90% FiO2. Patient continues to double stack vent. PRN given at this time. OG in place with Tube feed running at goal of 15. Abdomen taut. Abdomen is not distended. Bowel sounds hypoactive x 4 quads. Lung sounds diminished throughout. Edema continues in BUE and BLE. Pillows used for BUE and heel boots on Bilateral feet. Patient continues to be a Q 2 turn. Wound vac in place at this time. Dakins solution continues through wound vac per order. BUE soft wrist restraints in place due to attempts of pulling at ETT and lines. No signs of injury noted and PROM performed. Mercedes catheter in place, noted with gabriela colored urine. Will continue to monitor.   Electronically signed by Rubén Vogel RN on 12/20/2020 at 8:48 PM

## 2020-12-22 NOTE — PROGRESS NOTES
Reassessment complete. Blood pressure 117/78, pulse 120, temperature 97.7 °F (36.5 °C), temperature source Axillary, resp. rate (!) 38, height 5' 4\" (1.626 m), weight 244 lb (110.7 kg), SpO2 95 %, not currently breastfeeding. Patient currently intubated with ETT at 23LL. Vent settings are 38 RR, 380 VT, 18 of PEEP and 90% FiO2. Patient back on 90% FiO2. O2 saturations > 90%. No other changes since last assessment.     Electronically signed by Kei Nolan RN on 12/22/2020 at 5:41 AM

## 2020-12-22 NOTE — PROGRESS NOTES
Progress Note    HISTORY     CC:  Respiratory Failure          We are following for MIGUELANGEL        Subjective/   Remains in ICU  Intubated and sedated  Still high vent setting  Making some urine with lasix drip    ROS:  UTO    Social Hx:  No family at bedside     Past Medical and Surgical History:  - Reviewed, no changes     EXAM       Objective/     Vitals:    12/22/20 1000 12/22/20 1100 12/22/20 1131 12/22/20 1200   BP: 98/71 114/73  105/66   Pulse: 110 127 127 117   Resp: (!) 38 (!) 38 (!) 38 (!) 38   Temp:    92.6 °F (33.7 °C)   TempSrc:    Axillary   SpO2: 93% 94% 95% 91%   Weight:       Height:         24HR INTAKE/OUTPUT:      Intake/Output Summary (Last 24 hours) at 12/22/2020 1246  Last data filed at 12/22/2020 8377  Gross per 24 hour   Intake 1940 ml   Output 1250 ml   Net 690 ml       EXAM:   Gen: intubated and sedated    Head: NC , AT  ENT: + ET tube in place  Lungs: intubated on vent, breathing in synch with vent   Neuro: intubated and sedated, no myoclonic jerks, not agitated  Skin: no rash/lesions over exposed skin over face  CVS: hard to assess for JVD, + peripheral edema       MEDICAL DECISION MAKING       Data/  Recent Labs     12/20/20  0506 12/21/20  1231 12/22/20  0525   WBC 7.4 12.8* 18.4*   HGB 7.8* 9.0* 9.1*   HCT 25.4* 29.4* 29.9*   MCV 92.1 94.2 92.4   * 112* 133*     Recent Labs     12/20/20  0506 12/21/20  0400 12/22/20  0525    141 141   K 3.9 3.9 4.0    106 104   CO2 24 23 22   GLUCOSE 116* 123* 187*   PHOS 5.2* 6.7* 7.9*   BUN 54* 67* 81*   CREATININE 1.3* 1.6* 2.0*   LABGLOM 41* 33* 25*   GFRAA 50* 40* 31*       Scheduled Meds:   vancomycin  1,000 mg Intravenous Once    vancomycin (VANCOCIN) intermittent dosing (placeholder)   Other RX Placeholder    chlorothiazide  500 mg Intravenous BID    albumin human  25 g Intravenous Q8H    dexamethasone  4 mg Intravenous Daily    cefepime  2 g Intravenous Q12H  [Held by provider] dilTIAZem  60 mg Per NG tube 4 times per day    enoxaparin  40 mg Subcutaneous Daily    pantoprazole  40 mg Intravenous Daily    metroNIDAZOLE  500 mg Intravenous Q8H    insulin lispro  0-6 Units Subcutaneous Q4H    insulin lispro  0-3 Units Subcutaneous Nightly    miconazole   Topical BID    sodium chloride flush  10 mL Intravenous 2 times per day    carboxymethylcellulose PF  1 drop Both Eyes Q4H    And    artificial tears   Both Eyes Q4H    chlorhexidine  15 mL Mouth/Throat BID     Continuous Infusions:   cisatracurium (NIMBEX) infusion 4 mcg/kg/min (12/22/20 1130)    furosemide (LASIX) infusion 10 mg/hr (12/21/20 1700)    norepinephrine 5 mcg/min (12/22/20 0911)    propofol 20 mcg/kg/min (12/22/20 1213)    fentaNYL (SUBLIMAZE) infusion 100 mcg/hr (12/22/20 0724)    midazolam 5 mg/hr (12/22/20 0515)    dilTIAZem (CARDIZEM) 125 mg in dextrose 5% 125 mL infusion Stopped (12/18/20 1635)    sodium hypochlorite      sodium chloride 10 mL/hr at 12/13/20 1208    dextrose       PRN Meds:.sodium hypochlorite, perflutren lipid microspheres, acetaminophen **OR** acetaminophen, sodium chloride flush, polyethylene glycol, promethazine **OR** ondansetron, fentanNYL, promethazine **OR** ondansetron, midazolam, ipratropium-albuterol, glucose, dextrose, glucagon (rDNA), dextrose      Assessment/    Acute kidney injury: worse again with not great UOP to lasix drip   ATN in setting of Cardiac arrest              SCr 2.7 on admission, previously 1.0 from Nov 2020, non-oliguric              UA bland, Urine sodium 85, Urine chloride 87        - Acute hypoxic respiratory failure/COVID19 PNA - on ventilator, steroids, antibiotics       - Septic shock/Strep pyogenes bacteremia - plans per Critical Care     - Cardiac arrest/PEA arrest     - Acute blood loss anemia - prn prbc's     - Stage 4 decubitus ulcer               S/p bedside debridement by surgery on 12/15               On Antibiotics Plan/     Not optimal response to lasix drip.  C/w lasix drip 10 mg/hr, we have room to go up further but given sepsis and on pressors will keep the same, CXR with mainly pneomonia  Vent setting is high still   May require CRRT still   GOC discussion by primary team ongoing   D/w primary team at bedside    CC time:+30 mins

## 2020-12-22 NOTE — PROGRESS NOTES
FiO2 decreased to 75%         12/22/20 1131   Vent Information   Vent Mode AC/VC   Vt Ordered 380 mL   Rate Set 38 bmp   Peak Flow 70 L/min   FiO2  75 %   SpO2 95 %   SpO2/FiO2 ratio 126.67   Sensitivity 3   PEEP/CPAP 18   Humidification Source HME   Vent Patient Data   Peak Inspiratory Pressure 40 cmH2O   Mean Airway Pressure 27 cmH20   Rate Measured 38 br/min   Vt Exhaled 395 mL   Minute Volume 15 Liters   I:E Ratio 1:1.6   Cough/Sputum   Sputum How Obtained None   Spontaneous Breathing Trial (SBT) RT Doc   Pulse 127   Breath Sounds   Right Upper Lobe Diminished   Right Middle Lobe Diminished   Right Lower Lobe Diminished   Left Upper Lobe Diminished   Left Lower Lobe Diminished   Additional Respiratory  Assessments   Resp (!) 38   Position Semi-Mooney's   Oral Care Completed? Yes   Oral Care Mouth suctioned   Subglottic Suction Done? Yes   Alarm Settings   High Pressure Alarm 50 cmH2O   Low Minute Volume Alarm 4 L/min   Apnea (secs) 20 secs   High Respiratory Rate 50 br/min   Low Exhaled Vt  250 mL   ETT (adult)   Placement Date/Time: 12/10/20 1400   Timeout: Patient  Preoxygenation: Yes  Mask Ventilation: Ventilated by mask (1)  Technique: Direct laryngoscopy  Type: Cuffed  Tube Size: 7.5 mm  Location: Oral  Insertion attempts: 1  Placement Verified By[de-identified] Chest...    Secured at 24 cm   Measured From [address] [place] [address],[address] By [employer]   Site Condition Dry

## 2020-12-22 NOTE — PROGRESS NOTES
Restart vanco per Dr. Michelle Ortiz request.    Karma Record was not given yesterday. Vanco being pulse dosed because of renal function. Give Vanco 1Gm IVPB x 1.   Vanco level in AM.    Ammon Bryan [facility]

## 2020-12-22 NOTE — PROGRESS NOTES
Reassessment complete. Blood pressure 123/84, pulse 143, temperature 97.8 °F (36.6 °C), temperature source Axillary, resp. rate (!) 38, height 5' 4\" (1.626 m), weight 249 lb 1.9 oz (113 kg), SpO2 97 %, not currently breastfeeding. LEVOPHED infusing at a rate of 7 mcg/min. Blood pressure WNL. Central line dressing changed. No other changes since last assessment.     Electronically signed by Bowen Mantilla RN on 12/22/2020 at 12:03 AM

## 2020-12-22 NOTE — PROGRESS NOTES
Pulmonary & Critical Care Medicine ICU Progress Note    CC: Fatigue, anemia, PEA arrest in emergency department    Events of Last 24 hours:   Versed 5 mg/hr   Propofol 20 mcg/kg/min   Fentanyl 100 mcg/hr   Nimbex 4 mcg/kg/min  Levophed 5 mcg/min   Lasix drip 10 mg/hr   PEEP 18  FiO2 80%  Plateau pressure 37  PaO2/FiO2 63        Invasive Lines: Right IJ CVC 12/10/2020    MV: 12/10/2020  Vent Mode: AC/VC Rate Set: 38 bmp/Vt Ordered: 380 mL/ /FiO2 : 90 %  Recent Labs     12/21/20 1717 12/22/20 0525   PHART 7.241* 7.151*   OMM2JYO 56.2* 64.1*   PO2ART 33.2* 52.5*       IV:   cisatracurium (NIMBEX) infusion 3 mcg/kg/min (12/22/20 0316)    furosemide (LASIX) infusion 10 mg/hr (12/21/20 1700)    norepinephrine 7 mcg/min (12/21/20 2207)    propofol 20 mcg/kg/min (12/21/20 2351)    fentaNYL (SUBLIMAZE) infusion 100 mcg/hr (12/21/20 2021)    midazolam 5 mg/hr (12/22/20 0515)    dilTIAZem (CARDIZEM) 125 mg in dextrose 5% 125 mL infusion Stopped (12/18/20 1635)    sodium hypochlorite      sodium chloride 10 mL/hr at 12/13/20 1208    dextrose         Vitals:  Blood pressure 105/72, pulse 116, temperature 97.7 °F (36.5 °C), temperature source Axillary, resp. rate (!) 34, height 5' 4\" (1.626 m), weight 244 lb (110.7 kg), SpO2 95 %, not currently breastfeeding. on 38/380/+18     Intake/Output Summary (Last 24 hours) at 12/22/2020 0701  Last data filed at 12/22/2020 0511  Gross per 24 hour   Intake 2043 ml   Output 1195 ml   Net 848 ml     EXAM:  General: ill appearing. Intubated sedated. Eyes: PERRL. No sclera icterus. No conjunctival injection. ENT: No discharge. Pharynx clear. ET tube in place  Neck: Trachea midline. Normal thyroid. Resp: No accessory muscle use. Few crackles. No wheezing. No rhonchi. No dullness on percussion. CV: Regular rate. Irregular rhythm. No mumur or rub. No LE edema. GI: Non-tender. Non-distended. No masses. No organomegaly. Normal bowel sounds. No hernia. Skin: Warm and dry. No nodule on exposed extremities. No rash on exposed extremities. Lymph: No cervical LAD. No supraclavicular LAD. M/S: No cyanosis. No joint deformity. No clubbing. Neuro: Intubated sedated. Paralyze   Psych: Noncommunicative unable to obtain      Scheduled Meds:   chlorothiazide  500 mg Intravenous BID    albumin human  25 g Intravenous Q8H    dexamethasone  4 mg Intravenous Daily    cefepime  2 g Intravenous Q12H    [Held by provider] dilTIAZem  60 mg Per NG tube 4 times per day    enoxaparin  40 mg Subcutaneous Daily    pantoprazole  40 mg Intravenous Daily    metroNIDAZOLE  500 mg Intravenous Q8H    insulin lispro  0-6 Units Subcutaneous Q4H    insulin lispro  0-3 Units Subcutaneous Nightly    miconazole   Topical BID    sodium chloride flush  10 mL Intravenous 2 times per day    carboxymethylcellulose PF  1 drop Both Eyes Q4H    And    artificial tears   Both Eyes Q4H    chlorhexidine  15 mL Mouth/Throat BID     PRN Meds:  sodium hypochlorite, perflutren lipid microspheres, acetaminophen **OR** acetaminophen, sodium chloride flush, polyethylene glycol, promethazine **OR** ondansetron, fentanNYL, promethazine **OR** ondansetron, midazolam, ipratropium-albuterol, glucose, dextrose, glucagon (rDNA), dextrose    Results:  CBC:   Recent Labs     12/20/20  0506 12/21/20  1231 12/22/20  0525   WBC 7.4 12.8* 18.4*   HGB 7.8* 9.0* 9.1*   HCT 25.4* 29.4* 29.9*   MCV 92.1 94.2 92.4   * 112* 133*     BMP:   Recent Labs     12/20/20  0506 12/21/20  0400 12/22/20  0525    141 141   K 3.9 3.9 4.0    106 104   CO2 24 23 22   PHOS 5.2* 6.7* 7.9*   BUN 54* 67* 81*   CREATININE 1.3* 1.6* 2.0*     LIVER PROFILE:   No results for input(s): AST, ALT, LIPASE, BILIDIR, BILITOT, ALKPHOS in the last 72 hours. Invalid input(s):   AMYLASE,  ALB    Cultures:  12/10/2020 SARS-CoV-2 positive  12/10/2020 blood strep pyogenes  12/10/2020 blood gram-positive rods  12/16 [facility] NGTD 12/20 Resp MRSA and pseudomonas   12/21 [facility]  12/21 [facility]       Films:  CXR 12/22/2020  images reviewed by me and showed:   Satisfactory ETT position  Satisfactory CVC position   Diffuse bilateral ASD - no changes         ASSESSMENT:  · Acute hypoxemic respiratory failure  · MRSA/Pseudomonas pneumonia  · Septic shock  · Cardiac Arrest/PEA: unclear etiology - total compression time 18 minutes over 4 separate episodes with intermittent ROSC  · Strep pyogenes bacteremia - possibly wound source  · Acute encephalopathy/Metabolic encephalopathy   · COVID-19 pneumonia   · Acute anemia s/p 2 U PRBC - no sign of active bleeding  · MIGUELANGEL  · Stage IV decubitus ulcer  S/p surgical debridement  · Afib RVR  · Thrombocytopenia - better       PLAN:  Mechanical ventilation as per my orders. The ventilator was adjusted by me at the bedside for unstable, life threatening respiratory failure. ABG at 2 PM   COVID-19 isolation, droplet plus  Propofol gtt,  Fent gtt, Versed gtt  S/P TTM, started rewarming 12/11/20 at 1600  No Remdesevir given low GFR  Antibiotics per IM for sacral wound. On vanc D#13, Flagyl D#8 and Cefepime D#2. Post CTX  D#10. Decadron 6 mg IV daily, D#11- change to 4 mg tomorrow   Nephro following ? CRRT and d/c lasix given worse Cr  TF restart today   Follow up Cx   Follow TG   Surgery following - debridement 12/15, wound Vac  Prophylaxis: Lovenox; MRSA - mupirocin; GI - on protonix  D/W NOK is haris [employer] [phone]  Patient is DNR comfort care arrest with no chest compression or shocking. Total critical care time caring for this patient with life threatening, unstable organ failure, including direct patient contact, management of life support systems, review of data including imaging and labs, discussions with other team members and physicians is 33 minutes, excluding procedures.

## 2020-12-22 NOTE — PROGRESS NOTES
Wound dressing placed on patient at this time. Patient dropped to low 70's on FiO2 of 100% but recovered within 15 minutes. Patient Nimbex increased at this time due to movement and TOF. Will continue to monitor.

## 2020-12-22 NOTE — PROGRESS NOTES
ABG drawn from right bracial. 1 attempt(s). Sight prepped per policy and procedure. Modified Javad's test positive. Pressure held to sight after procedure for five minutes. No hematoma present. Pulse present after procedure.

## 2020-12-22 NOTE — PROGRESS NOTES
Updated Dr. Gerson Nathan with pt's consistent HR above 120. New order for amio gtt. Will administer when available from pharmacy.   Agueda Mejía RN

## 2020-12-22 NOTE — FLOWSHEET NOTE
Levo increased to 7 mcg/min.  At this time due to map of 57.      12/21/20 2200   Vital Signs   Pulse 110   Resp (!) 38   BP (!) 68/50   MAP (mmHg) (!) 57   Oxygen Therapy   SpO2 97 %

## 2020-12-22 NOTE — PROGRESS NOTES
Updated Dr. Neda Madison with ABG results. New order for bicarb gtt. Will administer when available from pharmacy.   Magda Mejía RN

## 2020-12-22 NOTE — PROGRESS NOTES
12/22/20 0245   Vent Information   Equipment Changed HME   Vent Type 840   Vent Mode AC/VC   Vt Ordered 380 mL   Rate Set 38 bmp   Peak Flow 70 L/min   FiO2  90 %   SpO2 95 %   SpO2/FiO2 ratio 105.56   Sensitivity 3   PEEP/CPAP 18   Humidification Source HME   Vent Patient Data   Peak Inspiratory Pressure 39 cmH2O   Mean Airway Pressure 27 cmH20   Rate Measured 38 br/min   Vt Exhaled 394 mL   Minute Volume 14.9 Liters   I:E Ratio 1:1.6   Plateau Pressure 35 ROD38   Cough/Sputum   Sputum How Obtained Suctioned;Endotracheal   Cough Non-productive   Spontaneous Breathing Trial (SBT) RT Doc   Pulse 127   Breath Sounds   Right Upper Lobe Diminished   Right Middle Lobe Diminished   Right Lower Lobe Diminished   Left Upper Lobe Diminished   Left Lower Lobe Diminished   Additional Respiratory  Assessments   Resp 23   Position Semi-Mooney's   Oral Care Completed? Yes   Oral Care Mouth suctioned   Subglottic Suction Done? Yes   Alarm Settings   High Pressure Alarm 50 cmH2O   Low Minute Volume Alarm 4 L/min   Apnea (secs) 20 secs   High Respiratory Rate 50 br/min   Low Exhaled Vt  250 mL   ETT (adult)   Placement Date/Time: 12/10/20 1400   Timeout: Patient  Preoxygenation: Yes  Mask Ventilation: Ventilated by mask (1)  Technique: Direct laryngoscopy  Type: Cuffed  Tube Size: 7.5 mm  Location: Oral  Insertion attempts: 1  Placement Verified By[de-identified] Chest...    Secured at 23 cm   Measured From Lips   ET Placement Left   Secured By Commercial tube brothers   Site Condition Dry

## 2020-12-22 NOTE — PROGRESS NOTES
Handoff report to [facility] RN for transfer of care. IV drips reviewed, vent setting reviewed.

## 2020-12-22 NOTE — PROGRESS NOTES
Admit: 12/10/2020    Name:  Gary Fall  Room:  3007/3007-01  MRN:    [id number]    Critical Care Daily Progress Note for 12/22/2020     Admitted after cardiac arrest in ER    Interval History:     12/11  She is now on propofol and versed prn  On levo and vaso  Strep in blood culture  Continues to be on the vent    12/12  Finished rewarming  Wakes up with asynchrony with the vent  On levo and vaso    12/13  Rewarmed  Concern for decorticate posturing  Off levo  Weaning vaso    12/14  Patient on mechanical ventilation, s/p TTM, off of pressors. renal function improved, continued on IV fluids   currently on vent settings FiO2 30% PEEP of 5   SBT today    12/15  Patient remains on mechanical ventilation. Remains off pressors FiO2 30%, PEEP of 5. S/p extensive debridement of stage IV large sacral decubitus ulcer today. Responds a little to painful stimuli. On tube feedings    12/16  Unchanged remains on mechanical ventilation    12/17  Remains on mechanical ventilation,  failed SBT FiO2 40% PEEP of 5    12/18  Doing the same; remains on mechanical ventilation  Failed SBT  On Cardizem drip    12/19  Failed SBT . She  remains on mechanical ventilation. Worsening hypoxemia . FiO2 is up to 70%, PEEP is up to 14. Off of Cardizem drip    12/20  Worsening hypoxia, fi O2 80 %. increased agitation, requiring sedation     12/21-oxygen demands were increased. FiO2 is up to 90%. PEEP is 18. Initial plan was for proning but Dr Phil Pillai talked to son. Patient now a DNR-CCA. Plan to start Lasix drip.     12/22-her FiO2 is down to 75%. PEEP of 18. Her creatinine is worse. Is up to 2.0.     Scheduled Meds:   vancomycin (VANCOCIN) intermittent dosing (placeholder)   Other RX Placeholder    chlorothiazide  500 mg Intravenous BID    albumin human  25 g Intravenous Q8H    dexamethasone  4 mg Intravenous Daily    cefepime  2 g Intravenous Q12H    [Held by provider] dilTIAZem  60 mg Per NG tube 4 times per day Last data filed at 12/22/2020 1435  Gross per 24 hour   Intake 1295 ml   Output 1485 ml   Net -190 ml       Physical Exam:  Patient seen and examined. She is in droplet plus precautions she is ill-appearing. Gen: Intubated, sedated, on mechanical ventilation  See wound care notes, patient has stage IV decubitus ulcer-media pictures reviewed  Eyes: Pupils are reacting, equal. No sclera icterus. No conjunctival injection. ENT: ETT  Neck: Trachea midline. Normal thyroid. Resp: No accessory muscle use. Minimal crackles. No wheezes. No rhonchi. CV: Regular rate. Regular rhythm. No murmur or rub. No edema. GI: Non-tender. Non-distended. No masses. No organomegaly. Normal bowel sounds. No hernia. Skin: Warm and dry. No nodule on exposed extremities. No rash on exposed extremities. Sacral decubitus ulcer  Lymph: No cervical LAD. No supraclavicular LAD. M/S: No cyanosis. No joint deformity. No clubbing. Neuro: Sedated       Lab Data:  CBC:   Recent Labs     12/20/20  0506 12/21/20  1231 12/22/20  0525   WBC 7.4 12.8* 18.4*   RBC 2.76* 3.12* 3.24*   HGB 7.8* 9.0* 9.1*   HCT 25.4* 29.4* 29.9*   MCV 92.1 94.2 92.4   RDW 20.9* 20.6* 21.0*   * 112* 133*     BMP:   Recent Labs     12/20/20  0506 12/21/20  0400 12/22/20  0525    141 141   K 3.9 3.9 4.0    106 104   CO2 24 23 22   PHOS 5.2* 6.7* 7.9*   BUN 54* 67* 81*   CREATININE 1.3* 1.6* 2.0*     BNP: No results for input(s): BNP in the last 72 hours. PT/INR:   Recent Labs     12/21/20  0400   PROTIME 15.2*   INR 1.31*     APTT:  No results for input(s): APTT in the last 72 hours. CARDIAC ENZYMES:   No results for input(s): CKMB, CKMBINDEX, TROPONINI in the last 72 hours. Invalid input(s): CKTOTAL;3  FASTING LIPID PANEL:  Lab Results   Component Value Date    CHOL 125 11/02/2015    HDL 39 11/02/2015    TRIG 117 12/21/2020     LIVER PROFILE:   No results for input(s): AST, ALT, ALB, BILIDIR, BILITOT, ALKPHOS in the last 72 hours. Radiology  XR CHEST PORTABLE   Final Result   Multifocal airspace disease, slightly increased on the right, but decreased   on the left         XR CHEST PORTABLE   Final Result   Unchanged multifocal bilateral pneumonia. XR CHEST PORTABLE   Final Result   Supportive tubing is stable. Bilateral airspace disease, with perhaps mild improvement in aeration of the   right base. XR CHEST PORTABLE   Final Result   Stable bilateral pneumonia. XR CHEST PORTABLE   Final Result   Worsening multifocal airspace opacities. XR CHEST PORTABLE   Final Result   1. Stable appropriate positions of support apparatus. 2. Stable small left pleural effusion. 3. Slight interval improvement in appearance of multifocal pneumonia. XR CHEST PORTABLE   Final Result   Multifocal bilateral pneumonia, unchanged. XR CHEST PORTABLE   Final Result   Slight improving ground-glass opacification on the right with persistent   ground-glass changes on the left. XR CHEST PORTABLE   Final Result   Perihilar and lower zone ground-glass opacities relatively stable. XR CHEST PORTABLE   Final Result   No significant change in appearance of bilateral multifocal parenchymal   disease when compared to the study of 12/12/2020 as described above. XR CHEST PORTABLE   Final Result   Low volume study with persistent diffuse bilateral airspace disease, slightly   increased on the right as compared to prior. Persistent small loculated left   pleural effusion. XR CHEST PORTABLE   Final Result   Stable multifocal pneumonia and small loculated left pleural effusion         CT HEAD WO CONTRAST   Final Result   No acute intracranial abnormality. XR CHEST PORTABLE   Final Result   The endotracheal tube has been retracted with tip now at the level the   clavicular heads 5 cm above the [place].       Persistent diffuse bilateral airspace disease consistent with atypical viral pneumonia. XR CHEST PORTABLE   Final Result   Endotracheal tube is noted at the level of the [place]. Recommend retracting   the tube 2-3 cm more proximally. Enteric tube and right IJ CVC appear to be in satisfactory position. No significant interval change in the bilateral airspace disease. XR CHEST PORTABLE   Final Result   Persistent multifocal bilateral airspace disease, slightly increased on the   left and slightly decreased on the right. Overall very similar appearance   over the past 4 weeks. XR CHEST PORTABLE    (Results Pending)         Assessment & Plan: Active Problems:    Acute respiratory failure with hypoxia ([facility])    PAF (paroxysmal atrial fibrillation) ([facility])    SVT (supraventricular tachycardia) ([facility])    Cardiac arrest ([facility])    Anemia requiring transfusions    Pneumonia due to COVID-19 virus    Shock ([facility]    Bacteremia    Severe protein-calorie malnutrition ([facility])    Pressure injury of sacral region, stage 4 ([facility])    Acute kidney injury ([facility]    Acute encephalopathy    Thrombocytopenia ([facility])    Acute anemia    Septic shock ([facility])    Atrial fibrillation with RVR ([facility])    Pneumonia of both lower lobes due to methicillin resistant Staphylococcus aureus (MRSA) ([facility]  Resolved Problems:    * No resolved hospital problems. *       # Cardiac arrest   - of unclear etiology. Possibly related to COVID infection and  sepsis  -Required multiple pressors on admission including epi, levo and vaso. --> Weaned off of pressors now. -S/p targeted temperature management. - will need ECHO. COVID +ve, patient on droplet plus precautions     #Acute hypoxic respiratory failure. - Intubated and placed  on mechanical ventilation.    -Critical care consulted. -S/p targeted temperature management , rewarming completed. -Failed SBT , continued on mechanical ventilation . Worsening hypoxia .  FiO2 30%-> 40 %--> 70%--> 90 % With increasing oxygen demands pulmonologist was going to prone the patient. but after speaking to son-DNR-CCA. Hold off proning. Creatinine has worsened. Nephrology is following. Considering CRRT although overall prognosis is limited.     #A fib with RVR   - S/P cardizem gtt. On PO dilt q 6. May have to stop as BP is low.      # COVID-19 pneumonia.   - Not a candidate for remdesivir given elevated creatinine.   - No CCP as it is not certain if the transfusion reaction was the cause of current condition - cardiac arrest in ER.    - Started Decadron 6 mg  IV daily day#11/ taper to 4 mg.     #Septic shock   Strep bacteremia  -Likely due to infected sacral decubitus ulcers, possible pneumonia  -Was on empiric cefepime and IV vancomycin. Antibiotics changed to Rocephin given strep in  blood cultures. Resumed vancomycin and Flagyl for infected sacral decubitus ulcer  Day 10/10 of rocephin, day#13 of Vancomycin that was stopped due to renal issues, day 5 of flagyl. Cefepime day 2.  -Weaned off of pressors   -Blood cultures positive for Streptococcus and diphtheroids .  -Leukocytosis improving    #Sacral decubitus ulcers  -she has large extensive stage 4 wounds in sacrum likely source sepsis. -Seen by General surgery. -> S/P debridement 12/15. Continue dressing changes, follow-up on wound cultures.  -Resumed antibiotics vancomycin and Flagyl  -Surgeon to reevaluate on Monday to see if she needs further debridement/Wound Vac placement  - can likely deescalate abx tomorrow after wound eval .       # Acute anemia.    -Discontinue Eliquis  - status post transfusion of 2 units of packed cells. - GI consult. Protonix twice daily.    -Follow H&H      # Hypertension.   - Home medications are held .     #Type 2 diabetes. -Was on insulin drip. Now on SSI    # MIGUELANGEL. -2 to hypotension and septic shock. -nephro consult. -MIGUELANGEL resolved with IV fluids. # Hyponatremia. severe. nephro consult. S/P  2 % NS.  Now off IVF # Hypocalcemia. - replaced     #H/o breast cancer  - on Ibrance      SCDs for DVT prophylaxis. Protonix for GI prophylaxis. DIET TUBE FEED CONTINUOUS/CYCLIC NPO; Low Calorie High Protein (Vital High-Protein); Orogastric; Continuous; 15; 15; 20  DNR-CCA        Prognosis for this patient is extremely limited and guarded. She is a DNR Comfort Care arrest.  No chest compressions or shocking.         Herberth Pena 12/22/2020 3:02 PM

## 2020-12-23 NOTE — PROGRESS NOTES
Post mortuum care performed per policy. Identification placed on body. All personal belongings sent home with Pt's son Amy Bernal. Transport, security and [facility] notified.     Ameena Mejía RN

## 2020-12-23 NOTE — PROGRESS NOTES
Spoke to Dr Alexandra Álvarez pertaining to pt's HR. Pt is in Afib and has Amiodarone infusing to try and control rate, however HR is keeps running up to 160's. He ordered metoprolol 2.5 mg IV once and stated that it could be repeated after 30 min if needed to keep HR under 140.

## 2020-12-23 NOTE — PROGRESS NOTES
[address]   Progress Note  Cardiology      HPI: Evaluating Ms. Marciano Rodriguez today for f/u afib with RVR. Remains intubated and sedated. I reviewed telemetry and HR elevated 130-150's bpm. She received 1 x metoprolol 2.5mg IV bolus earlier this AM and recently received IV digoxin 0.25mg. Physical Examination:  Due to the current efforts to prevent transmission of COVID-19 and also the need to preserve PPE for other caregivers, a face-to-face encounter with the patient was not performed. That being said, all relevant records and diagnostic tests were reviewed, including laboratory results and imaging. Please reference any relevant documentation elsewhere. Care will be coordinated with the primary service.     Vitals:    12/23/20 0900   BP: 95/64   Pulse: 136   Resp: (!) 38   Temp:    SpO2: 95%          Lab Results   Component Value Date    WBC 13.3 (H) 12/23/2020    HGB 8.0 (L) 12/23/2020    HCT 26.2 (L) 12/23/2020    MCV 91.7 12/23/2020    PLT 94 (L) 12/23/2020     Lab Results   Component Value Date    CREATININE 2.2 (H) 12/23/2020    BUN 88 (HH) 12/23/2020     12/23/2020    K 3.7 12/23/2020     12/23/2020    CO2 23 12/23/2020     Lab Results   Component Value Date    INR 1.31 (H) 12/21/2020    PROTIME 15.2 (H) 12/21/2020       CT head 12/11/20   No acute intracranial abnormality.      EKG 11/07/20  Supraventricular tachycardiaAbnormal ECGNo previous ECGs availableConfirmed by EDWIN MCNALLY, ARMIDA ([id number]) on 11/7/2020 4:44:34 PM      EKG 11/07/20  Atrial fibrillation with rapid ventricular responseST & T wave abnormality, consider inferolateral ischemia or digitalis effectAbnormal ECGNo previous ECGs availableConfirmed by [facility] MD, [id number] ([id number]) on 11/8/2020       EKG 11/09/20  Supraventricular tachycardi;aPossible sinus vs other including atrial flutterMarked ST abnormality, possible inferolateral subendocardial injuryAbnormal ECGConfirmed by Leandro Delgado MD, [place]ia ([id number]) on 11/10/2020 1:04    EK20  I have reviewed EKG with the following interpretation:  Impression:  See HPI Atrial fibrillation with rapid ventricular responseST abnormality consider lateral ischemiaAbnormal ECGWhen compared with ECG of 2020 17:27,Atrial fibrillation has replaced Sinus rhythmST change more prominentConfirmed by SIRIA Malin MD ([id number]) on 2020 7:35:22 AM        EKG 12/10/20   Normal sinus rhythmNonspecific ST abnormalityNo previous ECGs availableConfirmed by SIRIA Malin MD ([id number]) on 12/10/2020 5:54:19 PM      EKG 12/10/20   Narrow QRS tachycardiaUnusual P axis, possible ectopic atrial tachycardiaNonspecific ST and T wave abnormalityAbnormal ECGNo previous ECGs availableConfirmed by SIRIA Malin MD ([id number]) on 12/10/2020 11:31:58 AM      CXR 20  FINDINGS: ETT and NG tube remain in satisfactory position.  The heart is enlarged with a stable appearance of pulmonary vasculature.  Multifocal bilateral airspace opacification persists, unchanged from prior.  There is no pneumothorax or effusion.  RECOMMENDATION: Unchanged multifocal bilateral pneumonia.       Lexiscan myoview stress test 01/07/15  1. No scintigraphic evidence of stress-induced myocardial ischemia.  2. Mild left ventricular dilatation.  3. Normal LVEF of 63%.     ECHO 18 Summary:  The left ventricular wall motion is normal.  Overall left ventricular ejection fraction is estimated to be 60-65%. There is marked mitral annular calcification present. Poor endocardial border visualization  There is mild mitral regurgitation. There is mild concentric left ventricular hypertrophy. The diastolic function is impaired and classified as Grade 2  (psuedonormalization).     Assessment:  Pop Coronado is a 56 y.o. patient who presented to [facility]/10/20 with c/o SOB. Follows with Dr Ganesh Schwartz for cardiology--LOV 2019.  She has PMH HTN, DM, recurrent L. pleural effusion, metastatic breast CA, depression, and PAF dx 2018 on eliquis. Most recent Lexiscan stress test 01/08/15 EF 63%; no stress induced ischemia.  Most recent ECHO 5/30/18 EF 60-65%. marked MAC; mild MR mild concentricLVH  Grade 2 DD. I saw as consult in Nov 2020. Admitted for respiratory failure and PNA requiring intubation. She had atrial arrhythmias during admit including SVT and afib with RVR treated with IV diltiazem gtt. She converted to NSR and recovered from PNA and sent to SNF. She was d/c'd on eliquis 5mg BID, coreg 25mg BID, diltiazem CD 180mg BID. Now presents with SOB and found severely anemic (H/H=6.2/19.8) and profoundly hypotensive (00-90HYEG systolic). Got rapid infusion of unmatched blood (2U PRBC) and found weak pulse and went PEA. Multiple rounds epinephrine and HCO3 with CPR total 18 mins with ROSC. Now on pressors and intubated. Found Covid-19+ also. Admit EKG Narrow QRS tachycardia; Unusual P axis, possible ectopic atrial tachycardia; Nonspecific ST and T wave abnormality. Second EKG NSR; Nonspecific ST abnormality. Haris <0.01, Pro-BNP 23,880 (13K Nov 2020), BUN/Cr 45/2.6 (28/1.0 Nov 2020); ALT=93; CJX=735; CXR Persistent diffuse bilateral airspace disease consistent with atypical viral pneumonia. CT head no acute intracranial abnormality. Diagnosis of cardiac arrest likely precipitated by combination of severe anemia, respiratory failure, and combined hypovolemic and septic shock possibly in older female with Covid-19 PNA and pseudomonas/MRSA PNA. Also dx atrial fibrillation with RVR.     Recs:  1. Initial EKG studies time of event do not have ischemic EKG changes. Low clinical suspicion for ACS. Jannie Toni 2. Initial Haris negative. Cycle full series. Likely to increase given hypotension, CPR, and Covid. 3. NO AC given severe anemia requiring 2U PRBC. D/C'd eliquis. 4. Note EKG 12/21/20 showed afib with RVR 115bpm; ST abnormality consider inferolateral ischemia (no significant change from 12/15/20 EKG)  5. Renal consult following. ATN now with rising BUN/Cr=88//2.2 today (42/1.1 on 12/19/20). May need CRRT. 6. Will load with IV digoxin 0.25mg q 6 hours x 4 doses. 7. Give bolus of amiodarone 150mg x 1. She started continuous amio gtt 12/22 but did not receive bolus prior. 8. Hypotensive on pressor levophed. Titrate as needed. D/C diltiazem gtt. 9. ECHO pending if recovers. DNR/comfort care now. Per Dr. Melba Webb possible d/w son about withdrawal near future if does not improve.       Patient Active Problem List   Diagnosis    Cellulitis and abscess of leg    Chest pain    Acute lateral meniscus tear of left knee    Left knee pain    SOB (shortness of breath)    Primary cancer of right breast with metastasis to other site ([address]    Anxiety    DM2 (diabetes mellitus, type 2) ([address]    GERD (gastroesophageal reflux disease)    Hypertension, uncontrolled    Morbid obesity due to excess calories (HCC)    Acute renal injury ([address]    Chemotherapy-induced neutropenia (HCC)    Diarrhea    Acute respiratory failure with hypoxia (HCC)    Pneumonia due to organism    [facility]ox[facility]    Pleural effusion    SVT (supraventricular tachycardia) ([address]    HCAP (healthcare-associated pneumonia)    Cardiac arrest ([address]    Anemia requiring transfusions    Pneumonia due to COVID-19 virus    Shock ([address]    Bacteremia    Severe protein-calorie malnutrition (HCC)    Pressure injury of sacral region, stage 4 (HCC)    Acute kidney injury ([address]    Acute encephalopathy    Thrombocytopenia (HCC)    Acute anemia    Septic shock (HCC)    Atrial fibrillation with RVR ([address]  Pneumonia of both lower lobes due to methicillin resistant Staphylococcus aureus (MRSA) ([facility]

## 2020-12-23 NOTE — DISCHARGE SUMMARY
Death Summary        Please see my detailed progress note from 12/22/2020. Patient is a 49-year-old white female admitted to hospital with acute hypoxemic respiratory failure, MRSA pneumonia, Pseudomonas pneumonia, cardiac arrest with pulseless electrical activity with a total compression time of 18 minutes over 4 separate episodes with intermittent return of circulation, strep pyogenes bacteremia, COVID-19 pneumonia who was treated aggressively in the ICU with mechanical ventilation medication. Unfortunately she did not improve and family withdrew care and she passed away.       Cause of death: Cardiac Cassie Kaufman [facility] 12/23/2020 12:57 PM

## 2020-12-23 NOTE — PROGRESS NOTES
Discussed with son and sister at the bedside. Family does not want patient to suffer any longer and decided to withdraw care. Discussed with internal medicine and nursing staff.

## 2020-12-23 NOTE — PROGRESS NOTES
The family has decided to withdraw care on the patient. I expect the patient will  shortly after that.         Matilde Lundberg 2020 12:38 PM

## 2020-12-23 NOTE — PROGRESS NOTES
Pt  at this time. Verified by Gissell Isaac RN. EKG strip printed and in patient's chart. Son at bedside at TOD. MD, Clinical, Charge RN and admitting updated.   Jose Luis Mejía RN

## 2020-12-23 NOTE — PROGRESS NOTES
Pt BP 30/40s. Levophed increased to 30mcgs at this time and Dr. Catherine Mcdonough called to bedside. Pt BP recovered, new orders to start Vaso and epi if needed. Dr Catherine Mcdonough called pt's son, son wishes to withdraw care. On his way to hospital now.   Blaise Mejía RN

## 2020-12-23 NOTE — PROGRESS NOTES
Vanc-day 2  Vanc random 19.8mcg/ml  No vanc today, plan is for comfort care only  Antelmo DUMONT 12/23/202012:33 PM  .

## 2020-12-23 NOTE — PROGRESS NOTES
Diannah Crigler, RN with cardiology regarding pt's continued elevated HR, will update Dr. Garvey Scripture and return call.   Agueda Mejía RN

## 2020-12-23 NOTE — PROGRESS NOTES
Writer witnessed waste of 80mL of Fentanyl and 120mL of Versed with Jerome Danielson.   Electronically signed by Yaritza Hanna RN on 12/23/2020 at 2:44 PM     Electronically signed by John Davis RN on 12/23/2020 at 2:51 PM

## 2020-12-23 NOTE — PROGRESS NOTES
Pt's son and sister at bedside, wishes to withdraw care at this time. New order to extubate per  Chris Primus.   Marylen Simas Pride RN

## 2020-12-23 NOTE — PROGRESS NOTES
Pulmonary & Critical Care Medicine ICU Progress Note    CC: Fatigue, anemia, PEA arrest in emergency department    Events of Last 24 hours:   Versed 7 mg/hr   Propofol 20 mcg/kg/min   Fentanyl 175 mcg/hr   Nimbex 4.5 mcg/kg/min  Levophed 10 mcg/min   Afib with RVR   Amiodarone 0.5 mg/min  Lasix drip 10 mg/hr   PEEP 18  FiO2 100%  Plateau pressure 39  PaO2/FiO2 79  Performed recruitment maneuver and increased PEEP 20   Lack of significant movement despite maxing on medical therapy     Invasive Lines: Right IJ CVC 12/10/2020    MV: 12/10/2020  Vent Mode: AC/VC Rate Set: 38 bmp/Vt Ordered: 380 mL/ /FiO2 : 90 %  Recent Labs     12/22/20  2240 12/23/20  0429   PHART 7.206* 7.209*   VCN0EDH 57.5* 58.8*   PO2ART 76.1 71.6*       IV:   IV infusion builder 50 mL/hr at 12/22/20 1517    amiodarone 0.5 mg/min (12/22/20 2340)    cisatracurium (NIMBEX) infusion 4.5 mcg/kg/min (12/23/20 0635)    furosemide (LASIX) infusion 10 mg/hr (12/22/20 2058)    norepinephrine 1 mcg/min (12/23/20 0526)    propofol 20 mcg/kg/min (12/23/20 0247)    fentaNYL (SUBLIMAZE) infusion 175 mcg/hr (12/23/20 0252)    midazolam 7 mg/hr (12/23/20 0251)    dilTIAZem (CARDIZEM) 125 mg in dextrose 5% 125 mL infusion Stopped (12/18/20 1635)    sodium hypochlorite      sodium chloride 10 mL/hr at 12/13/20 1208    dextrose         Vitals:  Blood pressure 82/61, pulse 120, temperature 98 °F (36.7 °C), temperature source Oral, resp. rate (!) 38, height 5' 4\" (1.626 m), weight 244 lb (110.7 kg), SpO2 92 %, not currently breastfeeding. on 38/380    Intake/Output Summary (Last 24 hours) at 12/23/2020 0704  Last data filed at 12/23/2020 9079  Gross per 24 hour   Intake 3610 ml   Output 890 ml   Net 2720 ml     EXAM:  General: ill appearing. Intubated sedated. Eyes: PERRL. No sclera icterus. No conjunctival injection. ENT: No discharge. Pharynx clear. ET tube in place  Neck: Trachea midline. Normal thyroid. Resp: No accessory muscle use. Few crackles. No wheezing. No rhonchi. No dullness on percussion. CV: Tachy rate. Irregular rhythm. No mumur or rub. No LE edema. GI: Non-tender. Non-distended. No masses. No organomegaly. Normal bowel sounds. No hernia. Skin: Warm and dry. No nodule on exposed extremities. No rash on exposed extremities. Lymph: No cervical LAD. No supraclavicular LAD. M/S: No cyanosis. No joint deformity. No clubbing. Neuro: Intubated sedated.   Paralyze   Psych: Noncommunicative unable to obtain      Scheduled Meds:   vancomycin (VANCOCIN) intermittent dosing (placeholder)   Other RX Placeholder    dexamethasone  4 mg Intravenous Daily    cefepime  2 g Intravenous Q12H    [Held by provider] dilTIAZem  60 mg Per NG tube 4 times per day    enoxaparin  40 mg Subcutaneous Daily    pantoprazole  40 mg Intravenous Daily    metroNIDAZOLE  500 mg Intravenous Q8H    insulin lispro  0-6 Units Subcutaneous Q4H    insulin lispro  0-3 Units Subcutaneous Nightly    miconazole   Topical BID    sodium chloride flush  10 mL Intravenous 2 times per day    carboxymethylcellulose PF  1 drop Both Eyes Q4H    And    artificial tears   Both Eyes Q4H    chlorhexidine  15 mL Mouth/Throat BID     PRN Meds:  sodium hypochlorite, perflutren lipid microspheres, acetaminophen **OR** acetaminophen, sodium chloride flush, polyethylene glycol, promethazine **OR** ondansetron, fentanNYL, promethazine **OR** ondansetron, midazolam, ipratropium-albuterol, glucose, dextrose, glucagon (rDNA), dextrose    Results:  CBC:   Recent Labs     12/21/20  1231 12/22/20  0525 12/23/20  0429   WBC 12.8* 18.4* 13.3*   HGB 9.0* 9.1* 8.0*   HCT 29.4* 29.9* 26.2*   MCV 94.2 92.4 91.7   * 133* 94*     BMP:   Recent Labs     12/21/20  0400 12/22/20  0525 12/23/20  0429    141 137   K 3.9 4.0 3.7    104 100   CO2 23 22 23   PHOS 6.7* 7.9* 7.2*   BUN 67* 81* 88*   CREATININE 1.6* 2.0* 2.2*     LIVER PROFILE: No results for input(s): AST, ALT, LIPASE, BILIDIR, BILITOT, ALKPHOS in the last 72 hours. Invalid input(s): AMYLASE,  ALB    Cultures:  12/10/2020 SARS-CoV-2 positive  12/10/2020 blood strep pyogenes  12/10/2020 blood gram-positive rods  12/16 BC NGTD   12/20 Resp MRSA and pseudomonas   12/21 [facility]  12/21 respiratory Pseudomonas  12/21 UC Pseudomonas ESBL and Candida      Films:  CXR 12/22/2020  images reviewed by me and showed:   Satisfactory ETT position  Satisfactory CVC position   Diffuse bilateral ASD - worse today         ASSESSMENT:  · Acute hypoxemic respiratory failure  · MRSA/Pseudomonas pneumonia  · Septic shock  · Cardiac Arrest/PEA: unclear etiology - total compression time 18 minutes over 4 separate episodes with intermittent ROSC  · Strep pyogenes bacteremia - possibly wound source  · Acute encephalopathy/Metabolic encephalopathy. Likely significant anoxic brain injury given lack of meaningful recovery 72 hrs post arrest per nurse and MD reporting   · COVID-19 pneumonia   · Acute anemia s/p 2 U PRBC - no sign of active bleeding  · MIGUELANGEL  · Stage IV decubitus ulcer  S/p surgical debridement  · Afib RVR  · Thrombocytopenia - worse now       PLAN:  Mechanical ventilation as per my orders. The ventilator was adjusted by me at the bedside for unstable, life threatening respiratory failure. COVID-19 isolation, droplet plus  Propofol gtt,  Fent gtt, Versed gtt  S/P TTM, started rewarming 12/11/20 at 1600  No Remdesevir given low GFR  Antibiotics per IM for sacral wound. On vanc D#14, Flagyl D#9 and Cefepime D#3. Post CTX  D#10. Change cefepime to meropenem. D/C Flagyl   Decadron 6 mg IV daily, D#12- change to 4 mg tomorrow   Nephro following ?  CRRT and d/c lasix given worse Cr and Levophed requirement   Amiodarone drip/digoxin per cardiology and cardiology follow up   TF trophic today   Heparin drip and H&H Q 8 hrs   Follow up Cx   Follow TG   Surgery following - debridement 12/15, wound Vac Prophylaxis: Heparin drip and PPI  D/W NOKATHI is haris Potts [phone]. Very poor prognosis and he is on his way to the hospital considering withdrawal of care. Limited code with meds only and no chest compression or shocking. Total critical care time caring for this patient with life threatening, unstable organ failure, including direct patient contact, management of life support systems, review of data including imaging and labs, discussions with other team members and physicians is 35 minutes, excluding procedures.

## 2020-12-23 NOTE — PROGRESS NOTES
12/22/20 2338   Vent Information   Vent Type 840   Vent Mode AC/VC   Vt Ordered 380 mL   Rate Set 38 bmp   Peak Flow 70 L/min   SpO2 91 %   Sensitivity 3   PEEP/CPAP 18   Humidification Source HME   Vent Patient Data   Peak Inspiratory Pressure 42 cmH2O   Mean Airway Pressure 28 cmH20   Rate Measured 38 br/min   Vt Exhaled 395 mL   Minute Volume 15 Liters   I:E Ratio 1:1.6   Plateau Pressure 39 [id number]   Cough/Sputum   Sputum How Obtained Suctioned;Endotracheal   Cough Non-productive   Spontaneous Breathing Trial (SBT) RT Doc   Pulse 115   Breath Sounds   Right Upper Lobe Diminished   Right Middle Lobe Diminished   Right Lower Lobe Diminished   Left Upper Lobe Diminished   Left Lower Lobe Diminished   Additional Respiratory  Assessments   Resp (!) 34   Position Semi-Mooney's   Oral Care Completed? Yes   Oral Care Mouth suctioned   Subglottic Suction Done? Yes   Alarm Settings   High Pressure Alarm 50 cmH2O   Low Minute Volume Alarm 4 L/min   Apnea (secs) 20 secs   High Respiratory Rate 50 br/min   Low Exhaled Vt  250 mL   ETT (adult)   Placement Date/Time: 12/10/20 1400   Timeout: Patient  Preoxygenation: Yes  Mask Ventilation: Ventilated by mask (1)  Technique: Direct laryngoscopy  Type: Cuffed  Tube Size: 7.5 mm  Location: Oral  Insertion attempts: 1  Placement Verified By[de-identified] Chest...    Secured at 24 cm   Measured From Lips   ET Placement Left   Secured By [employer] tube brothers   Site Condition Dry

## 2020-12-23 NOTE — CARE COORDINATION
INTERDISCIPLINARY PLAN OF CARE CONFERENCE    Date/Time: 12/23/2020 10:37 AM  Completed by: Alee Lopez, Case Management      Patient Name:  Jessica Jeronimo  YOB: 1960  Admitting Diagnosis: Cardiac arrest [facility]) [I46.9]     Admit Date/Time:  12/10/2020  9:18 AM    Chart reviewed. Interdisciplinary team contacted or reviewed plan related to patient progress and discharge plans. Disciplines included Case Management, Nursing, and Dietitian. Current Status:ICU intubated    Expected D/C Disposition:  EGS/STR    Discharge Plan Comments: Chart reviewed. C-19+ on 12/10. remains in ICU intubated. From [facility] STR, son wants EGS, per Gaby can accept, no C19 re-testing required prior to DC to SNF.  Follow    Home O2 in place on admit: No

## 2020-12-23 NOTE — PROGRESS NOTES
Body leaving the unit at this time per transport to [facility].   Sachin Mejía RN

## 2020-12-23 NOTE — PROGRESS NOTES
Return call from [facility]. New order for dig to follow per Dr. Gamal Dutton.   [facility] Lilly Mejía RN

## 2020-12-25 LAB
BLOOD CULTURE, ROUTINE: NORMAL
CULTURE, BLOOD 2: NORMAL

## 2020-12-29 LAB
AFB CULTURE (MYCOBACTERIA): NORMAL
AFB SMEAR: NORMAL

## 2021-01-06 NOTE — PROGRESS NOTES
Physician Progress Note      Boris Landeros  CSN #:                  [id number]  :                       1960  ADMIT DATE:       12/10/2020 9:18 AM  DISCH DATE:        2020 3:25 PM  RESPONDING  PROVIDER #:        Radha Trejo MD          QUERY TEXT:    Patient admitted with Sepsis and underwent excisional debridement. Per Op note   dated 12/15/2020, \"sharp excisional debridement of black eschar and   non-viable tissue from the wound surface and wound base\" was noted. To   accurately reflect the procedure performed please document the deepest depth   of tissue removed as down to and including: The medical record reflects the following:  Risk Factors: Stage 4 pressure wound  Clinical Indicators: sharp excisional debridement of black eschar and   non-viable tissue from the wound surface and wound base  Treatment: excisional debridement, wound vac, supportive care    Thank you,  Jesse Bird, RN, CDS  [phone], RN, CDS  [phone]  Options provided:  -- Excisional debridement of subcutaneous tissue  -- Excisional debridement of fascia  -- Excisional debridement of muscle  -- Excisional debridement of bone  -- Other - I will add my own diagnosis  -- Disagree - Not applicable / Not valid  -- Disagree - Clinically unable to determine / Unknown  -- Refer to Clinical Documentation Reviewer    PROVIDER RESPONSE TEXT:    Excisional debridement of subcutaneous tissue of coccyx was performed during   procedure on 12/15/2020.     Query created by: Gustavo Howard on 2021 9:44 AM      Electronically signed by:  Radha Trejo MD 2021 6:37 PM

## 2023-01-26 NOTE — PROGRESS NOTES
Progress Note    Admit Date:  11/7/2020    Subjective:  Ms. Deedee Galindo was admitted to hospital with increasing shortness of breath. She had been admitted to [facility] about 3 weeks ago when she is diagnosed with left pleural effusion. This is tapped. She felt better the time of discharge. Patient is diagnosed with metastatic right breast cancer 4 years ago. She is on Damir. She is a past medical history of atrial fibrillation. She also has a history of diabetes, hypertension, morbid obesity and depression. At the time of her admission patient was found to have acute respiratory failure. Her initial heart rate was in the 130s. She was in A. fib. She is placed on oxygen. She is requiring up to 12 L of oxygen. This has been weaned down to 5.5 L of oxygen at present. Work-up in the ER included a chest x-ray that showed recurrent left pleural effusion. Review of the cytology from [facility] showed no malignant cells. This was of course on just one sample. When I saw the patient she was still somewhat short of breath. She is complaining of leg edema. She denies any anosmia. No diarrhea. 11/9. Persistent shortness of breath . thoracentesis was ordered for pleural effusion (not done yet as patient is on droplet plus precautions)  - COVID-19 results are still pendin. - she continues to feel dyspneic .     she is complaining of chest pain she has become tachycardic heart rate is up to 140->  sinus tachycardia  EKG - sinus tachycardia , lateral ST changes. 11/10  Rapid response called yesterday when she became tachycardic with a heart rate of 140. Complained of chest pain. Also became hypoxic requiring a nonrebreather. Transferred to the ICU and intubated and started on mechanical ventilation. 11/11  Continues to be on the vent. Low-grade temps. Underwent SBT.   Was very anxious during the trial.  Underwent thoracentesis yesterday      11/12  On spontaneous breathing trial this morning. She has been placed on Precedex. 11/13  On spontaneous breathing trial again this morning. She is on Precedex and will need low-dose of propofol this morning. She is easily arousable. Objective:   /71   Pulse 102   Temp 98.7 °F (37.1 °C) (Axillary)   Resp 22   Ht 5' 4\" (1.626 m)   Wt 224 lb (101.6 kg)   SpO2 93%   BMI 38.45 kg/m²          Intake/Output Summary (Last 24 hours) at 11/13/2020 0908  Last data filed at 11/13/2020 0857  Gross per 24 hour   Intake 1676 ml   Output 4420 ml   Net -2744 ml       Physical Exam:    General appearance: Awake intubated  Head: Normocephalic, without obvious abnormality, atraumatic  Eyes: conjunctivae/corneas clear. PERRL, EOM's intact. Neck: no adenopathy, no carotid bruit, no JVD, supple, symmetrical, trachea midline and thyroid not enlarged, symmetric, no tenderness/mass/nodules  Lungs: Diminished breath sounds on the left, increasing bibasilar crackles. Heart: Tachycardic.   Abdomen: soft, non-tender; bowel sounds normal; no masses,  no organomegaly  Extremities: extremities normal, atraumatic, no cyanosis, trace edema  Pulses: 2+ and symmetric  Skin: Skin color, texture, turgor normal. No rashes or lesions  Neurologic: Awake, oriented  Scheduled Meds:   acetaZOLAMIDE  250 mg Oral Q6H    magnesium sulfate  1 g Intravenous Once    furosemide  40 mg Intravenous BID    pantoprazole  40 mg Intravenous Daily    insulin lispro  0-12 Units Subcutaneous Q4H    carboxymethylcellulose PF  1 drop Both Eyes Q4H    And    artificial tears   Both Eyes Q4H    chlorhexidine  15 mL Mouth/Throat BID    mupirocin   Nasal BID    ipratropium-albuterol  1 ampule Inhalation Q4H    carvedilol  25 mg Oral BID WC    citalopram  40 mg Oral Daily    sodium chloride flush  10 mL Intravenous 2 times per day    enoxaparin  40 mg Subcutaneous Daily    levofloxacin  750 mg Intravenous Q48H       Continuous Infusions:   dexmedetomidine (PRECEDEX) IV infusion 1.2 mcg/kg/hr (11/13/20 8160)    fentaNYL (SUBLIMAZE) infusion Stopped (11/13/20 0741)    propofol Stopped (11/13/20 0741)    dextrose         PRN Meds:  acetaminophen, fentanNYL, midazolam, sodium chloride flush, acetaminophen **OR** acetaminophen, polyethylene glycol, promethazine **OR** ondansetron, glucose, dextrose, glucagon (rDNA), dextrose      Data:  CBC:   Recent Labs     11/11/20  0627 11/12/20 0413 11/13/20 0411   WBC 7.1 7.3 8.0   HGB 8.7* 8.4* 10.0*   HCT 26.3* 25.3* 30.1*   MCV 92.2 91.9 90.9    147 129*     BMP:   Recent Labs     11/12/20 0413 11/12/20  1048 11/13/20 0411    136 136   K 3.1* 4.1 3.3*   CL 89* 89* 88*   CO2 34* 35* 35*   PHOS 4.7 4.5 5.7*   BUN 29* 29* 32*   CREATININE 1.3* 1.4* 1.4*     LIVER PROFILE:   No results for input(s): AST, ALT, LIPASE, BILIDIR, BILITOT, ALKPHOS in the last 72 hours. Invalid input(s): AMYLASE,  ALB  PT/INR:   No results for input(s): PROTIME, INR in the last 72 hours. Cultures:   Results for Harper Russell (MRN [id number]) as of 11/8/2020 16:17   Ref. Range 11/8/2020 03:11   SARS-CoV-2, NAAT Latest Ref Range: Not Detected  Not Detected       Radiology  XR CHEST PORTABLE   Final Result      XR CHEST PORTABLE   Final Result   Stable appropriate endotracheal tube positioning. Bilateral effusions and borderline edema, not substantially changed. XR CHEST PORTABLE   Final Result   Improving airspace opacities within the right hemithorax. Support tubes and lines as above, in grossly unchanged positioning. US THORACENTESIS   Final Result   Successful ultrasound guided left thoracentesis. XR CHEST PORTABLE   Final Result   Cardiomegaly with scattered right pulmonary infiltrates consistent pneumonia. Small right basilar effusion. Support tubes as described above.          XR CHEST PORTABLE   Final Result   Persistent multifocal bilateral airspace disease, slightly increased at the   left base as compared to prior. XR CHEST PORTABLE   Final Result   Endotracheal tube 3.3 cm above the [place]. Gastric tube in the mid stomach. Increasing         CT CHEST PULMONARY EMBOLISM W CONTRAST   Final Result   1. Multifocal consolidative opacity compatible with inflammatory process or   infection. 2.  Motion artifact degrades evaluation of the pulmonary arteries. No   evidence for central embolism. 3.  Nodular appearance of some of these opacities are also noted, favored to   represent a component of the acute inflammatory process/infection versus   metastatic disease. Short-term follow-up in 3 months is recommended to   ensure resolution. 4.  Moderate-sized left pleural effusion and suspected pleural thickening. Neoplastic involvement is not excluded. 5.  Osseous metastatic disease again demonstrated. Similar appearance of   mediastinal lymphadenopathy and left internal lymphadenopathy. CT HEAD WO CONTRAST   Final Result   No acute intracranial abnormality. XR CHEST PORTABLE   Final Result   Patchy bilateral pulmonary opacities, concerning for multifocal pneumonia. Imaging features can be seen with COVID-19 pneumonia, though are nonspecific   and can occur with a variety of infectious and noninfectious processes. PneInd              CXR 11/9  Endotracheal tube 3.3 cm above the [place].  Gastric tube in the mid stomach. Increasing     11/10 CXR   Persistent multifocal bilateral airspace disease, slightly increased at the   left base as compared to prior.      Assessment:  Principal Problem:    Acute respiratory failure with hypoxia (HCC)  Active Problems:    SOB (shortness of breath)    Primary cancer of right breast with metastasis to other site [facility])    Anxiety    DM2 (diabetes mellitus, type 2) (HCC)    GERD (gastroesophageal reflux disease)    Morbid obesity due to excess calories (HCC)    Pneumonia due to organism Paroxysmal atrial fibrillation (HCC)    Pleural effusion    Atrial fibrillation with RVR ([address]    HCAP (healthcare-associated pneumonia)  Resolved Problems:    * No resolved hospital problems. *      Plan:    #Acute respiratory failure with hypoxia. - This is likely secondary to pneumonia and pleural effusion.    -Pulmonary consultation was obtained. - O2 12L->. 5.5 L  -Remains on droplet plus precautions. Awaiting thoracentesis  -Worsening respiratory distress now, patient on 100% nonrebreather, severely hypoxic. Transferred to ICU--> subsequently intubated ( 11/9 PM )  -  COVID-19  Negative  Did not do well on a spontaneous breathing trial   SBT again today with Precedex. #Healthcare associated pneumonia from MRSA or gram-negative organism. With recent hospitalization metastatic breast cancer she is at risk for MRSA and for gram-negative pneumonia. -Received 6 days of IV vancomycin. Levaquin day 7  Cultures are negative so far    #Left pleural effusion. She had this at [facility] 3 weeks ago. Thoracentesis was done. Cytology that did not show any malignant cells. Repeat thoracentesis  Ordered- 700 ml removed  11/10. The possibility exists that this could be malignant pleural effusion. #Diabetes mellitus type 2. Monitor sugars closely. #Atrial fibrillation with RVR. Presently in sinus rhythm. #Morbid Obesity  - Body mass index is 38.45 kg/m². - Complicating assessment and treatment. Placing patient at risk for multiple co-morbidities as well as early death and contributing to the patient's presentation.   - Counseled on weight loss. #Metastatic breast cancer. On Ibrance. #Nodular lung opacity in the lung may be metastatic breast disease. #Lovenox for DVT prophylaxis.       Art Deng MD 11/13/2020 9:08 AM Lip Wedge Excision Repair Text: Given the location of the defect and the proximity to free margins a full thickness wedge repair was deemed most appropriate.  Using a sterile surgical marker, the appropriate repair was drawn incorporating the defect and placing the expected incisions perpendicular to the vermilion border.  The vermilion border was also meticulously outlined to ensure appropriate reapproximation during the repair.  The area thus outlined was incised through and through with a #15 scalpel blade.  The muscularis and dermis were reaproximated with deep sutures following hemostasis. Care was taken to realign the vermilion border before proceeding with the superficial closure.  Once the vermilion was realigned the superfical and mucosal closure was finished.

## 2025-03-09 NOTE — PROGRESS NOTES
Pulmonary & Critical Care Medicine ICU Progress Note    CC: Fatigue, anemia, PEA arrest in emergency department    Events of Last 24 hours:   Rewarming - @ target @ 6 m  Seen by cardiology and nephrology, recommended echo & 2% saline  Levophed @ 5   Propofol @ 25     Invasive Lines: Right IJ CVC 12/10/2020    MV: 12/10/2020  Vent Mode: AC/VC Rate Set: 28 bmp/Vt Ordered: 340 mL/ /FiO2 : 30 %  Recent Labs     20  1922 20  0000   PHART 7.411 7.405   CNI3AMO 34.3* 34.5*   PO2ART 85.4 82.5       IV:   IV infusion builder 60 mL/hr at 20 1507    norepinephrine 5 mcg/min (20 0400)    propofol 10 mcg/kg/min (20 0500)    vasopressin (Septic Shock) infusion 0.04 Units/min (20 2200)    dextrose      insulin (HUMAN R) non-weight based infusion 0.97 Units/hr (20)       Vitals:  Blood pressure 108/78, pulse 78, temperature 97.7 °F (36.5 °C), temperature source Bladder, resp. rate (!) 35, height 5' 4\" (1.626 m), weight 254 lb 13.6 oz (115.6 kg), SpO2 96 %, not currently breastfeeding. on 30%  Temp  Av.1 °F (34.5 °C)  Min: 90.1 °F (32.3 °C)  Max: 97.7 °F (36.5 °C)    Intake/Output Summary (Last 24 hours) at 2020 4146  Last data filed at 2020 0600  Gross per 24 hour   Intake 2419 ml   Output 2037 ml   Net 382 ml     EXAM:  General: intubated, ill appearing    ENT: Pharynx with ETT. Resp: No crackles. No wheezing. CV: S1, S2. +edema  GI: NT, ND, +BS  Skin: Warm and dry. Neuro: PERRL. Sedated, not following commands.  Moves extremities for nursing, not to my exam.  Normal oculocephalic, +gag, +spontaneous breathing    Scheduled Meds:   miconazole   Topical BID    cefTRIAXone (ROCEPHIN) IV  2 g Intravenous Q24H    sodium chloride flush  10 mL Intravenous 2 times per day    carboxymethylcellulose PF  1 drop Both Eyes Q4H    And    artificial tears   Both Eyes Q4H    chlorhexidine  15 mL Mouth/Throat BID    pantoprazole  40 mg Intravenous BID Reports multiple previous episode of syncope  First occurrence was August, last epsiode of syncope was 6 months ago.   She denies any syncope since wearing her compression stockings routinely and increasing her dietary intake.   Her echocardiogram showed normal LV function with no significant valve pathology  There were no residual effects of \"cardiac surgery as 5 month old\"  Holter also with no pathological findings, only SVT that is medically controlled.   Suspect component of orthostatic and vasovagal syncope.   Recommend continued avoidance dehydration, constipation and diarrhea      mupirocin   Nasal BID    dexamethasone  6 mg Intravenous Daily     PRN Meds:  perflutren lipid microspheres, acetaminophen **OR** acetaminophen, sodium chloride flush, polyethylene glycol, promethazine **OR** ondansetron, fentanNYL, albuterol sulfate HFA **AND** ipratropium **AND** MDI Treatment, promethazine **OR** ondansetron, midazolam, ipratropium-albuterol, glucose, dextrose, glucagon (rDNA), dextrose    Results:  CBC:   Recent Labs     12/11/20 1500 12/11/20 2000 12/12/20  0200   WBC 10.8 11.1* 15.2*   HGB 8.2* 8.1* 8.3*   HCT 24.7* 24.5* 25.3*   MCV 85.2 85.7 86.4   * 107* 116*     BMP:   Recent Labs     12/11/20  0830 12/11/20 1500 12/11/20  1500 12/11/20 2000 12/11/20 2000 12/12/20  0000 12/12/20  0200 12/12/20  0400   * 120*  --  120*  --   --  121*  --    K 4.1 4.0   < > 4.0   < > 4.2 4.2 4.2   CL 91* 88*  --  88*  --   --  89*  --    CO2 22 20*  --  20*  --   --  21  --    PHOS 6.9* 6.7*  --  7.2*  --   --   --   --    BUN 47* 49*  --  52*  --   --  52*  --    CREATININE 2.5* 2.3*  --  2.4*  --   --  2.4*  --     < > = values in this interval not displayed.      LIVER PROFILE:   Recent Labs     12/10/20  2000 12/11/20  0830 12/11/20  1810 12/12/20  0200   * 133* 123* 125*   ALT 93* 91* 96* 106*   LIPASE 9.0*  --   --   --    BILIDIR 3.2* 1.7* 1.0* 0.7*   BILITOT 3.5* 2.0* 1.1* 0.9   ALKPHOS 133* 110 97 99       Cultures:  12/10/2020 SARS-CoV-2 positive  12/10/2020 blood strep pyogenes    Films:  CXR 12/12/2020 stable lines and tubes    Head CT 12/11/2020 no acute abnormality    ASSESSMENT:  · Cardiac Arrest/PEA: unclear etiology - total compression time 18 minutes over 4 separate episodes with intermittent ROSC  · Strep pyogenes bacteremia  · Septic shock  · Acute hypoxemic respiratory failure   · LLL pneumonia  · COVID-19 pneumonia   · Acute anemia s/p 2 U PRBC  · MIGUELANGEL  · Hypo-natremia has worsened  · Stage IV decubitus ulcer     PLAN:  COVID-19 isolation, droplet plus Mechanical ventilation as per my orders. The ventilator was adjusted by me at the bedside for unstable, life threatening respiratory failure. Fentanyl PRN pain; Versed PRN agitation. Attempt to avoid sedation as able  Titrating vasopressors for MAP 65  S/P TTM, started rewarming 12/11/20 at 1600  No Remdesevir given low GFR  No CCP as we cannot be certain transfusion reaction not part of current pathology   CTX 2 gram D#2, antibiotic D#3   Decadron 6 mg IV daily, D#3   2% saline per nephrology, avoid free water    Echo per cardiology  D/C insulin gtt  Start TF   Repeat ABG off bicarbonate  Protonix BID   Prophylaxis: DVT - SCD; MRSA - mupirocin; GI - on protonix  · JORGITO is haris Vergara     Total critical care time caring for this patient with life threatening, unstable organ failure, including direct patient contact, management of life support systems, review of data including imaging and labs, discussions with other team members and physicians at least 35 minutes so far today, excluding procedures.

## (undated) DEVICE — FORCEPS BX L240CM DIA2.4MM L NDL RAD JAW 4 133340

## (undated) DEVICE — ENDO CARRY-ON PROCEDURE KIT INCLUDES SUCTION TUBING, LUBRICANT, GAUZE, BIOHAZARD STICKER, TRANSPORT PAD AND INTERCEPT BEDSIDE KIT.: Brand: ENDO CARRY-ON PROCEDURE KIT

## (undated) DEVICE — ELECTRODE ECG MONITR FOAM TEAR DROP ADLT RED